# Patient Record
Sex: MALE | Race: ASIAN | NOT HISPANIC OR LATINO | URBAN - METROPOLITAN AREA
[De-identification: names, ages, dates, MRNs, and addresses within clinical notes are randomized per-mention and may not be internally consistent; named-entity substitution may affect disease eponyms.]

---

## 2019-04-01 ENCOUNTER — OUTPATIENT (OUTPATIENT)
Dept: OUTPATIENT SERVICES | Facility: HOSPITAL | Age: 79
LOS: 1 days | End: 2019-04-01
Payer: MEDICAID

## 2019-04-03 ENCOUNTER — INPATIENT (INPATIENT)
Facility: HOSPITAL | Age: 79
LOS: 21 days | Discharge: HOME | DRG: 286 | End: 2019-04-25
Attending: HOSPITALIST | Admitting: HOSPITALIST
Payer: MEDICAID

## 2019-04-03 VITALS
HEIGHT: 66 IN | RESPIRATION RATE: 18 BRPM | OXYGEN SATURATION: 98 % | SYSTOLIC BLOOD PRESSURE: 164 MMHG | DIASTOLIC BLOOD PRESSURE: 110 MMHG | WEIGHT: 167.99 LBS | HEART RATE: 102 BPM | TEMPERATURE: 98 F

## 2019-04-03 DIAGNOSIS — I48.91 UNSPECIFIED ATRIAL FIBRILLATION: ICD-10-CM

## 2019-04-03 LAB
ALBUMIN SERPL ELPH-MCNC: 3.7 G/DL — SIGNIFICANT CHANGE UP (ref 3.3–5)
ALP SERPL-CCNC: 111 U/L — SIGNIFICANT CHANGE UP (ref 40–120)
ALT FLD-CCNC: 467 U/L — HIGH (ref 10–45)
ANION GAP SERPL CALC-SCNC: 15 MMOL/L — SIGNIFICANT CHANGE UP (ref 5–17)
APPEARANCE UR: CLEAR — SIGNIFICANT CHANGE UP
APTT BLD: 27.2 SEC — LOW (ref 27.5–36.3)
AST SERPL-CCNC: 389 U/L — HIGH (ref 10–40)
BACTERIA # UR AUTO: ABNORMAL
BASE EXCESS BLDV CALC-SCNC: -1.2 MMOL/L — SIGNIFICANT CHANGE UP (ref -2–2)
BASOPHILS # BLD AUTO: 0 K/UL — SIGNIFICANT CHANGE UP (ref 0–0.2)
BASOPHILS NFR BLD AUTO: 0.1 % — SIGNIFICANT CHANGE UP (ref 0–2)
BILIRUB SERPL-MCNC: 0.5 MG/DL — SIGNIFICANT CHANGE UP (ref 0.2–1.2)
BILIRUB UR-MCNC: NEGATIVE — SIGNIFICANT CHANGE UP
BUN SERPL-MCNC: 34 MG/DL — HIGH (ref 7–23)
CA-I SERPL-SCNC: 1.1 MMOL/L — LOW (ref 1.12–1.3)
CALCIUM SERPL-MCNC: 8.9 MG/DL — SIGNIFICANT CHANGE UP (ref 8.4–10.5)
CHLORIDE BLDV-SCNC: 96 MMOL/L — SIGNIFICANT CHANGE UP (ref 96–108)
CHLORIDE SERPL-SCNC: 90 MMOL/L — LOW (ref 96–108)
CO2 BLDV-SCNC: 24 MMOL/L — SIGNIFICANT CHANGE UP (ref 22–30)
CO2 SERPL-SCNC: 19 MMOL/L — LOW (ref 22–31)
COLOR SPEC: YELLOW — SIGNIFICANT CHANGE UP
CREAT SERPL-MCNC: 1.59 MG/DL — HIGH (ref 0.5–1.3)
DIFF PNL FLD: ABNORMAL
EOSINOPHIL # BLD AUTO: 0.1 K/UL — SIGNIFICANT CHANGE UP (ref 0–0.5)
EOSINOPHIL NFR BLD AUTO: 0.3 % — SIGNIFICANT CHANGE UP (ref 0–6)
EPI CELLS # UR: 3 — SIGNIFICANT CHANGE UP
GAS PNL BLDV: 121 MMOL/L — LOW (ref 136–145)
GAS PNL BLDV: SIGNIFICANT CHANGE UP
GAS PNL BLDV: SIGNIFICANT CHANGE UP
GLUCOSE BLDV-MCNC: 181 MG/DL — HIGH (ref 70–99)
GLUCOSE SERPL-MCNC: 190 MG/DL — HIGH (ref 70–99)
GLUCOSE UR QL: NEGATIVE — SIGNIFICANT CHANGE UP
HCO3 BLDV-SCNC: 23 MMOL/L — SIGNIFICANT CHANGE UP (ref 21–29)
HCT VFR BLD CALC: 39.2 % — SIGNIFICANT CHANGE UP (ref 39–50)
HCT VFR BLDA CALC: 42 % — SIGNIFICANT CHANGE UP (ref 39–50)
HGB BLD CALC-MCNC: 13.6 G/DL — SIGNIFICANT CHANGE UP (ref 13–17)
HGB BLD-MCNC: 13.6 G/DL — SIGNIFICANT CHANGE UP (ref 13–17)
HYALINE CASTS # UR AUTO: 35 /LPF — HIGH (ref 0–7)
INR BLD: 1.52 RATIO — HIGH (ref 0.88–1.16)
KETONES UR-MCNC: NEGATIVE — SIGNIFICANT CHANGE UP
LACTATE BLDV-MCNC: 3.3 MMOL/L — HIGH (ref 0.7–2)
LEUKOCYTE ESTERASE UR-ACNC: NEGATIVE — SIGNIFICANT CHANGE UP
LIDOCAIN IGE QN: 72 U/L — HIGH (ref 7–60)
LYMPHOCYTES # BLD AUTO: 1.9 K/UL — SIGNIFICANT CHANGE UP (ref 1–3.3)
LYMPHOCYTES # BLD AUTO: 12.1 % — LOW (ref 13–44)
MCHC RBC-ENTMCNC: 29.6 PG — SIGNIFICANT CHANGE UP (ref 27–34)
MCHC RBC-ENTMCNC: 34.8 GM/DL — SIGNIFICANT CHANGE UP (ref 32–36)
MCV RBC AUTO: 85 FL — SIGNIFICANT CHANGE UP (ref 80–100)
MONOCYTES # BLD AUTO: 1.5 K/UL — HIGH (ref 0–0.9)
MONOCYTES NFR BLD AUTO: 9.4 % — SIGNIFICANT CHANGE UP (ref 2–14)
NEUTROPHILS # BLD AUTO: 12.1 K/UL — HIGH (ref 1.8–7.4)
NEUTROPHILS NFR BLD AUTO: 78.1 % — HIGH (ref 43–77)
NITRITE UR-MCNC: NEGATIVE — SIGNIFICANT CHANGE UP
NT-PROBNP SERPL-SCNC: HIGH PG/ML (ref 0–300)
OSMOLALITY UR: 506 MOS/KG — SIGNIFICANT CHANGE UP (ref 300–900)
PCO2 BLDV: 40 MMHG — SIGNIFICANT CHANGE UP (ref 35–50)
PH BLDV: 7.38 — SIGNIFICANT CHANGE UP (ref 7.35–7.45)
PH UR: 6 — SIGNIFICANT CHANGE UP (ref 5–8)
PLATELET # BLD AUTO: 255 K/UL — SIGNIFICANT CHANGE UP (ref 150–400)
PO2 BLDV: 20 MMHG — LOW (ref 25–45)
POTASSIUM BLDV-SCNC: 4.6 MMOL/L — SIGNIFICANT CHANGE UP (ref 3.5–5.3)
POTASSIUM SERPL-MCNC: 4.9 MMOL/L — SIGNIFICANT CHANGE UP (ref 3.5–5.3)
POTASSIUM SERPL-SCNC: 4.9 MMOL/L — SIGNIFICANT CHANGE UP (ref 3.5–5.3)
PROT SERPL-MCNC: 7.2 G/DL — SIGNIFICANT CHANGE UP (ref 6–8.3)
PROT UR-MCNC: ABNORMAL
PROTHROM AB SERPL-ACNC: 17.5 SEC — HIGH (ref 10–12.9)
RBC # BLD: 4.61 M/UL — SIGNIFICANT CHANGE UP (ref 4.2–5.8)
RBC # FLD: 12.6 % — SIGNIFICANT CHANGE UP (ref 10.3–14.5)
RBC CASTS # UR COMP ASSIST: >50 /HPF — HIGH (ref 0–4)
SAO2 % BLDV: 24 % — LOW (ref 67–88)
SODIUM SERPL-SCNC: 124 MMOL/L — LOW (ref 135–145)
SP GR SPEC: 1.03 — HIGH (ref 1.01–1.02)
TROPONIN T, HIGH SENSITIVITY RESULT: 54 NG/L — HIGH (ref 0–51)
UROBILINOGEN FLD QL: NEGATIVE — SIGNIFICANT CHANGE UP
WBC # BLD: 15.5 K/UL — HIGH (ref 3.8–10.5)
WBC # FLD AUTO: 15.5 K/UL — HIGH (ref 3.8–10.5)
WBC UR QL: 3 /HPF — SIGNIFICANT CHANGE UP (ref 0–5)

## 2019-04-03 PROCEDURE — 93308 TTE F-UP OR LMTD: CPT | Mod: 26

## 2019-04-03 PROCEDURE — 99291 CRITICAL CARE FIRST HOUR: CPT

## 2019-04-03 PROCEDURE — 93010 ELECTROCARDIOGRAM REPORT: CPT

## 2019-04-03 PROCEDURE — 71045 X-RAY EXAM CHEST 1 VIEW: CPT | Mod: 26

## 2019-04-03 PROCEDURE — 99223 1ST HOSP IP/OBS HIGH 75: CPT | Mod: GC

## 2019-04-03 RX ORDER — FUROSEMIDE 40 MG
40 TABLET ORAL ONCE
Qty: 0 | Refills: 0 | Status: COMPLETED | OUTPATIENT
Start: 2019-04-03 | End: 2019-04-03

## 2019-04-03 RX ORDER — ONDANSETRON 8 MG/1
4 TABLET, FILM COATED ORAL ONCE
Qty: 0 | Refills: 0 | Status: COMPLETED | OUTPATIENT
Start: 2019-04-03 | End: 2019-04-03

## 2019-04-03 RX ORDER — METOPROLOL TARTRATE 50 MG
5 TABLET ORAL ONCE
Qty: 0 | Refills: 0 | Status: COMPLETED | OUTPATIENT
Start: 2019-04-03 | End: 2019-04-03

## 2019-04-03 RX ORDER — METOPROLOL TARTRATE 50 MG
25 TABLET ORAL ONCE
Qty: 0 | Refills: 0 | Status: COMPLETED | OUTPATIENT
Start: 2019-04-03 | End: 2019-04-03

## 2019-04-03 RX ORDER — ENOXAPARIN SODIUM 100 MG/ML
80 INJECTION SUBCUTANEOUS ONCE
Qty: 0 | Refills: 0 | Status: COMPLETED | OUTPATIENT
Start: 2019-04-03 | End: 2019-04-03

## 2019-04-03 RX ORDER — SODIUM CHLORIDE 9 MG/ML
1000 INJECTION INTRAMUSCULAR; INTRAVENOUS; SUBCUTANEOUS ONCE
Qty: 0 | Refills: 0 | Status: COMPLETED | OUTPATIENT
Start: 2019-04-03 | End: 2019-04-03

## 2019-04-03 RX ORDER — METOCLOPRAMIDE HCL 10 MG
10 TABLET ORAL ONCE
Qty: 0 | Refills: 0 | Status: COMPLETED | OUTPATIENT
Start: 2019-04-03 | End: 2019-04-03

## 2019-04-03 RX ADMIN — ENOXAPARIN SODIUM 80 MILLIGRAM(S): 100 INJECTION SUBCUTANEOUS at 20:36

## 2019-04-03 RX ADMIN — Medication 5 MILLIGRAM(S): at 18:23

## 2019-04-03 RX ADMIN — SODIUM CHLORIDE 1000 MILLILITER(S): 9 INJECTION INTRAMUSCULAR; INTRAVENOUS; SUBCUTANEOUS at 17:09

## 2019-04-03 RX ADMIN — Medication 10 MILLIGRAM(S): at 17:09

## 2019-04-03 RX ADMIN — ONDANSETRON 4 MILLIGRAM(S): 8 TABLET, FILM COATED ORAL at 14:33

## 2019-04-03 RX ADMIN — Medication 40 MILLIGRAM(S): at 18:23

## 2019-04-03 RX ADMIN — Medication 25 MILLIGRAM(S): at 18:53

## 2019-04-03 NOTE — ED PROVIDER NOTE - CROS ED CONS ALL NEG
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 During hospitalization, patient presented with elevated blood pressures and sudden drop to systolic 130s, which could cause ischemic ATN.  2. Over diuresis for CHF  3. Hb trending down, causing hypoperfusion of kidneys  4. Hypoperfusion with cardiorenal etiology, given her decompensated CHF  5. Progression of her CKD  6. Progression of medication effect    Plan / Recommendations:  1. Continue holding diuretics.  2. Medications to renal parameters  3. Follow-up with Nephrology outpatient within 2 weeks with Renal Panel Labs.  4. Strict I/O and chart.  5. Avoid nephrotoxic medications.  6. Maintain MAP > 65.   7. Hb > 7gm/dL.         negative...

## 2019-04-03 NOTE — ED PROVIDER NOTE - CLINICAL SUMMARY MEDICAL DECISION MAKING FREE TEXT BOX
ATTG: : abd distention with decreased po, concern for obstruction, inflammation, will check labs, check ct a/p, ivf, pain meds, re eval for dispo.

## 2019-04-03 NOTE — ED PROVIDER NOTE - CARE PLAN
Principal Discharge DX:	Rapid atrial fibrillation  Secondary Diagnosis:	Acute congestive heart failure, unspecified heart failure type  Secondary Diagnosis:	Hyponatremia

## 2019-04-03 NOTE — ED PROVIDER NOTE - PROGRESS NOTE DETAILS
Attending MD Boyce: patient in rapid afib afib to 150s, increased WOB, POCUS with global LV dysfunction and b/l B lines c/f new onset Chf, lopressor 5mg IV given, will give IV lasix. Attending MD Boyce: patient still unable to lie flat, abdomen not a surgical abdomen so I think CT imaging can be deferred for the moment while we address the patient's acute pulmonary edema. Will admit to telemetry

## 2019-04-03 NOTE — ED PROVIDER NOTE - OBJECTIVE STATEMENT
78yo M denies pmhx, denies past surg hx presents to ER c/o of generalized abdominal pain(fullness) and distension associated with dec po intake, n/v x1week.  Patient reports ~3episodes of nonbloody emesis total, constant nausea.  Tried taking Protonix with no relief.  Has never had colonoscopy. reports having bowel movements everyday however lower in caliber over past 4 days.  Denies fever, chills, change in urinating, hematuria, prostate issues, bloody stool, ETOH use.    Of note: Mild swelling bilaterally 80yo M denies pmhx, denies past surg hx presents to ER c/o of generalized abdominal pain(fullness) and distension associated with dec po intake, n/v x1week.  Patient reports ~3episodes of nonbloody emesis total, constant nausea. Also noted swelling bilaterally. Tried taking Protonix with no relief.  Has never had colonoscopy. reports having bowel movements everyday however lower in caliber over past 4 days.  Denies fever, chills, change in urinating, hematuria, prostate issues, bloody stool, ETOH use.

## 2019-04-03 NOTE — ED PROVIDER NOTE - ATTENDING CONTRIBUTION TO CARE
78 y/o M with pmhx denies pmhx, denies past surg hx presents for generalized abdominal pain described as fullness and distention. e, n/v x1week.    Gen.    HEENT:    Lungs:    CVS: S1S2   Abd;    Ext:   Neuro:  MSK: 78 y/o M with pmhx denies pmhx, denies past surg hx presents for generalized abdominal pain described as fullness and distention. Patient states he feels as though his abd is larger. also with decreased PO intake.had 3 episodes of nonbloody emesis total, constant nausea.    Denies fever, chills, change in urinating, hematuria, prostate issues, bloody stool, ETOH use. son is a physician, at the bedside.   Gen.  no acute distress  HEENT:  perrl eomi  Lungs:  b/l bs  CVS: S1S2   Abd;  soft, non tender. no guarding.  min distention.  Ext: b/l lower ext edema  Neuro: aaox3 no focal deficits  MSK: strength 5/5 x 4 ext

## 2019-04-03 NOTE — ED PROVIDER NOTE - CRITICAL CARE PROVIDED
interpretation of diagnostic studies/documentation/direct patient care (not related to procedure)/consult w/ pt's family directly relating to pts condition/additional history taking

## 2019-04-03 NOTE — ED ADULT NURSE NOTE - OBJECTIVE STATEMENT
79 year old male A&OX4 with no medical hx presents with one week of nausea, vomiting, abdominal distention, and decreased PO intake. Patient's son states that patient has had significant abdominal swelling over the last several days. Patient also notes increased weakness as well as dyspnea on exertion. Patient's lung sounds are clear and equal bilaterally. Abdomen is distended and non tender to palpation. Patient denies fevers, chills, chest pain, palpitations, constipation, diarrhea. 79 year old male A&OX4 with no medical hx presents with one week of nausea, vomiting, abdominal distention, and decreased PO intake. Patient's son states that patient has had significant abdominal swelling over the last several days. Patient also notes increased weakness as well as dyspnea on exertion. Patient's lung sounds are clear and equal bilaterally. Abdomen is distended and non tender to palpation. Bilateral lower extremity swelling noted. Patient denies fevers, chills, chest pain, palpitations, constipation, diarrhea, no jaundice noted or icterus of the sclera.

## 2019-04-03 NOTE — ED STATDOCS - OBJECTIVE STATEMENT
79 y.o. male no PMHx coming in with his son that is a physician complaining of diffuse abd pain, distension, burping, nausea, vomiting.  Symptoms started on Friday with swelling of the abdomen and some nausea.  Since has had intermittent pain but every day.  Difficult swallowing solids, only able to tolerate small amounts of PO liquids.  Last BM was yesterday and normal.  No dysuria, no frequency.  NO chest pain, palp, or SoB.  Has not taken anything for the pain, nothing makes it better, eating makes it worse.

## 2019-04-04 DIAGNOSIS — N17.9 ACUTE KIDNEY FAILURE, UNSPECIFIED: ICD-10-CM

## 2019-04-04 DIAGNOSIS — E87.79 OTHER FLUID OVERLOAD: ICD-10-CM

## 2019-04-04 DIAGNOSIS — I50.9 HEART FAILURE, UNSPECIFIED: ICD-10-CM

## 2019-04-04 DIAGNOSIS — D72.829 ELEVATED WHITE BLOOD CELL COUNT, UNSPECIFIED: ICD-10-CM

## 2019-04-04 DIAGNOSIS — R10.13 EPIGASTRIC PAIN: ICD-10-CM

## 2019-04-04 DIAGNOSIS — R18.8 OTHER ASCITES: ICD-10-CM

## 2019-04-04 DIAGNOSIS — I48.91 UNSPECIFIED ATRIAL FIBRILLATION: ICD-10-CM

## 2019-04-04 DIAGNOSIS — R74.0 NONSPECIFIC ELEVATION OF LEVELS OF TRANSAMINASE AND LACTIC ACID DEHYDROGENASE [LDH]: ICD-10-CM

## 2019-04-04 DIAGNOSIS — D68.9 COAGULATION DEFECT, UNSPECIFIED: ICD-10-CM

## 2019-04-04 DIAGNOSIS — E87.1 HYPO-OSMOLALITY AND HYPONATREMIA: ICD-10-CM

## 2019-04-04 DIAGNOSIS — Z29.9 ENCOUNTER FOR PROPHYLACTIC MEASURES, UNSPECIFIED: ICD-10-CM

## 2019-04-04 LAB
ALBUMIN SERPL ELPH-MCNC: 3.6 G/DL — SIGNIFICANT CHANGE UP (ref 3.3–5)
ALP SERPL-CCNC: 138 U/L — HIGH (ref 40–120)
ALT FLD-CCNC: 1350 U/L — HIGH (ref 10–45)
ANION GAP SERPL CALC-SCNC: 14 MMOL/L — SIGNIFICANT CHANGE UP (ref 5–17)
AST SERPL-CCNC: 1061 U/L — HIGH (ref 10–40)
BASE EXCESS BLDV CALC-SCNC: -2.5 MMOL/L — LOW (ref -2–2)
BASOPHILS # BLD AUTO: 0.02 K/UL — SIGNIFICANT CHANGE UP (ref 0–0.2)
BASOPHILS NFR BLD AUTO: 0.1 % — SIGNIFICANT CHANGE UP (ref 0–2)
BILIRUB DIRECT SERPL-MCNC: 0.4 MG/DL — HIGH (ref 0–0.2)
BILIRUB INDIRECT FLD-MCNC: 0.5 MG/DL — SIGNIFICANT CHANGE UP (ref 0.2–1)
BILIRUB SERPL-MCNC: 0.9 MG/DL — SIGNIFICANT CHANGE UP (ref 0.2–1.2)
BUN SERPL-MCNC: 45 MG/DL — HIGH (ref 7–23)
CA-I SERPL-SCNC: 1.01 MMOL/L — LOW (ref 1.12–1.3)
CALCIUM SERPL-MCNC: 8.7 MG/DL — SIGNIFICANT CHANGE UP (ref 8.4–10.5)
CHLORIDE BLDV-SCNC: 102 MMOL/L — SIGNIFICANT CHANGE UP (ref 96–108)
CHLORIDE SERPL-SCNC: 93 MMOL/L — LOW (ref 96–108)
CHLORIDE UR-SCNC: 56 MMOL/L — SIGNIFICANT CHANGE UP
CK MB CFR SERPL CALC: 5.6 NG/ML — SIGNIFICANT CHANGE UP (ref 0–6.7)
CK SERPL-CCNC: 98 U/L — SIGNIFICANT CHANGE UP (ref 30–200)
CO2 BLDV-SCNC: 22 MMOL/L — SIGNIFICANT CHANGE UP (ref 22–30)
CO2 SERPL-SCNC: 19 MMOL/L — LOW (ref 22–31)
CREAT ?TM UR-MCNC: 35 MG/DL — SIGNIFICANT CHANGE UP
CREAT ?TM UR-MCNC: 36 MG/DL — SIGNIFICANT CHANGE UP
CREAT SERPL-MCNC: 1.77 MG/DL — HIGH (ref 0.5–1.3)
EOSINOPHIL # BLD AUTO: 0 K/UL — SIGNIFICANT CHANGE UP (ref 0–0.5)
EOSINOPHIL NFR BLD AUTO: 0 % — SIGNIFICANT CHANGE UP (ref 0–6)
FERRITIN SERPL-MCNC: 2570 NG/ML — HIGH (ref 30–400)
GAS PNL BLDV: 118 MMOL/L — CRITICAL LOW (ref 136–145)
GAS PNL BLDV: SIGNIFICANT CHANGE UP
GAS PNL BLDV: SIGNIFICANT CHANGE UP
GLUCOSE BLDV-MCNC: 123 MG/DL — HIGH (ref 70–99)
GLUCOSE SERPL-MCNC: 121 MG/DL — HIGH (ref 70–99)
HAV IGM SER-ACNC: SIGNIFICANT CHANGE UP
HBA1C BLD-MCNC: 6.6 % — HIGH (ref 4–5.6)
HBA1C BLD-MCNC: 6.7 % — HIGH (ref 4–5.6)
HBV CORE IGM SER-ACNC: SIGNIFICANT CHANGE UP
HBV SURFACE AG SER-ACNC: SIGNIFICANT CHANGE UP
HCO3 BLDV-SCNC: 21 MMOL/L — SIGNIFICANT CHANGE UP (ref 21–29)
HCT VFR BLD CALC: 38.9 % — LOW (ref 39–50)
HCT VFR BLDA CALC: 40 % — SIGNIFICANT CHANGE UP (ref 39–50)
HCV AB S/CO SERPL IA: 0.07 S/CO — SIGNIFICANT CHANGE UP (ref 0–0.99)
HCV AB S/CO SERPL IA: 0.07 S/CO — SIGNIFICANT CHANGE UP (ref 0–0.99)
HCV AB SERPL-IMP: SIGNIFICANT CHANGE UP
HCV AB SERPL-IMP: SIGNIFICANT CHANGE UP
HGB BLD CALC-MCNC: 13 G/DL — SIGNIFICANT CHANGE UP (ref 13–17)
HGB BLD-MCNC: 12.8 G/DL — LOW (ref 13–17)
IMM GRANULOCYTES NFR BLD AUTO: 1.1 % — SIGNIFICANT CHANGE UP (ref 0–1.5)
LACTATE BLDV-MCNC: 2.7 MMOL/L — HIGH (ref 0.7–2)
LYMPHOCYTES # BLD AUTO: 1.96 K/UL — SIGNIFICANT CHANGE UP (ref 1–3.3)
LYMPHOCYTES # BLD AUTO: 12.5 % — LOW (ref 13–44)
MAGNESIUM SERPL-MCNC: 2.4 MG/DL — SIGNIFICANT CHANGE UP (ref 1.6–2.6)
MCHC RBC-ENTMCNC: 28.1 PG — SIGNIFICANT CHANGE UP (ref 27–34)
MCHC RBC-ENTMCNC: 32.9 GM/DL — SIGNIFICANT CHANGE UP (ref 32–36)
MCV RBC AUTO: 85.3 FL — SIGNIFICANT CHANGE UP (ref 80–100)
MONOCYTES # BLD AUTO: 1.38 K/UL — HIGH (ref 0–0.9)
MONOCYTES NFR BLD AUTO: 8.8 % — SIGNIFICANT CHANGE UP (ref 2–14)
NEUTROPHILS # BLD AUTO: 12.19 K/UL — HIGH (ref 1.8–7.4)
NEUTROPHILS NFR BLD AUTO: 77.5 % — HIGH (ref 43–77)
OSMOLALITY SERPL: 286 MOS/KG — SIGNIFICANT CHANGE UP (ref 275–300)
OSMOLALITY UR: 323 MOS/KG — SIGNIFICANT CHANGE UP (ref 300–900)
PCO2 BLDV: 35 MMHG — SIGNIFICANT CHANGE UP (ref 35–50)
PH BLDV: 7.4 — SIGNIFICANT CHANGE UP (ref 7.35–7.45)
PHOSPHATE SERPL-MCNC: 4.6 MG/DL — HIGH (ref 2.5–4.5)
PLATELET # BLD AUTO: 220 K/UL — SIGNIFICANT CHANGE UP (ref 150–400)
PO2 BLDV: 46 MMHG — HIGH (ref 25–45)
POTASSIUM BLDV-SCNC: 4.3 MMOL/L — SIGNIFICANT CHANGE UP (ref 3.5–5.3)
POTASSIUM SERPL-MCNC: 4.7 MMOL/L — SIGNIFICANT CHANGE UP (ref 3.5–5.3)
POTASSIUM SERPL-SCNC: 4.7 MMOL/L — SIGNIFICANT CHANGE UP (ref 3.5–5.3)
POTASSIUM UR-SCNC: 44 MMOL/L — SIGNIFICANT CHANGE UP
PROT ?TM UR-MCNC: 15 MG/DL — HIGH (ref 0–12)
PROT SERPL-MCNC: 6.7 G/DL — SIGNIFICANT CHANGE UP (ref 6–8.3)
PROT/CREAT UR-RTO: 0.4 RATIO — HIGH (ref 0–0.2)
RAPID RVP RESULT: SIGNIFICANT CHANGE UP
RBC # BLD: 4.56 M/UL — SIGNIFICANT CHANGE UP (ref 4.2–5.8)
RBC # FLD: 13.1 % — SIGNIFICANT CHANGE UP (ref 10.3–14.5)
SAO2 % BLDV: 74 % — SIGNIFICANT CHANGE UP (ref 67–88)
SODIUM SERPL-SCNC: 126 MMOL/L — LOW (ref 135–145)
SODIUM UR-SCNC: 27 MMOL/L — SIGNIFICANT CHANGE UP
TROPONIN T, HIGH SENSITIVITY RESULT: 64 NG/L — HIGH (ref 0–51)
TROPONIN T, HIGH SENSITIVITY RESULT: 67 NG/L — HIGH (ref 0–51)
TSH SERPL-MCNC: 1.05 UIU/ML — SIGNIFICANT CHANGE UP (ref 0.27–4.2)
URATE SERPL-MCNC: 9.7 MG/DL — HIGH (ref 3.4–8.8)
UUN UR-MCNC: 426 MG/DL — SIGNIFICANT CHANGE UP
WBC # BLD: 15.73 K/UL — HIGH (ref 3.8–10.5)
WBC # FLD AUTO: 15.73 K/UL — HIGH (ref 3.8–10.5)

## 2019-04-04 PROCEDURE — 99255 IP/OBS CONSLTJ NEW/EST HI 80: CPT

## 2019-04-04 PROCEDURE — 99233 SBSQ HOSP IP/OBS HIGH 50: CPT | Mod: GC

## 2019-04-04 PROCEDURE — 74176 CT ABD & PELVIS W/O CONTRAST: CPT | Mod: 26

## 2019-04-04 PROCEDURE — 93306 TTE W/DOPPLER COMPLETE: CPT | Mod: 26

## 2019-04-04 PROCEDURE — 71250 CT THORAX DX C-: CPT | Mod: 26

## 2019-04-04 RX ORDER — ENOXAPARIN SODIUM 100 MG/ML
80 INJECTION SUBCUTANEOUS EVERY 12 HOURS
Qty: 0 | Refills: 0 | Status: DISCONTINUED | OUTPATIENT
Start: 2019-04-04 | End: 2019-04-04

## 2019-04-04 RX ORDER — FAMOTIDINE 10 MG/ML
20 INJECTION INTRAVENOUS DAILY
Qty: 0 | Refills: 0 | Status: DISCONTINUED | OUTPATIENT
Start: 2019-04-04 | End: 2019-04-25

## 2019-04-04 RX ORDER — FUROSEMIDE 40 MG
40 TABLET ORAL EVERY 12 HOURS
Qty: 0 | Refills: 0 | Status: DISCONTINUED | OUTPATIENT
Start: 2019-04-04 | End: 2019-04-05

## 2019-04-04 RX ORDER — METOPROLOL TARTRATE 50 MG
12.5 TABLET ORAL EVERY 12 HOURS
Qty: 0 | Refills: 0 | Status: DISCONTINUED | OUTPATIENT
Start: 2019-04-04 | End: 2019-04-04

## 2019-04-04 RX ORDER — METOPROLOL TARTRATE 50 MG
25 TABLET ORAL EVERY 12 HOURS
Qty: 0 | Refills: 0 | Status: DISCONTINUED | OUTPATIENT
Start: 2019-04-04 | End: 2019-04-05

## 2019-04-04 RX ORDER — HEPARIN SODIUM 5000 [USP'U]/ML
5000 INJECTION INTRAVENOUS; SUBCUTANEOUS EVERY 12 HOURS
Qty: 0 | Refills: 0 | Status: DISCONTINUED | OUTPATIENT
Start: 2019-04-04 | End: 2019-04-04

## 2019-04-04 RX ORDER — ERGOCALCIFEROL 1.25 MG/1
50000 CAPSULE ORAL
Qty: 0 | Refills: 0 | Status: DISCONTINUED | OUTPATIENT
Start: 2019-04-04 | End: 2019-04-25

## 2019-04-04 RX ORDER — ENOXAPARIN SODIUM 100 MG/ML
80 INJECTION SUBCUTANEOUS
Qty: 0 | Refills: 0 | Status: DISCONTINUED | OUTPATIENT
Start: 2019-04-04 | End: 2019-04-05

## 2019-04-04 RX ADMIN — Medication 40 MILLIGRAM(S): at 17:49

## 2019-04-04 RX ADMIN — Medication 40 MILLIGRAM(S): at 05:34

## 2019-04-04 RX ADMIN — ENOXAPARIN SODIUM 80 MILLIGRAM(S): 100 INJECTION SUBCUTANEOUS at 21:29

## 2019-04-04 RX ADMIN — Medication 12.5 MILLIGRAM(S): at 05:34

## 2019-04-04 RX ADMIN — Medication 25 MILLIGRAM(S): at 17:49

## 2019-04-04 RX ADMIN — ENOXAPARIN SODIUM 80 MILLIGRAM(S): 100 INJECTION SUBCUTANEOUS at 11:19

## 2019-04-04 RX ADMIN — Medication 1 TABLET(S): at 11:20

## 2019-04-04 RX ADMIN — FAMOTIDINE 20 MILLIGRAM(S): 10 INJECTION INTRAVENOUS at 11:20

## 2019-04-04 NOTE — H&P ADULT - PROBLEM SELECTOR PLAN 2
Patient previously with reported prior EKG at [facility], now found to have new atrial fibrillation with RVR to 130. No prior cardiac instrumentation. [id number]  - s/p metoprolol 5 mg IV and metorpolol 25 mg PO x 1  - currently in afib with controlled rate in 90s  - c/w metoprolol 12.5 mg q12, cautious given concomitant ADHF  - monitor on tele  - replete to K > 4 and Mg > 2  - check TSH  - TTE  - c/w lovenox for AC  - f.u cardiology recs Patient previously with reported prior EKG at [facility], now found to have new atrial fibrillation with RVR to 130. No prior cardiac instrumentation. [id number] 2  - s/p metoprolol 5 mg IV and metorpolol 25 mg PO x 1  - currently in afib with controlled rate in 90s  - c/w metoprolol 12.5 mg q12, cautious given concomitant ADHF  - monitor on tele  - replete to K > 4 and Mg > 2  - check TSH  - TTE  - hold lovenox for now  - f.u cardiology recs Patient previously with reported prior EKG at [facility], now found to have new atrial fibrillation with RVR to 130. No prior cardiac instrumentation. [id number] 2  - s/p metoprolol 5 mg IV and metoprolol 25 mg PO x 1  - currently in afib with controlled rate in 90s  - c/w metoprolol 12.5 mg q12, cautious given concomitant ADHF  - monitor on tele  - replete to K > 4 and Mg > 2  - check TSH  - TTE  - hold lovenox for now  - f.u cardiology recs Patient previously with reported prior EKG at [facility], now found to have new atrial fibrillation with RVR to 130. No prior cardiac instrumentation. [id number] 2 for now  - s/p metoprolol 5 mg IV and metoprolol 25 mg PO x 1  - currently in afib with controlled rate in 90s  - c/w metoprolol 12.5 mg q12, cautious given concomitant ADHF  - monitor on tele  - replete to K > 4 and Mg > 2  - check TSH  - TTE  - will cont lovenox for now  - f.u cardiology recs  - may benefit from cardioversion?

## 2019-04-04 NOTE — PROGRESS NOTE ADULT - PROBLEM SELECTOR PLAN 6
WBC 15 with only reported sx of non productive cough and vague abdominal sx  - f/u blood cultures  - possibly reactive, afebrile, VS stable  - f/u official CT chest read

## 2019-04-04 NOTE — CONSULT NOTE ADULT - SUBJECTIVE AND OBJECTIVE BOX
Patient is a 79y old  Male who presents with a chief complaint of dyspnea      HPI:  79 male patient p/w new onset sob found to have new onset CHF (low ef noted on tte), complicated by afib with vrv, ethan, hypoNa, elevated trop, leukocytosis. hepatology called for worsening LFts. Patient recalls having an isolated episode of jaundice in 2001 for which he received ? med for 3 weeks and that resolved afterwards. He denies recent infection, OTC meds, herbs, alcohol, ivdu, tattoos.     no egd  no colonoscopy  FH + gastric ca              PAST MEDICAL & SURGICAL HISTORY:  No pertinent past medical history  No significant past surgical history      Home Medications:  ergocalciferol 50,000 intl units (1.25 mg) oral capsule: 1 cap(s) orally once a week on Sunday (04 Apr 2019 02:53)  Multiple Vitamins oral tablet: 1 tab(s) orally once a day (04 Apr 2019 02:53)  Protonix: milligram(s) orally once a day, As Needed (04 Apr 2019 02:53)  tumeric: 500 milligram(s) orally once a day (04 Apr 2019 02:53)      MEDICATIONS  (STANDING):  enoxaparin Injectable 80 milliGRAM(s) SubCutaneous two times a day  ergocalciferol 62992 Unit(s) Oral every week  famotidine    Tablet 20 milliGRAM(s) Oral daily  furosemide   Injectable 40 milliGRAM(s) IV Push every 12 hours  metoprolol tartrate 25 milliGRAM(s) Oral every 12 hours  multivitamin 1 Tablet(s) Oral daily    MEDICATIONS  (PRN):      Allergies    No Known Allergies    Intolerances        FAMILY HISTORY:  FH: liver disease: unknown etiology, mother      SOCIAL    REVIEW OF SYSTEMS  General: mild fatigue   Skin: no new changes   Ophtalmologic: no new visual changes   Respiratory: no new shortness of breath   Cardiovascular: no new chest pain   Gastrointestinal: as per H&P   Genitourinary: no new dysuria   Neurological: no new weakness   otherwise as described above     Vital Signs Last 24 Hrs  T(C): 36.6 (04 Apr 2019 20:58), Max: 36.6 (04 Apr 2019 05:29)  T(F): 97.8 (04 Apr 2019 20:58), Max: 97.8 (04 Apr 2019 05:29)  HR: 51 (04 Apr 2019 20:58) (51 - 104)  BP: 118/90 (04 Apr 2019 20:58) (109/83 - 127/89)  BP(mean): --  RR: 18 (04 Apr 2019 20:58) (16 - 18)  SpO2: 98% (04 Apr 2019 20:58) (94% - 100%)    GENERAL:  no distress  SKIN: no new changes   HEENT:  NC/AT,  anicteric  CHEST:   no new increased effort, breath sounds clear  HEART:  Regular rhythm  ABDOMEN:  Soft, non-tender, non-distended  EXTREMITIES:  no new cyanosis, LLE   PSYCHIATRIC: normal affect       CBC Full  -  ( 04 Apr 2019 09:41 )  WBC Count : 15.73 K/uL  RBC Count : 4.56 M/uL  Hemoglobin : 12.8 g/dL  Hematocrit : 38.9 %  Platelet Count - Automated : 220 K/uL  Mean Cell Volume : 85.3 fl  Mean Cell Hemoglobin : 28.1 pg  Mean Cell Hemoglobin Concentration : 32.9 gm/dL  Auto Neutrophil # : 12.19 K/uL  Auto Lymphocyte # : 1.96 K/uL  Auto Monocyte # : 1.38 K/uL  Auto Eosinophil # : 0.00 K/uL  Auto Basophil # : 0.02 K/uL  Auto Neutrophil % : 77.5 %  Auto Lymphocyte % : 12.5 %  Auto Monocyte % : 8.8 %  Auto Eosinophil % : 0.0 %  Auto Basophil % : 0.1 %      Alkaline Phosphatase, Serum: 138 U/L (04-04-19 @ 14:30)  Aspartate Aminotransferase (AST/SGOT): 1061 U/L (04-04-19 @ 14:30)  Alanine Aminotransferase (ALT/SGPT): 1350 U/L (04-04-19 @ 14:30)  Bilirubin Direct, Serum: 0.4 mg/dL (04-04-19 @ 14:30)  Bilirubin Total, Serum: 0.9 mg/dL (04-04-19 @ 14:30)  Hemoglobin: 12.8 g/dL (04-04-19 @ 09:41)  Alkaline Phosphatase, Serum: 130 U/L (04-04-19 @ 05:44)  Aspartate Aminotransferase (AST/SGOT): 1304 U/L (04-04-19 @ 05:44)  Alanine Aminotransferase (ALT/SGPT): 1398 U/L (04-04-19 @ 05:44)  Bilirubin Direct, Serum: 0.5 mg/dL (04-04-19 @ 05:44)  Bilirubin Total, Serum: 1.2 mg/dL (04-04-19 @ 05:44)  INR: 1.52 ratio (04-03-19 @ 20:37)  Hemoglobin: 13.6 g/dL (04-03-19 @ 14:42)  Alkaline Phosphatase, Serum: 111 U/L (04-03-19 @ 14:42)  Bilirubin Total, Serum: 0.5 mg/dL (04-03-19 @ 14:42)  Aspartate Aminotransferase (AST/SGOT): 389 U/L (04-03-19 @ 14:42)  Alanine Aminotransferase (ALT/SGPT): 467 U/L (04-03-19 @ 14:42)      PT/INR - ( 03 Apr 2019 20:37 )   PT: 17.5 sec;   INR: 1.52 ratio         PTT - ( 03 Apr 2019 20:37 )  PTT:27.2 sec    04-04    126<L>  |  90<L>  |  46<H>  ----------------------------<  111<H>  4.7   |  17<L>  |  1.80<H>    Ca    8.7      04 Apr 2019 05:44  Phos  4.7     04-04  Mg     2.4     04-04    TPro  6.7  /  Alb  3.6  /  TBili  0.9  /  DBili  0.4<H>  /  AST  1061<H>  /  ALT  1350<H>  /  AlkPhos  138<H>  04-04        AMYLASE                  04-03 @ 14:42   --  LIPASE                   04-03 @ 14:42  72  HCG  --                    04-03 @ 14:42          RADIOLOGY Patient is a 79y old  Male who presents with a chief complaint of dyspnea      HPI:  79 male patient p/w new onset sob found to have new onset CHF (low ef noted on tte), complicated by afib with vrv, ethan, hypoNa, elevated trop, leukocytosis. hepatology called for worsening LFts. Patient recalls having an isolated episode of jaundice in 2001 for which he received ? med for 3 weeks and that resolved afterwards. He denies recent infection, OTC meds, herbs, alcohol, ivdu, tattoos.     no egd  no colonoscopy  FH + gastric ca              PAST MEDICAL & SURGICAL HISTORY:  No pertinent past medical history  No significant past surgical history      Home Medications:  ergocalciferol 50,000 intl units (1.25 mg) oral capsule: 1 cap(s) orally once a week on Sunday (04 Apr 2019 02:53)  Multiple Vitamins oral tablet: 1 tab(s) orally once a day (04 Apr 2019 02:53)  Protonix: milligram(s) orally once a day, As Needed (04 Apr 2019 02:53)  tumeric: 500 milligram(s) orally once a day (04 Apr 2019 02:53)      MEDICATIONS  (STANDING):  enoxaparin Injectable 80 milliGRAM(s) SubCutaneous two times a day  ergocalciferol 95297 Unit(s) Oral every week  famotidine    Tablet 20 milliGRAM(s) Oral daily  furosemide   Injectable 40 milliGRAM(s) IV Push every 12 hours  metoprolol tartrate 25 milliGRAM(s) Oral every 12 hours  multivitamin 1 Tablet(s) Oral daily    MEDICATIONS  (PRN):      Allergies    No Known Allergies    Intolerances        FAMILY HISTORY:  FH: liver disease: unknown etiology, mother      SOCIAL    REVIEW OF SYSTEMS  General: mild fatigue   Skin: no new changes   Ophtalmologic: no new visual changes   Respiratory: no new shortness of breath   Cardiovascular: no new chest pain   Gastrointestinal: as per H&P   Genitourinary: no new dysuria   Neurological: no new weakness   otherwise as described above     Vital Signs Last 24 Hrs  T(C): 36.6 (04 Apr 2019 20:58), Max: 36.6 (04 Apr 2019 05:29)  T(F): 97.8 (04 Apr 2019 20:58), Max: 97.8 (04 Apr 2019 05:29)  HR: 51 (04 Apr 2019 20:58) (51 - 104)  BP: 118/90 (04 Apr 2019 20:58) (109/83 - 127/89)  BP(mean): --  RR: 18 (04 Apr 2019 20:58) (16 - 18)  SpO2: 98% (04 Apr 2019 20:58) (94% - 100%)    GENERAL:  no distress  SKIN: no new changes   HEENT:  NC/AT,  anicteric  CHEST:   no new increased effort, breath sounds clear  HEART:  Regular rhythm  ABDOMEN:  Soft, non-tender, non-distended  EXTREMITIES:  no new cyanosis, LLE   PSYCHIATRIC: normal affect       CBC Full  -  ( 04 Apr 2019 09:41 )  WBC Count : 15.73 K/uL  RBC Count : 4.56 M/uL  Hemoglobin : 12.8 g/dL  Hematocrit : 38.9 %  Platelet Count - Automated : 220 K/uL  Mean Cell Volume : 85.3 fl  Mean Cell Hemoglobin : 28.1 pg  Mean Cell Hemoglobin Concentration : 32.9 gm/dL  Auto Neutrophil # : 12.19 K/uL  Auto Lymphocyte # : 1.96 K/uL  Auto Monocyte # : 1.38 K/uL  Auto Eosinophil # : 0.00 K/uL  Auto Basophil # : 0.02 K/uL  Auto Neutrophil % : 77.5 %  Auto Lymphocyte % : 12.5 %  Auto Monocyte % : 8.8 %  Auto Eosinophil % : 0.0 %  Auto Basophil % : 0.1 %      Alkaline Phosphatase, Serum: 138 U/L (04-04-19 @ 14:30)  Aspartate Aminotransferase (AST/SGOT): 1061 U/L (04-04-19 @ 14:30)  Alanine Aminotransferase (ALT/SGPT): 1350 U/L (04-04-19 @ 14:30)  Bilirubin Direct, Serum: 0.4 mg/dL (04-04-19 @ 14:30)  Bilirubin Total, Serum: 0.9 mg/dL (04-04-19 @ 14:30)  Hemoglobin: 12.8 g/dL (04-04-19 @ 09:41)  Alkaline Phosphatase, Serum: 130 U/L (04-04-19 @ 05:44)  Aspartate Aminotransferase (AST/SGOT): 1304 U/L (04-04-19 @ 05:44)  Alanine Aminotransferase (ALT/SGPT): 1398 U/L (04-04-19 @ 05:44)  Bilirubin Direct, Serum: 0.5 mg/dL (04-04-19 @ 05:44)  Bilirubin Total, Serum: 1.2 mg/dL (04-04-19 @ 05:44)  INR: 1.52 ratio (04-03-19 @ 20:37)  Hemoglobin: 13.6 g/dL (04-03-19 @ 14:42)  Alkaline Phosphatase, Serum: 111 U/L (04-03-19 @ 14:42)  Bilirubin Total, Serum: 0.5 mg/dL (04-03-19 @ 14:42)  Aspartate Aminotransferase (AST/SGOT): 389 U/L (04-03-19 @ 14:42)  Alanine Aminotransferase (ALT/SGPT): 467 U/L (04-03-19 @ 14:42)      PT/INR - ( 03 Apr 2019 20:37 )   PT: 17.5 sec;   INR: 1.52 ratio         PTT - ( 03 Apr 2019 20:37 )  PTT:27.2 sec    04-04    126<L>  |  90<L>  |  46<H>  ----------------------------<  111<H>  4.7   |  17<L>  |  1.80<H>    Ca    8.7      04 Apr 2019 05:44  Phos  4.7     04-04  Mg     2.4     04-04    TPro  6.7  /  Alb  3.6  /  TBili  0.9  /  DBili  0.4<H>  /  AST  1061<H>  /  ALT  1350<H>  /  AlkPhos  138<H>  04-04        AMYLASE                  04-03 @ 14:42   --  LIPASE                   04-03 @ 14:42  72  HCG  --                    04-03 @ 14:42          RADIOLOGY

## 2019-04-04 NOTE — H&P ADULT - PROBLEM SELECTOR PLAN 9
DVT: heparin SQ  DIet: Veg DASH TLC  Ambulate with assistance for now Patient with complaint of frequent belching and epigastric discomfort. No weight loss, melena, hematemesis. Recent IFOBT test neg in 12/2018  - H-pylori test  - famotidine daily  - low threshold for CT chest, abd, and pelvis

## 2019-04-04 NOTE — PROGRESS NOTE ADULT - PROBLEM SELECTOR PLAN 5
-2/2 to HF vs. liver dysfunction vs. nephrotic range proteinuria causing intravascular volume depletion (BP stable)   -abdominal CT for assess for ascites/liver disease as above  -can consider therapeutic tap if very symptomatic  -continue diuresis

## 2019-04-04 NOTE — PROGRESS NOTE ADULT - SUBJECTIVE AND OBJECTIVE BOX
Delia Garcia, PGY1   Contact/Pager - [phone] / [phone]    SUBJECTIVE / OVERNIGHT EVENTS:  -no acute events overnight  -denies any complaints including CP, SOB, abdominal pain, N/V, diarrhea, constipation, headache, confusion, fever/chills      MEDICATIONS  (STANDING):  enoxaparin Injectable 80 milliGRAM(s) SubCutaneous two times a day  ergocalciferol 65063 Unit(s) Oral every week  famotidine    Tablet 20 milliGRAM(s) Oral daily  furosemide   Injectable 40 milliGRAM(s) IV Push every 12 hours  metoprolol tartrate 12.5 milliGRAM(s) Oral every 12 hours  multivitamin 1 Tablet(s) Oral daily    MEDICATIONS  (PRN):      Allergies    No Known Allergies    Intolerances          Vital Signs Last 24 Hrs  T(C): 36.3 (2019 11:36), Max: 36.6 (2019 05:29)  T(F): 97.3 (2019 11:36), Max: 97.8 (2019 05:29)  HR: 76 (:36) (76 - 140)  BP: 115/73 (2019 11:36) (109/83 - 164/110)  BP(mean): --  RR: 17 (2019 11:36) (17 - 18)  SpO2: 100% (2019 11:36) (97% - 100%)  CAPILLARY BLOOD GLUCOSE        I&O's Summary    2019 07:  -  2019 07:00  --------------------------------------------------------  IN: 0 mL / OUT: 300 mL / NET: -300 mL    2019 07:  -  2019 13:14  --------------------------------------------------------  IN: 0 mL / OUT: 600 mL / NET: -600 mL          PHYSICAL EXAM:  GENERAL: NAD, well-developed  HEAD:  AT, NC  EYES: EOMI, PERRLA, conjunctiva and sclera clear  ENMT: Airway patent. MMM. Good dentition, no lesions.  NECK: Supple, No JVD  CHEST/LUNG: CTABL; No wheezing, rhonci, or rales  HEART: RRR; Normal S1, S2. No murmurs, rubs, or gallops  ABDOMEN: Soft, NT, ND; Bowel sounds present. No organomegaly  EXTREMITIES:  2+ Peripheral Pulses, No clubbing, cyanosis, or edema  PSYCH: AAOx3  NEUROLOGY: non-focal  SKIN: Warm, dry, intact; No rashes or lesions      LABS:                        12.8   15.73 )-----------( 220      ( 2019 09:41 )             38.9     04-04    126<L>  |  90<L>  |  46<H>  ----------------------------<  111<H>  4.7   |  17<L>  |  1.80<H>    Ca    8.7      2019 05:44  Phos  4.7     04-  Mg     2.4     04-    TPro  6.8  /  Alb  3.6  /  TBili  1.2  /  DBili  0.5<H>  /  AST  1304<H>  /  ALT  1398<H>  /  AlkPhos  130<H>  -    LIVER FUNCTIONS - ( 2019 05:44 )  Alb: 3.6 g/dL / Pro: 6.8 g/dL / ALK PHOS: 130 U/L / ALT: 1398 U/L / AST: 1304 U/L / GGT: x           PT/INR - ( 2019 20:37 )   PT: 17.5 sec;   INR: 1.52 ratio         PTT - ( 2019 20:37 )  PTT:27.2 sec  CARDIAC MARKERS ( 2019 05:44 )  x     / x     / 98 U/L / x     / 5.6 ng/mL      Urinalysis Basic - ( 2019 14:42 )    Color: Yellow / Appearance: Clear / S.026 / pH: x  Gluc: x / Ketone: Negative  / Bili: Negative / Urobili: Negative   Blood: x / Protein: 300 mg/dL / Nitrite: Negative   Leuk Esterase: Negative / RBC: >50 /hpf / WBC 3 /HPF   Sq Epi: x / Non Sq Epi: 3 / Bacteria: Few              RADIOLOGY & ADDITIONAL TESTS:    Imaging Personally Reviewed: YES    Consultant(s) Notes Reviewed: YES    Care Discussed with Consultants/Other Providers: YES

## 2019-04-04 NOTE — H&P ADULT - ASSESSMENT
79 M no known PMH who presents with abdominal discomfort, nausea/vomiting, and dyspnea on exertion, found to have new heart failure with reduced ejection facture as well as atrial fibrillation with RVR c/b ELVA and hypervolemia hyponatremia 80 y/o M, some english but mostly gay/English speaking, c hx [place] and no other significant PMH who pw hypervolemic hyponatremia, leukocytosis, new onset afib c rvr, transaminitis, ELVA, of unclear etiologies.

## 2019-04-04 NOTE — H&P ADULT - PROBLEM SELECTOR PLAN 7
DVT: on full dose AC  DIet: Veg DASH TLC  Ambulate with assistance for now Cr 1.5 reportedly  normal in 12/2018. No electrolytes disturbances or significant uremia. UA noting microscopic hematuria and proteinuria  - monitor UOP  - replete K > 4 and Mg > 2  - f/u abdominal US  - f/u urine lytes, pro/Cr  - if worsening Cr, consider renal consult Patient with INR of 1.5. Unclear etiology but noted to have liver dysfxn. Low suspicion for malnutrition  - f/u mixing studies  - trend  - no overt bleeding

## 2019-04-04 NOTE — H&P ADULT - PROBLEM SELECTOR PLAN 6
Patient with complaint of frequent belching and epigatsric discomfort. No weight loss, melena, hematemesis. Recent IFOBT test neg in 12/2018  - hypylori test Patient with complaint of frequent belching and epigastric discomfort. No weight loss, melena, hematemesis. Recent IFOBT test neg in 12/2018  - hypylori test  - famotidine daily WBC 15 with only reported sx of non productive cough and vague abdominal sx  - f/u blood cultures  - low threshold for pan CT scan if fever, worsening leukocytosis WBC 15 with only reported sx of non productive cough and vague abdominal sx  - f/u blood cultures  - pan CT scan

## 2019-04-04 NOTE — H&P ADULT - PROBLEM SELECTOR PLAN 8
Patient with complaint of frequent belching and epigastric discomfort. No weight loss, melena, hematemesis. Recent IFOBT test neg in 12/2018  - hypylori test  - famotidine daily  - low threshold for CT chest, abd,and pelvis Cr 1.5 reportedly  normal in 12/2018. No electrolytes disturbances or significant uremia. UA noting microscopic hematuria and proteinuria  - monitor UOP  - replete K > 4 and Mg > 2  - f/u abdominal US  - f/u urine lytes, pro/Cr  - if worsening Cr, consider renal consult Cr 1.5 reportedly  normal in 12/2018. No electrolytes disturbances or significant uremia.   - UA noting significant microscopic hematuria and poss nephrotic range proteinuria  - monitor UOP  - replete K > 4 and Mg > 2  - f/u abdominal US  - f/u urine lytes, pro/Cr  - if worsening Cr, consider renal consult

## 2019-04-04 NOTE — PROGRESS NOTE ADULT - PROBLEM SELECTOR PLAN 2
-as per son, hx of Afib with recommended ECHO in December. ECHO never done, and patient reverted to sinus on his own  -in ED, originally HR 130s  -now rate controlled 70-80s s/p metoprolol 5 mg IV and metoprolol 25 mg PO x 1, still Afib  -cards recs appreciated- Lopressor inc to 25 BID, continue therapeutic Lovenox, can start Eliquis based on ECHO results   - TSH wnl

## 2019-04-04 NOTE — PROGRESS NOTE ADULT - PROBLEM SELECTOR PLAN 4
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

## 2019-04-04 NOTE — PROGRESS NOTE ADULT - PROBLEM SELECTOR PLAN 1
-likely 2/2 to new acute HF exacerbation based off of imaging, labs, and PE findings   -check official TTE  -cards recs appreciated- continue diuresis  -lipid panel wnl, check A1c in AM  -BNP downtrended to 2700 from 50,000  -repeat troponins stable in 60's upon repeats  -monitor on tele, monitor UOP

## 2019-04-04 NOTE — PROGRESS NOTE ADULT - PROBLEM SELECTOR PLAN 8
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

## 2019-04-04 NOTE — H&P ADULT - PROBLEM SELECTOR PLAN 5
Cr 1.5 reportedly  normal in 12/2018. No electrolytes distubrances or significant uremia  - monitor UOP  - replete K > 4 and Mg > 2  - f/u abdominal US  - f/u urine lytes, pro/Cr Cr 1.5 reportedly  normal in 12/2018. No electrolytes disturbances or significant uremia  - monitor UOP  - replete K > 4 and Mg > 2  - f/u abdominal US  - f/u urine lytes, pro/Cr On exam with fluid wave suggestive of moderate vol ascites, which appears to be new. Likely in setting of volume overload, however unclear if alternative etiologies ie malignant vs On exam with fluid wave suggestive of moderate vol ascites, which appears to be new. Likely in setting of volume overload, however unclear if alternative etiologies ie malignant vs due to liver dysfunction  - f/u abdominal US vs CTAP to assess for malignancy  - consider tap, especially if no overt cardiac or renal dysfxn to explain On exam with fluid wave suggestive of moderate vol ascites, which appears to be new. Likely in setting of volume overload, however unclear if alternative etiologies ie malignant vs due to liver dysfunction  - f/u abdominal US vs CTAP to assess for malignancy  - consider tap, especially if no overt cardiac or renal dysfxn to explain  - if fever, treat for SBP

## 2019-04-04 NOTE — PROGRESS NOTE ADULT - ASSESSMENT
78 y/o male with no known PMH who presented with abdominal distension and severe HEAD likely 2/2 to new acute on chronic HF, found to be in Afib with RVR, along with transaminitis and ELVA of unclear etiologies.

## 2019-04-04 NOTE — CONSULT NOTE ADULT - ASSESSMENT
79 male patient p/w new onset sob found to have new onset CHF (low ef noted on tte), complicated by afib with vrv, ethan, hypoNa, elevated trop, leukocytosis. hepatology called for worsening LFts. Patient recalls having an isolated episode of jaundice in 2001 for which he received ? med for 3 weeks and that resolved afterwards. He denies recent infection, OTC meds, herbs, alcohol, ivdu, tattoos.       1- Elevated LFTs   ast 1061, alt 1350  hav, hbv, hcv, rsv negative     likely ischemic hepatopathy 2/2 hypoperfusion inaddition to component of congestion   check tamiko, ama, asma, iron studies, A1AT, ceruloplasmin, Ig panel  check US abd   trace ascites on ct abd, nl liver   check pancx in view of leukocytosis with elevated pmn   daily LFts and inr     2- Intermittent dyspepsia  HCM   + family hx of gastric ca     consider o/p egd and colonoscopy when stable 79 male patient p/w new onset sob found to have new onset CHF (low ef noted on tte), complicated by afib with vrv, ethan, hypoNa, elevated trop, leukocytosis. hepatology called for worsening LFts. Patient recalls having an isolated episode of jaundice in 2001 for which he received ? med for 3 weeks and that resolved afterwards. He denies recent infection, OTC meds, herbs, alcohol, ivdu, tattoos.     1) Elevated LFTS, likely 2/2 congestive/ischemic hepatopathy in setting of severe heart disease  2) CHFrEF, new diagnosis    Recs:  - followup serologic liver workup for other causes of liver disease, though low suspicion  - optimize cardiac status   - obtain US abdomen to eval liver parenchyma and vasculature  - trend CBC, CMP, INR    Please call hepatology back with questions.

## 2019-04-04 NOTE — H&P ADULT - PROBLEM SELECTOR PLAN 3
Patient with hyponatremia to mis 120s, reportedly normal chemistries in Dec 2018. Sx of nausea/vomiting may be related to hyponatremia. Currently with hypervolemia. UOsm concentrated, indicating inappropriate elevated ADH state. DDx include volume overloaded state 2/2 cardiac and renal dysfxn  - check serum Osm, UOsm  - f/u urine lytes  - address ADHF as above Patient with hyponatremia to mis 120s, reportedly normal chemistries in Dec 2018. Sx of nausea/vomiting may be related to hyponatremia. Currently with hypervolemia. DDx include volume overloaded state 2/2 cardiac and renal dysfxn  - check serum Osm, UOsm  - f/u urine lytes  - address ADHF as above Patient with hyponatremia to mis 120s, reportedly normal chemistries in Dec 2018. Sx of nausea/vomiting may be related to hyponatremia. Currently with hypervolemia. DDx include volume overloaded state 2/2 cardiac and renal dysfxn  - check serum Osm, UOsm and ur lytes  - address ADHF as above  - cont diuresis

## 2019-04-04 NOTE — H&P ADULT - PROBLEM SELECTOR PLAN 4
and  with normal alk phos and Tb, likely hepatic congestion from overloaded state. Degree of hepatic synthetic dysfunction given mildly elevated INR, albumin wnl. Unclear family hx of liver disease in mother  - trend  - f/u acute hepatitis panel  - obtain abdominal US to assess ascites vol and liver anatomy  and  with normal alk phos and Tb, likely hepatic congestion from overloaded state. On exam with fluid wave suggestive of moderate vol ascites. Degree of hepatic synthetic dysfunction given mildly elevated INR, albumin wnl. Unclear family hx of liver disease in mother  - trend LFTs  - f/u acute hepatitis panel  - obtain abdominal US to assess ascites vol and liver anatomy  and  with normal alk phos and Tb, likely hepatic congestion from overloaded state. Degree of hepatic synthetic dysfunction given mildly elevated INR, albumin wnl. Unclear family hx of liver disease in mother  - trend LFTs  - f/u acute hepatitis panel  - obtain abdominal US to assess ascites vol and liver anatomy  and  with normal alk phos and Tb, likely hepatic congestion from overloaded state. Degree of hepatic synthetic dysfunction given mildly elevated INR, albumin wnl. Unclear family hx of liver disease in mother  - trend LFTs  - f/u acute hepatitis panel  - obtain abdominal US to assess ascites vol and liver anatomy  - will also get CT a/p

## 2019-04-04 NOTE — H&P ADULT - PROBLEM SELECTOR PLAN 1
Patient with worsening dyspnea on exertion, ascites on exam, Patient with acute progressive dyspnea on exertion, LE edema, and ascites found  to have pulmonary vascular congestion as well as depression EF on POCUS echo. Unclear etiology, possible silent ischemia, valvular, myocarditis  - obtain TTE  - RVP to assess for possible viral etiology of myocarditis  - check lipid profile, hba1c - reportedly normal in 12/2018  - monitor on tele  - given lasix 40  x 1,   - c/w lasix 20 q8 dosing given soft BP  - monitor UOP  - cardiology consult in AM Patient with acute progressive dyspnea on exertion, LE edema, and ascites found  to have pulmonary vascular congestion as well as depression EF on POCUS echo. Unclear etiology, possible silent ischemia, valvular, myocarditis. EKG without overt ischemic changes  - initial troponin 54, f/u repeat  - obtain TTE  - RVP to assess for possible viral etiology of myocarditis  - check lipid profile, hba1c - reportedly normal in 12/2018  - monitor on tele  - given lasix 40  x 1,   - c/w lasix 20 q8 dosing given soft BP  - monitor UOP Patient with acute progressive dyspnea on exertion, LE edema, and ascites found  to have pulmonary vascular congestion as well as depression EF on POCUS echo. Unclear etiology, possible silent ischemia, valvular, myocarditis. EKG without overt ischemic changes  - initial troponin 54, f/u repeat  - obtain TTE  - RVP to assess for possible viral etiology of myocarditis  - check lipid profile, hba1c - reportedly normal in 12/2018  - monitor on tele  - given lasix 40  x 1,   - c/w lasix 40 q12   - monitor UOP Patient with acute progressive dyspnea on exertion, LE edema, pulmonary vascular congestion. ED US showing possible ascites and reduced EF. EKG without overt ischemic changes  - poss newly diagnosed CHF exacerbation?  - initial troponin 54, f/u repeat  - obtain official TTE  - RVP to assess for possible viral etiology of myocarditis  - check lipid profile, hba1c - reportedly normal in 12/2018  - monitor on tele  - given lasix 40  x 1,   - c/w lasix 40 IV q12   - monitor UOP

## 2019-04-04 NOTE — H&P ADULT - NSHPSOCIALHISTORY_GEN_ALL_CORE
Never smoker  No alcohol use  No illict drugs  Lives with wife at home Never smoker  No alcohol use  No illict drugs  Lives with wife at home  AMbulates without assistance, independent for all ADL and IADL Never smoker  No alcohol use  No illict drugs  Lives with wife at home  AMbulates without assistance, independent for all ADL and IADL  has 1 son and 1 daughter  retired

## 2019-04-04 NOTE — H&P ADULT - NSHPREVIEWOFSYSTEMS_GEN_ALL_CORE
REVIEW OF SYSTEMS:    CONSTITUTIONAL: No weakness, fevers or chills  EYES/ENT: No visual changes;  No vertigo or throat pain   NECK: No pain or stiffness  RESPIRATORY: + cough, shortness of breath  CARDIOVASCULAR: No chest pain or palpitations  GASTROINTESTINAL: + abdominal pain; nausea, vomiting, belching;  diarrhea or constipation. No hematemesis, melena or hematochezia.  GENITOURINARY: No dysuria, frequency or hematuria  NEUROLOGICAL: No numbness or weakness  SKIN: No itching, burning, rashes, or lesions   All other review of systems is negative unless indicated above. REVIEW OF SYSTEMS:    CONSTITUTIONAL: +weakness, No fevers or chills  EYES/ENT: No visual changes;  No vertigo or throat pain   NECK: No pain or stiffness  RESPIRATORY: + cough, +shortness of breath  CARDIOVASCULAR: No chest pain or palpitations  GASTROINTESTINAL: + abdominal pain; nausea, vomiting, belching;  diarrhea or constipation. No hematemesis, melena or hematochezia.  GENITOURINARY: No dysuria, frequency or hematuria  NEUROLOGICAL: No numbness or weakness  SKIN: No itching, burning, rashes, or lesions   Psych: No anxiety or depression  All other review of systems is negative unless indicated above.

## 2019-04-04 NOTE — PROGRESS NOTE ADULT - PROBLEM SELECTOR PLAN 9
Patient with complaint of frequent belching and epigastric discomfort. No weight loss, melena, hematemesis. Recent IFOBT test neg in 12/2018  - famotidine daily  - consider H. pylori after acute exacerbation treated

## 2019-04-04 NOTE — H&P ADULT - NSHPPHYSICALEXAM_GEN_ALL_CORE
PHYSICAL EXAM:    Vital Signs Last 24 Hrs  T(C): 36.4 (03 Apr 2019 22:31), Max: 36.4 (03 Apr 2019 13:42)  T(F): 97.5 (03 Apr 2019 22:31), Max: 97.5 (03 Apr 2019 13:42)  HR: 90 (03 Apr 2019 22:31) (90 - 140)  BP: 111/84 (03 Apr 2019 22:31) (111/84 - 164/110)  BP(mean): --  RR: 18 (03 Apr 2019 22:31) (18 - 18)  SpO2: 100% (03 Apr 2019 22:31) (98% - 100%) on 2L NC    General: No acute distress, sitting upright on nasal cannula breathing comfortably  HEENT: NCAT.  PERRL.  EOMI.  No scleral icterus or injection.    Neck: Supple.  Full ROM.  + JVD. No lymphadenopathy.   Heart: RRR.  Normal S1 and S2.  No murmurs, rubs, or gallops.   Lungs: diminished bilateral breath sounds  Abdomen: Distended, soft, BS+, soft, global discomfort,  no organomegaly.  Skin: Warm and dry.  No rashes.  Extremities: 1+ edema bilateral.  2+ peripheral pulses b/l.  Musculoskeletal: No deformities.  No spinal or paraspinal tenderness.  Neuro: A&Ox3. No focal neurological deficits PHYSICAL EXAM:    Vital Signs Last 24 Hrs  T(C): 36.4 (03 Apr 2019 22:31), Max: 36.4 (03 Apr 2019 13:42)  T(F): 97.5 (03 Apr 2019 22:31), Max: 97.5 (03 Apr 2019 13:42)  HR: 90 (03 Apr 2019 22:31) (90 - 140)  BP: 111/84 (03 Apr 2019 22:31) (111/84 - 164/110)  BP(mean): --  RR: 18 (03 Apr 2019 22:31) (18 - 18)  SpO2: 100% (03 Apr 2019 22:31) (98% - 100%) on 2L NC    General: No acute distress, sitting upright on nasal cannula breathing comfortably  HEENT: NCAT.  PERRL.  EOMI.  No scleral icterus or injection.    Neck: Supple.  Full ROM.  + JVD. No lymphadenopathy.   Heart: Irregular irrgegular.  Normal S1 and S2.  No murmurs, rubs, or gallops.   Lungs: diminished bilateral breath sounds  Abdomen: Distended, soft, BS+, soft, global discomfort,  no organomegaly.  Skin: Warm and dry.  No rashes.  Extremities: 1+ edema bilateral.  2+ peripheral pulses b/l.  Musculoskeletal: No deformities.  No spinal or paraspinal tenderness.  Neuro: A&Ox3. No focal neurological deficits

## 2019-04-04 NOTE — CONSULT NOTE ADULT - ATTENDING COMMENTS
Hepatology Staff: Deneen Benítez MD  I saw and examined the patient on 4/5/2019 along with Dr. Ramirez.    Patient is severe CHF ( EF 20-25%), now with acute elevation of LFTs in the setting of relative hypotension. Now improving. Suspect ischemic hepatitis. Recommend optimization of cardiac function. LFTs likely will improve in the ensuing days to weeks.  No additional recs from hepatology.  We will sign off.

## 2019-04-04 NOTE — H&P ADULT - HISTORY OF PRESENT ILLNESS
80 y/o M w/ no significant PMH who presents with a 10 day h/x of progressive abdominal distension and pain with progressive shortness of breath. The pt had been in his usual state of health until 10 days ago, when he began belching and first started to notice abdominal pain. Pt also had decreased PO intake and has decreased caliber of stools. He describes feeling constantly nauseous and has had ~3 episodes of non-bloody non-bilious emesis over the past week . He notes a burning abdominal pain that he attributes to heartburn which has not been relieved by Protonix. However about 5 days ago, he noticed progressive abdominal and lower extremity swelling within the last 2-3 days. He also noted progressive dyspnea on exertion and orthopnea over the past 10 days which has progressed to the point where he is unable to take even a few steps without sitting down. He also notes a non productive cough that has developed over the same period of time. He denies fevers, chills, sore throat, and chest pain. Pt was seen in recent months by his PCP and no abnormalities were noted. He states his labs were within normal ranges aside from a slightly elevated cholesterol and that IFOBT testing for colon cancer were negative, although the pt has never had a colonoscopy. Outpt records are currently unavailable.    Initial ED vitals: T 97.5F, , /110, RR 18 on RA. He was initially given 1L NS bolus, Zofran, and Reglan. Pt became tachycardic to the 140s and was noted to be in AFib with RVR on EKG. Pt was administered Lopressor 5mg IV and 25mg PO, which improved rate control to 90s. ED echo showed decreased EF and has was administered Lasix 40mg IV. Pt's most recent vitals: T 36.4C, HR 90, /84, RR 18, satO2 100% on 2.5L O2 nasal canula.    Seen at beside, the pt denies any headache, fever, chills, chest pain, diarrhea, constipation, hematuria, or dysuria. He states that he felt palpatations earlier and that he had felt his heart beating fast intermittently over the past couple of weeks. He states that his abdominal discomfort feels like a "fullness" and rates it at a 5/10 in severity. He denies any shortness of breath at rest. 78 y/o M w/ no significant PMH who presents with a 10 day h/x of progressive abdominal distension and pain with progressive shortness of breath. Son (internal medicine physician) at bedside. The pt had been in his usual state of health until 10 days ago, when he began belching and first started to notice abdominal pain. Pt also had decreased PO intake and has decreased caliber of stools. He describes feeling constantly nauseous and has had ~3 episodes of non-bloody non-bilious emesis over the past week . He notes a burning abdominal pain that he attributes to heartburn which has not been relieved by Protonix. However about 5 days ago, he noticed progressive abdominal and lower extremity swelling within the last 2-3 days. He also noted progressive dyspnea on exertion and orthopnea over the past 10 days which has progressed to the point where he is unable to take even a few steps without sitting down. He also notes a non productive cough that has developed over the same period of time. He denies fevers, chills, sore throat, and chest pain. Pt was seen in recent months by his PCP and no abnormalities were noted. He states his labs were within normal ranges aside from a slightly elevated cholesterol and that IFOBT testing for colon cancer were negative, although the pt has never had a colonoscopy. Outpt records are currently unavailable.    Initial ED vitals: T 97.5F, , /110, RR 18 on RA. He was initially given 1L NS bolus, Zofran, and Reglan. Pt became tachycardic to the 140s and was noted to be in AFib with RVR on EKG. Pt was administered Lopressor 5mg IV and 25mg PO, which improved rate control to 90s. ED echo showed decreased EF and has was administered Lasix 40mg IV. Pt's most recent vitals: T 36.4C, HR 90, /84, RR 18, satO2 100% on 2.5L O2 nasal canula.    Seen at beside, the pt denies any headache, fever, chills, chest pain, diarrhea, constipation, hematuria, or dysuria. He states that he felt palpatations earlier and that he had felt his heart beating fast intermittently over the past couple of weeks. He states that his abdominal discomfort feels like a "fullness" and rates it at a 5/10 in severity. He denies any shortness of breath at rest. 80 y/o M, some english but mostly gay/Hebrew speaking, c hx [place] and no other significant PMH who presents with a 10 day h/x of progressive abdominal distension and pain with progressive shortness of breath.     Pt's son (Dr. Zhang is a hospitalist @ [facility] in [facility]) at bedside provided collateral. The pt had been in his usual state of health until 10 days ago, when he began belching and first started to notice abdominal pain. Pt also had decreased PO intake and has decreased caliber of stools. He describes feeling constantly nauseous and has had ~3 episodes of non-bloody non-bilious emesis over the past week . He notes a burning abdominal pain that he attributes to heartburn which has not been relieved by Protonix. However about 5 days ago, he noticed progressive abdominal and lower extremity swelling within the last 2-3 days. He also noted progressive dyspnea on exertion and orthopnea over the past 10 days which has progressed to the point where he is unable to take even a few steps without sitting down. He also notes a non productive cough that has developed over the same period of time. He denies fevers, chills, sore throat, and chest pain. Pt was seen in recent months by his PCP and no abnormalities were noted. He states his labs were within normal ranges aside from a slightly elevated cholesterol and that IFOBT testing for colon cancer were negative, although the pt has never had a colonoscopy. Outpt records are currently unavailable.    Initial ED vitals: T 97.5F, , /110, RR 18 on RA. He was initially given 1L NS bolus, Zofran, and Reglan. Pt became tachycardic to the 140s and was noted to be in AFib with RVR on EKG. Pt was administered Lopressor 5mg IV and 25mg PO, which improved rate control to 90s. ED echo showed decreased EF and has was administered Lasix 40mg IV. Pt's most recent vitals: T 36.4C, HR 90, /84, RR 18, satO2 100% on 2.5L O2 nasal canula.    Seen at beside, the pt denies any headache, fever, chills, chest pain, diarrhea, constipation, hematuria, or dysuria. He states that he felt palpatations earlier and that he had felt his heart beating fast intermittently over the past couple of weeks. He states that his abdominal discomfort feels like a "fullness" and rates it at a 5/10 in severity. He denies any shortness of breath at rest. Pt evaluated prior to midnight on 4/3/19.    80 y/o M, some english but mostly gay/Uzbek speaking, c hx [place] and no other significant PMH who presents with a 10 day h/x of progressive abdominal distension and pain with progressive shortness of breath.     Pt's son (Dr. Zhang is a hospitalist @ [facility] in [facility]) at bedside provided collateral. The pt had been in his usual state of health until 10 days ago, when he began belching and first started to notice abdominal pain. Pt also had decreased PO intake and has decreased caliber of stools. He describes feeling constantly nauseous and has had ~3 episodes of non-bloody non-bilious emesis over the past week . He notes a burning abdominal pain that he attributes to heartburn which has not been relieved by Protonix. However about 5 days ago, he noticed progressive abdominal and lower extremity swelling within the last 2-3 days. He also noted progressive dyspnea on exertion and orthopnea over the past 10 days which has progressed to the point where he is unable to take even a few steps without sitting down. He also notes a non productive cough that has developed over the same period of time. He denies fevers, chills, sore throat, and chest pain. Pt was seen in recent months by his PCP and no abnormalities were noted. He states his labs were within normal ranges aside from a slightly elevated cholesterol and that IFOBT testing for colon cancer were negative, although the pt has never had a colonoscopy. Outpt records are currently unavailable.    Initial ED vitals: T 97.5F, , /110, RR 18 on RA. He was initially given 1L NS bolus, Zofran, and Reglan. Pt became tachycardic to the 140s and was noted to be in AFib with RVR on EKG. Pt was administered Lopressor 5mg IV and 25mg PO, which improved rate control to 90s. ED echo showed decreased EF and has was administered Lasix 40mg IV. Pt's most recent vitals: T 36.4C, HR 90, /84, RR 18, satO2 100% on 2.5L O2 nasal canula.    Seen at beside, the pt denies any headache, fever, chills, chest pain, diarrhea, constipation, hematuria, or dysuria. He states that he felt palpatations earlier and that he had felt his heart beating fast intermittently over the past couple of weeks. He states that his abdominal discomfort feels like a "fullness" and rates it at a 5/10 in severity. He denies any shortness of breath at rest.

## 2019-04-04 NOTE — PROGRESS NOTE ADULT - PROBLEM SELECTOR PLAN 3
-LFT's now in the 1000s from 400's  -possible 2/2 to hepatic congestion from overloaded state. Degree of hepatic synthetic dysfunction given mildly elevated INR, albumin wnl  - f/u acute hepatitis panel  - f/u CT A/P

## 2019-04-04 NOTE — H&P ADULT - NSHPLABSRESULTS_GEN_ALL_CORE
CBC Full  -  ( 2019 14:42 )  WBC Count : 15.5 K/uL  Hemoglobin : 13.6 g/dL  Hematocrit : 39.2 %  Platelet Count - Automated : 255 K/uL  Mean Cell Volume : 85.0 fl  Mean Cell Hemoglobin : 29.6 pg  Mean Cell Hemoglobin Concentration : 34.8 gm/dL  Auto Neutrophil # : 12.1 K/uL  Auto Lymphocyte # : 1.9 K/uL  Auto Monocyte # : 1.5 K/uL  Auto Eosinophil # : 0.1 K/uL  Auto Basophil # : 0.0 K/uL  Auto Neutrophil % : 78.1 %  Auto Lymphocyte % : 12.1 %  Auto Monocyte % : 9.4 %  Auto Eosinophil % : 0.3 %  Auto Basophil % : 0.1 %    0403    125<L>  |  93<L>  |  35<H>  ----------------------------<  174<H>  4.3   |  15<L>  |  1.52<H>    Ca    8.3<L>      2019 18:17    TPro  7.2  /  Alb  3.7  /  TBili  0.5  /  DBili  x   /  AST  389<H>  /  ALT  467<H>  /  AlkPhos  111  04-03    LIVER FUNCTIONS - ( 2019 14:42 )  Alb: 3.7 g/dL / Pro: 7.2 g/dL / ALK PHOS: 111 U/L / ALT: 467 U/L / AST: 389 U/L / GGT: x           Urinalysis Basic - ( 2019 14:42 )    Color: Yellow / Appearance: Clear / S.026 / pH: x  Gluc: x / Ketone: Negative  / Bili: Negative / Urobili: Negative   Blood: x / Protein: 300 mg/dL / Nitrite: Negative   Leuk Esterase: Negative / RBC: >50 /hpf / WBC 3 /HPF   Sq Epi: x / Non Sq Epi: 3 / Bacteria: Few      PT/INR - ( 2019 20:37 )   PT: 17.5 sec;   INR: 1.52 ratio         PTT - ( 2019 20:37 )  PTT:27.2 sec    Troponin T, High Sensitivity (19 @ 18:17)    Troponin T, High Sensitivity Result: 54    Serum Pro-Brain Natriuretic Peptide (19 @ 18:17)    Serum Pro-Brain Natriuretic Peptide: 89873 pg/mL    14:42 - VBG - pH: 7.38  | pCO2: 40    | pO2: 20    | Lactate: 3.3        Osmolality, Random Urine (19 @ 21:19)    Osmolality, Random Urine: 506 mos/kg    TTE 4/3/2019  IMPRESSION:   No Pericardial Effusion.  Depressed LV systolic function.  Bilateral pleural effusions and diffuse B lines concerning for pulmonary   edema.      EKG:   afib with RVR to 120, STT depression in V4-6    Imaging: CBC Full  -  ( 2019 14:42 )  WBC Count : 15.5 K/uL  Hemoglobin : 13.6 g/dL  Hematocrit : 39.2 %  Platelet Count - Automated : 255 K/uL  Mean Cell Volume : 85.0 fl  Mean Cell Hemoglobin : 29.6 pg  Mean Cell Hemoglobin Concentration : 34.8 gm/dL  Auto Neutrophil # : 12.1 K/uL  Auto Lymphocyte # : 1.9 K/uL  Auto Monocyte # : 1.5 K/uL  Auto Eosinophil # : 0.1 K/uL  Auto Basophil # : 0.0 K/uL  Auto Neutrophil % : 78.1 %  Auto Lymphocyte % : 12.1 %  Auto Monocyte % : 9.4 %  Auto Eosinophil % : 0.3 %  Auto Basophil % : 0.1 %    0403    125<L>  |  93<L>  |  35<H>  ----------------------------<  174<H>  4.3   |  15<L>  |  1.52<H>    Ca    8.3<L>      2019 18:17    TPro  7.2  /  Alb  3.7  /  TBili  0.5  /  DBili  x   /  AST  389<H>  /  ALT  467<H>  /  AlkPhos  111  04-03    LIVER FUNCTIONS - ( 2019 14:42 )  Alb: 3.7 g/dL / Pro: 7.2 g/dL / ALK PHOS: 111 U/L / ALT: 467 U/L / AST: 389 U/L / GGT: x           Urinalysis Basic - ( 2019 14:42 )    Color: Yellow / Appearance: Clear / S.026 / pH: x  Gluc: x / Ketone: Negative  / Bili: Negative / Urobili: Negative   Blood: x / Protein: 300 mg/dL / Nitrite: Negative   Leuk Esterase: Negative / RBC: >50 /hpf / WBC 3 /HPF   Sq Epi: x / Non Sq Epi: 3 / Bacteria: Few      PT/INR - ( 2019 20:37 )   PT: 17.5 sec;   INR: 1.52 ratio         PTT - ( 2019 20:37 )  PTT:27.2 sec    Troponin T, High Sensitivity (19 @ 18:17)    Troponin T, High Sensitivity Result: 54    Serum Pro-Brain Natriuretic Peptide (19 @ 18:17)    Serum Pro-Brain Natriuretic Peptide: 40410 pg/mL    14:42 - VBG - pH: 7.38  | pCO2: 40    | pO2: 20    | Lactate: 3.3        Osmolality, Random Urine (19 @ 21:19)    Osmolality, Random Urine: 506 mos/kg    TTE 4/3/2019  IMPRESSION:   No Pericardial Effusion.  Depressed LV systolic function.  Bilateral pleural effusions and diffuse B lines concerning for pulmonary   edema.      EKG:   afib with RVR to 120, STT depression in V4-6    Imaging:    < from: Xray Chest 1 View AP/PA (19 @ 15:27) >      IMPRESSION:  Small areas of bibasilar atelectasis. Pulmonary vascular congestion. No   pleural effusion or pneumothorax. The the heart is enlarged. Mild   degenerative changes of the spine and right shoulder.    < end of copied text > Personally reviewed imaging, labs, ekg.    CBC Full  -  ( 2019 14:42 )  WBC Count : 15.5 K/uL  Hemoglobin : 13.6 g/dL  Hematocrit : 39.2 %  Platelet Count - Automated : 255 K/uL  Mean Cell Volume : 85.0 fl  Mean Cell Hemoglobin : 29.6 pg  Mean Cell Hemoglobin Concentration : 34.8 gm/dL  Auto Neutrophil # : 12.1 K/uL  Auto Lymphocyte # : 1.9 K/uL  Auto Monocyte # : 1.5 K/uL  Auto Eosinophil # : 0.1 K/uL  Auto Basophil # : 0.0 K/uL  Auto Neutrophil % : 78.1 %  Auto Lymphocyte % : 12.1 %  Auto Monocyte % : 9.4 %  Auto Eosinophil % : 0.3 %  Auto Basophil % : 0.1 %        125<L>  |  93<L>  |  35<H>  ----------------------------<  174<H>  4.3   |  15<L>  |  1.52<H>    Ca    8.3<L>      2019 18:17    TPro  7.2  /  Alb  3.7  /  TBili  0.5  /  DBili  x   /  AST  389<H>  /  ALT  467<H>  /  AlkPhos  111  04-03    LIVER FUNCTIONS - ( 2019 14:42 )  Alb: 3.7 g/dL / Pro: 7.2 g/dL / ALK PHOS: 111 U/L / ALT: 467 U/L / AST: 389 U/L / GGT: x           Urinalysis Basic - ( 2019 14:42 )    Color: Yellow / Appearance: Clear / S.026 / pH: x  Gluc: x / Ketone: Negative  / Bili: Negative / Urobili: Negative   Blood: x / Protein: 300 mg/dL / Nitrite: Negative   Leuk Esterase: Negative / RBC: >50 /hpf / WBC 3 /HPF   Sq Epi: x / Non Sq Epi: 3 / Bacteria: Few      PT/INR - ( 2019 20:37 )   PT: 17.5 sec;   INR: 1.52 ratio         PTT - ( 2019 20:37 )  PTT:27.2 sec    Troponin T, High Sensitivity (.19 @ 18:17)    Troponin T, High Sensitivity Result: 54    Serum Pro-Brain Natriuretic Peptide (04.03.19 @ 18:17)    Serum Pro-Brain Natriuretic Peptide: 43985 pg/mL    14:42 - VBG - pH: 7.38  | pCO2: 40    | pO2: 20    | Lactate: 3.3        Osmolality, Random Urine (19 @ 21:19)    Osmolality, Random Urine: 506 mos/kg    TTE 4/3/2019  IMPRESSION:   No Pericardial Effusion.  Depressed LV systolic function.  Bilateral pleural effusions and diffuse B lines concerning for pulmonary   edema.      EKG:   afib with RVR to 120, STT depression in V4-6    Imaging:    < from: Xray Chest 1 View AP/PA (19 @ 15:27) >      IMPRESSION:  Small areas of bibasilar atelectasis. Pulmonary vascular congestion. No   pleural effusion or pneumothorax. The the heart is enlarged. Mild   degenerative changes of the spine and right shoulder.    < end of copied text >

## 2019-04-04 NOTE — PROGRESS NOTE ADULT - PROBLEM SELECTOR PLAN 7
-Patient with INR of 1.5. Unclear etiology but noted to have liver dysfxn. Low suspicion for malnutrition  - f/u mixing studies  - trend  - no overt bleeding

## 2019-04-04 NOTE — CONSULT NOTE ADULT - SUBJECTIVE AND OBJECTIVE BOX
MRN-[id number]    CHIEF COMPLAINT:  Dyspnea, abdominal swelling    HISTORY OF PRESENT ILLNESS:  ERICKSON GREENBERG is a 79y Male patient with past medical history of ***    Allergies  No Known Allergies    PAST MEDICAL & SURGICAL HISTORY:  No pertinent past medical history  No significant past surgical history    FAMILY HISTORY:  FH: liver disease: unknown etiology, mother    SOCIAL HISTORY:    Non-smoker    REVIEW OF SYSTEMS:  CONSTITUTIONAL: No fever, weight loss, or fatigue  EYES: No eye pain, visual disturbances, or discharge  ENMT:  No difficulty hearing, tinnitus, vertigo; No sinus or throat pain  NECK: No pain or stiffness  RESPIRATORY: No cough, wheezing, chills or hemoptysis; No Shortness of Breath  CARDIOVASCULAR: No chest pain, palpitations, passing out, dizziness, or leg swelling  GASTROINTESTINAL: No abdominal or epigastric pain. No nausea, vomiting, or hematemesis; No diarrhea or constipation. No melena or hematochezia.  GENITOURINARY: No dysuria, frequency, hematuria, or incontinence  NEUROLOGICAL: No headaches, memory loss, loss of strength, numbness, or tremors  SKIN: No itching, burning, rashes, or lesions   LYMPH Nodes: No enlarged glands  ENDOCRINE: No heat or cold intolerance; No hair loss  MUSCULOSKELETAL: No joint pain or swelling; No muscle, back, or extremity pain  PSYCHIATRIC: No depression, anxiety, mood swings, or difficulty sleeping  HEME/LYMPH: No easy bruising, or bleeding gums  ALLERY AND IMMUNOLOGIC: No hives or eczema	    I&O's Summary    03 Apr 2019 07:01  -  04 Apr 2019 06:47  --------------------------------------------------------  IN: 0 mL / OUT: 300 mL / NET: -300 mL    PHYSICAL EXAM:  Vital Signs Last 24 Hrs  T(C): 36.6 (04 Apr 2019 05:29), Max: 36.6 (04 Apr 2019 05:29)  T(F): 97.8 (04 Apr 2019 05:29), Max: 97.8 (04 Apr 2019 05:29)  HR: 91 (04 Apr 2019 05:29) (90 - 140)  BP: 115/82 (04 Apr 2019 05:29) (111/84 - 164/110)  RR: 18 (04 Apr 2019 05:29) (18 - 18)  SpO2: 99% (04 Apr 2019 05:29) (98% - 100%)    Appearance: Normal	  HEENT:   Normal oral mucosa, PERRL, EOMI	  Lymphatic: No lymphadenopathy  Cardiovascular: Normal S1 S2, No JVD, No murmurs, No edema  Respiratory: Lungs clear to auscultation	  Psychiatry: A & O x 3, Mood & affect appropriate  Gastrointestinal:  Soft, Non-tender, + BS	  Skin: No rashes, No ecchymoses, No cyanosis	  Neurologic: Non-focal  Extremities: Normal range of motion, No clubbing, cyanosis or edema  Vascular: Peripheral pulses palpable 2+ bilaterally    MEDICATIONS:  MEDICATIONS  (STANDING):  enoxaparin Injectable 80 milliGRAM(s) SubCutaneous two times a day  ergocalciferol 47446 Unit(s) Oral every week  famotidine    Tablet 20 milliGRAM(s) Oral daily  furosemide   Injectable 40 milliGRAM(s) IV Push every 12 hours  metoprolol tartrate 12.5 milliGRAM(s) Oral every 12 hours  multivitamin 1 Tablet(s) Oral daily    LABS:	 	  CBC Full  -  ( 03 Apr 2019 14:42 )  WBC Count : 15.5 K/uL  Hemoglobin : 13.6 g/dL  Hematocrit : 39.2 %  Platelet Count - Automated : 255 K/uL  Mean Cell Volume : 85.0 fl  Mean Cell Hemoglobin : 29.6 pg  Mean Cell Hemoglobin Concentration : 34.8 gm/dL  Auto Neutrophil # : 12.1 K/uL  Auto Lymphocyte # : 1.9 K/uL  Auto Monocyte # : 1.5 K/uL  Auto Eosinophil # : 0.1 K/uL  Auto Basophil # : 0.0 K/uL  Auto Neutrophil % : 78.1 %  Auto Lymphocyte % : 12.1 %  Auto Monocyte % : 9.4 %  Auto Eosinophil % : 0.3 %  Auto Basophil % : 0.1 %    04-04    126<L>  |  90<L>  |  46<H>  ----------------------------<  111<H>  4.7   |  17<L>  |  1.80<H>  04-04    126<L>  |  93<L>  |  45<H>  ----------------------------<  121<H>  4.7   |  19<L>  |  1.77<H>    Ca    8.7      04 Apr 2019 05:44  Ca    8.7      04 Apr 2019 02:10  Phos  4.7     04-04  Phos  4.6     04-04  Mg     2.4     04-04  Mg     2.4     04-04    TPro  7.2  /  Alb  3.7  /  TBili  0.5  /  DBili  x   /  AST  389<H>  /  ALT  467<H>  /  AlkPhos  111  04-03    PT/INR - ( 03 Apr 2019 20:37 )   PT: 17.5 sec;   INR: 1.52 ratio       PTT - ( 03 Apr 2019 20:37 )  PTT:27.2 sec  CARDIAC MARKERS ( 04 Apr 2019 05:44 )  x     / x     / 98 U/L / x     / 5.6 ng/mL    proBNP:   Lipid Profile:   HgA1c:   TSH:     TELEMETRY: 	    ECG:  	  RADIOLOGY:  OTHER: 	    CARDIAC TESTING/STUDIES:    [ ] Echocardiogram:  [ ]  Catheterization:  [ ] Stress Test:  	  	  ASSESSMENT/PLAN: 	      Zehra Shahid MD, MPH, ARVIN  Cardiovascular Specialist Attending  [facility]  C: [phone]  E: [email or web address]  (Cardiology Nocturnist cell number available 7 pm - 7 am every night; available daytime week days for follow-up only; daytime weekends covered by general cardiology consult service) MRN-[id number]    CHIEF COMPLAINT:  Dyspnea, abdominal swelling    HISTORY OF PRESENT ILLNESS:  ERICKSON GREENBERG is a 79y Male patient that does not regularly follow up with healthcare providers and endorses no significant past medical history of presenting with 1-2 weeks of progressive shortness of breath, abdominal distention, mild lower extremity edema, palpitations, reduced exercise capacity. Denies chest pain, diaphoresis, syncope, presyncope. Upon evaluation in the emergency department, found to be in atrial fibrillation RVR controlled with IV and PO metoprolol. BNP >50,000, CXR with interstitial edema, mild lower extremity edema and abdominal distention. hs-troponin 54, 67, 64. He was given IV furosmide with good response and improvement in his symptoms. ECG with course atrial fibrillation and some suggestion of atrial flutter with variable conduction, normal axis, and good r wave progression. Liver transaminitis and rising INR all likely secondary to venous congestion as well as poor perfusion in the setting of decompensation. Cardiology consulted for further management.     Allergies  No Known Allergies    PAST MEDICAL & SURGICAL HISTORY:  No pertinent past medical history  No significant past surgical history    FAMILY HISTORY:  FH: liver disease: unknown etiology, mother    SOCIAL HISTORY:    Non-smoker    REVIEW OF SYSTEMS:  CONSTITUTIONAL: No fever, weight loss, Positive fatigue  EYES: No eye pain, visual disturbances  ENMT:  No difficulty hearing, tinnitus  NECK: No pain or stiffness  RESPIRATORY: No cough, wheezing, chills or hemoptysis; Positive Shortness of Breath  CARDIOVASCULAR: No chest pain, passing out, dizziness, Positive leg swelling and palpitations.  GASTROINTESTINAL: No abdominal or epigastric pain. Positive nausea, vomiting, abdominal distention/tightness  NEUROLOGICAL: No headaches, memory loss  SKIN: No itching, burning, rashes, or lesions   LYMPH Nodes: No enlarged glands  MUSCULOSKELETAL: No joint pain or swelling;  PSYCHIATRIC: No depression, anxiety  HEME/LYMPH: No easy bruising, or bleeding gums    I&O's Summary    03 Apr 2019 07:01  -  04 Apr 2019 06:47  --------------------------------------------------------  IN: 0 mL / OUT: 300 mL / NET: -300 mL    PHYSICAL EXAM:  Vital Signs Last 24 Hrs  T(C): 36.6 (04 Apr 2019 05:29), Max: 36.6 (04 Apr 2019 05:29)  T(F): 97.8 (04 Apr 2019 05:29), Max: 97.8 (04 Apr 2019 05:29)  HR: 91 (04 Apr 2019 05:29) (90 - 140)  BP: 115/82 (04 Apr 2019 05:29) (111/84 - 164/110)  RR: 18 (04 Apr 2019 05:29) (18 - 18)  SpO2: 99% (04 Apr 2019 05:29) (98% - 100%)    Appearance: resting in bed, somewhat tachypnic.   HEENT:  Normal oral mucosa  Lymphatic: No lymphadenopathy  Cardiovascular: Normal S1 S2, No JVD, No murmurs, Pedema  Respiratory: Lungs clear to auscultation	  Psychiatry: A & O x 3, Mood & affect appropriate  Gastrointestinal:  Soft, Non-tender, + BS	  Skin: No rashes, No ecchymoses, No cyanosis	  Neurologic: Non-focal  Extremities: Normal range of motion, No clubbing, cyanosis or edema  Vascular: Peripheral pulses palpable 2+ bilaterally    MEDICATIONS:  MEDICATIONS  (STANDING):  enoxaparin Injectable 80 milliGRAM(s) SubCutaneous two times a day  ergocalciferol 70910 Unit(s) Oral every week  famotidine    Tablet 20 milliGRAM(s) Oral daily  furosemide   Injectable 40 milliGRAM(s) IV Push every 12 hours  metoprolol tartrate 12.5 milliGRAM(s) Oral every 12 hours  multivitamin 1 Tablet(s) Oral daily    LABS:	 	  CBC Full  -  ( 03 Apr 2019 14:42 )  WBC Count : 15.5 K/uL  Hemoglobin : 13.6 g/dL  Hematocrit : 39.2 %  Platelet Count - Automated : 255 K/uL  Mean Cell Volume : 85.0 fl  Mean Cell Hemoglobin : 29.6 pg  Mean Cell Hemoglobin Concentration : 34.8 gm/dL  Auto Neutrophil # : 12.1 K/uL  Auto Lymphocyte # : 1.9 K/uL  Auto Monocyte # : 1.5 K/uL  Auto Eosinophil # : 0.1 K/uL  Auto Basophil # : 0.0 K/uL  Auto Neutrophil % : 78.1 %  Auto Lymphocyte % : 12.1 %  Auto Monocyte % : 9.4 %  Auto Eosinophil % : 0.3 %  Auto Basophil % : 0.1 %    04-04    126<L>  |  90<L>  |  46<H>  ----------------------------<  111<H>  4.7   |  17<L>  |  1.80<H>  04-04    126<L>  |  93<L>  |  45<H>  ----------------------------<  121<H>  4.7   |  19<L>  |  1.77<H>    Ca    8.7      04 Apr 2019 05:44  Ca    8.7      04 Apr 2019 02:10  Phos  4.7     04-04  Phos  4.6     04-04  Mg     2.4     04-04  Mg     2.4     04-04    TPro  7.2  /  Alb  3.7  /  TBili  0.5  /  DBili  x   /  AST  389<H>  /  ALT  467<H>  /  AlkPhos  111  04-03    PT/INR - ( 03 Apr 2019 20:37 )   PT: 17.5 sec;   INR: 1.52 ratio       PTT - ( 03 Apr 2019 20:37 )  PTT:27.2 sec  CARDIAC MARKERS ( 04 Apr 2019 05:44 )  x     / x     / 98 U/L / x     / 5.6 ng/mL    proBNP:   Lipid Profile:   HgA1c:   TSH:     TELEMETRY: 	    ECG:  	  RADIOLOGY:  OTHER: 	    CARDIAC TESTING/STUDIES:    [ ] Echocardiogram:  [ ]  Catheterization:  [ ] Stress Test:  	  	  ASSESSMENT/PLAN: 	      Zehra Shahid MD, MPH, ARVIN  Cardiovascular Specialist Attending  [facility]  C: [phone]  E: [email or web address]  (Cardiology Nocturnist cell number available 7 pm - 7 am every night; available daytime week days for follow-up only; daytime weekends covered by general cardiology consult service) MRN-[id number]    CHIEF COMPLAINT:  Dyspnea, abdominal swelling    HISTORY OF PRESENT ILLNESS:  ERICKSON GREENBERG is a 79y Male patient that does not regularly follow up with healthcare providers and endorses no significant past medical history of presenting with 1-2 weeks of progressive shortness of breath, abdominal distention, mild lower extremity edema, palpitations, reduced exercise capacity. Denies chest pain, diaphoresis, syncope, presyncope. Upon evaluation in the emergency department, found to be in atrial fibrillation RVR controlled with IV and PO metoprolol. BNP >50,000, CXR with interstitial edema, mild lower extremity edema and abdominal distention. hs-troponin 54, 67, 64. He was given IV furosmide with good response and improvement in his symptoms. ECG with coarse atrial fibrillation and some suggestion of atrial flutter with variable conduction, normal axis, and good r wave progression. Liver transaminitis and rising INR all likely secondary to venous congestion as well as poor perfusion in the setting of decompensation. Cardiology consulted for further management.     Allergies  No Known Allergies    PAST MEDICAL & SURGICAL HISTORY:  No pertinent past medical history  No significant past surgical history    FAMILY HISTORY:  FH: liver disease: unknown etiology, mother    SOCIAL HISTORY:    Non-smoker    REVIEW OF SYSTEMS:  CONSTITUTIONAL: No fever, weight loss, Positive fatigue  EYES: No eye pain, visual disturbances  ENMT:  No difficulty hearing, tinnitus  NECK: No pain or stiffness  RESPIRATORY: No cough, wheezing, chills or hemoptysis; Positive Shortness of Breath  CARDIOVASCULAR: No chest pain, passing out, dizziness, Positive leg swelling and palpitations.  GASTROINTESTINAL: No abdominal or epigastric pain. Positive nausea, vomiting, abdominal distention/tightness  NEUROLOGICAL: No headaches, memory loss  SKIN: No itching, burning, rashes, or lesions   LYMPH Nodes: No enlarged glands  MUSCULOSKELETAL: No joint pain or swelling;  PSYCHIATRIC: No depression, anxiety  HEME/LYMPH: No easy bruising, or bleeding gums    I&O's Summary    2019 07:01  -  2019 06:47  --------------------------------------------------------  IN: 0 mL / OUT: 300 mL / NET: -300 mL    PHYSICAL EXAM:  Vital Signs Last 24 Hrs  T(C): 36.6 (2019 05:29), Max: 36.6 (2019 05:29)  T(F): 97.8 (2019 05:29), Max: 97.8 (2019 05:29)  HR: 91 (2019 05:29) (90 - 140)  BP: 115/82 (2019 05:29) (111/84 - 164/110)  RR: 18 (2019 05:29) (18 - 18)  SpO2: 99% (2019 05:29) (98% - 100%)    Appearance: resting in bed, somewhat tachypnic.   HEENT:  Normal oral mucosa  Lymphatic: No lymphadenopathy  Cardiovascular: Normal S1 S2, No JVD, No murmurs, Positive trace-1+ pitting edema bilateral lower extremities.   Respiratory: reduced breat hsounds, I did not appreciate significant crackles.   Psychiatry: A & O x 3  Gastrointestinal:  Soft, Non-tender, Positive distended.   Skin: No rashes, No ecchymoses  Neurologic: Non-focal  Extremities: No clubbing, cyanosis  Vascular: Peripheral pulses palpable 2+ bilaterally    MEDICATIONS:  MEDICATIONS  (STANDING):  enoxaparin Injectable 80 milliGRAM(s) SubCutaneous two times a day  ergocalciferol 09538 Unit(s) Oral every week  famotidine    Tablet 20 milliGRAM(s) Oral daily  furosemide   Injectable 40 milliGRAM(s) IV Push every 12 hours  metoprolol tartrate 12.5 milliGRAM(s) Oral every 12 hours  multivitamin 1 Tablet(s) Oral daily    LABS:	 	  CBC Full  -  ( 2019 14:42 )  WBC Count : 15.5 K/uL  Hemoglobin : 13.6 g/dL  Hematocrit : 39.2 %  Platelet Count - Automated : 255 K/uL  Mean Cell Volume : 85.0 fl  Mean Cell Hemoglobin : 29.6 pg  Mean Cell Hemoglobin Concentration : 34.8 gm/dL  Auto Neutrophil # : 12.1 K/uL  Auto Lymphocyte # : 1.9 K/uL  Auto Monocyte # : 1.5 K/uL  Auto Eosinophil # : 0.1 K/uL  Auto Basophil # : 0.0 K/uL  Auto Neutrophil % : 78.1 %  Auto Lymphocyte % : 12.1 %  Auto Monocyte % : 9.4 %  Auto Eosinophil % : 0.3 %  Auto Basophil % : 0.1 %    04-04    126<L>  |  90<L>  |  46<H>  ----------------------------<  111<H>  4.7   |  17<L>  |  1.80<H>  04-    126<L>  |  93<L>  |  45<H>  ----------------------------<  121<H>  4.7   |  19<L>  |  1.77<H>    Ca    8.7      2019 05:44  Ca    8.7      2019 02:10  Phos  4.7     04-  Phos  4.6     04-  Mg     2.4     04-  Mg     2.4     04-04    TPro  7.2  /  Alb  3.7  /  TBili  0.5  /  DBili  x   /  AST  389<H>  /  ALT  467<H>  /  AlkPhos  111  04-03    PT/INR - ( 2019 20:37 )   PT: 17.5 sec;   INR: 1.52 ratio       PTT - ( 2019 20:37 )  PTT:27.2 sec  CARDIAC MARKERS ( 2019 05:44 )  x     / x     / 98 U/L / x     / 5.6 ng/mL    proBNP: >50,000    TELEMETRY: 4/3/2019  AF    EC/3/2019  Coarse atrial fibrillation and some suggestion of atrial flutter with variable conduction, normal axis, and good r wave progression.     CXR 2019  IMPRESSION:  Small areas of bibasilar atelectasis. Pulmonary vascular congestion. No   pleural effusion or pneumothorax. The the heart is enlarged. Mild   degenerative changes of the spine and right shoulder.    CARDIAC TESTING/STUDIES:    N/A    ASSESSMENT/PLAN:   79y Male patient that does not regularly follow up with healthcare providers and endorses no significant past medical history of presenting with decompensated congestive heart failure and atrial fibrillation with RVR; both cardiac diseases likely exacerbating each other.     1) CHF, diastolic +/- systolic   Physical exam findings consistent with volume overload  BNP >50,000  CXR pulm congestion   End organ hypoperfusion/congestion - ELVA< transaminitis, rising INR, troponin elevated.     ·	Continue IV furosemide 40 mg BID; monitor I/O, electrolytes and modify as appropriate   ·	Pending ECHO to assess LV function, RV function, diastology, chamber quantification, WMA, etc.     2) AF  CHADS2-Vasc: 3 (age, CHF)  Rate controlled     ·	Increase metoprolol tartrate 25 mg BID.  ·	TSH blood test   ·	Suggest therapeutic dose enoxaparin versus heparin ggt for A/C given elevated risk and we can transition to a DOAC once we have an ECHO to rule out sver valvular pathology and ensure there are is not severe LV dysfunction that might warrant cardiac cath.  ·	No current plans for attempts at NSR in the setting of decompensated CHF, pending ECHO results that will assist in predicting the success in conversion and maintenance of NSR.     3) Non-thrombotic troponin elevation   Setting of RVR, decompensated heart failure, ELVA   CK-MB normal   hs-troponin  54, 67, 64    ·	No further cardiac enzymes needed.   ·	Pending ECHO to assess LV function, RV function, diastology, chamber quantification, WMA, etc.   ·	Lipid panel and HgA1c for improved risk stratification.   ·	Although I do not believe this troponin elevation is ACS, once he is compensated, he should get an ischemic evaluation since he has never had one, such as an exercise/pharmacologic nuclear stress scan which can be done outpatient. Will await ECHO and other results as we tailor his medical care.     Zehra Shahid MD, MPH, ARVIN  Cardiovascular Specialist Attending  [facility]  C: [phone]  E: [email or web address]  (Cardiology Nocturnist cell number available 7 pm - 7 am every night; available daytime week days for follow-up only; daytime weekends covered by general cardiology consult service)

## 2019-04-05 DIAGNOSIS — I50.21 ACUTE SYSTOLIC (CONGESTIVE) HEART FAILURE: ICD-10-CM

## 2019-04-05 DIAGNOSIS — R06.09 OTHER FORMS OF DYSPNEA: ICD-10-CM

## 2019-04-05 LAB
ALBUMIN SERPL ELPH-MCNC: 3.5 G/DL — SIGNIFICANT CHANGE UP (ref 3.3–5)
ALP SERPL-CCNC: 148 U/L — HIGH (ref 40–120)
ALT FLD-CCNC: 1124 U/L — HIGH (ref 10–45)
ANION GAP SERPL CALC-SCNC: 16 MMOL/L — SIGNIFICANT CHANGE UP (ref 5–17)
APTT BLD: 34.9 SEC — SIGNIFICANT CHANGE UP (ref 27.5–36.3)
AST SERPL-CCNC: 558 U/L — HIGH (ref 10–40)
BASOPHILS # BLD AUTO: 0 K/UL — SIGNIFICANT CHANGE UP (ref 0–0.2)
BASOPHILS NFR BLD AUTO: 0 % — SIGNIFICANT CHANGE UP (ref 0–2)
BILIRUB SERPL-MCNC: 0.7 MG/DL — SIGNIFICANT CHANGE UP (ref 0.2–1.2)
BUN SERPL-MCNC: 56 MG/DL — HIGH (ref 7–23)
CALCIUM SERPL-MCNC: 8.1 MG/DL — LOW (ref 8.4–10.5)
CHLORIDE SERPL-SCNC: 92 MMOL/L — LOW (ref 96–108)
CO2 SERPL-SCNC: 21 MMOL/L — LOW (ref 22–31)
CREAT SERPL-MCNC: 2 MG/DL — HIGH (ref 0.5–1.3)
EOSINOPHIL # BLD AUTO: 0 K/UL — SIGNIFICANT CHANGE UP (ref 0–0.5)
EOSINOPHIL NFR BLD AUTO: 0 % — SIGNIFICANT CHANGE UP (ref 0–6)
GLUCOSE SERPL-MCNC: 111 MG/DL — HIGH (ref 70–99)
HCT VFR BLD CALC: 40.3 % — SIGNIFICANT CHANGE UP (ref 39–50)
HGB BLD-MCNC: 13.4 G/DL — SIGNIFICANT CHANGE UP (ref 13–17)
INR BLD: 1.6 RATIO — HIGH (ref 0.88–1.16)
IRON SATN MFR SERPL: 15 % — LOW (ref 16–55)
IRON SATN MFR SERPL: 55 UG/DL — SIGNIFICANT CHANGE UP (ref 45–165)
LYMPHOCYTES # BLD AUTO: 14 % — SIGNIFICANT CHANGE UP (ref 13–44)
LYMPHOCYTES # BLD AUTO: 2.22 K/UL — SIGNIFICANT CHANGE UP (ref 1–3.3)
MAGNESIUM SERPL-MCNC: 2.4 MG/DL — SIGNIFICANT CHANGE UP (ref 1.6–2.6)
MCHC RBC-ENTMCNC: 28.4 PG — SIGNIFICANT CHANGE UP (ref 27–34)
MCHC RBC-ENTMCNC: 33.3 GM/DL — SIGNIFICANT CHANGE UP (ref 32–36)
MCV RBC AUTO: 85.4 FL — SIGNIFICANT CHANGE UP (ref 80–100)
MONOCYTES # BLD AUTO: 1.43 K/UL — HIGH (ref 0–0.9)
MONOCYTES NFR BLD AUTO: 9 % — SIGNIFICANT CHANGE UP (ref 2–14)
NEUTROPHILS # BLD AUTO: 12.21 K/UL — HIGH (ref 1.8–7.4)
NEUTROPHILS NFR BLD AUTO: 77 % — SIGNIFICANT CHANGE UP (ref 43–77)
PHOSPHATE SERPL-MCNC: 3.9 MG/DL — SIGNIFICANT CHANGE UP (ref 2.5–4.5)
PLATELET # BLD AUTO: 236 K/UL — SIGNIFICANT CHANGE UP (ref 150–400)
POTASSIUM SERPL-MCNC: 4 MMOL/L — SIGNIFICANT CHANGE UP (ref 3.5–5.3)
POTASSIUM SERPL-SCNC: 4 MMOL/L — SIGNIFICANT CHANGE UP (ref 3.5–5.3)
PROT SERPL-MCNC: 6.6 G/DL — SIGNIFICANT CHANGE UP (ref 6–8.3)
PROTHROM AB SERPL-ACNC: 18.3 SEC — HIGH (ref 10–13.1)
RBC # BLD: 4.72 M/UL — SIGNIFICANT CHANGE UP (ref 4.2–5.8)
RBC # FLD: 13.4 % — SIGNIFICANT CHANGE UP (ref 10.3–14.5)
SODIUM SERPL-SCNC: 129 MMOL/L — LOW (ref 135–145)
TIBC SERPL-MCNC: 376 UG/DL — SIGNIFICANT CHANGE UP (ref 220–430)
UIBC SERPL-MCNC: 321 UG/DL — SIGNIFICANT CHANGE UP (ref 110–370)
WBC # BLD: 15.86 K/UL — HIGH (ref 3.8–10.5)
WBC # FLD AUTO: 15.86 K/UL — HIGH (ref 3.8–10.5)

## 2019-04-05 PROCEDURE — 99233 SBSQ HOSP IP/OBS HIGH 50: CPT

## 2019-04-05 PROCEDURE — 99233 SBSQ HOSP IP/OBS HIGH 50: CPT | Mod: GC

## 2019-04-05 PROCEDURE — 99232 SBSQ HOSP IP/OBS MODERATE 35: CPT

## 2019-04-05 RX ORDER — HEPARIN SODIUM 5000 [USP'U]/ML
INJECTION INTRAVENOUS; SUBCUTANEOUS
Qty: 25000 | Refills: 0 | Status: DISCONTINUED | OUTPATIENT
Start: 2019-04-05 | End: 2019-04-08

## 2019-04-05 RX ORDER — HEPARIN SODIUM 5000 [USP'U]/ML
6500 INJECTION INTRAVENOUS; SUBCUTANEOUS EVERY 6 HOURS
Qty: 0 | Refills: 0 | Status: DISCONTINUED | OUTPATIENT
Start: 2019-04-05 | End: 2019-04-15

## 2019-04-05 RX ORDER — HEPARIN SODIUM 5000 [USP'U]/ML
3000 INJECTION INTRAVENOUS; SUBCUTANEOUS EVERY 6 HOURS
Qty: 0 | Refills: 0 | Status: DISCONTINUED | OUTPATIENT
Start: 2019-04-05 | End: 2019-04-08

## 2019-04-05 RX ORDER — HEPARIN SODIUM 5000 [USP'U]/ML
6500 INJECTION INTRAVENOUS; SUBCUTANEOUS ONCE
Qty: 0 | Refills: 0 | Status: DISCONTINUED | OUTPATIENT
Start: 2019-04-05 | End: 2019-04-08

## 2019-04-05 RX ORDER — FUROSEMIDE 40 MG
40 TABLET ORAL EVERY 6 HOURS
Qty: 0 | Refills: 0 | Status: DISCONTINUED | OUTPATIENT
Start: 2019-04-05 | End: 2019-04-08

## 2019-04-05 RX ADMIN — Medication 40 MILLIGRAM(S): at 05:19

## 2019-04-05 RX ADMIN — Medication 25 MILLIGRAM(S): at 05:20

## 2019-04-05 RX ADMIN — Medication 40 MILLIGRAM(S): at 11:59

## 2019-04-05 RX ADMIN — HEPARIN SODIUM 1400 UNIT(S)/HR: 5000 INJECTION INTRAVENOUS; SUBCUTANEOUS at 22:06

## 2019-04-05 RX ADMIN — FAMOTIDINE 20 MILLIGRAM(S): 10 INJECTION INTRAVENOUS at 11:59

## 2019-04-05 RX ADMIN — ENOXAPARIN SODIUM 80 MILLIGRAM(S): 100 INJECTION SUBCUTANEOUS at 09:46

## 2019-04-05 RX ADMIN — Medication 40 MILLIGRAM(S): at 17:05

## 2019-04-05 RX ADMIN — Medication 1 TABLET(S): at 11:59

## 2019-04-05 NOTE — PROGRESS NOTE ADULT - PROBLEM SELECTOR PLAN 6
WBC 15 with only reported sx of non productive cough and vague abdominal sx  - possibly reactive, afebrile, VS stable  - CT chest/A/P without source of infection  - f/u blood cultures

## 2019-04-05 NOTE — PROGRESS NOTE ADULT - PROBLEM SELECTOR PLAN 1
-likely 2/2 to fluid overload from new acute HF exacerbation based off of imaging, labs, and PE findings   -ACS shows stable troponins and EKG   -f/u TTE  -cards recs appreciated- continue diuresis  -BNP downtrended to 2700 from 50,000  -monitor on tele, monitor UOP -ECHO shows EF 23% with right and left ventricular dysfunction, elevated pulm pressures  -continue diuresis   -ACS shows stable troponins and EKG   -cardiology following   -monitor on tele, monitor UOP; currently net neg 1L -ECHO shows EF 23% with right and left ventricular dysfunction, elevated pulm pressures  -ACS shows stable troponins and EKG   -cardiology following - concern for component of cardiogenic shock given cool extremities; inc diuresis to Lasix 40 IV q 6, d/c BB; possible dobutamine if no improvement in peripheral circulation  -plan for cath Monday given adequate diuresis   -monitor on tele, monitor UOP, strict I+O and daily weight

## 2019-04-05 NOTE — PROGRESS NOTE ADULT - PROBLEM SELECTOR PLAN 4
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

## 2019-04-05 NOTE — PROGRESS NOTE ADULT - PROBLEM SELECTOR PLAN 3
-LFT's now in the 1000s from 400's, but trending down this AM  -hepatitis panel negative; CT A/P with no acute abdominal pathology   -hepatology recs appreciated- possibly 2/2 to hepatic congestion vs. ischemic hepatopathy  -check autoimmune labs  -abdominal U/S ordered -LFT's now in the 1000s from 400's, but trending down this AM  -hepatitis panel negative; CT A/P with no acute abdominal pathology   -hepatology recs appreciated- possibly 2/2 to hepatic congestion vs. ischemic hepatopathy  -check autoimmune labs  -improving with diuresis

## 2019-04-05 NOTE — PROGRESS NOTE ADULT - SUBJECTIVE AND OBJECTIVE BOX
Delia Garcia, PGY1   Contact/Pager - [phone] / [phone]    SUBJECTIVE / OVERNIGHT EVENTS:  -no acute events overnight  -denies any complaints including CP, SOB, abdominal pain, N/V, diarrhea, constipation, headache, confusion, fever/chills      MEDICATIONS  (STANDING):  enoxaparin Injectable 80 milliGRAM(s) SubCutaneous two times a day  ergocalciferol 49599 Unit(s) Oral every week  famotidine    Tablet 20 milliGRAM(s) Oral daily  furosemide   Injectable 40 milliGRAM(s) IV Push every 12 hours  metoprolol tartrate 25 milliGRAM(s) Oral every 12 hours  multivitamin 1 Tablet(s) Oral daily    MEDICATIONS  (PRN):      Allergies    No Known Allergies    Intolerances          Vital Signs Last 24 Hrs  T(C): 36.3 (2019 05:17), Max: 36.6 (2019 17:39)  T(F): 97.4 (2019 05:17), Max: 97.8 (2019 17:39)  HR: 71 (:17) (51 - 104)  BP: 135/90 (2019 05:17) (115/73 - 139/91)  BP(mean): --  RR: 18 (2019 05:17) (16 - 18)  SpO2: 98% (2019 05:17) (94% - 100%)  CAPILLARY BLOOD GLUCOSE        I&O's Summary    2019 07:01  -  2019 07:00  --------------------------------------------------------  IN: 340 mL / OUT: 1325 mL / NET: -985 mL          PHYSICAL EXAM:  GENERAL: NAD, well-developed  HEAD:  AT, NC  EYES: EOMI, PERRLA, conjunctiva and sclera clear  ENMT: Airway patent. MMM. Good dentition, no lesions.  NECK: Supple, No JVD  CHEST/LUNG: CTABL; No wheezing, rhonci, or rales  HEART: RRR; Normal S1, S2. No murmurs, rubs, or gallops  ABDOMEN: Soft, NT, ND; Bowel sounds present. No organomegaly  EXTREMITIES:  2+ Peripheral Pulses, No clubbing, cyanosis, or edema  PSYCH: AAOx3  NEUROLOGY: non-focal  SKIN: Warm, dry, intact; No rashes or lesions      LABS:                        12.8   15.73 )-----------( 220      ( 2019 09:41 )             38.9     04-05    129<L>  |  92<L>  |  56<H>  ----------------------------<  111<H>  4.0   |  21<L>  |  2.00<H>    Ca    8.1<L>      2019 05:54  Phos  3.9     -  Mg     2.4     -    TPro  6.6  /  Alb  3.5  /  TBili  0.7  /  DBili  x   /  AST  558<H>  /  ALT  1124<H>  /  AlkPhos  148<H>      LIVER FUNCTIONS - ( 2019 05:54 )  Alb: 3.5 g/dL / Pro: 6.6 g/dL / ALK PHOS: 148 U/L / ALT: 1124 U/L / AST: 558 U/L / GGT: x           PT/INR - ( 2019 20:37 )   PT: 17.5 sec;   INR: 1.52 ratio         PTT - ( 2019 20:37 )  PTT:27.2 sec  CARDIAC MARKERS ( 2019 05:44 )  x     / x     / 98 U/L / x     / 5.6 ng/mL      Urinalysis Basic - ( 2019 14:42 )    Color: Yellow / Appearance: Clear / S.026 / pH: x  Gluc: x / Ketone: Negative  / Bili: Negative / Urobili: Negative   Blood: x / Protein: 300 mg/dL / Nitrite: Negative   Leuk Esterase: Negative / RBC: >50 /hpf / WBC 3 /HPF   Sq Epi: x / Non Sq Epi: 3 / Bacteria: Few              RADIOLOGY & ADDITIONAL TESTS:    Imaging Personally Reviewed: YES    Consultant(s) Notes Reviewed: YES    Care Discussed with Consultants/Other Providers: YES Delia Garcia, PGY1   Contact/Pager - [phone] / [phone]    SUBJECTIVE / OVERNIGHT EVENTS:  -no acute events overnight  -patient feeling well this AM      MEDICATIONS  (STANDING):  enoxaparin Injectable 80 milliGRAM(s) SubCutaneous two times a day  ergocalciferol 18114 Unit(s) Oral every week  famotidine    Tablet 20 milliGRAM(s) Oral daily  furosemide   Injectable 40 milliGRAM(s) IV Push every 12 hours  metoprolol tartrate 25 milliGRAM(s) Oral every 12 hours  multivitamin 1 Tablet(s) Oral daily    MEDICATIONS  (PRN):      Allergies    No Known Allergies    Intolerances          Vital Signs Last 24 Hrs  T(C): 36.3 (2019 05:17), Max: 36.6 (2019 17:39)  T(F): 97.4 (2019 05:17), Max: 97.8 (2019 17:39)  HR: 71 (2019 05:17) (51 - 104)  BP: 135/90 (2019 05:17) (115/73 - 139/91)  BP(mean): --  RR: 18 (2019 05:17) (16 - 18)  SpO2: 98% (2019 05:17) (94% - 100%)  CAPILLARY BLOOD GLUCOSE        I&O's Summary    2019 07:01  -  2019 07:00  --------------------------------------------------------  IN: 340 mL / OUT: 1325 mL / NET: -985 mL          PHYSICAL EXAM:  GENERAL: NAD, well-developed  HEAD:  AT, NC  EYES: EOMI, PERRLA, conjunctiva and sclera clear  ENMT: Airway patent. MMM. Good dentition, no lesions.  NECK: Supple, No JVD  CHEST/LUNG: decreased breath sounds at bases; no crackles  HEART: RRR; Normal S1, S2. No murmurs, rubs, or gallops  ABDOMEN: Soft, NT, distended with fluid wave; Bowel sounds present. No organomegaly  EXTREMITIES:  2+ Peripheral Pulses, No clubbing, cyanosis, or edema  PSYCH: AAOx3  NEUROLOGY: non-focal  SKIN: Warm, dry, intact; No rashes or lesions      LABS:                        12.8   15.73 )-----------( 220      ( 2019 09:41 )             38.9     04-05    129<L>  |  92<L>  |  56<H>  ----------------------------<  111<H>  4.0   |  21<L>  |  2.00<H>    Ca    8.1<L>      2019 05:54  Phos  3.9       Mg     2.4         TPro  6.6  /  Alb  3.5  /  TBili  0.7  /  DBili  x   /  AST  558<H>  /  ALT  1124<H>  /  AlkPhos  148<H>      LIVER FUNCTIONS - ( 2019 05:54 )  Alb: 3.5 g/dL / Pro: 6.6 g/dL / ALK PHOS: 148 U/L / ALT: 1124 U/L / AST: 558 U/L / GGT: x           PT/INR - ( 2019 20:37 )   PT: 17.5 sec;   INR: 1.52 ratio         PTT - ( 2019 20:37 )  PTT:27.2 sec  CARDIAC MARKERS ( 2019 05:44 )  x     / x     / 98 U/L / x     / 5.6 ng/mL      Urinalysis Basic - ( 2019 14:42 )    Color: Yellow / Appearance: Clear / S.026 / pH: x  Gluc: x / Ketone: Negative  / Bili: Negative / Urobili: Negative   Blood: x / Protein: 300 mg/dL / Nitrite: Negative   Leuk Esterase: Negative / RBC: >50 /hpf / WBC 3 /HPF   Sq Epi: x / Non Sq Epi: 3 / Bacteria: Few              RADIOLOGY & ADDITIONAL TESTS:    Imaging Personally Reviewed: YES    < from: Transthoracic Echocardiogram (19 @ 19:23) >  ------------------------------------------------------------------------  Conclusions:  1. Tethered mitral valve leaflets with normal opening.  Malcoaptation of leaflet tips.  Moderate mitral  regurgitation. Peak mitral valve gradient equals 4 mm Hg,  mean transmitral valve gradient equals 1 mm Hg.  2. Severely dilated left atrium.  LA volume index = 55  cc/m2.  3. Normal left ventricular internal dimensions and wall  thicknesses.  4. Severe global left ventricular systolic dysfunction.  5. Increased E/e'  is consistent with elevated left  ventricular filling pressure.  6. Right ventricular enlargement with decreased right  ventricular systolic function.  7. Normal tricuspid valve. Moderate-severe tricuspid  regurgitation.  8. Large left pleural effusion.  *** No previous Echo exam.    EF is 23%    < end of copied text >      Consultant(s) Notes Reviewed: YES    Care Discussed with Consultants/Other Providers: YES

## 2019-04-05 NOTE — PROGRESS NOTE ADULT - SUBJECTIVE AND OBJECTIVE BOX
SUBJ:continued SOB, though patient denies it    MEDICATIONS  (STANDING):  enoxaparin Injectable 80 milliGRAM(s) SubCutaneous two times a day  ergocalciferol 10005 Unit(s) Oral every week  famotidine    Tablet 20 milliGRAM(s) Oral daily  furosemide   Injectable 40 milliGRAM(s) IV Push every 12 hours  metoprolol tartrate 25 milliGRAM(s) Oral every 12 hours  multivitamin 1 Tablet(s) Oral daily    MEDICATIONS  (PRN):      Vital Signs Last 24 Hrs  T(C): 36.3 (05 Apr 2019 05:17), Max: 36.6 (04 Apr 2019 17:39)  T(F): 97.4 (05 Apr 2019 05:17), Max: 97.8 (04 Apr 2019 17:39)  HR: 71 (05 Apr 2019 05:17) (51 - 104)  BP: 135/90 (05 Apr 2019 05:17) (115/73 - 139/91)  BP(mean): --  RR: 18 (05 Apr 2019 05:17) (16 - 18)  SpO2: 98% (05 Apr 2019 05:17) (94% - 100%)    REVIEW OF SYSTEMS:  CONSTITUTIONAL: No fever, weight loss, or fatigue  EYES: No eye pain, visual disturbances, or discharge  ENMT:  No difficulty hearing, tinnitus, vertigo; No sinus or throat pain  NECK: No pain or stiffness  RESPIRATORY: No cough, wheezing, chills or hemoptysis; +shortness of breath  CARDIOVASCULAR: No chest pain, palpitations, dizziness, +leg swelling, + abd swelling, + HEAD  GASTROINTESTINAL: No abdominal or epigastric pain. No nausea, vomiting, or hematemesis; No diarrhea or constipation. No melena or hematochezia.  GENITOURINARY: No dysuria, frequency, hematuria, or incontinence  NEUROLOGICAL: No headaches, memory loss, loss of strength, numbness, or tremors  SKIN: No itching, burning, rashes, or lesions   LYMPH NODES: No enlarged glands  ENDOCRINE: No heat or cold intolerance; No hair loss  MUSCULOSKELETAL: No joint pain or swelling; No muscle, back, or extremity pain  PSYCHIATRIC: No depression, anxiety, mood swings, or difficulty sleeping  HEME/LYMPH: No easy bruising, or bleeding gums  ALLERY AND IMMUNOLOGIC: No hives or eczema          PHYSICAL EXAM:  · CONSTITUTIONAL: eld m sitting 45deg in bed      · EYES: EOMI; PERRL; no drainage or redness  · NECK: No bruits; no thyromegaly or nodules  ·RESPIRATORY:   crackles and reduced br sounds to mid lung fields  · CARDIOVASCULAR: irreg, + murmur, + JVD, cool extremities, +1LE edema  . GASTROINTESTINAL:  +distention; no masses palpable; bowel sounds normal  · EXTREMITIES: No cyanosis, clubbing or edema  · VASCULAR:  Equal and normal pulses (radial, femoral)  ·NEUROLOGICAL:  Alert and oriented x 3;   · SKIN: No lesions; no rash  . LYMPH NODES: No lymphadedenopathy  · MUSCULOSKELETAL:  No calf tenderness  no joint swelling	    TELEMETRY:afib 70-90      TTE: images reviewed EF 20-25%, mod severe MR, biatrial enlargement  pleural effusions    LABS:   CBC Full  -  ( 04 Apr 2019 09:41 )  WBC Count : 15.73 K/uL  RBC Count : 4.56 M/uL  Hemoglobin : 12.8 g/dL  Hematocrit : 38.9 %  Platelet Count - Automated : 220 K/uL  Mean Cell Volume : 85.3 fl  Mean Cell Hemoglobin : 28.1 pg  Mean Cell Hemoglobin Concentration : 32.9 gm/dL  Auto Neutrophil # : 12.19 K/uL  Auto Lymphocyte # : 1.96 K/uL  Auto Monocyte # : 1.38 K/uL  Auto Eosinophil # : 0.00 K/uL  Auto Basophil # : 0.02 K/uL  Auto Neutrophil % : 77.5 %  Auto Lymphocyte % : 12.5 %  Auto Monocyte % : 8.8 %  Auto Eosinophil % : 0.0 %  Auto Basophil % : 0.1 %      04-05    129<L>  |  92<L>  |  56<H>  ----------------------------<  111<H>  4.0   |  21<L>  |  2.00<H>    Ca    8.1<L>      05 Apr 2019 05:54  Phos  3.9     04-05  Mg     2.4     04-05    TPro  6.6  /  Alb  3.5  /  TBili  0.7  /  DBili  x   /  AST  558<H>  /  ALT  1124<H>  /  AlkPhos  148<H>  04-05    CARDIAC MARKERS ( 04 Apr 2019 05:44 )  x     / x     / 98 U/L / x     / 5.6 ng/mL        RADIOLOGY & ADDITIONAL STUDIES:    IMPRESSION AND PLAN:      ERICKSON GREENBERG is a79y Male patient that does not regularly follow up with healthcare providers and endorses no significant past medical history of presenting with decompensated congestive heart failure and atrial fibrillation with RVR; both cardiac diseases likely exacerbating each other. His labs are signifianct for mild troponin elevation, Elevated BNP , elevated LFTs.  Exam is significant for cool extremities, abd distention, + JVd, and irreg HB.  He is currently responding to the diuresis but in the presence of cool extremities will hold the beta blocker and increase the diuresis.        1) CHF, EF 20-25%, acute decompensated  Physical exam findings consistent with volume overload  BNP >50,000  CXR pulm congestion   End organ hypoperfusion/congestion - ELVA< transaminitis, rising INR, troponin elevated.     ·	increase IV furosemide 40 mg q6hrs,for net negative 2L daily ;   ·	monitor I/O, electrolytes and modify as appropriate , BID chem labs  ·	-monitor ins/outs  ·	-check daily standing weights  ·	  ·	-hold beta blocker  ·	-hold ACE/ ARB at this time  reassess for urine output and extremity perfusion in 24hrs as he has so far responded well to IV lasix and if continued cool extremities consider addition of inotrope   ·	he will need a cath this admission but will need to be able to lie flat first. will hope to cath Monday or Tuesday following a weekend of AGGRESSIVE diuresis.  ·	    2) AF  CHADS2-Vasc: 3 (age, CHF)  Rate controlled but will hold betablocker in setting of cardiogenic shock    ·	dc metoprolol tartrate   ·	TSH blood test   ·	may begin AC with heparin drip, hold DOAC until after cardiac cath   No current plans for attempts at NSR in the setting of decompensated CHF,     3) Non-thrombotic troponin elevation   Setting of RVR, decompensated heart failure, ELVA   CK-MB normal   hs-troponin  54, 67, 64    ·	No further cardiac enzymes needed. trop elevation in setting of cardiac decompensation  ·	Lipid panel and HgA1c for improved risk stratification.         Billy Lopez MD, PhD  Cardiology Attending  [facility]  [phone]    (Cardiology Nocturnist cell number available 7 pm - 7 am every night; available daytime week days for follow-up only; daytime weekends covered by general cardiology consult service)

## 2019-04-05 NOTE — PROGRESS NOTE ADULT - PROBLEM SELECTOR PLAN 2
-as per son, hx of Afib with recommended ECHO in December. ECHO never done, and patient reverted to sinus on his own  -in ED, originally HR 130s, now rate controlled 70-80s  -cards recs appreciated- Lopressor 25 BID, continue therapeutic Lovenox, can start Eliquis based on ECHO results   - TSH wnl -LA severely dilated in ECHO   -in ED, originally HR 130s, now rate controlled 70-80s  -cards recs appreciated- Lopressor 25 BID, continue therapeutic Lovenox, potential switch to Eliquis with ECHO results   - TSH wnl -LA severely dilated in ECHO   -in ED, originally HR 130s, now rate controlled 70-80s  -cards recs appreciated- d/c BB for now; switch to heparin gtt for AC given renal function  - TSH wnl

## 2019-04-05 NOTE — PROGRESS NOTE ADULT - PROBLEM SELECTOR PLAN 8
-Cr typically wnl as per patient and family   -UA noting significant microscopic hematuria and poss nephrotic range  proteinuria, consistent with Intrinsic disease as seen with FeNa  -Cr uptrending, on lasix- will consult neprhology  - monitor UOP -Cr typically wnl as per patient and family   -UA noting significant microscopic hematuria and poss nephrotic range  proteinuria, consistent with Intrinsic disease as seen with FeNa  -Cr uptrending, on lasix- will consult nephrology  - monitor UOP

## 2019-04-05 NOTE — PROGRESS NOTE ADULT - PROBLEM SELECTOR PLAN 5
-likely 2/2 to HF exacerbation (of note, patient with proteinuria that may be contributing to intravascular depletion)  -abdominal CT with ascites  -continue diuresis -likely 2/2 to HF exacerbation (of note, patient with proteinuria that may be contributing to intravascular depletion); possibly 2/2 to primary liver dysfunction given elevated INR and transaminitis   -abdominal CT with ascites, f/u abdominal U/S  -continue diuresis

## 2019-04-06 LAB
ALBUMIN SERPL ELPH-MCNC: 3.7 G/DL — SIGNIFICANT CHANGE UP (ref 3.3–5)
ALP SERPL-CCNC: 138 U/L — HIGH (ref 40–120)
ALT FLD-CCNC: 869 U/L — HIGH (ref 10–45)
ANION GAP SERPL CALC-SCNC: 17 MMOL/L — SIGNIFICANT CHANGE UP (ref 5–17)
ANION GAP SERPL CALC-SCNC: 17 MMOL/L — SIGNIFICANT CHANGE UP (ref 5–17)
ANION GAP SERPL CALC-SCNC: 18 MMOL/L — HIGH (ref 5–17)
APTT BLD: 122.3 SEC — CRITICAL HIGH (ref 27.5–36.3)
APTT BLD: 151.6 SEC — CRITICAL HIGH (ref 27.5–36.3)
APTT BLD: 73.5 SEC — HIGH (ref 27.5–36.3)
AST SERPL-CCNC: 261 U/L — HIGH (ref 10–40)
BILIRUB DIRECT SERPL-MCNC: 0.2 MG/DL — SIGNIFICANT CHANGE UP (ref 0–0.2)
BILIRUB INDIRECT FLD-MCNC: 0.4 MG/DL — SIGNIFICANT CHANGE UP (ref 0.2–1)
BILIRUB SERPL-MCNC: 0.6 MG/DL — SIGNIFICANT CHANGE UP (ref 0.2–1.2)
BUN SERPL-MCNC: 44 MG/DL — HIGH (ref 7–23)
BUN SERPL-MCNC: 48 MG/DL — HIGH (ref 7–23)
BUN SERPL-MCNC: 55 MG/DL — HIGH (ref 7–23)
CALCIUM SERPL-MCNC: 7.9 MG/DL — LOW (ref 8.4–10.5)
CALCIUM SERPL-MCNC: 8 MG/DL — LOW (ref 8.4–10.5)
CALCIUM SERPL-MCNC: 8 MG/DL — LOW (ref 8.4–10.5)
CHLORIDE SERPL-SCNC: 86 MMOL/L — LOW (ref 96–108)
CHLORIDE SERPL-SCNC: 88 MMOL/L — LOW (ref 96–108)
CHLORIDE SERPL-SCNC: 90 MMOL/L — LOW (ref 96–108)
CO2 SERPL-SCNC: 25 MMOL/L — SIGNIFICANT CHANGE UP (ref 22–31)
CO2 SERPL-SCNC: 26 MMOL/L — SIGNIFICANT CHANGE UP (ref 22–31)
CO2 SERPL-SCNC: 26 MMOL/L — SIGNIFICANT CHANGE UP (ref 22–31)
CREAT SERPL-MCNC: 1.7 MG/DL — HIGH (ref 0.5–1.3)
CREAT SERPL-MCNC: 1.76 MG/DL — HIGH (ref 0.5–1.3)
CREAT SERPL-MCNC: 1.85 MG/DL — HIGH (ref 0.5–1.3)
GLUCOSE SERPL-MCNC: 167 MG/DL — HIGH (ref 70–99)
GLUCOSE SERPL-MCNC: 170 MG/DL — HIGH (ref 70–99)
GLUCOSE SERPL-MCNC: 184 MG/DL — HIGH (ref 70–99)
HCT VFR BLD CALC: 40.2 % — SIGNIFICANT CHANGE UP (ref 39–50)
HGB BLD-MCNC: 13.6 G/DL — SIGNIFICANT CHANGE UP (ref 13–17)
INR BLD: 1.46 RATIO — HIGH (ref 0.88–1.16)
MAGNESIUM SERPL-MCNC: 2.1 MG/DL — SIGNIFICANT CHANGE UP (ref 1.6–2.6)
MCHC RBC-ENTMCNC: 28.8 PG — SIGNIFICANT CHANGE UP (ref 27–34)
MCHC RBC-ENTMCNC: 33.7 GM/DL — SIGNIFICANT CHANGE UP (ref 32–36)
MCV RBC AUTO: 85.4 FL — SIGNIFICANT CHANGE UP (ref 80–100)
PHOSPHATE SERPL-MCNC: 3.1 MG/DL — SIGNIFICANT CHANGE UP (ref 2.5–4.5)
PLATELET # BLD AUTO: 240 K/UL — SIGNIFICANT CHANGE UP (ref 150–400)
POTASSIUM SERPL-MCNC: 2.6 MMOL/L — CRITICAL LOW (ref 3.5–5.3)
POTASSIUM SERPL-MCNC: 3.8 MMOL/L — SIGNIFICANT CHANGE UP (ref 3.5–5.3)
POTASSIUM SERPL-MCNC: 4.5 MMOL/L — SIGNIFICANT CHANGE UP (ref 3.5–5.3)
POTASSIUM SERPL-SCNC: 2.6 MMOL/L — CRITICAL LOW (ref 3.5–5.3)
POTASSIUM SERPL-SCNC: 3.8 MMOL/L — SIGNIFICANT CHANGE UP (ref 3.5–5.3)
POTASSIUM SERPL-SCNC: 4.5 MMOL/L — SIGNIFICANT CHANGE UP (ref 3.5–5.3)
PROT SERPL-MCNC: 6.7 G/DL — SIGNIFICANT CHANGE UP (ref 6–8.3)
PROTHROM AB SERPL-ACNC: 16.8 SEC — HIGH (ref 10–12.9)
RBC # BLD: 4.71 M/UL — SIGNIFICANT CHANGE UP (ref 4.2–5.8)
RBC # FLD: 13.2 % — SIGNIFICANT CHANGE UP (ref 10.3–14.5)
SODIUM SERPL-SCNC: 130 MMOL/L — LOW (ref 135–145)
SODIUM SERPL-SCNC: 131 MMOL/L — LOW (ref 135–145)
SODIUM SERPL-SCNC: 132 MMOL/L — LOW (ref 135–145)
WBC # BLD: 14.3 K/UL — HIGH (ref 3.8–10.5)
WBC # FLD AUTO: 14.3 K/UL — HIGH (ref 3.8–10.5)

## 2019-04-06 PROCEDURE — 99233 SBSQ HOSP IP/OBS HIGH 50: CPT

## 2019-04-06 PROCEDURE — 99233 SBSQ HOSP IP/OBS HIGH 50: CPT | Mod: GC

## 2019-04-06 RX ORDER — POTASSIUM CHLORIDE 20 MEQ
40 PACKET (EA) ORAL EVERY 4 HOURS
Qty: 0 | Refills: 0 | Status: COMPLETED | OUTPATIENT
Start: 2019-04-06 | End: 2019-04-06

## 2019-04-06 RX ORDER — SIMETHICONE 80 MG/1
80 TABLET, CHEWABLE ORAL ONCE
Qty: 0 | Refills: 0 | Status: COMPLETED | OUTPATIENT
Start: 2019-04-06 | End: 2019-04-06

## 2019-04-06 RX ORDER — POTASSIUM CHLORIDE 20 MEQ
10 PACKET (EA) ORAL
Qty: 0 | Refills: 0 | Status: COMPLETED | OUTPATIENT
Start: 2019-04-06 | End: 2019-04-06

## 2019-04-06 RX ORDER — POTASSIUM CHLORIDE 20 MEQ
40 PACKET (EA) ORAL ONCE
Qty: 0 | Refills: 0 | Status: COMPLETED | OUTPATIENT
Start: 2019-04-06 | End: 2019-04-06

## 2019-04-06 RX ORDER — ONDANSETRON 8 MG/1
4 TABLET, FILM COATED ORAL ONCE
Qty: 0 | Refills: 0 | Status: COMPLETED | OUTPATIENT
Start: 2019-04-06 | End: 2019-04-06

## 2019-04-06 RX ORDER — POTASSIUM CHLORIDE 20 MEQ
10 PACKET (EA) ORAL
Qty: 0 | Refills: 0 | Status: DISCONTINUED | OUTPATIENT
Start: 2019-04-06 | End: 2019-04-06

## 2019-04-06 RX ADMIN — Medication 40 MILLIGRAM(S): at 18:19

## 2019-04-06 RX ADMIN — Medication 100 MILLIEQUIVALENT(S): at 09:26

## 2019-04-06 RX ADMIN — ONDANSETRON 4 MILLIGRAM(S): 8 TABLET, FILM COATED ORAL at 18:19

## 2019-04-06 RX ADMIN — HEPARIN SODIUM 900 UNIT(S)/HR: 5000 INJECTION INTRAVENOUS; SUBCUTANEOUS at 21:45

## 2019-04-06 RX ADMIN — SIMETHICONE 80 MILLIGRAM(S): 80 TABLET, CHEWABLE ORAL at 18:19

## 2019-04-06 RX ADMIN — HEPARIN SODIUM 0 UNIT(S)/HR: 5000 INJECTION INTRAVENOUS; SUBCUTANEOUS at 13:02

## 2019-04-06 RX ADMIN — HEPARIN SODIUM 1400 UNIT(S)/HR: 5000 INJECTION INTRAVENOUS; SUBCUTANEOUS at 04:49

## 2019-04-06 RX ADMIN — Medication 40 MILLIGRAM(S): at 13:01

## 2019-04-06 RX ADMIN — FAMOTIDINE 20 MILLIGRAM(S): 10 INJECTION INTRAVENOUS at 13:01

## 2019-04-06 RX ADMIN — Medication 100 MILLIEQUIVALENT(S): at 10:32

## 2019-04-06 RX ADMIN — Medication 40 MILLIGRAM(S): at 00:41

## 2019-04-06 RX ADMIN — Medication 100 MILLIEQUIVALENT(S): at 08:05

## 2019-04-06 RX ADMIN — Medication 1 TABLET(S): at 13:01

## 2019-04-06 RX ADMIN — Medication 40 MILLIEQUIVALENT(S): at 05:23

## 2019-04-06 RX ADMIN — HEPARIN SODIUM 1100 UNIT(S)/HR: 5000 INJECTION INTRAVENOUS; SUBCUTANEOUS at 14:02

## 2019-04-06 RX ADMIN — Medication 40 MILLIEQUIVALENT(S): at 10:29

## 2019-04-06 RX ADMIN — Medication 40 MILLIEQUIVALENT(S): at 13:11

## 2019-04-06 RX ADMIN — Medication 40 MILLIGRAM(S): at 05:00

## 2019-04-06 NOTE — PROGRESS NOTE ADULT - ASSESSMENT
79y Male patient that does not regularly follow up with healthcare providers and endorses no significant past medical history of presenting with decompensated congestive heart failure and atrial fibrillation with RVR; both cardiac diseases likely exacerbating each other.   ·	Patient starting to feel better. He appears better perfused. Feet are cold to touch but patient with palpable DP pulses B/l. Likely improving CO and perfusion. Crt improving as well. Lasix increased to Q6 hours.   ·	BP is elevated would consider adding vasodilators. If crt stable add ACE if crt increasing will add Hydralazine/isordil.   ·	AF rates are currently acceptable. Continue heparin gtt  ·	Plan for LHC and likely RHC on Monday if patient able to lay flat.    =======================================================================  Rufus Espinosa MD FAC    Please page [phone] with questions  On nights and weekend please call the office [phone].

## 2019-04-06 NOTE — PROGRESS NOTE ADULT - PROBLEM SELECTOR PLAN 4
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

## 2019-04-06 NOTE — PROGRESS NOTE ADULT - ASSESSMENT
80 y/o male with no known PMH who presented with abdominal distension and severe HEAD likely 2/2 to new acute on chronic HF, found to be in Afib with RVR, along with transaminitis and ELVA of unclear etiologies.

## 2019-04-06 NOTE — PROGRESS NOTE ADULT - PROBLEM SELECTOR PLAN 8
-Cr typically wnl as per patient and family   -UA noting significant microscopic hematuria and poss nephrotic range  proteinuria, consistent with Intrinsic disease as seen with FeNa  -Cr uptrending, on lasix- will consult nephrology  - monitor UOP -Cr typically wnl as per patient and family   -UA noting significant microscopic hematuria and poss nephrotic range  proteinuria, consistent with Intrinsic disease as seen with FeNa  -Cr uptrending, on lasix- will consult nephrology  -Cr improved from 2.00 to 1.85 this morning  -monitor UOP -Cr typically wnl as per patient and family   -UA noting significant microscopic hematuria and poss nephrotic range  proteinuria, consistent with Intrinsic disease as seen with FeNa  -Cr improved from 2.00 to 1.85 this morning  -monitor UOP

## 2019-04-06 NOTE — PROGRESS NOTE ADULT - PROBLEM SELECTOR PLAN 2
-LA severely dilated in ECHO   -in ED, originally HR 130s, now rate controlled 70-80s  -cards recs appreciated- d/c BB for now; switch to heparin gtt for AC given renal function  - TSH wnl -LA severely dilated on TTE  -rate controlled 70-80s  -cards recs appreciated- d/c BB for now; switch to heparin gtt for AC given renal function  - TSH wnl

## 2019-04-06 NOTE — PHYSICAL THERAPY INITIAL EVALUATION ADULT - ADDITIONAL COMMENTS
Pt A&Ox4. Pt is primarily Farsi speaking but is able to communicate in English. Pt stated that he lives in a 1 level private house with wife and daughter. Pt is independent with all functional activities and ambulation. Pt does not require an assistive device.

## 2019-04-06 NOTE — PROGRESS NOTE ADULT - PROBLEM SELECTOR PLAN 5
-likely 2/2 to HF exacerbation (of note, patient with proteinuria that may be contributing to intravascular depletion); possibly 2/2 to primary liver dysfunction given elevated INR and transaminitis   -abdominal CT with ascites, f/u abdominal U/S  -continue diuresis -likely 2/2 to CHF exacerbation (of note, patient with proteinuria that may be contributing to intravascular depletion); possibly 2/2 to primary liver dysfunction given elevated INR and transaminitis   -abdominal CT with ascites  -continue diuresis

## 2019-04-06 NOTE — PROGRESS NOTE ADULT - SUBJECTIVE AND OBJECTIVE BOX
Cardiology Follow Up  ==========================================  CC: 4 Elements or 3 chronics    HPI:    Review Of Systems:  General: No fevers  GI: No nausea, no abdominal pain  Other wise negative except as documented above      Medications:    famotidine    Tablet   20 milliGRAM(s) Oral (04-06-19 @ 13:01)    furosemide   Injectable   40 milliGRAM(s) IV Push (04-06-19 @ 13:01)   40 milliGRAM(s) IV Push (04-06-19 @ 05:00)   40 milliGRAM(s) IV Push (04-06-19 @ 00:41)   40 milliGRAM(s) IV Push (04-05-19 @ 17:05)    heparin  Infusion.   1100 Unit(s)/Hr IV Continuous (04-06-19 @ 13:02)   0 Unit(s)/Hr IV Continuous (04-06-19 @ 04:49)   1400 Unit(s)/Hr IV Continuous (04-05-19 @ 22:06)    multivitamin   1 Tablet(s) Oral (04-06-19 @ 13:01)    potassium chloride    Tablet ER   40 milliEquivalent(s) Oral (04-06-19 @ 13:11)    potassium chloride    Tablet ER   40 milliEquivalent(s) Oral (04-06-19 @ 10:29)   40 milliEquivalent(s) Oral (04-06-19 @ 05:23)    potassium chloride  10 mEq/100 mL IVPB   100 mL/Hr IV Intermittent (04-06-19 @ 10:32)   100 mL/Hr IV Intermittent (04-06-19 @ 09:26)   100 mL/Hr IV Intermittent (04-06-19 @ 08:05)        Telemetry:     Vital Signs Last 24 Hrs  T(C): 36.6 (06 Apr 2019 12:39), Max: 36.7 (06 Apr 2019 00:40)  T(F): 97.9 (06 Apr 2019 12:39), Max: 98 (06 Apr 2019 00:40)  HR: 87 (06 Apr 2019 12:39) (68 - 101)  BP: 157/95 (06 Apr 2019 12:39) (133/88 - 162/93)  BP(mean): --  RR: 18 (06 Apr 2019 12:39) (18 - 18)  SpO2: 95% (06 Apr 2019 12:39) (95% - 100%)  I&O's Summary    05 Apr 2019 07:01  -  06 Apr 2019 07:00  --------------------------------------------------------  IN: 1191 mL / OUT: 4000 mL / NET: -2809 mL    06 Apr 2019 07:01  -  06 Apr 2019 14:22  --------------------------------------------------------  IN: 720 mL / OUT: 950 mL / NET: -230 mL        Physical Exam:  General: [x ] NAD  Eyes: [x ] EOMI [ x] PERRL  HENT: [x] MMM  Neck: [x] No JVD  CV: [x ] S1,S2 [x ] RRR [x ] No M/R/G [x] Nl PMI         [x] No edema  Lungs: [x ] CTA B/L [ x] Normal effort  Abd: [x ] Soft [x ] Non-tender [x ] +BS  Ext: [x ] No cyanosis  Skin:[x] No rashes   Psych: [x] Mood and affect appropriate    Labs:                        13.6   14.3  )-----------( 240      ( 06 Apr 2019 04:23 )             40.2     04-06    131<L>  |  88<L>  |  48<H>  ----------------------------<  184<H>  3.8   |  26  |  1.76<H>    Ca    8.0<L>      06 Apr 2019 12:18  Phos  3.1     04-06  Mg     2.1     04-06    TPro  6.7  /  Alb  3.7  /  TBili  0.6  /  DBili  0.2  /  AST  261<H>  /  ALT  869<H>  /  AlkPhos  138<H>  04-06 Cardiology Follow Up  ==========================================  CC: AF, HFrEF, HTN    HPI: AF without symptoms rates 90's. Patient is on hep gtt. HFrEF with slowly improving volume status. Denies SOB. He has HTN with elevated BP.     Review Of Systems:  General: No fevers  GI: No nausea, no abdominal pain  Other wise negative except as documented above      Medications:    famotidine    Tablet   20 milliGRAM(s) Oral (04-06-19 @ 13:01)    furosemide   Injectable   40 milliGRAM(s) IV Push (04-06-19 @ 13:01)   40 milliGRAM(s) IV Push (04-06-19 @ 05:00)   40 milliGRAM(s) IV Push (04-06-19 @ 00:41)   40 milliGRAM(s) IV Push (04-05-19 @ 17:05)    heparin  Infusion.   1100 Unit(s)/Hr IV Continuous (04-06-19 @ 13:02)   0 Unit(s)/Hr IV Continuous (04-06-19 @ 04:49)   1400 Unit(s)/Hr IV Continuous (04-05-19 @ 22:06)    multivitamin   1 Tablet(s) Oral (04-06-19 @ 13:01)    potassium chloride    Tablet ER   40 milliEquivalent(s) Oral (04-06-19 @ 13:11)    potassium chloride    Tablet ER   40 milliEquivalent(s) Oral (04-06-19 @ 10:29)   40 milliEquivalent(s) Oral (04-06-19 @ 05:23)    potassium chloride  10 mEq/100 mL IVPB   100 mL/Hr IV Intermittent (04-06-19 @ 10:32)   100 mL/Hr IV Intermittent (04-06-19 @ 09:26)   100 mL/Hr IV Intermittent (04-06-19 @ 08:05)        Telemetry:  AF 90's    Vital Signs Last 24 Hrs  T(C): 36.6 (06 Apr 2019 12:39), Max: 36.7 (06 Apr 2019 00:40)  T(F): 97.9 (06 Apr 2019 12:39), Max: 98 (06 Apr 2019 00:40)  HR: 87 (06 Apr 2019 12:39) (68 - 101)  BP: 157/95 (06 Apr 2019 12:39) (133/88 - 162/93)  BP(mean): --  RR: 18 (06 Apr 2019 12:39) (18 - 18)  SpO2: 95% (06 Apr 2019 12:39) (95% - 100%)  I&O's Summary    05 Apr 2019 07:01  -  06 Apr 2019 07:00  --------------------------------------------------------  IN: 1191 mL / OUT: 4000 mL / NET: -2809 mL    06 Apr 2019 07:01  -  06 Apr 2019 14:22  --------------------------------------------------------  IN: 720 mL / OUT: 950 mL / NET: -230 mL        Physical Exam:  General: [x ] NAD  Eyes: [x ] EOMI [ x] PERRL  HENT: [x] MMM  Neck: [x] minimal JVD elevation  CV: [x ] S1,S2 [x ] IRIR [x ] No M/R/G [x] Nl PMI         [x] B/L edema  Lungs: [x ] BS decreased L base. [ x] Normal effort  Abd: [x ] Soft [x ] Non-tender [x ] +BS  Ext: [x ] No cyanosis  Skin:[x] No rashes   Psych: [x] Mood and affect appropriate    Labs:                        13.6   14.3  )-----------( 240      ( 06 Apr 2019 04:23 )             40.2     04-06    131<L>  |  88<L>  |  48<H>  ----------------------------<  184<H>  3.8   |  26  |  1.76<H>    Ca    8.0<L>      06 Apr 2019 12:18  Phos  3.1     04-06  Mg     2.1     04-06    TPro  6.7  /  Alb  3.7  /  TBili  0.6  /  DBili  0.2  /  AST  261<H>  /  ALT  869<H>  /  AlkPhos  138<H>  04-06

## 2019-04-06 NOTE — PROGRESS NOTE ADULT - PROBLEM SELECTOR PLAN 9
Patient with complaint of frequent belching and epigastric discomfort. No weight loss, melena, hematemesis. Recent IFOBT test neg in 12/2018  - famotidine daily  - consider H. pylori after acute exacerbation treated  - endoscopy/colonoscopy outpatient Patient with complaint of frequent belching and epigastric discomfort. No weight loss, melena, hematemesis. Recent IFOBT test neg in 12/2018  - famotidine daily  - consider H. pylori after acute exacerbation treated  - endoscopy/colonoscopy as outpatient

## 2019-04-06 NOTE — PROGRESS NOTE ADULT - PROBLEM SELECTOR PLAN 10
DVT: therapeutic Lovenox for Afib   DIet: Veg DASH TLC  Ambulate with assistance for now DVT: Heparin drip  DIet: Veg DASH TLC  Ambulate with assistance for now

## 2019-04-06 NOTE — PROGRESS NOTE ADULT - SUBJECTIVE AND OBJECTIVE BOX
Patient is a 79y old  Male who presents with a chief complaint of dyspnea, abdominal swelling (05 Apr 2019 10:09)      INTERVAL HPI/OVERNIGHT EVENTS:    MEDICATIONS (STANDING):  ergocalciferol 52628 Unit(s) Oral every week  famotidine    Tablet 20 milliGRAM(s) Oral daily  furosemide   Injectable 40 milliGRAM(s) IV Push every 6 hours  heparin  Infusion.  Unit(s)/Hr IV Continuous <Continuous>  heparin  Injectable 6500 Unit(s) IV Push once  multivitamin 1 Tablet(s) Oral daily  potassium chloride    Tablet ER 40 milliEquivalent(s) Oral every 4 hours    MEDICATIONS  (PRN):  heparin  Injectable 6500 Unit(s) IV Push every 6 hours PRN  heparin  Injectable 3000 Unit(s) IV Push every 6 hours PRN      REVIEW OF SYSTEMS:  CONSTITUTIONAL: No fever, weight loss, or fatigue  EYES: No eye pain, visual disturbances, or discharge  ENMT:  No difficulty hearing, tinnitus, vertigo; No sinus or throat pain  NECK: No pain or stiffness  RESPIRATORY: No cough, wheezing, chills or hemoptysis; No shortness of breath  CARDIOVASCULAR: No chest pain, palpitations, dizziness, or leg swelling  GASTROINTESTINAL: No abdominal or epigastric pain. No nausea, vomiting, or hematemesis; No diarrhea or constipation. No melena or hematochezia.  GENITOURINARY: No dysuria, frequency, hematuria, or incontinence  NEUROLOGICAL: No headaches, memory loss, loss of strength, numbness, or tremors  SKIN: No itching, burning, rashes, or lesions   MUSCULOSKELETAL: No joint pain or swelling; No muscle, back, or extremity pain    T(F): 97.6 (04-06-19 @ 04:07), Max: 98 (04-06-19 @ 00:40)  HR: 94 (04-06-19 @ 04:07) (66 - 95)  BP: 162/93 (04-06-19 @ 04:07) (127/92 - 162/93)  RR: 18 (04-06-19 @ 04:07) (18 - 18)  SpO2: 96% (04-06-19 @ 04:07) (95% - 100%)  Wt(kg): --  CAPILLARY BLOOD GLUCOSE        I&O's Summary    05 Apr 2019 07:01  -  06 Apr 2019 07:00  --------------------------------------------------------  IN: 1163 mL / OUT: 4000 mL / NET: -2837 mL        PHYSICAL EXAM:  GENERAL: NAD, well-groomed, well-developed  HEAD:  Atraumatic, Normocephalic  EYES: EOMI, PERRLA, conjunctiva and sclera clear  ENMT: No tonsillar erythema, exudates, or enlargement; Moist mucous membranes  NECK: Supple, No JVD, Normal thyroid  NERVOUS SYSTEM:  Alert & Oriented X3, Good concentration; Motor Strength 5/5 B/L upper and lower extremities  CHEST/LUNG: Clear to auscultation bilaterally; No rales, rhonchi, wheezing, or rubs  HEART: Regular rate and rhythm; No murmurs, rubs, or gallops  ABDOMEN: Soft, Nontender, Nondistended; Bowel sounds present  EXTREMITIES:  2+ Peripheral Pulses, No clubbing, cyanosis, or edema  LYMPH: No lymphadenopathy noted  SKIN: No rashes or lesions    LABS:                        13.6   14.3  )-----------( 240      ( 06 Apr 2019 04:23 )             40.2     04-06    132<L>  |  90<L>  |  55<H>  ----------------------------<  170<H>  2.6<LL>   |  25  |  1.85<H>    Ca    7.9<L>      06 Apr 2019 04:23  Phos  3.1     04-06  Mg     2.1     04-06    TPro  6.7  /  Alb  3.7  /  TBili  0.6  /  DBili  0.2  /  AST  261<H>  /  ALT  869<H>  /  AlkPhos  138<H>  04-06    PT/INR - ( 06 Apr 2019 04:23 )   PT: 16.8 sec;   INR: 1.46 ratio         PTT - ( 06 Apr 2019 04:23 )  PTT:73.5 sec        RADIOLOGY & ADDITIONAL TESTS:    Yohana Jacobsen  Internal Medicine PGY-3  Pager #[phone] ([place]) / [phone] ([facility]) Patient is a 79y old  Male who presents with a chief complaint of dyspnea, abdominal swelling (05 Apr 2019 10:09)      INTERVAL HPI/OVERNIGHT EVENTS: Patient seen and examined at bedside. No overnight events. Patient has no complaints.    MEDICATIONS (STANDING):  ergocalciferol 17714 Unit(s) Oral every week  famotidine    Tablet 20 milliGRAM(s) Oral daily  furosemide   Injectable 40 milliGRAM(s) IV Push every 6 hours  heparin  Infusion.  Unit(s)/Hr IV Continuous <Continuous>  heparin  Injectable 6500 Unit(s) IV Push once  multivitamin 1 Tablet(s) Oral daily  potassium chloride    Tablet ER 40 milliEquivalent(s) Oral every 4 hours    MEDICATIONS  (PRN):  heparin  Injectable 6500 Unit(s) IV Push every 6 hours PRN  heparin  Injectable 3000 Unit(s) IV Push every 6 hours PRN      REVIEW OF SYSTEMS:  CONSTITUTIONAL: No fever, weight loss, or fatigue  EYES: No eye pain, visual disturbances, or discharge  ENMT:  No difficulty hearing, tinnitus, vertigo; No sinus or throat pain  NECK: No pain or stiffness  RESPIRATORY: No cough, wheezing, chills or hemoptysis; No shortness of breath  CARDIOVASCULAR: No chest pain, palpitations, dizziness, or leg swelling  GASTROINTESTINAL: No abdominal or epigastric pain. No nausea, vomiting, or hematemesis; No diarrhea or constipation. No melena or hematochezia.  GENITOURINARY: No dysuria, frequency, hematuria, or incontinence  NEUROLOGICAL: No headaches, memory loss, loss of strength, numbness, or tremors  SKIN: No itching, burning, rashes, or lesions   MUSCULOSKELETAL: No joint pain or swelling; No muscle, back, or extremity pain    T(F): 97.6 (04-06-19 @ 04:07), Max: 98 (04-06-19 @ 00:40)  HR: 94 (04-06-19 @ 04:07) (66 - 95)  BP: 162/93 (04-06-19 @ 04:07) (127/92 - 162/93)  RR: 18 (04-06-19 @ 04:07) (18 - 18)  SpO2: 96% (04-06-19 @ 04:07) (95% - 100%)  Wt(kg): --  CAPILLARY BLOOD GLUCOSE        I&O's Summary    05 Apr 2019 07:01  -  06 Apr 2019 07:00  --------------------------------------------------------  IN: 1163 mL / OUT: 4000 mL / NET: -2837 mL        PHYSICAL EXAM:  GENERAL: NAD, well-groomed, well-developed  HEAD:  Atraumatic, Normocephalic  EYES: EOMI, PERRLA, conjunctiva and sclera clear  ENMT: No tonsillar erythema, exudates, or enlargement; Moist mucous membranes  NECK: Supple, No JVD  NERVOUS SYSTEM:  Alert & Oriented X3, Good concentration; Motor Strength 5/5 B/L upper and lower extremities  CHEST/LUNG: Decreased breath sounds at b/l lower lobes  HEART: Irregular rate and rhythm; No murmurs, rubs, or gallops  ABDOMEN: Soft, Nontender, Nondistended; Bowel sounds present  EXTREMITIES:  2+ Peripheral Pulses, trace b/l ankle edema  LYMPH: No lymphadenopathy noted  SKIN: No rashes or lesions    LABS:                        13.6   14.3  )-----------( 240      ( 06 Apr 2019 04:23 )             40.2     04-06    132<L>  |  90<L>  |  55<H>  ----------------------------<  170<H>  2.6<LL>   |  25  |  1.85<H>    Ca    7.9<L>      06 Apr 2019 04:23  Phos  3.1     04-06  Mg     2.1     04-06    TPro  6.7  /  Alb  3.7  /  TBili  0.6  /  DBili  0.2  /  AST  261<H>  /  ALT  869<H>  /  AlkPhos  138<H>  04-06    PT/INR - ( 06 Apr 2019 04:23 )   PT: 16.8 sec;   INR: 1.46 ratio         PTT - ( 06 Apr 2019 04:23 )  PTT:73.5 sec        RADIOLOGY & ADDITIONAL TESTS: No new imaging    Yohana Delmar  Internal Medicine PGY-3  Pager #[phone] ([place]) / [phone] ([facility])

## 2019-04-06 NOTE — PROGRESS NOTE ADULT - PROBLEM SELECTOR PLAN 3
-LFT's now in the 1000s from 400's, but trending down this AM  -hepatitis panel negative; CT A/P with no acute abdominal pathology   -hepatology recs appreciated- possibly 2/2 to hepatic congestion vs. ischemic hepatopathy  -check autoimmune labs  -improving with diuresis -LFT's now in the 1000s from 400's, but trending down  -hepatitis panel negative; CT A/P with no acute abdominal pathology   -hepatology recs appreciated- possibly 2/2 to hepatic congestion vs. ischemic hepatopathy  -check autoimmune labs  -improving with diuresis

## 2019-04-06 NOTE — PROGRESS NOTE ADULT - PROBLEM SELECTOR PLAN 6
WBC 15 with only reported sx of non productive cough and vague abdominal sx  - possibly reactive, afebrile, VS stable  - CT chest/A/P without source of infection  - f/u blood cultures WBC 15 with only reported sx of non productive cough and vague abdominal sx  - possibly reactive, afebrile, VS stable  - CT chest/A/P without source of infection  - blood cultures with NGTD from 4/4

## 2019-04-06 NOTE — PHYSICAL THERAPY INITIAL EVALUATION ADULT - PERTINENT HX OF CURRENT PROBLEM, REHAB EVAL
Pt is a 79 M with PMH of hypervolemic hyponatremia, leukocytosis, afib with RVR, transaminitis, ELVA, and [place] that presented with abdominal swelling and dyspnea. CXR: small areas of bibasilar atelectasis. CT Chest: b/l pleural effusion.

## 2019-04-06 NOTE — PROGRESS NOTE ADULT - PROBLEM SELECTOR PLAN 7
-Patient with INR of 1.5. Unclear etiology but noted to have liver dysfxn. Low suspicion for malnutrition  - f/u mixing studies  - trend  - no overt bleeding -Patient with INR of 1.5. Unclear etiology but noted to have liver dysfxn. Low suspicion for malnutrition  - no overt bleeding

## 2019-04-06 NOTE — PROGRESS NOTE ADULT - PROBLEM SELECTOR PLAN 1
-ECHO shows EF 23% with right and left ventricular dysfunction, elevated pulm pressures  -ACS shows stable troponins and EKG   -cardiology following - concern for component of cardiogenic shock given cool extremities; inc diuresis to Lasix 40 IV q 6, d/c BB; possible dobutamine if no improvement in peripheral circulation  -plan for cath Monday given adequate diuresis   -monitor on tele, monitor UOP, strict I+O and daily weight -ECHO shows EF 23% with right and left ventricular dysfunction, elevated pulm pressures  -ACS shows stable troponins and EKG   -cardiology following - concern for component of cardiogenic shock given cool extremities; increased diuresis to Lasix 40 IV q6h, d/c BB; possible dobutamine if no improvement in peripheral circulation  -plan for cath Monday given adequate diuresis   -monitor on tele, monitor UOP, strict I+O and daily weight  -net negative 2.8L on 4/5

## 2019-04-07 LAB
ALBUMIN SERPL ELPH-MCNC: 3.8 G/DL — SIGNIFICANT CHANGE UP (ref 3.3–5)
ALP SERPL-CCNC: 141 U/L — HIGH (ref 40–120)
ALT FLD-CCNC: 799 U/L — HIGH (ref 10–45)
ANION GAP SERPL CALC-SCNC: 20 MMOL/L — HIGH (ref 5–17)
APTT BLD: 71 SEC — HIGH (ref 27.5–36.3)
APTT BLD: 76 SEC — HIGH (ref 27.5–36.3)
AST SERPL-CCNC: 229 U/L — HIGH (ref 10–40)
BASOPHILS # BLD AUTO: 0.02 K/UL — SIGNIFICANT CHANGE UP (ref 0–0.2)
BASOPHILS NFR BLD AUTO: 0.1 % — SIGNIFICANT CHANGE UP (ref 0–2)
BILIRUB SERPL-MCNC: 0.9 MG/DL — SIGNIFICANT CHANGE UP (ref 0.2–1.2)
BUN SERPL-MCNC: 42 MG/DL — HIGH (ref 7–23)
CALCIUM SERPL-MCNC: 8.4 MG/DL — SIGNIFICANT CHANGE UP (ref 8.4–10.5)
CERULOPLASMIN SERPL-MCNC: 40 MG/DL — HIGH (ref 15–30)
CHLORIDE SERPL-SCNC: 85 MMOL/L — LOW (ref 96–108)
CO2 SERPL-SCNC: 26 MMOL/L — SIGNIFICANT CHANGE UP (ref 22–31)
CREAT SERPL-MCNC: 1.77 MG/DL — HIGH (ref 0.5–1.3)
EOSINOPHIL # BLD AUTO: 0 K/UL — SIGNIFICANT CHANGE UP (ref 0–0.5)
EOSINOPHIL NFR BLD AUTO: 0 % — SIGNIFICANT CHANGE UP (ref 0–6)
FERRITIN SERPL-MCNC: 959 NG/ML — HIGH (ref 30–400)
GLUCOSE SERPL-MCNC: 178 MG/DL — HIGH (ref 70–99)
HCT VFR BLD CALC: 43.7 % — SIGNIFICANT CHANGE UP (ref 39–50)
HGB BLD-MCNC: 14.3 G/DL — SIGNIFICANT CHANGE UP (ref 13–17)
IMM GRANULOCYTES NFR BLD AUTO: 0.8 % — SIGNIFICANT CHANGE UP (ref 0–1.5)
INR BLD: 1.33 RATIO — HIGH (ref 0.88–1.16)
LYMPHOCYTES # BLD AUTO: 1.68 K/UL — SIGNIFICANT CHANGE UP (ref 1–3.3)
LYMPHOCYTES # BLD AUTO: 11.1 % — LOW (ref 13–44)
MCHC RBC-ENTMCNC: 27.4 PG — SIGNIFICANT CHANGE UP (ref 27–34)
MCHC RBC-ENTMCNC: 32.7 GM/DL — SIGNIFICANT CHANGE UP (ref 32–36)
MCV RBC AUTO: 83.7 FL — SIGNIFICANT CHANGE UP (ref 80–100)
MITOCHONDRIA AB SER-ACNC: SIGNIFICANT CHANGE UP
MONOCYTES # BLD AUTO: 1.17 K/UL — HIGH (ref 0–0.9)
MONOCYTES NFR BLD AUTO: 7.7 % — SIGNIFICANT CHANGE UP (ref 2–14)
NEUTROPHILS # BLD AUTO: 12.14 K/UL — HIGH (ref 1.8–7.4)
NEUTROPHILS NFR BLD AUTO: 80.3 % — HIGH (ref 43–77)
PLATELET # BLD AUTO: 294 K/UL — SIGNIFICANT CHANGE UP (ref 150–400)
POTASSIUM SERPL-MCNC: 4.2 MMOL/L — SIGNIFICANT CHANGE UP (ref 3.5–5.3)
POTASSIUM SERPL-SCNC: 4.2 MMOL/L — SIGNIFICANT CHANGE UP (ref 3.5–5.3)
PROT SERPL-MCNC: 7.4 G/DL — SIGNIFICANT CHANGE UP (ref 6–8.3)
PROTHROM AB SERPL-ACNC: 15.4 SEC — HIGH (ref 10–13.1)
RBC # BLD: 5.22 M/UL — SIGNIFICANT CHANGE UP (ref 4.2–5.8)
RBC # FLD: 14.3 % — SIGNIFICANT CHANGE UP (ref 10.3–14.5)
SMOOTH MUSCLE AB SER-ACNC: SIGNIFICANT CHANGE UP
SODIUM SERPL-SCNC: 131 MMOL/L — LOW (ref 135–145)
WBC # BLD: 15.13 K/UL — HIGH (ref 3.8–10.5)
WBC # FLD AUTO: 15.13 K/UL — HIGH (ref 3.8–10.5)

## 2019-04-07 PROCEDURE — 99232 SBSQ HOSP IP/OBS MODERATE 35: CPT

## 2019-04-07 RX ORDER — SIMETHICONE 80 MG/1
80 TABLET, CHEWABLE ORAL ONCE
Qty: 0 | Refills: 0 | Status: COMPLETED | OUTPATIENT
Start: 2019-04-07 | End: 2019-04-07

## 2019-04-07 RX ORDER — LANOLIN ALCOHOL/MO/W.PET/CERES
1 CREAM (GRAM) TOPICAL ONCE
Qty: 0 | Refills: 0 | Status: COMPLETED | OUTPATIENT
Start: 2019-04-07 | End: 2019-04-07

## 2019-04-07 RX ORDER — ONDANSETRON 8 MG/1
4 TABLET, FILM COATED ORAL ONCE
Qty: 0 | Refills: 0 | Status: COMPLETED | OUTPATIENT
Start: 2019-04-07 | End: 2019-04-07

## 2019-04-07 RX ADMIN — Medication 40 MILLIGRAM(S): at 06:21

## 2019-04-07 RX ADMIN — SIMETHICONE 80 MILLIGRAM(S): 80 TABLET, CHEWABLE ORAL at 22:53

## 2019-04-07 RX ADMIN — Medication 1 MILLIGRAM(S): at 22:54

## 2019-04-07 RX ADMIN — Medication 40 MILLIGRAM(S): at 17:15

## 2019-04-07 RX ADMIN — Medication 1 TABLET(S): at 11:16

## 2019-04-07 RX ADMIN — Medication 40 MILLIGRAM(S): at 11:16

## 2019-04-07 RX ADMIN — Medication 1 MILLIGRAM(S): at 01:19

## 2019-04-07 RX ADMIN — FAMOTIDINE 20 MILLIGRAM(S): 10 INJECTION INTRAVENOUS at 11:16

## 2019-04-07 RX ADMIN — ONDANSETRON 4 MILLIGRAM(S): 8 TABLET, FILM COATED ORAL at 13:23

## 2019-04-07 RX ADMIN — Medication 40 MILLIGRAM(S): at 00:07

## 2019-04-07 RX ADMIN — SIMETHICONE 80 MILLIGRAM(S): 80 TABLET, CHEWABLE ORAL at 13:23

## 2019-04-07 RX ADMIN — HEPARIN SODIUM 900 UNIT(S)/HR: 5000 INJECTION INTRAVENOUS; SUBCUTANEOUS at 09:29

## 2019-04-07 RX ADMIN — Medication 40 MILLIGRAM(S): at 23:00

## 2019-04-07 RX ADMIN — HEPARIN SODIUM 900 UNIT(S)/HR: 5000 INJECTION INTRAVENOUS; SUBCUTANEOUS at 16:19

## 2019-04-07 NOTE — PROGRESS NOTE ADULT - PROBLEM SELECTOR PLAN 1
-ECHO shows EF 23% with right and left ventricular dysfunction, elevated pulm pressures  -ACS shows stable troponins and EKG   -cardiology following   - Minimal urine output with Lasix 40mg Q6 hours ATC.  - After discussion with Cardiology, Metolazone added.  - Plan for cath Monday given adequate diuresis   - Monitor on tele, monitor UOP, strict I+O and daily weight  - Net negative 2.8L on 4/5

## 2019-04-07 NOTE — PROGRESS NOTE ADULT - PROBLEM SELECTOR PLAN 2
- LA severely dilated on TTE  - Rate controlled 70-80s  - Cards recs appreciated- d/c BB for now; switch to heparin gtt for AC given renal   function  - TSH wnl

## 2019-04-07 NOTE — PROGRESS NOTE ADULT - PROBLEM SELECTOR PLAN 4
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

## 2019-04-07 NOTE — PROGRESS NOTE ADULT - PROBLEM SELECTOR PLAN 5
- likely 2/2 to CHF exacerbation (of note, patient with proteinuria that may be   contributing to intravascular depletion); possibly 2/2 to primary liver dysfunction   given elevated INR and transaminitis   - abdominal CT with ascites  - continue diuresis

## 2019-04-07 NOTE — PROGRESS NOTE ADULT - SUBJECTIVE AND OBJECTIVE BOX
Patient is a 79y old  Male who presents with a chief complaint of dyspnea, abdominal swelling (06 Apr 2019 14:22)      SUBJECTIVE / OVERNIGHT EVENTS:  Patient sitting in the chair.  Poor urine output, even with ATC Lasix Q6 hours.  Still with abdominal and flank distension.    MEDICATIONS  (STANDING):  ergocalciferol 36912 Unit(s) Oral every week  famotidine    Tablet 20 milliGRAM(s) Oral daily  furosemide   Injectable 40 milliGRAM(s) IV Push every 6 hours  heparin  Infusion.  Unit(s)/Hr (14 mL/Hr) IV Continuous <Continuous>  heparin  Injectable 6500 Unit(s) IV Push once  metolazone 2.5 milliGRAM(s) Oral once  multivitamin 1 Tablet(s) Oral daily    MEDICATIONS  (PRN):  heparin  Injectable 6500 Unit(s) IV Push every 6 hours PRN For aPTT less than 40  heparin  Injectable 3000 Unit(s) IV Push every 6 hours PRN For aPTT between 40 - 57      CAPILLARY BLOOD GLUCOSE        I&O's Summary    06 Apr 2019 07:01  -  07 Apr 2019 07:00  --------------------------------------------------------  IN: 1264 mL / OUT: 3000 mL / NET: -1736 mL    07 Apr 2019 07:01  -  07 Apr 2019 14:13  --------------------------------------------------------  IN: 200 mL / OUT: 0 mL / NET: 200 mL        PHYSICAL EXAM:  Vital Signs Last 24 Hrs  T(C): 36.6 (07 Apr 2019 12:36), Max: 36.7 (07 Apr 2019 04:58)  T(F): 97.8 (07 Apr 2019 12:36), Max: 98 (07 Apr 2019 04:58)  HR: 106 (07 Apr 2019 12:36) (90 - 106)  BP: 148/106 (07 Apr 2019 12:36) (138/98 - 148/106)  BP(mean): --  RR: 18 (07 Apr 2019 12:36) (18 - 18)  SpO2: 99% (07 Apr 2019 12:36) (96% - 99%)  GENERAL: NAD, well-developed  HEAD:  Atraumatic, Normocephalic  EYES: EOMI, PERRLA, conjunctiva and sclera clear  NECK: Supple, No JVD  CHEST/LUNG: Clear to auscultation bilaterally; No wheeze  HEART: Regular rate and rhythm; No murmurs, rubs, or gallops  ABDOMEN: Soft, Nontender, Nondistended; Bowel sounds present, flank and abdominal edema  EXTREMITIES:  2+ Peripheral Pulses, No clubbing, cyanosis, or edema  PSYCH: AAOx3  NEUROLOGY: non-focal  SKIN: No rashes or lesions    LABS:                        14.3   15.13 )-----------( 294      ( 07 Apr 2019 08:29 )             43.7     04-07    131<L>  |  85<L>  |  42<H>  ----------------------------<  178<H>  4.2   |  26  |  1.77<H>    Ca    8.4      07 Apr 2019 05:15  Phos  3.1     04-06  Mg     2.1     04-06    TPro  7.4  /  Alb  3.8  /  TBili  0.9  /  DBili  x   /  AST  229<H>  /  ALT  799<H>  /  AlkPhos  141<H>  04-07    PT/INR - ( 07 Apr 2019 07:37 )   PT: 15.4 sec;   INR: 1.33 ratio         PTT - ( 07 Apr 2019 07:37 )  PTT:71.0 sec          RADIOLOGY & ADDITIONAL TESTS:    Imaging Personally Reviewed:    Consultant(s) Notes Reviewed:      Care Discussed with Consultants/Other Providers:

## 2019-04-07 NOTE — PROGRESS NOTE ADULT - PROBLEM SELECTOR PLAN 3
-LFT's now in the 1000s from 400's, but trending down  -hepatitis panel negative; CT A/P with no acute abdominal pathology   - hepatology recs appreciated- possibly 2/2 to hepatic congestion vs. ischemic     hepatopathy  - improving with diuresis

## 2019-04-07 NOTE — PROGRESS NOTE ADULT - PROBLEM SELECTOR PLAN 6
WBC 15 with only reported sx of non productive cough and vague abdominal sx  - possibly reactive, afebrile, VS stable  - CT chest/A/P without source of infection  - blood cultures with NGTD from 4/4

## 2019-04-07 NOTE — PROGRESS NOTE ADULT - PROBLEM SELECTOR PLAN 7
-Patient with INR of 1.5. Unclear etiology but noted to have liver dysfxn. Low suspicion for malnutrition  - no overt bleeding

## 2019-04-07 NOTE — PROGRESS NOTE ADULT - PROBLEM SELECTOR PLAN 8
-Cr typically wnl as per patient and family   -UA noting significant microscopic hematuria and poss nephrotic range  proteinuria, consistent with Intrinsic disease as seen with FeNa  -Cr improved from 2.00 to 1.85 this morning  -monitor UOP

## 2019-04-07 NOTE — PROGRESS NOTE ADULT - PROBLEM SELECTOR PLAN 9
Patient with complaint of frequent belching and epigastric discomfort. No weight loss, melena, hematemesis. Recent IFOBT test neg in 12/2018  - famotidine daily  - consider H. pylori after acute exacerbation treated  - endoscopy/colonoscopy as outpatient

## 2019-04-08 LAB
ANION GAP SERPL CALC-SCNC: 20 MMOL/L — HIGH (ref 5–17)
APTT BLD: 43.4 SEC — HIGH (ref 27.5–36.3)
BUN SERPL-MCNC: 41 MG/DL — HIGH (ref 7–23)
CALCIUM SERPL-MCNC: 9 MG/DL — SIGNIFICANT CHANGE UP (ref 8.4–10.5)
CHLORIDE SERPL-SCNC: 76 MMOL/L — LOW (ref 96–108)
CO2 SERPL-SCNC: 30 MMOL/L — SIGNIFICANT CHANGE UP (ref 22–31)
CREAT SERPL-MCNC: 1.87 MG/DL — HIGH (ref 0.5–1.3)
GLUCOSE SERPL-MCNC: 177 MG/DL — HIGH (ref 70–99)
HCT VFR BLD CALC: 42.7 % — SIGNIFICANT CHANGE UP (ref 39–50)
HGB BLD-MCNC: 14.3 G/DL — SIGNIFICANT CHANGE UP (ref 13–17)
MCHC RBC-ENTMCNC: 27.8 PG — SIGNIFICANT CHANGE UP (ref 27–34)
MCHC RBC-ENTMCNC: 33.5 GM/DL — SIGNIFICANT CHANGE UP (ref 32–36)
MCV RBC AUTO: 82.9 FL — SIGNIFICANT CHANGE UP (ref 80–100)
PLATELET # BLD AUTO: 301 K/UL — SIGNIFICANT CHANGE UP (ref 150–400)
POTASSIUM SERPL-MCNC: 3.5 MMOL/L — SIGNIFICANT CHANGE UP (ref 3.5–5.3)
POTASSIUM SERPL-SCNC: 3.5 MMOL/L — SIGNIFICANT CHANGE UP (ref 3.5–5.3)
RBC # BLD: 5.15 M/UL — SIGNIFICANT CHANGE UP (ref 4.2–5.8)
RBC # FLD: 14 % — SIGNIFICANT CHANGE UP (ref 10.3–14.5)
SODIUM SERPL-SCNC: 126 MMOL/L — LOW (ref 135–145)
WBC # BLD: 16.85 K/UL — HIGH (ref 3.8–10.5)
WBC # FLD AUTO: 16.85 K/UL — HIGH (ref 3.8–10.5)

## 2019-04-08 PROCEDURE — 99233 SBSQ HOSP IP/OBS HIGH 50: CPT

## 2019-04-08 PROCEDURE — 93456 R HRT CORONARY ARTERY ANGIO: CPT | Mod: 26,GC

## 2019-04-08 PROCEDURE — 99152 MOD SED SAME PHYS/QHP 5/>YRS: CPT | Mod: GC

## 2019-04-08 RX ORDER — FUROSEMIDE 40 MG
60 TABLET ORAL EVERY 12 HOURS
Qty: 0 | Refills: 0 | Status: DISCONTINUED | OUTPATIENT
Start: 2019-04-08 | End: 2019-04-08

## 2019-04-08 RX ORDER — FUROSEMIDE 40 MG
60 TABLET ORAL
Qty: 0 | Refills: 0 | Status: DISCONTINUED | OUTPATIENT
Start: 2019-04-08 | End: 2019-04-10

## 2019-04-08 RX ORDER — ONDANSETRON 8 MG/1
4 TABLET, FILM COATED ORAL ONCE
Qty: 0 | Refills: 0 | Status: COMPLETED | OUTPATIENT
Start: 2019-04-08 | End: 2019-04-08

## 2019-04-08 RX ORDER — HEPARIN SODIUM 5000 [USP'U]/ML
6500 INJECTION INTRAVENOUS; SUBCUTANEOUS EVERY 6 HOURS
Qty: 0 | Refills: 0 | Status: DISCONTINUED | OUTPATIENT
Start: 2019-04-09 | End: 2019-04-19

## 2019-04-08 RX ORDER — FUROSEMIDE 40 MG
40 TABLET ORAL
Qty: 0 | Refills: 0 | Status: DISCONTINUED | OUTPATIENT
Start: 2019-04-08 | End: 2019-04-08

## 2019-04-08 RX ORDER — HEPARIN SODIUM 5000 [USP'U]/ML
INJECTION INTRAVENOUS; SUBCUTANEOUS
Qty: 25000 | Refills: 0 | Status: DISCONTINUED | OUTPATIENT
Start: 2019-04-09 | End: 2019-04-19

## 2019-04-08 RX ORDER — HYDRALAZINE HCL 50 MG
10 TABLET ORAL THREE TIMES A DAY
Qty: 0 | Refills: 0 | Status: DISCONTINUED | OUTPATIENT
Start: 2019-04-08 | End: 2019-04-11

## 2019-04-08 RX ORDER — ISOSORBIDE DINITRATE 5 MG/1
20 TABLET ORAL
Qty: 0 | Refills: 0 | Status: DISCONTINUED | OUTPATIENT
Start: 2019-04-08 | End: 2019-04-08

## 2019-04-08 RX ORDER — HEPARIN SODIUM 5000 [USP'U]/ML
3000 INJECTION INTRAVENOUS; SUBCUTANEOUS EVERY 6 HOURS
Qty: 0 | Refills: 0 | Status: DISCONTINUED | OUTPATIENT
Start: 2019-04-09 | End: 2019-04-19

## 2019-04-08 RX ORDER — ASPIRIN/CALCIUM CARB/MAGNESIUM 324 MG
81 TABLET ORAL DAILY
Qty: 0 | Refills: 0 | Status: DISCONTINUED | OUTPATIENT
Start: 2019-04-08 | End: 2019-04-08

## 2019-04-08 RX ADMIN — HEPARIN SODIUM 3000 UNIT(S): 5000 INJECTION INTRAVENOUS; SUBCUTANEOUS at 08:40

## 2019-04-08 RX ADMIN — FAMOTIDINE 20 MILLIGRAM(S): 10 INJECTION INTRAVENOUS at 11:30

## 2019-04-08 RX ADMIN — Medication 40 MILLIGRAM(S): at 11:30

## 2019-04-08 RX ADMIN — Medication 1 TABLET(S): at 11:30

## 2019-04-08 RX ADMIN — Medication 40 MILLIGRAM(S): at 05:32

## 2019-04-08 RX ADMIN — ONDANSETRON 4 MILLIGRAM(S): 8 TABLET, FILM COATED ORAL at 21:39

## 2019-04-08 RX ADMIN — HEPARIN SODIUM 1100 UNIT(S)/HR: 5000 INJECTION INTRAVENOUS; SUBCUTANEOUS at 08:38

## 2019-04-08 NOTE — PROGRESS NOTE ADULT - PROBLEM SELECTOR PLAN 6
WBC 17 with only reported sx of non productive cough and vague abdominal sx  - possibly reactive, afebrile, VS stable  - CT chest/A/P without source of infection  - blood cultures with NGTD from 4/4 WBC 16 with only reported sx of non productive cough and vague abdominal sx  - possibly reactive, afebrile, VS stable  - CT chest/A/P without source of infection  - blood cultures with NGTD from 4/4

## 2019-04-08 NOTE — PROGRESS NOTE ADULT - SUBJECTIVE AND OBJECTIVE BOX
Cardiology Attending Progress Note    CHIEF COMPLAINT/REASON FOR CONSULT: SOB, AFIB    HISTORY OF PRESENT ILLNESS:    79y.o. Male with no PMH now admitted with new HFrEF (TTE 04/04/2019 with LVEF 25%, biventricular dysfunction, moderate MR, moderate TR, mild WA) and AFIB with RVR, abdominal discomfort/nausea concerning for hepatic congestion in the setting of acute decompensated CHF (BNP>50K), now pending LHC/RHC.    INTERVAL EVENTS:   Patient seen and examined. This AM he complains of persistent nausea and abdominal distension. No CP. No SOB.. No HA/Dizziness. No Palpitations. No N/V/D/F/C.    Allergies    No Known Allergies    Intolerances    	    MEDICATIONS:  furosemide   Injectable 40 milliGRAM(s) IV Push every 6 hours  heparin  Infusion.  Unit(s)/Hr IV Continuous <Continuous>  heparin  Injectable 6500 Unit(s) IV Push once  heparin  Injectable 6500 Unit(s) IV Push every 6 hours PRN  heparin  Injectable 3000 Unit(s) IV Push every 6 hours PRN  famotidine    Tablet 20 milliGRAM(s) Oral daily      ergocalciferol 53100 Unit(s) Oral every week  multivitamin 1 Tablet(s) Oral daily      PAST MEDICAL & SURGICAL HISTORY:  No pertinent past medical history  No significant past surgical history      FAMILY HISTORY:  FH: liver disease: unknown etiology, mother      SOCIAL HISTORY:    Never smoked, no ETOH, no IVDU    REVIEW OF SYSTEMS:    CONSTITUTIONAL: No weakness, fevers or chills  EYES/ENT: No visual changes;  No vertigo or throat pain   NECK: No pain or stiffness  RESPIRATORY: No cough, wheezing, hemoptysis; +shortness of breath  CARDIOVASCULAR: No chest pain or palpitations  GASTROINTESTINAL: No abdominal or epigastric pain. +nausea/vomiting. No hematemesis; No diarrhea or constipation. No melena or hematochezia.  GENITOURINARY: No dysuria, frequency or hematuria  NEUROLOGICAL: No numbness or weakness  SKIN: No itching, burning, rashes, or lesions   All other review of systems is negative unless indicated above.    PHYSICAL EXAM:  T(C): 36.4 (04-08-19 @ 08:01), Max: 36.7 (04-07-19 @ 20:37)  HR: 118 (04-08-19 @ 08:01) (93 - 118)  BP: 162/109 (04-08-19 @ 08:01) (134/102 - 162/109)  RR: 18 (04-08-19 @ 08:01) (16 - 18)  SpO2: 95% (04-08-19 @ 08:01) (95% - 99%)  Wt(kg): --  I&O's Summary    07 Apr 2019 07:01  -  08 Apr 2019 07:00  --------------------------------------------------------  IN: 1018 mL / OUT: 1800 mL / NET: -782 mL    08 Apr 2019 07:01  -  08 Apr 2019 10:59  --------------------------------------------------------  IN: 200 mL / OUT: 200 mL / NET: 0 mL        Appearance: Normal	  HEENT:   Normal oral mucosa, PERRL, EOMI	  Lymphatic: No lymphadenopathy  Cardiovascular: Normal S1 S2, +JVD, 1/6 MAZIN  Respiratory: Lungs clear to auscultation	  Psychiatry: A & O x 3, Mood & affect appropriate  Gastrointestinal:  Soft, Non-tender, + BS	  Skin: No rashes, No ecchymoses, No cyanosis	  Neurologic: Non-focal  Extremities: Normal range of motion, No clubbing, cyanosis. Trace edema BL LE   Vascular: Peripheral pulses palpable 2+ bilaterally    LABS:	 	    CBC Full  -  ( 08 Apr 2019 09:05 )  WBC Count : 16.85 K/uL  Hemoglobin : 14.3 g/dL  Hematocrit : 42.7 %  Platelet Count - Automated : 301 K/uL  Mean Cell Volume : 82.9 fl  Mean Cell Hemoglobin : 27.8 pg  Mean Cell Hemoglobin Concentration : 33.5 gm/dL  Auto Neutrophil # : x  Auto Lymphocyte # : x  Auto Monocyte # : x  Auto Eosinophil # : x  Auto Basophil # : x  Auto Neutrophil % : x  Auto Lymphocyte % : x  Auto Monocyte % : x  Auto Eosinophil % : x  Auto Basophil % : x    04-08    126<L>  |  76<L>  |  41<H>  ----------------------------<  177<H>  3.5   |  30  |  1.87<H>  04-07    131<L>  |  85<L>  |  42<H>  ----------------------------<  178<H>  4.2   |  26  |  1.77<H>    Ca    9.0      08 Apr 2019 05:56  Ca    8.4      07 Apr 2019 05:15    TPro  7.4  /  Alb  3.8  /  TBili  0.9  /  DBili  x   /  AST  229<H>  /  ALT  799<H>  /  AlkPhos  141<H>  04-07    hsTrop: 54 -> 67 -> 64  proBNP:  50,170  WBC 16.85  cr 1.87  ,     ECG: Coarse atrial fibrillation    TELEMETRY: AFIB 90 - 120  	    	  CT-ChesT: 04/04/2019:  CHEST:     LUNGS AND LARGE AIRWAYS: Patent central airways. Bilateral lower lung   passive atelectasis. No pulmonary nodules.  PLEURA: Small to moderate bilateral pleural effusions.  VESSELS: Aortic and coronary artery calcification.  HEART: Cardiomegaly. No pericardial effusion.  MEDIASTINUM AND OSWALDO: No lymphadenopathy.  CHEST WALL AND LOWER NECK: Bilateral gynecomastia with slight asymmetry.    ABDOMEN AND PELVIS:    LIVER: Within normal limits.  BILE DUCTS: Normal caliber.  GALLBLADDER: Within normal limits.  SPLEEN: Within normal limits.  PANCREAS: Within normal limits.  ADRENALS: Within normal limits.  KIDNEYS/URETERS: Within normal limits.     BLADDER: Within normal limits.  REPRODUCTIVE ORGANS: Prostate within normal limits.    BOWEL: No bowel obstruction. Appendix  is normal.  PERITONEUM: Trace pelvic free fluid.  VESSELS:  Atherosclerotic changes.  RETROPERITONEUM: No lymphadenopathy.    ABDOMINAL WALL: Questionable right inguinal hernia repair.  BONES: Degenerative changes.    IMPRESSION:     Small-to-moderate bilateral pleural effusions.    No acute pathology in the abdomen or pelvis.      TTE:  04/04/2019:  EF (Visual Estimate): 20-25 %  EF (Teicholtz): 23 %  Doppler Peak Velocity (m/sec): MV=1.0  ------------------------------------------------------------------------  Observations:  Mitral Valve: Tethered mitral valve leaflets with normal  opening. Malcoaptation of leaflet tips.  Moderate mitral  regurgitation. Peak mitral valve gradient equals 4 mm Hg,  mean transmitral valve gradient equals 1 mm Hg.  Aortic Valve/Aorta: Normal trileaflet aortic valve. Mild  aortic regurgitation.  Aortic Root: 3.3 cm.  LVOT diameter: 2 cm.  Left Atrium: Severely dilated left atrium.  LA volume index  = 55 cc/m2.  Left Ventricle: Severe global left ventricular systolic  dysfunction. Normal left ventricular internal dimensions  and wall thicknesses. Increased E/e'  is consistent with  elevated left ventricular filling pressure.  Right Heart: Severe right atrial enlargement. Right  ventricular enlargement with decreased right ventricular  systolic function. Normal tricuspid valve. Moderate  tricuspid regurgitation. Pulmonic valve not well  visualized. Mild pulmonic regurgitation.  Pericardium/Pleura: Normal pericardium with no pericardial  effusion.  Large left pleural effusion.  Hemodynamic: Estimated right atrial pressure is 8 mm Hg.  Estimated right ventricular systolic pressure equals 37 mm  Hg, assuming right atrial pressure equals 8 mm Hg,  consistent with borderline pulmonary hypertension.  ------------------------------------------------------------------------  Conclusions:  1. Tethered mitral valve leaflets with normal opening.  Malcoaptation of leaflet tips.  Moderate mitral  regurgitation. Peak mitral valve gradient equals 4 mm Hg,  mean transmitral valve gradient equals 1 mm Hg.  2. Severely dilated left atrium.  LA volume index = 55  cc/m2.  3. Normal left ventricular internal dimensions and wall  thicknesses.  4. Severe global left ventricular systolic dysfunction.  5. Increased E/e'  is consistent with elevated left  ventricular filling pressure.  6. Right ventricular enlargement with decreased right  ventricular systolic function.  7. Normal tricuspid valve. Moderate-severe tricuspid  regurgitation.  8. Large left pleural effusion.  *** No previous Echo exam.      A/P: 79y.o. Male with no PMH now admitted with new HFrEF (TTE 04/04/2019 with LVEF 25%, biventricular dysfunction, moderate MR, moderate TR, mild WA) and AFIB with RVR, abdominal discomfort/nausea concerning for hepatic congestion in the setting of acute decompensated CHF (BNP>50K), now pending LHC/RHC.    1. HFrEF, RV dysfunction, moderate MR - Suspect SOB is multifactorial 2/2 acute on chronic decompensated biventricular dysfunction and valvular dysfunction (moderate MR) in the setting of atrial fibrillation.  -Overall picture is concerning for hepatic congestion and cardiorenal syndrome in the setting of acute on chronic HFrEF.  -Net negative 782 cc overnight  -Switch from lasix 40 IV Q6 to Lasix 60 mg IV Q12  -Cont off beta blockade at this time pending volume optimization  -Strict I/O's  -Daily weights  -Plan for LHC/RHC today    2. AFIB - Patient with dilated LA on TTE, suspect longstanding paroxysmal AFIB  -If he remains in AF despite volume optimization will need to consider RICH/DCCV  -Cont Heparin GTT for now  -AF rates reasonable off beta blockade at this time    3. HTN - BP elevated at 162/109  -Start hydralazine 10 mg po TID/Isosorbide dinitrate 20 mg po TID  -Consider adding ACE once cr stable    Thank you for this interesting consult. My team will continue to follow along with you.      Elliot Meyer MD  Cardiology Attending  [facility]  Cell: [phone]  (Cardiology Nocturnist cell number available 7 PM -7 AM every night; available day time weekdays for follow-up only; day time weekends covered by general cardiology consult service) Cardiology Attending Progress Note    CHIEF COMPLAINT/REASON FOR CONSULT: SOB, AFIB    HISTORY OF PRESENT ILLNESS:    79y.o. Male with no PMH now admitted with new HFrEF (TTE 04/04/2019 with LVEF 25%, biventricular dysfunction, moderate MR, moderate TR, mild DE) and AFIB with RVR, abdominal discomfort/nausea concerning for hepatic congestion in the setting of acute decompensated CHF (BNP>50K), now pending LHC/RHC.    INTERVAL EVENTS:   Patient seen and examined. This AM he complains of persistent nausea and abdominal distension. No CP. No SOB.. No HA/Dizziness. No Palpitations. No N/V/D/F/C.    Allergies    No Known Allergies    Intolerances    	    MEDICATIONS:  furosemide   Injectable 40 milliGRAM(s) IV Push every 6 hours  heparin  Infusion.  Unit(s)/Hr IV Continuous <Continuous>  heparin  Injectable 6500 Unit(s) IV Push once  heparin  Injectable 6500 Unit(s) IV Push every 6 hours PRN  heparin  Injectable 3000 Unit(s) IV Push every 6 hours PRN  famotidine    Tablet 20 milliGRAM(s) Oral daily      ergocalciferol 25073 Unit(s) Oral every week  multivitamin 1 Tablet(s) Oral daily      PAST MEDICAL & SURGICAL HISTORY:  No pertinent past medical history  No significant past surgical history      FAMILY HISTORY:  FH: liver disease: unknown etiology, mother      SOCIAL HISTORY:    Never smoked, no ETOH, no IVDU    REVIEW OF SYSTEMS:    CONSTITUTIONAL: No weakness, fevers or chills  EYES/ENT: No visual changes;  No vertigo or throat pain   NECK: No pain or stiffness  RESPIRATORY: No cough, wheezing, hemoptysis; +shortness of breath  CARDIOVASCULAR: No chest pain or palpitations  GASTROINTESTINAL: No abdominal or epigastric pain. +nausea/vomiting. No hematemesis; No diarrhea or constipation. No melena or hematochezia.  GENITOURINARY: No dysuria, frequency or hematuria  NEUROLOGICAL: No numbness or weakness  SKIN: No itching, burning, rashes, or lesions   All other review of systems is negative unless indicated above.    PHYSICAL EXAM:  T(C): 36.4 (04-08-19 @ 08:01), Max: 36.7 (04-07-19 @ 20:37)  HR: 118 (04-08-19 @ 08:01) (93 - 118)  BP: 162/109 (04-08-19 @ 08:01) (134/102 - 162/109)  RR: 18 (04-08-19 @ 08:01) (16 - 18)  SpO2: 95% (04-08-19 @ 08:01) (95% - 99%)  Wt(kg): --  I&O's Summary    07 Apr 2019 07:01  -  08 Apr 2019 07:00  --------------------------------------------------------  IN: 1018 mL / OUT: 1800 mL / NET: -782 mL    08 Apr 2019 07:01  -  08 Apr 2019 10:59  --------------------------------------------------------  IN: 200 mL / OUT: 200 mL / NET: 0 mL        Appearance: Normal	  HEENT:   Normal oral mucosa, PERRL, EOMI	  Lymphatic: No lymphadenopathy  Cardiovascular: Normal S1 S2, +JVD, 1/6 MAZIN  Respiratory: Lungs clear to auscultation	  Psychiatry: A & O x 3, Mood & affect appropriate  Gastrointestinal:  Soft, Non-tender, + BS	  Skin: No rashes, No ecchymoses, No cyanosis	  Neurologic: Non-focal  Extremities: Normal range of motion, No clubbing, cyanosis. Trace edema BL LE   Vascular: Peripheral pulses palpable 2+ bilaterally    LABS:	 	    CBC Full  -  ( 08 Apr 2019 09:05 )  WBC Count : 16.85 K/uL  Hemoglobin : 14.3 g/dL  Hematocrit : 42.7 %  Platelet Count - Automated : 301 K/uL  Mean Cell Volume : 82.9 fl  Mean Cell Hemoglobin : 27.8 pg  Mean Cell Hemoglobin Concentration : 33.5 gm/dL  Auto Neutrophil # : x  Auto Lymphocyte # : x  Auto Monocyte # : x  Auto Eosinophil # : x  Auto Basophil # : x  Auto Neutrophil % : x  Auto Lymphocyte % : x  Auto Monocyte % : x  Auto Eosinophil % : x  Auto Basophil % : x    04-08    126<L>  |  76<L>  |  41<H>  ----------------------------<  177<H>  3.5   |  30  |  1.87<H>  04-07    131<L>  |  85<L>  |  42<H>  ----------------------------<  178<H>  4.2   |  26  |  1.77<H>    Ca    9.0      08 Apr 2019 05:56  Ca    8.4      07 Apr 2019 05:15    TPro  7.4  /  Alb  3.8  /  TBili  0.9  /  DBili  x   /  AST  229<H>  /  ALT  799<H>  /  AlkPhos  141<H>  04-07    hsTrop: 54 -> 67 -> 64  proBNP:  50,170  WBC 16.85  cr 1.87  ,     ECG: Coarse atrial fibrillation    TELEMETRY: AFIB 90 - 120  	    	  CT-ChesT: 04/04/2019:  CHEST:     LUNGS AND LARGE AIRWAYS: Patent central airways. Bilateral lower lung   passive atelectasis. No pulmonary nodules.  PLEURA: Small to moderate bilateral pleural effusions.  VESSELS: Aortic and coronary artery calcification.  HEART: Cardiomegaly. No pericardial effusion.  MEDIASTINUM AND OSWALDO: No lymphadenopathy.  CHEST WALL AND LOWER NECK: Bilateral gynecomastia with slight asymmetry.    ABDOMEN AND PELVIS:    LIVER: Within normal limits.  BILE DUCTS: Normal caliber.  GALLBLADDER: Within normal limits.  SPLEEN: Within normal limits.  PANCREAS: Within normal limits.  ADRENALS: Within normal limits.  KIDNEYS/URETERS: Within normal limits.     BLADDER: Within normal limits.  REPRODUCTIVE ORGANS: Prostate within normal limits.    BOWEL: No bowel obstruction. Appendix  is normal.  PERITONEUM: Trace pelvic free fluid.  VESSELS:  Atherosclerotic changes.  RETROPERITONEUM: No lymphadenopathy.    ABDOMINAL WALL: Questionable right inguinal hernia repair.  BONES: Degenerative changes.    IMPRESSION:     Small-to-moderate bilateral pleural effusions.    No acute pathology in the abdomen or pelvis.      TTE:  04/04/2019:  EF (Visual Estimate): 20-25 %  EF (Teicholtz): 23 %  Doppler Peak Velocity (m/sec): MV=1.0  ------------------------------------------------------------------------  Observations:  Mitral Valve: Tethered mitral valve leaflets with normal  opening. Malcoaptation of leaflet tips.  Moderate mitral  regurgitation. Peak mitral valve gradient equals 4 mm Hg,  mean transmitral valve gradient equals 1 mm Hg.  Aortic Valve/Aorta: Normal trileaflet aortic valve. Mild  aortic regurgitation.  Aortic Root: 3.3 cm.  LVOT diameter: 2 cm.  Left Atrium: Severely dilated left atrium.  LA volume index  = 55 cc/m2.  Left Ventricle: Severe global left ventricular systolic  dysfunction. Normal left ventricular internal dimensions  and wall thicknesses. Increased E/e'  is consistent with  elevated left ventricular filling pressure.  Right Heart: Severe right atrial enlargement. Right  ventricular enlargement with decreased right ventricular  systolic function. Normal tricuspid valve. Moderate  tricuspid regurgitation. Pulmonic valve not well  visualized. Mild pulmonic regurgitation.  Pericardium/Pleura: Normal pericardium with no pericardial  effusion.  Large left pleural effusion.  Hemodynamic: Estimated right atrial pressure is 8 mm Hg.  Estimated right ventricular systolic pressure equals 37 mm  Hg, assuming right atrial pressure equals 8 mm Hg,  consistent with borderline pulmonary hypertension.  ------------------------------------------------------------------------  Conclusions:  1. Tethered mitral valve leaflets with normal opening.  Malcoaptation of leaflet tips.  Moderate mitral  regurgitation. Peak mitral valve gradient equals 4 mm Hg,  mean transmitral valve gradient equals 1 mm Hg.  2. Severely dilated left atrium.  LA volume index = 55  cc/m2.  3. Normal left ventricular internal dimensions and wall  thicknesses.  4. Severe global left ventricular systolic dysfunction.  5. Increased E/e'  is consistent with elevated left  ventricular filling pressure.  6. Right ventricular enlargement with decreased right  ventricular systolic function.  7. Normal tricuspid valve. Moderate-severe tricuspid  regurgitation.  8. Large left pleural effusion.  *** No previous Echo exam.      A/P: 79y.o. Male with no PMH now admitted with new HFrEF (TTE 04/04/2019 with LVEF 25%, biventricular dysfunction, moderate MR, moderate TR, mild DE) and AFIB with RVR, abdominal discomfort/nausea concerning for hepatic congestion in the setting of acute decompensated CHF (BNP>50K), now pending LHC/RHC.    1. HFrEF, RV dysfunction, moderate MR - Suspect SOB is multifactorial 2/2 acute on chronic decompensated biventricular dysfunction and valvular dysfunction (moderate MR) in the setting of atrial fibrillation.  -Overall picture is concerning for hepatic congestion and cardiorenal syndrome in the setting of acute on chronic HFrEF.  -Net negative 782 cc overnight  -Switch from lasix 40 IV Q6 to Lasix 60 mg IV Q12  -Start ASA 81 mg po QD  -Cont off beta blockade at this time pending volume optimization  -Strict I/O's  -Daily weights  -Plan for LHC/RHC today    2. AFIB - Patient with dilated LA on TTE, suspect longstanding paroxysmal AFIB  -If he remains in AF despite volume optimization will need to consider RICH/DCCV  -Cont Heparin GTT for now  -AF rates reasonable off beta blockade at this time    3. HTN - BP elevated at 162/109  -Start hydralazine 10 mg po TID/Isosorbide dinitrate 20 mg po TID  -Consider adding ACE once cr stable    Thank you for this interesting consult. My team will continue to follow along with you.      Elliot Meyer MD  Cardiology Attending  [facility]  Cell: [phone]  (Cardiology Nocturnist cell number available 7 PM -7 AM every night; available day time weekdays for follow-up only; day time weekends covered by general cardiology consult service)

## 2019-04-08 NOTE — PROGRESS NOTE ADULT - PROBLEM SELECTOR PLAN 1
-ECHO shows EF 23% with right and left ventricular dysfunction, elevated pulm pressures  -ACS shows stable troponins and EKG   -cardiology following   - Crads following, Lasix 60 mg iv q12.   - Continue with Metolazone.   - Plan for cath today.   - Monitor on tele, monitor UOP, strict I+O and daily weight  - Net negative 2.8L on 4/5

## 2019-04-08 NOTE — PROGRESS NOTE ADULT - PROBLEM SELECTOR PLAN 3
-LFT's trending down  -hepatitis panel negative; CT A/P with no acute abdominal pathology   - hepatology recs appreciated- possibly 2/2 to hepatic congestion vs. ischemic     hepatopathy  - improving with diuresis

## 2019-04-08 NOTE — PROGRESS NOTE ADULT - PROBLEM SELECTOR PLAN 2
- LA severely dilated on TTE  - Rate controlled 70-80s  - Heparin gtt for AC given renal   function  - TSH wnl

## 2019-04-08 NOTE — PROVIDER CONTACT NOTE (OTHER) - ASSESSMENT
pt AO3, VSS, complaining of abdominal discomfort, non-tender to touch, distended, + bowel sounds, no chest pain, SOB, dizziness  Zofran given during day for same issue   pt states last BM yesterday

## 2019-04-08 NOTE — PROGRESS NOTE ADULT - SUBJECTIVE AND OBJECTIVE BOX
Patient is a 79y old  Male who presents with a chief complaint of dyspnea, abdominal swelling (06 Apr 2019 14:22)      SUBJECTIVE / OVERNIGHT EVENTS:  Patient feels ok, no new complaints.   Still with abdominal and flank distension.    T(C): 36.6 (04-08-19 @ 12:12), Max: 36.6 (04-08-19 @ 12:12)  HR: 113 (04-08-19 @ 12:12) (113 - 118)  BP: 147/110 (04-08-19 @ 12:12) (147/110 - 162/109)  RR: 18 (04-08-19 @ 12:12) (18 - 18)  SpO2: 96% (04-08-19 @ 12:12) (95% - 96%)    MEDICATIONS  (STANDING):  aspirin enteric coated 81 milliGRAM(s) Oral daily  ergocalciferol 71205 Unit(s) Oral every week  famotidine    Tablet 20 milliGRAM(s) Oral daily  furosemide   Injectable 40 milliGRAM(s) IV Push two times a day  heparin  Infusion.  Unit(s)/Hr (14 mL/Hr) IV Continuous <Continuous>  heparin  Injectable 6500 Unit(s) IV Push once  multivitamin 1 Tablet(s) Oral daily    MEDICATIONS  (PRN):  heparin  Injectable 6500 Unit(s) IV Push every 6 hours PRN For aPTT less than 40  heparin  Injectable 3000 Unit(s) IV Push every 6 hours PRN For aPTT between 40 - 57      PHYSICAL EXAM:        GENERAL: NAD, well-developed  HEAD:  Atraumatic, Normocephalic  EYES: EOMI, conjunctiva and sclera clear  NECK: Supple, No JVD  CHEST/LUNG: Clear to auscultation bilaterally; No wheeze  HEART: Regular rate and rhythm; No murmurs, rubs, or gallops  ABDOMEN: Soft, Nontender, distended, Bowel sounds present, flank and abdominal edema  EXTREMITIES:  2+ Peripheral Pulses, No clubbing, cyanosis, or edema  PSYCH: AAOx3  NEUROLOGY: non-focal  SKIN: No rashes or lesions                          14.3   16.85 )-----------( 301      ( 08 Apr 2019 09:05 )             42.7           LIVER FUNCTIONS - ( 07 Apr 2019 05:15 )  Alb: 3.8 g/dL / Pro: 7.4 g/dL / ALK PHOS: 141 U/L / ALT: 799 U/L / AST: 229 U/L / GGT: x           PT/INR - ( 07 Apr 2019 07:37 )   PT: 15.4 sec;   INR: 1.33 ratio         PTT - ( 08 Apr 2019 08:06 )  PTT:43.4 sec  126|76|41<177  3.5|30|1.87  9.0,--,--  04-08 @ 05:56        RADIOLOGY & ADDITIONAL TESTS:    Imaging Personally Reviewed:    Consultant(s) Notes Reviewed:      Care Discussed with Consultants/Other Providers:

## 2019-04-09 DIAGNOSIS — I50.9 HEART FAILURE, UNSPECIFIED: ICD-10-CM

## 2019-04-09 LAB
ALBUMIN SERPL ELPH-MCNC: 3.9 G/DL — SIGNIFICANT CHANGE UP (ref 3.3–5)
ALP SERPL-CCNC: 118 U/L — SIGNIFICANT CHANGE UP (ref 40–120)
ALT FLD-CCNC: 436 U/L — HIGH (ref 10–45)
ANA TITR SER: NEGATIVE — SIGNIFICANT CHANGE UP
ANION GAP SERPL CALC-SCNC: 18 MMOL/L — HIGH (ref 5–17)
ANION GAP SERPL CALC-SCNC: 21 MMOL/L — HIGH (ref 5–17)
APTT BLD: 103.7 SEC — HIGH (ref 27.5–36.3)
APTT BLD: 136.7 SEC — CRITICAL HIGH (ref 27.5–36.3)
APTT BLD: 67.9 SEC — HIGH (ref 27.5–36.3)
AST SERPL-CCNC: 76 U/L — HIGH (ref 10–40)
BASOPHILS # BLD AUTO: 0 K/UL — SIGNIFICANT CHANGE UP (ref 0–0.2)
BASOPHILS NFR BLD AUTO: 0.1 % — SIGNIFICANT CHANGE UP (ref 0–2)
BILIRUB DIRECT SERPL-MCNC: 0.3 MG/DL — HIGH (ref 0–0.2)
BILIRUB INDIRECT FLD-MCNC: 1 MG/DL — SIGNIFICANT CHANGE UP (ref 0.2–1)
BILIRUB SERPL-MCNC: 1.3 MG/DL — HIGH (ref 0.2–1.2)
BUN SERPL-MCNC: 42 MG/DL — HIGH (ref 7–23)
BUN SERPL-MCNC: 48 MG/DL — HIGH (ref 7–23)
CALCIUM SERPL-MCNC: 9.1 MG/DL — SIGNIFICANT CHANGE UP (ref 8.4–10.5)
CALCIUM SERPL-MCNC: 9.1 MG/DL — SIGNIFICANT CHANGE UP (ref 8.4–10.5)
CHLORIDE SERPL-SCNC: 71 MMOL/L — LOW (ref 96–108)
CHLORIDE SERPL-SCNC: 73 MMOL/L — LOW (ref 96–108)
CO2 SERPL-SCNC: 33 MMOL/L — HIGH (ref 22–31)
CO2 SERPL-SCNC: 35 MMOL/L — HIGH (ref 22–31)
CREAT SERPL-MCNC: 1.93 MG/DL — HIGH (ref 0.5–1.3)
CREAT SERPL-MCNC: 2.13 MG/DL — HIGH (ref 0.5–1.3)
CULTURE RESULTS: SIGNIFICANT CHANGE UP
CULTURE RESULTS: SIGNIFICANT CHANGE UP
EOSINOPHIL # BLD AUTO: 0 K/UL — SIGNIFICANT CHANGE UP (ref 0–0.5)
EOSINOPHIL NFR BLD AUTO: 0.1 % — SIGNIFICANT CHANGE UP (ref 0–6)
GLUCOSE SERPL-MCNC: 201 MG/DL — HIGH (ref 70–99)
GLUCOSE SERPL-MCNC: 205 MG/DL — HIGH (ref 70–99)
HCT VFR BLD CALC: 43.7 % — SIGNIFICANT CHANGE UP (ref 39–50)
HCT VFR BLD CALC: 45.4 % — SIGNIFICANT CHANGE UP (ref 39–50)
HGB BLD-MCNC: 14.2 G/DL — SIGNIFICANT CHANGE UP (ref 13–17)
HGB BLD-MCNC: 14.7 G/DL — SIGNIFICANT CHANGE UP (ref 13–17)
IGG SERPL-MCNC: 1102 MG/DL — SIGNIFICANT CHANGE UP (ref 700–1600)
IGG1 SER-MCNC: 400 MG/DL — SIGNIFICANT CHANGE UP (ref 248–810)
IGG2 SER-MCNC: 483 MG/DL — SIGNIFICANT CHANGE UP (ref 130–555)
IGG3 SER-MCNC: 19 MG/DL — SIGNIFICANT CHANGE UP (ref 15–102)
IGG4 SER-MCNC: 46 MG/DL — SIGNIFICANT CHANGE UP (ref 2–96)
LYMPHOCYTES # BLD AUTO: 1.5 K/UL — SIGNIFICANT CHANGE UP (ref 1–3.3)
LYMPHOCYTES # BLD AUTO: 8.9 % — LOW (ref 13–44)
MCHC RBC-ENTMCNC: 26.7 PG — LOW (ref 27–34)
MCHC RBC-ENTMCNC: 28.4 PG — SIGNIFICANT CHANGE UP (ref 27–34)
MCHC RBC-ENTMCNC: 31.3 GM/DL — LOW (ref 32–36)
MCHC RBC-ENTMCNC: 33.5 GM/DL — SIGNIFICANT CHANGE UP (ref 32–36)
MCV RBC AUTO: 84.9 FL — SIGNIFICANT CHANGE UP (ref 80–100)
MCV RBC AUTO: 85.2 FL — SIGNIFICANT CHANGE UP (ref 80–100)
MONOCYTES # BLD AUTO: 1.3 K/UL — HIGH (ref 0–0.9)
MONOCYTES NFR BLD AUTO: 7.6 % — SIGNIFICANT CHANGE UP (ref 2–14)
NEUTROPHILS # BLD AUTO: 14.5 K/UL — HIGH (ref 1.8–7.4)
NEUTROPHILS NFR BLD AUTO: 83.4 % — HIGH (ref 43–77)
PLAT MORPH BLD: NORMAL — SIGNIFICANT CHANGE UP
PLATELET # BLD AUTO: 278 K/UL — SIGNIFICANT CHANGE UP (ref 150–400)
PLATELET # BLD AUTO: 287 K/UL — SIGNIFICANT CHANGE UP (ref 150–400)
POTASSIUM SERPL-MCNC: 2.5 MMOL/L — CRITICAL LOW (ref 3.5–5.3)
POTASSIUM SERPL-MCNC: 4.2 MMOL/L — SIGNIFICANT CHANGE UP (ref 3.5–5.3)
POTASSIUM SERPL-SCNC: 2.5 MMOL/L — CRITICAL LOW (ref 3.5–5.3)
POTASSIUM SERPL-SCNC: 4.2 MMOL/L — SIGNIFICANT CHANGE UP (ref 3.5–5.3)
PROT SERPL-MCNC: 7.2 G/DL — SIGNIFICANT CHANGE UP (ref 6–8.3)
RBC # BLD: 5.15 M/UL — SIGNIFICANT CHANGE UP (ref 4.2–5.8)
RBC # BLD: 5.33 M/UL — SIGNIFICANT CHANGE UP (ref 4.2–5.8)
RBC # FLD: 13 % — SIGNIFICANT CHANGE UP (ref 10.3–14.5)
RBC # FLD: 13.1 % — SIGNIFICANT CHANGE UP (ref 10.3–14.5)
RBC BLD AUTO: SIGNIFICANT CHANGE UP
SODIUM SERPL-SCNC: 124 MMOL/L — LOW (ref 135–145)
SODIUM SERPL-SCNC: 127 MMOL/L — LOW (ref 135–145)
SPECIMEN SOURCE: SIGNIFICANT CHANGE UP
SPECIMEN SOURCE: SIGNIFICANT CHANGE UP
WBC # BLD: 16.1 K/UL — HIGH (ref 3.8–10.5)
WBC # BLD: 17.4 K/UL — HIGH (ref 3.8–10.5)
WBC # FLD AUTO: 16.1 K/UL — HIGH (ref 3.8–10.5)
WBC # FLD AUTO: 17.4 K/UL — HIGH (ref 3.8–10.5)

## 2019-04-09 PROCEDURE — 99233 SBSQ HOSP IP/OBS HIGH 50: CPT

## 2019-04-09 PROCEDURE — 99223 1ST HOSP IP/OBS HIGH 75: CPT | Mod: AI

## 2019-04-09 PROCEDURE — 76705 ECHO EXAM OF ABDOMEN: CPT | Mod: 26

## 2019-04-09 RX ORDER — SIMETHICONE 80 MG/1
80 TABLET, CHEWABLE ORAL THREE TIMES A DAY
Qty: 0 | Refills: 0 | Status: DISCONTINUED | OUTPATIENT
Start: 2019-04-09 | End: 2019-04-25

## 2019-04-09 RX ORDER — POTASSIUM CHLORIDE 20 MEQ
PACKET (EA) ORAL
Qty: 0 | Refills: 0 | Status: DISCONTINUED | OUTPATIENT
Start: 2019-04-09 | End: 2019-04-09

## 2019-04-09 RX ORDER — CALCIUM CARBONATE 500(1250)
2 TABLET ORAL THREE TIMES A DAY
Qty: 0 | Refills: 0 | Status: DISCONTINUED | OUTPATIENT
Start: 2019-04-09 | End: 2019-04-25

## 2019-04-09 RX ORDER — POTASSIUM CHLORIDE 20 MEQ
40 PACKET (EA) ORAL EVERY 4 HOURS
Qty: 0 | Refills: 0 | Status: COMPLETED | OUTPATIENT
Start: 2019-04-09 | End: 2019-04-09

## 2019-04-09 RX ORDER — POTASSIUM CHLORIDE 20 MEQ
40 PACKET (EA) ORAL ONCE
Qty: 0 | Refills: 0 | Status: COMPLETED | OUTPATIENT
Start: 2019-04-09 | End: 2019-04-09

## 2019-04-09 RX ORDER — METOPROLOL TARTRATE 50 MG
12.5 TABLET ORAL
Qty: 0 | Refills: 0 | Status: DISCONTINUED | OUTPATIENT
Start: 2019-04-09 | End: 2019-04-10

## 2019-04-09 RX ORDER — ONDANSETRON 8 MG/1
4 TABLET, FILM COATED ORAL ONCE
Qty: 0 | Refills: 0 | Status: COMPLETED | OUTPATIENT
Start: 2019-04-09 | End: 2019-04-09

## 2019-04-09 RX ORDER — POTASSIUM CHLORIDE 20 MEQ
40 PACKET (EA) ORAL EVERY 4 HOURS
Qty: 0 | Refills: 0 | Status: DISCONTINUED | OUTPATIENT
Start: 2019-04-09 | End: 2019-04-09

## 2019-04-09 RX ADMIN — Medication 60 MILLIGRAM(S): at 18:46

## 2019-04-09 RX ADMIN — HEPARIN SODIUM 900 UNIT(S)/HR: 5000 INJECTION INTRAVENOUS; SUBCUTANEOUS at 23:17

## 2019-04-09 RX ADMIN — HEPARIN SODIUM 1200 UNIT(S)/HR: 5000 INJECTION INTRAVENOUS; SUBCUTANEOUS at 07:57

## 2019-04-09 RX ADMIN — HEPARIN SODIUM 900 UNIT(S)/HR: 5000 INJECTION INTRAVENOUS; SUBCUTANEOUS at 16:24

## 2019-04-09 RX ADMIN — Medication 40 MILLIEQUIVALENT(S): at 09:46

## 2019-04-09 RX ADMIN — Medication 10 MILLIGRAM(S): at 05:14

## 2019-04-09 RX ADMIN — Medication 40 MILLIEQUIVALENT(S): at 12:27

## 2019-04-09 RX ADMIN — ONDANSETRON 4 MILLIGRAM(S): 8 TABLET, FILM COATED ORAL at 09:46

## 2019-04-09 RX ADMIN — Medication 12.5 MILLIGRAM(S): at 18:46

## 2019-04-09 RX ADMIN — HEPARIN SODIUM 1400 UNIT(S)/HR: 5000 INJECTION INTRAVENOUS; SUBCUTANEOUS at 00:31

## 2019-04-09 RX ADMIN — HEPARIN SODIUM 0 UNIT(S)/HR: 5000 INJECTION INTRAVENOUS; SUBCUTANEOUS at 15:20

## 2019-04-09 RX ADMIN — Medication 10 MILLIGRAM(S): at 16:04

## 2019-04-09 RX ADMIN — Medication 1 TABLET(S): at 12:24

## 2019-04-09 RX ADMIN — Medication 10 MILLIGRAM(S): at 21:14

## 2019-04-09 RX ADMIN — FAMOTIDINE 20 MILLIGRAM(S): 10 INJECTION INTRAVENOUS at 12:24

## 2019-04-09 RX ADMIN — Medication 60 MILLIGRAM(S): at 05:14

## 2019-04-09 RX ADMIN — Medication 40 MILLIEQUIVALENT(S): at 18:47

## 2019-04-09 NOTE — PROGRESS NOTE ADULT - SUBJECTIVE AND OBJECTIVE BOX
Cardiology Attending Progress Note    CHIEF COMPLAINT/REASON FOR CONSULT: SOB, AFIB    HISTORY OF PRESENT ILLNESS:    79y.o. Male with no PMH now admitted with new HFrEF (TTE 04/04/2019 with LVEF 25%, biventricular dysfunction, moderate MR, moderate TR, mild WA) and AFIB with RVR, abdominal discomfort/nausea concerning for hepatic congestion in the setting of acute decompensated CHF (BNP>50K), now s/p LHC/RHC (non-obstructive CAD).    INTERVAL EVENTS:   Patient seen and examined. This AM he complains of persistent nausea and abdominal distension. No CP. No SOB.. No HA/Dizziness. No Palpitations. No N/V/D/F/C.    Allergies    erythromycin (Unknown)    Intolerances    	    MEDICATIONS:  furosemide   Injectable 60 milliGRAM(s) IV Push two times a day  heparin  Infusion.  Unit(s)/Hr IV Continuous <Continuous>  heparin  Injectable 6500 Unit(s) IV Push every 6 hours PRN  heparin  Injectable 6500 Unit(s) IV Push every 6 hours PRN  heparin  Injectable 3000 Unit(s) IV Push every 6 hours PRN  hydrALAZINE 10 milliGRAM(s) Oral three times a day          famotidine    Tablet 20 milliGRAM(s) Oral daily      ergocalciferol 68537 Unit(s) Oral every week  multivitamin 1 Tablet(s) Oral daily  potassium chloride   Solution 40 milliEquivalent(s) Oral every 4 hours      PAST MEDICAL & SURGICAL HISTORY:  No pertinent past medical history  No significant past surgical history      FAMILY HISTORY:  FH: liver disease: unknown etiology, mother      SOCIAL HISTORY:    Never smoked, no ETOH, no IVDU    REVIEW OF SYSTEMS:    REVIEW OF SYSTEMS:    CONSTITUTIONAL: No weakness, fevers or chills  EYES/ENT: No visual changes;  No vertigo or throat pain   NECK: No pain or stiffness  RESPIRATORY: No cough, wheezing, hemoptysis; +shortness of breath  CARDIOVASCULAR: No chest pain or palpitations  GASTROINTESTINAL: No abdominal or epigastric pain. +nausea/vomiting. No hematemesis; No diarrhea or constipation. No melena or hematochezia.  GENITOURINARY: No dysuria, frequency or hematuria  NEUROLOGICAL: No numbness or weakness  SKIN: No itching, burning, rashes, or lesions   All other review of systems is negative unless indicated above.    PHYSICAL EXAM:  T(C): 36.5 (04-09-19 @ 08:53), Max: 36.6 (04-08-19 @ 12:12)  HR: 110 (04-09-19 @ 08:53) (100 - 127)  BP: 136/87 (04-09-19 @ 08:53) (136/87 - 163/100)  RR: 18 (04-09-19 @ 08:53) (18 - 18)  SpO2: 97% (04-09-19 @ 08:53) (94% - 99%)  Wt(kg): --  I&O's Summary    08 Apr 2019 07:01  -  09 Apr 2019 07:00  --------------------------------------------------------  IN: 1160 mL / OUT: 2650 mL / NET: -1490 mL    09 Apr 2019 07:01  -  09 Apr 2019 12:00  --------------------------------------------------------  IN: 240 mL / OUT: 0 mL / NET: 240 mL        Appearance: Normal	  HEENT:   Normal oral mucosa, PERRL, EOMI	  Lymphatic: No lymphadenopathy  Cardiovascular: Normal S1 S2, +JVD, 1/6 MAZIN  Respiratory: Lungs clear to auscultation	  Psychiatry: A & O x 3, Mood & affect appropriate  Gastrointestinal:  Soft, Non-tender, + BS	  Skin: No rashes, No ecchymoses, No cyanosis	  Neurologic: Non-focal  Extremities: Normal range of motion, No clubbing, cyanosis. Trace edema BL LE   Vascular: Peripheral pulses palpable 2+ bilaterally    LABS:	 	    CBC Full  -  ( 09 Apr 2019 07:01 )  WBC Count : 16.1 K/uL  Hemoglobin : 14.2 g/dL  Hematocrit : 45.4 %  Platelet Count - Automated : 287 K/uL  Mean Cell Volume : 85.2 fl  Mean Cell Hemoglobin : 26.7 pg  Mean Cell Hemoglobin Concentration : 31.3 gm/dL  Auto Neutrophil # : x  Auto Lymphocyte # : x  Auto Monocyte # : x  Auto Eosinophil # : x  Auto Basophil # : x  Auto Neutrophil % : x  Auto Lymphocyte % : x  Auto Monocyte % : x  Auto Eosinophil % : x  Auto Basophil % : x    04-09    127<L>  |  71<L>  |  42<H>  ----------------------------<  201<H>  2.5<LL>   |  35<H>  |  1.93<H>  04-08    126<L>  |  76<L>  |  41<H>  ----------------------------<  177<H>  3.5   |  30  |  1.87<H>    Ca    9.1      09 Apr 2019 06:58  Ca    9.0      08 Apr 2019 05:56      hsTrop: 54 -> 67 -> 64  proBNP:  50,170  WBC 16.85  cr 1.93  ,     ECG: Coarse atrial fibrillation    TELEMETRY: AFIB 90 - 120  	    	  CT-ChesT: 04/04/2019:  CHEST:     LUNGS AND LARGE AIRWAYS: Patent central airways. Bilateral lower lung   passive atelectasis. No pulmonary nodules.  PLEURA: Small to moderate bilateral pleural effusions.  VESSELS: Aortic and coronary artery calcification.  HEART: Cardiomegaly. No pericardial effusion.  MEDIASTINUM AND OSWALDO: No lymphadenopathy.  CHEST WALL AND LOWER NECK: Bilateral gynecomastia with slight asymmetry.    ABDOMEN AND PELVIS:    LIVER: Within normal limits.  BILE DUCTS: Normal caliber.  GALLBLADDER: Within normal limits.  SPLEEN: Within normal limits.  PANCREAS: Within normal limits.  ADRENALS: Within normal limits.  KIDNEYS/URETERS: Within normal limits.     BLADDER: Within normal limits.  REPRODUCTIVE ORGANS: Prostate within normal limits.    BOWEL: No bowel obstruction. Appendix  is normal.  PERITONEUM: Trace pelvic free fluid.  VESSELS:  Atherosclerotic changes.  RETROPERITONEUM: No lymphadenopathy.    ABDOMINAL WALL: Questionable right inguinal hernia repair.  BONES: Degenerative changes.    IMPRESSION:     Small-to-moderate bilateral pleural effusions.    No acute pathology in the abdomen or pelvis.      TTE:  04/04/2019:  EF (Visual Estimate): 20-25 %  EF (Teicholtz): 23 %  Doppler Peak Velocity (m/sec): MV=1.0  ------------------------------------------------------------------------  Observations:  Mitral Valve: Tethered mitral valve leaflets with normal  opening. Malcoaptation of leaflet tips.  Moderate mitral  regurgitation. Peak mitral valve gradient equals 4 mm Hg,  mean transmitral valve gradient equals 1 mm Hg.  Aortic Valve/Aorta: Normal trileaflet aortic valve. Mild  aortic regurgitation.  Aortic Root: 3.3 cm.  LVOT diameter: 2 cm.  Left Atrium: Severely dilated left atrium.  LA volume index  = 55 cc/m2.  Left Ventricle: Severe global left ventricular systolic  dysfunction. Normal left ventricular internal dimensions  and wall thicknesses. Increased E/e'  is consistent with  elevated left ventricular filling pressure.  Right Heart: Severe right atrial enlargement. Right  ventricular enlargement with decreased right ventricular  systolic function. Normal tricuspid valve. Moderate  tricuspid regurgitation. Pulmonic valve not well  visualized. Mild pulmonic regurgitation.  Pericardium/Pleura: Normal pericardium with no pericardial  effusion.  Large left pleural effusion.  Hemodynamic: Estimated right atrial pressure is 8 mm Hg.  Estimated right ventricular systolic pressure equals 37 mm  Hg, assuming right atrial pressure equals 8 mm Hg,  consistent with borderline pulmonary hypertension.  ------------------------------------------------------------------------  Conclusions:  1. Tethered mitral valve leaflets with normal opening.  Malcoaptation of leaflet tips.  Moderate mitral  regurgitation. Peak mitral valve gradient equals 4 mm Hg,  mean transmitral valve gradient equals 1 mm Hg.  2. Severely dilated left atrium.  LA volume index = 55  cc/m2.  3. Normal left ventricular internal dimensions and wall  thicknesses.  4. Severe global left ventricular systolic dysfunction.  5. Increased E/e'  is consistent with elevated left  ventricular filling pressure.  6. Right ventricular enlargement with decreased right  ventricular systolic function.  7. Normal tricuspid valve. Moderate-severe tricuspid  regurgitation.  8. Large left pleural effusion.  *** No previous Echo exam.    [facility] 04/08/2019:  CORONARY VESSELS: The coronary circulation is right dominant.  LM:   --  Ostial LM: There was a 20 % stenosis.  --  Distal left main: There was a 30 % stenosis.  LAD:   --  LAD: Normal.  CX:   --  Circumflex: Normal.  RCA:   --  RCA: Normal.  COMPLICATIONS: There were no complications.  DIAGNOSTIC RECOMMENDATIONS: The patient should continue with the present  medications.  Prepared and signed by  Owen Christina M.D.  Signed 04/09/2019 09:10:39  HEMODYNAMIC TABLES  Pressures:  Baseline  Pressures:  - HR: 109  Pressures:  - Rhythm:  Pressures:  -- Aortic Pressure (S/D/M): 164/103/131  Pressures:  -- Pulmonary Artery (S/D/M): 59/34/43  Pressures:  -- Pulmonary Capillary Wedge: 36/34/33  Pressures:  -- Right Atrium (a/v/M): 15/19/15  Pressures:  -- Right Ventricle (s/edp): 58/14/--      A/P: 79y.o. Male with no PMH now admitted with new HFrEF (TTE 04/04/2019 with LVEF 25%, biventricular dysfunction, moderate MR, moderate TR, mild WA) and AFIB with RVR, abdominal discomfort/nausea concerning for hepatic congestion in the setting of acute decompensated CHF (BNP>50K), now s/p LHC/RHC (non-obstructive CAD).      1. HFrEF, RV dysfunction, moderate MR - Suspect SOB is multifactorial 2/2 acute on chronic decompensated biventricular dysfunction and valvular dysfunction (moderate MR) in the setting of atrial fibrillation.  -s/p LHC with non-obstructive CAD  -Overall picture is concerning for hepatic congestion and cardiorenal syndrome in the setting of acute on chronic HFrEF and RV dysfunction. Given non-obstructive CAD on LHC, NICM may be 2/2 longstanding valvular dysfunction (MR) vs tachycardia induced CM, or a combination of both.  -Net negative 1.5 L overnight  -Cont Lasix 60 mg IV Q12  -Daily lytes. Replete K, repeat BMP tonight.  -Start ASA 81 mg po QD  -As volume status improving, would start low dose beta blockade with Metoprolol 12.5 mg po BID  -Strict I/O's  -Daily weights      2. AFIB - Patient with dilated LA on TTE, suspect longstanding paroxysmal AFIB  -Cont Heparin GTT for now given poor renal function  -Start Metoprolol 12.5 mg po BID  -Will consult EPS for RICH/DCCV.     3. HTN - /87  -Cont hydralazine 10 mg po TID/Isosorbide dinitrate 20 mg po TID  -Consider adding ACE once cr stable    Thank you for this interesting consult. My team will continue to follow along with you.      Elliot Meyer MD  Cardiology Attending  [facility]  Cell: [phone]  (Cardiology Nocturnist cell number available 7 PM -7 AM every night; available day time weekdays for follow-up only; day time weekends covered by general cardiology consult service)

## 2019-04-09 NOTE — CONSULT NOTE ADULT - ATTENDING COMMENTS
seen and examined with NP. I agree with H & P, A & P.  Possible TIC.  When medically optimized would proceed with RICH guided DCCV and likely amio post.

## 2019-04-09 NOTE — DIETITIAN INITIAL EVALUATION ADULT. - PHYSICAL APPEARANCE
other (specify)/BMI 27.1, pt appears thin however Nutrition focused physical exam deferred at this time secondary to family present at bedside and pt preference

## 2019-04-09 NOTE — DIETITIAN INITIAL EVALUATION ADULT. - PROBLEM SELECTOR PLAN 1
Patient with acute progressive dyspnea on exertion, LE edema, pulmonary vascular congestion. ED US showing possible ascites and reduced EF. EKG without overt ischemic changes  - poss newly diagnosed CHF exacerbation?  - initial troponin 54, f/u repeat  - obtain official TTE  - RVP to assess for possible viral etiology of myocarditis  - check lipid profile, hba1c - reportedly normal in 12/2018  - monitor on tele  - given lasix 40  x 1,   - c/w lasix 40 IV q12   - monitor UOP

## 2019-04-09 NOTE — PROGRESS NOTE ADULT - PROBLEM SELECTOR PLAN 1
-ECHO shows EF 23% with right and left ventricular dysfunction, elevated pulm pressures  -ACS shows stable troponins and EKG   -cardiology following   - Cards following, Lasix 60 mg iv q12.   - Continue with Metolazone.   -s/p cath today, non obstructive coronaries.   - Monitor on tele, monitor UOP, strict I+O and daily weight  - Net negative 2.8L on 4/5

## 2019-04-09 NOTE — DIETITIAN INITIAL EVALUATION ADULT. - PROBLEM SELECTOR PLAN 5
On exam with fluid wave suggestive of moderate vol ascites, which appears to be new. Likely in setting of volume overload, however unclear if alternative etiologies ie malignant vs due to liver dysfunction  - f/u abdominal US vs CTAP to assess for malignancy  - consider tap, especially if no overt cardiac or renal dysfxn to explain  - if fever, treat for SBP

## 2019-04-09 NOTE — DIETITIAN INITIAL EVALUATION ADULT. - PROBLEM SELECTOR PLAN 4
and  with normal alk phos and Tb, likely hepatic congestion from overloaded state. Degree of hepatic synthetic dysfunction given mildly elevated INR, albumin wnl. Unclear family hx of liver disease in mother  - trend LFTs  - f/u acute hepatitis panel  - obtain abdominal US to assess ascites vol and liver anatomy  - will also get CT a/p

## 2019-04-09 NOTE — DIETITIAN INITIAL EVALUATION ADULT. - ORAL INTAKE PTA
pt with decreased po intake a few days prior to admission secondary to fluid retention and early satiety. NKFA reported. No vitamin/mineral/herbal supplementation PTA reported.

## 2019-04-09 NOTE — DIETITIAN INITIAL EVALUATION ADULT. - NUTRITION INTERVENTION
Medical Food Supplements/Vitamin/Nutrition Education/Collaboration and Referral of Nutrition Care/Meals and Snack

## 2019-04-09 NOTE — DIETITIAN INITIAL EVALUATION ADULT. - ENERGY NEEDS
Height: 66 inches, Weight: 172.4 pounds (dosing)  BMI: 27.1 kg/m2 IBW: 142 pounds (+/-10%), %IBW: 121%  Pertinent Info: No peripheral edema noted, pt noted with distended abdomen, no pressure injuries noted in nursing flow sheet.  Pt is 79yoM with no PMH per chart, presenting with dyspnea, LE and abdomen swelling for several days PTA as well as nausea/vomiting and decreased po intake. On admission, pt with hypervolemic hyponatremia, leukocytosis, new Afib with RVR, transaminitis, ELVA, ascites, coagulopathy, and dyspepsia. Per hepatology, low suspicion for liver disease, LFTs downtrending, "likely 2/2 congestive/ischemic hepatopathy in setting of severe heart disease." Pt s/p cath indicative of non-obstructive CAD, "NICM may be 2/2 longstanding valvular dysfunction (MR) vs tachycardia induced CM, or a combination of both". Pt with new acute systolic HF (EF 35%). Pt noted with elevated HgbA1c, borderline DM? Pt continues with low sodium and low potassium per chart.

## 2019-04-09 NOTE — DIETITIAN INITIAL EVALUATION ADULT. - PROBLEM SELECTOR PLAN 6
WBC 15 with only reported sx of non productive cough and vague abdominal sx  - f/u blood cultures  - pan CT scan

## 2019-04-09 NOTE — DIETITIAN INITIAL EVALUATION ADULT. - OTHER INFO
Pt seen for length of stay on 6TOW. Pt reports poor po intake x 7 days in-patient, family reports pt consuming 25-50% of meals. c/o lack of appetite, feeling of fullness, excessive dyspepsia, nausea. No c/o diarrhea or constipation. Pt denies chewing/swallowing difficulties. Family unaware of any recent wt changes, unable to provide pt UBW. Pt dosing wt noted as 172.4 lbs, however ascites may be masking more accurate wt? Pt normally with good appetite at baseline, this is new secondary to current medical issues it seems. See recommendations below.

## 2019-04-09 NOTE — DIETITIAN INITIAL EVALUATION ADULT. - NS AS NUTRI INTERV MEDICAL AND FOOD SUPPLEMENTS
Pt declines multiple oral nutrition supplements offered stating he doesn't want anything sweet, however when offered supplements that are not sweet pt declines those as well. Some food preferences obtained and will be honored as feasible- to provide plain yogurt at every meal. RD remains available to update preferences as feasible. Pt/family provided with vegetarian/vegan items available list as well as alternative cold items list. Family bringing in food from home for pt as well.

## 2019-04-09 NOTE — DIETITIAN INITIAL EVALUATION ADULT. - PROBLEM SELECTOR PLAN 8
Cr 1.5 reportedly  normal in 12/2018. No electrolytes disturbances or significant uremia.   - UA noting significant microscopic hematuria and poss nephrotic range proteinuria  - monitor UOP  - replete K > 4 and Mg > 2  - f/u abdominal US  - f/u urine lytes, pro/Cr  - if worsening Cr, consider renal consult

## 2019-04-09 NOTE — PROGRESS NOTE ADULT - SUBJECTIVE AND OBJECTIVE BOX
Patient is a 79y old  Male who presents with a chief complaint of dyspnea, abdominal swelling (06 Apr 2019 14:22)      SUBJECTIVE / OVERNIGHT EVENTS:  Patient feels ok, no new complaints.   Still with abdominal and flank distension.    T(C): 36.5 (04-09-19 @ 08:53), Max: 36.6 (04-09-19 @ 05:02)  HR: 110 (04-09-19 @ 08:53) (104 - 110)  BP: 136/87 (04-09-19 @ 08:53) (136/87 - 163/100)  RR: 18 (04-09-19 @ 08:53) (18 - 18)  SpO2: 97% (04-09-19 @ 08:53) (95% - 99%)      MEDICATIONS  (STANDING):  ergocalciferol 00123 Unit(s) Oral every week  famotidine    Tablet 20 milliGRAM(s) Oral daily  furosemide   Injectable 60 milliGRAM(s) IV Push two times a day  heparin  Infusion.  Unit(s)/Hr (14 mL/Hr) IV Continuous <Continuous>  hydrALAZINE 10 milliGRAM(s) Oral three times a day  multivitamin 1 Tablet(s) Oral daily  potassium chloride   Solution 40 milliEquivalent(s) Oral every 4 hours    MEDICATIONS  (PRN):  calcium carbonate    500 mG (Tums) Chewable 2 Tablet(s) Chew three times a day PRN Heartburn  heparin  Injectable 6500 Unit(s) IV Push every 6 hours PRN For aPTT less than 40  heparin  Injectable 6500 Unit(s) IV Push every 6 hours PRN For aPTT less than 40  heparin  Injectable 3000 Unit(s) IV Push every 6 hours PRN For aPTT between 40 - 57    PHYSICAL EXAM:      GENERAL: NAD, well-developed  HEAD:  Atraumatic, Normocephalic  EYES: EOMI, conjunctiva and sclera clear  NECK: Supple, No JVD  CHEST/LUNG: Clear to auscultation bilaterally; No wheeze  HEART: Irregular rate and rhythm; No murmurs, rubs, or gallops  ABDOMEN: Soft, Nontender, distended, Bowel sounds present, flank and abdominal edema  EXTREMITIES:  2+ Peripheral Pulses, No clubbing, cyanosis, or edema  PSYCH: AAOx3  NEUROLOGY: non-focal  SKIN: No rashes or lesions                                            14.7   17.4  )-----------( 278      ( 09 Apr 2019 12:14 )             43.7           LIVER FUNCTIONS - ( 09 Apr 2019 12:14 )  Alb: 3.9 g/dL / Pro: 7.2 g/dL / ALK PHOS: 118 U/L / ALT: 436 U/L / AST: 76 U/L / GGT: x           PTT - ( 09 Apr 2019 07:01 )  PTT:103.7 sec  127|71|42<201  2.5|35|1.93  9.1,--,--  04-09 @ 06:58      RADIOLOGY & ADDITIONAL TESTS:    Imaging Personally Reviewed:    Consultant(s) Notes Reviewed:  cards     Care Discussed with Consultants/Other Providers:

## 2019-04-09 NOTE — DIETITIAN INITIAL EVALUATION ADULT. - PROBLEM SELECTOR PLAN 9
Patient with complaint of frequent belching and epigastric discomfort. No weight loss, melena, hematemesis. Recent IFOBT test neg in 12/2018  - H-pylori test  - famotidine daily  - low threshold for CT chest, abd, and pelvis

## 2019-04-09 NOTE — DIETITIAN INITIAL EVALUATION ADULT. - PROBLEM SELECTOR PLAN 2
Patient previously with reported prior EKG at [facility], now found to have new atrial fibrillation with RVR to 130. No prior cardiac instrumentation. [id number] 2 for now  - s/p metoprolol 5 mg IV and metoprolol 25 mg PO x 1  - currently in afib with controlled rate in 90s  - c/w metoprolol 12.5 mg q12, cautious given concomitant ADHF  - monitor on tele  - replete to K > 4 and Mg > 2  - check TSH  - TTE  - will cont lovenox for now  - f.u cardiology recs  - may benefit from cardioversion?

## 2019-04-09 NOTE — PROGRESS NOTE ADULT - PROBLEM SELECTOR PLAN 6
WBC 16 with only reported sx of non productive cough and vague abdominal sx  - possibly reactive, afebrile, VS stable  - CT chest/A/P without source of infection  - blood cultures with NGTD from 4/4

## 2019-04-09 NOTE — CONSULT NOTE ADULT - PROBLEM SELECTOR RECOMMENDATION 9
- Tele: AF - 130's  - ChadVasc Score= 3 (Age 2, HF 1)  - Continue with Heparin drip with eventual transition to oral AC  - Agree with starting BB for rate control - Tele: AF - 130's  -S/P cardiac Cath, which revealed NCA, elevated pulmonary pressures  - ChadVasc Score= 3 (Age 2, HF 1)  - Continue with Heparin drip with eventual transition to oral AC  - Agree with starting BB for rate control

## 2019-04-09 NOTE — DIETITIAN INITIAL EVALUATION ADULT. - NS AS NUTRI INTERV ED CONTENT
Other (specify)/Discussed importance of adequate po intake as feasible, continuing to encourage po intake. Extensive HF education deferred at this time as pt c/o dyspepsia at time of visit and with poor po persisting. RD remains available to review nutrition education as feasible./Purpose of the nutrition education

## 2019-04-09 NOTE — DIETITIAN INITIAL EVALUATION ADULT. - ADHERENCE
no specific therapeutic diet restrictions followed PTA. Pt follows a lacto-vegetarian diet (accepts dairy, does not accept fish, eggs, meat/poultry).

## 2019-04-09 NOTE — CONSULT NOTE ADULT - ASSESSMENT
Patient is a 79 year old male denying any past medical or surgical history and reports normal state of health prior  who now present  to [facility] with complaint of progressively worsening SOB over the last 2-3 weeks and noted to have increasing abdominal girth, pain, increased belching and n/v.  Seen in Ed was found to be  in AFIB w/ RVR 's was given IVF and lopresor IV and lopressor 25mg PO x 1, later became rate controlled to HR 90's as per documentation. Pt denies any past history of AFIB, however patient's son who is a physician recalls a past EKG done in Dr. Johnston office may have revealed AFIB. EP consulted for further management of AFIB. Patient is a 79 year old male denying any past medical or surgical history and reports normal state of health prior  who now present  to [facility] with complaint of progressively worsening SOB over the last 2-3 weeks and noted to have increasing abdominal girth, pain, increased belching and n/v.  Seen in Ed was found to be  in AFIB w/ RVR 's was given IVF and lopresor IV and lopressor 25mg PO x 1, later became rate controlled to HR 90's as per documentation. Pt denies any past history of AFIB, however patient's son who is a physician recalls a past EKG done in Dr. Johnston office may have revealed AFIB. EP consulted for further management of AFIB.     #AFIB  # HF  - Tele: AF - 130's  - ECHO: LA size 5.7cm, severe global LV systolic dysfunction, right ventricular enlargment with decreased RV systolic fxn. Patient is a 79 year old male denying any past medical or surgical history and reports normal state of health prior  who now present  to [facility] with complaint of progressively worsening SOB over the last 2-3 weeks and noted to have increasing abdominal girth, pain, increased belching and n/v.  Seen in Ed was found to be  in AFIB w/ RVR 's was given IVF and lopresor IV and lopressor 25mg PO x 1, later became rate controlled to HR 90's as per documentation. Pt denies any past history of AFIB, however patient's son who is a physician recalls a past EKG done in Dr. Johnston office may have revealed AFIB. EP consulted for further management of AFIB. Patient is a 79 year old male denying any past medical or surgical history and reports normal state of health prior  who now present  to [facility] with complaint of progressively worsening SOB over the last 2-3 weeks and noted to have increasing abdominal girth, pain, increased belching and n/v.  Seen in Ed was found to be  in AFIB w/ RVR 's was given IVF and lopresor IV and lopressor 25mg PO x 1, later became rate controlled to HR 90's as per documentation. Pt denies any past history of AFIB, however patient's son who is a physician recalls a past EKG done in Dr. Jhonston office may have revealed AFIB. S/P cardiac Cath, which revealed NCA, EP consulted for further management of AFIB.  S/P cardiac Cath, which revealed NCA,

## 2019-04-09 NOTE — DIETITIAN INITIAL EVALUATION ADULT. - NS AS NUTRI INTERV MEALS SNACK
General/healthful diet/Recommend change diet to Low Sodium only, Lacto-Vegetarian per pt preference.

## 2019-04-09 NOTE — DIETITIAN INITIAL EVALUATION ADULT. - PROBLEM SELECTOR PLAN 7
Patient with INR of 1.5. Unclear etiology but noted to have liver dysfxn. Low suspicion for malnutrition  - f/u mixing studies  - trend  - no overt bleeding

## 2019-04-09 NOTE — DIETITIAN INITIAL EVALUATION ADULT. - PT NOT SOURCE
Pt primarily Barry/Bulgarian speaking per chart, family at bedside provided assistance in translation for RD interview

## 2019-04-09 NOTE — CONSULT NOTE ADULT - SUBJECTIVE AND OBJECTIVE BOX
HPI:  Patient is a 79 year old male denying any past medical or surgical history and reports normal state of health prior  who now present  to [facility] with complaint of progressively worsening SOB over the last 2-3 weeks and noted to have increasing abdominal girth, pain, increased belching and n/v.  Seen in Ed was found to be  in AFIB w/ RVR 's was given IVF and lopresor IV and lopressor 25mg PO x 1, later became rate controlled to HR 90's as per documentation. Pt denies any past history of AFIB, however patient's son who is a physician recalls a past EKG done in Dr. Johnston office may have revealed AFIB. At present reports + SOB however denies any lightheadedness, dizziness, chest pains, or palpitations. EP consulted for further management of AFIB.   Pt is English and Barry speaking son at the bedside for further translation assistance.       PCP Dr. Johnston    PAST MEDICAL & SURGICAL HISTORY:  No pertinent past medical history  No significant past surgical history      ROS:  General: Pt denies recent weight loss/fever/chills  Neurological: denies numbness or  sensation loss  Cardiovascular: denies chest pain/palpitations/leg edema  Respiratory and Thorax: denies SOB/cough/wheezing  Gastrointestinal: + abdominal bloating  Musculoskeletal: denies joint pain or swelling, denies restricted motion  Hematologic: denies abnormal bleeding  	    MEDICATIONS  (STANDING):  ergocalciferol 07241 Unit(s) Oral every week  famotidine    Tablet 20 milliGRAM(s) Oral daily  furosemide   Injectable 60 milliGRAM(s) IV Push two times a day  heparin  Infusion.  Unit(s)/Hr (14 mL/Hr) IV Continuous <Continuous>  hydrALAZINE 10 milliGRAM(s) Oral three times a day  multivitamin 1 Tablet(s) Oral daily  potassium chloride   Solution 40 milliEquivalent(s) Oral every 4 hours    MEDICATIONS  (PRN):  calcium carbonate    500 mG (Tums) Chewable 2 Tablet(s) Chew three times a day PRN Heartburn  heparin  Injectable 6500 Unit(s) IV Push every 6 hours PRN For aPTT less than 40  heparin  Injectable 6500 Unit(s) IV Push every 6 hours PRN For aPTT less than 40  heparin  Injectable 3000 Unit(s) IV Push every 6 hours PRN For aPTT between 40 - 57      Allergies    erythromycin (Unknown)    Intolerances        SOCIAL HISTORY:    FAMILY HISTORY:  FH: liver disease: unknown etiology, mother      Vital Signs Last 24 Hrs  T(C): 36.6 (09 Apr 2019 13:39), Max: 36.6 (09 Apr 2019 05:02)  T(F): 97.8 (09 Apr 2019 13:39), Max: 97.9 (09 Apr 2019 05:02)  HR: 70 (09 Apr 2019 13:39) (70 - 127)  BP: 115/88 (09 Apr 2019 13:39) (115/88 - 163/100)  BP(mean): --  RR: 18 (09 Apr 2019 13:39) (18 - 18)  SpO2: 98% (09 Apr 2019 13:39) (94% - 99%)    Physical Exam:  Neuro: Alert and oriented x 3  Cardiac: S1, S2 IRR  Resp: Bilateral lung sounds clear  Abdomen Soft, slight distention  Extremities; No edema noted      LABS:                        14.7   17.4  )-----------( 278      ( 09 Apr 2019 12:14 )             43.7     04-09    127<L>  |  71<L>  |  42<H>  ----------------------------<  201<H>  2.5<LL>   |  35<H>  |  1.93<H>    Ca    9.1      09 Apr 2019 06:58    TPro  7.2  /  Alb  3.9  /  TBili  1.3<H>  /  DBili  0.3<H>  /  AST  76<H>  /  ALT  436<H>  /  AlkPhos  118  04-09    PTT - ( 09 Apr 2019 14:36 )  PTT:136.7 sec      RADIOLOGY & ADDITIONAL STUDIES:    < from: Transthoracic Echocardiogram (04.04.19 @ 19:23) >  Dimensions:    Normal Values:  LA:     5.7    2.0 - 4.0 cm  Ao:     3.3    2.0 - 3.8 cm  SEPTUM: 0.9    0.6 - 1.2 cm  PWT:    0.9    0.6 - 1.1 cm  LVIDd:  4.8    3.0 - 5.6 cm  LVIDs:  4.3    1.8 - 4.0 cm  Derived variables:  LVMI: 79 g/m2  RWT: 0.37  Fractional short: 10 %  EF (Visual Estimate): 20-25 %  EF (Teicholtz): 23 %  Doppler Peak Velocity (m/sec): MV=1.0  ------------------------------------------------------------------------  Observations:  Mitral Valve: Tethered mitral valve leaflets with normal  opening. Malcoaptation of leaflet tips.  Moderate mitral  regurgitation. Peak mitral valve gradient equals 4 mm Hg,  mean transmitral valve gradient equals 1 mm Hg.  Aortic Valve/Aorta: Normal trileaflet aortic valve. Mild  aortic regurgitation.  Aortic Root: 3.3 cm.  LVOT diameter: 2 cm.  Left Atrium: Severely dilated left atrium.  LA volume index  =55 cc/m2.  Left Ventricle: Severe global left ventricular systolic  dysfunction. Normal left ventricular internal dimensions  and wall thicknesses. Increased E/e'  is consistent with  elevated left ventricular filling pressure.  Right Heart: Severe right atrial enlargement. Right  ventricular enlargement with decreased right ventricular  systolic function. Normal tricuspid valve. Moderate  tricuspid regurgitation. Pulmonic valve not well  visualized. Mild pulmonic regurgitation.  Pericardium/Pleura: Normal pericardium with no pericardial  effusion.  Large left pleural effusion.  Hemodynamic: Estimated right atrial pressure is 8 mm Hg.  Estimated right ventricular systolic pressure equals 37 mm  Hg, assuming right atrial pressure equals 8 mm Hg,  consistent with borderline pulmonary hypertension.  ------------------------------------------------------------------------  Conclusions:  1. Tethered mitral valve leaflets with normal opening.  Malcoaptation of leaflet tips.  Moderate mitral  regurgitation. Peak mitral valve gradient equals 4 mm Hg,  mean transmitral valve gradient equals 1 mm Hg.  2. Severely dilated left atrium.  LA volume index = 55  cc/m2.  3. Normal left ventricular internal dimensions and wall  thicknesses.  4. Severe global left ventricular systolic dysfunction.  5. Increased E/e'  is consistent with elevated left  ventricular filling pressure.  6. Right ventricular enlargement with decreased right  ventricular systolic function.  7. Normal tricuspid valve. Moderate-severe tricuspid  regurgitation.  8. Large left pleural effusion.  *** No previous Echo exam.  ------------------------------------------------------------------------    < end of copied text >

## 2019-04-09 NOTE — DIETITIAN INITIAL EVALUATION ADULT. - PROBLEM SELECTOR PLAN 3
Patient with hyponatremia to mis 120s, reportedly normal chemistries in Dec 2018. Sx of nausea/vomiting may be related to hyponatremia. Currently with hypervolemia. DDx include volume overloaded state 2/2 cardiac and renal dysfxn  - check serum Osm, UOsm and ur lytes  - address ADHF as above  - cont diuresis

## 2019-04-10 LAB
ALBUMIN SERPL ELPH-MCNC: 3.9 G/DL — SIGNIFICANT CHANGE UP (ref 3.3–5)
ALP SERPL-CCNC: 116 U/L — SIGNIFICANT CHANGE UP (ref 40–120)
ALT FLD-CCNC: 343 U/L — HIGH (ref 10–45)
ANION GAP SERPL CALC-SCNC: 19 MMOL/L — HIGH (ref 5–17)
ANION GAP SERPL CALC-SCNC: 19 MMOL/L — HIGH (ref 5–17)
APTT BLD: 70.8 SEC — HIGH (ref 27.5–36.3)
AST SERPL-CCNC: 50 U/L — HIGH (ref 10–40)
BILIRUB DIRECT SERPL-MCNC: 0.4 MG/DL — HIGH (ref 0–0.2)
BILIRUB INDIRECT FLD-MCNC: 0.8 MG/DL — SIGNIFICANT CHANGE UP (ref 0.2–1)
BILIRUB SERPL-MCNC: 1.2 MG/DL — SIGNIFICANT CHANGE UP (ref 0.2–1.2)
BUN SERPL-MCNC: 53 MG/DL — HIGH (ref 7–23)
BUN SERPL-MCNC: 58 MG/DL — HIGH (ref 7–23)
CALCIUM SERPL-MCNC: 8.8 MG/DL — SIGNIFICANT CHANGE UP (ref 8.4–10.5)
CALCIUM SERPL-MCNC: 9.1 MG/DL — SIGNIFICANT CHANGE UP (ref 8.4–10.5)
CHLORIDE SERPL-SCNC: 71 MMOL/L — LOW (ref 96–108)
CHLORIDE SERPL-SCNC: 73 MMOL/L — LOW (ref 96–108)
CO2 SERPL-SCNC: 30 MMOL/L — SIGNIFICANT CHANGE UP (ref 22–31)
CO2 SERPL-SCNC: 33 MMOL/L — HIGH (ref 22–31)
CREAT SERPL-MCNC: 2.41 MG/DL — HIGH (ref 0.5–1.3)
CREAT SERPL-MCNC: 2.41 MG/DL — HIGH (ref 0.5–1.3)
GLUCOSE SERPL-MCNC: 185 MG/DL — HIGH (ref 70–99)
GLUCOSE SERPL-MCNC: 187 MG/DL — HIGH (ref 70–99)
HCT VFR BLD CALC: 42.9 % — SIGNIFICANT CHANGE UP (ref 39–50)
HGB BLD-MCNC: 14.4 G/DL — SIGNIFICANT CHANGE UP (ref 13–17)
INR BLD: 1.28 RATIO — HIGH (ref 0.88–1.16)
MAGNESIUM SERPL-MCNC: 2.6 MG/DL — SIGNIFICANT CHANGE UP (ref 1.6–2.6)
MCHC RBC-ENTMCNC: 28.2 PG — SIGNIFICANT CHANGE UP (ref 27–34)
MCHC RBC-ENTMCNC: 33.6 GM/DL — SIGNIFICANT CHANGE UP (ref 32–36)
MCV RBC AUTO: 84 FL — SIGNIFICANT CHANGE UP (ref 80–100)
PLATELET # BLD AUTO: 254 K/UL — SIGNIFICANT CHANGE UP (ref 150–400)
POTASSIUM SERPL-MCNC: 4.1 MMOL/L — SIGNIFICANT CHANGE UP (ref 3.5–5.3)
POTASSIUM SERPL-MCNC: 4.4 MMOL/L — SIGNIFICANT CHANGE UP (ref 3.5–5.3)
POTASSIUM SERPL-SCNC: 4.1 MMOL/L — SIGNIFICANT CHANGE UP (ref 3.5–5.3)
POTASSIUM SERPL-SCNC: 4.4 MMOL/L — SIGNIFICANT CHANGE UP (ref 3.5–5.3)
PROT SERPL-MCNC: 7.4 G/DL — SIGNIFICANT CHANGE UP (ref 6–8.3)
PROTHROM AB SERPL-ACNC: 14.7 SEC — HIGH (ref 10–12.9)
RBC # BLD: 5.11 M/UL — SIGNIFICANT CHANGE UP (ref 4.2–5.8)
RBC # FLD: 13.6 % — SIGNIFICANT CHANGE UP (ref 10.3–14.5)
SODIUM SERPL-SCNC: 120 MMOL/L — CRITICAL LOW (ref 135–145)
SODIUM SERPL-SCNC: 125 MMOL/L — LOW (ref 135–145)
WBC # BLD: 19.16 K/UL — HIGH (ref 3.8–10.5)
WBC # FLD AUTO: 19.16 K/UL — HIGH (ref 3.8–10.5)

## 2019-04-10 PROCEDURE — 99232 SBSQ HOSP IP/OBS MODERATE 35: CPT

## 2019-04-10 PROCEDURE — 99233 SBSQ HOSP IP/OBS HIGH 50: CPT

## 2019-04-10 RX ORDER — FUROSEMIDE 40 MG
40 TABLET ORAL DAILY
Qty: 0 | Refills: 0 | Status: DISCONTINUED | OUTPATIENT
Start: 2019-04-10 | End: 2019-04-10

## 2019-04-10 RX ORDER — MILRINONE LACTATE 1 MG/ML
0.25 INJECTION, SOLUTION INTRAVENOUS
Qty: 20 | Refills: 0 | Status: DISCONTINUED | OUTPATIENT
Start: 2019-04-10 | End: 2019-04-14

## 2019-04-10 RX ORDER — DIGOXIN 250 MCG
0.5 TABLET ORAL ONCE
Qty: 0 | Refills: 0 | Status: COMPLETED | OUTPATIENT
Start: 2019-04-10 | End: 2019-04-10

## 2019-04-10 RX ORDER — FUROSEMIDE 40 MG
10 TABLET ORAL
Qty: 500 | Refills: 0 | Status: DISCONTINUED | OUTPATIENT
Start: 2019-04-10 | End: 2019-04-13

## 2019-04-10 RX ADMIN — FAMOTIDINE 20 MILLIGRAM(S): 10 INJECTION INTRAVENOUS at 12:19

## 2019-04-10 RX ADMIN — Medication 5 MG/HR: at 20:22

## 2019-04-10 RX ADMIN — Medication 10 MILLIGRAM(S): at 05:21

## 2019-04-10 RX ADMIN — Medication 12.5 MILLIGRAM(S): at 17:40

## 2019-04-10 RX ADMIN — SIMETHICONE 80 MILLIGRAM(S): 80 TABLET, CHEWABLE ORAL at 20:31

## 2019-04-10 RX ADMIN — Medication 10 MILLIGRAM(S): at 13:47

## 2019-04-10 RX ADMIN — Medication 0.5 MILLIGRAM(S): at 20:50

## 2019-04-10 RX ADMIN — HEPARIN SODIUM 900 UNIT(S)/HR: 5000 INJECTION INTRAVENOUS; SUBCUTANEOUS at 08:30

## 2019-04-10 RX ADMIN — MILRINONE LACTATE 5.87 MICROGRAM(S)/KG/MIN: 1 INJECTION, SOLUTION INTRAVENOUS at 20:50

## 2019-04-10 RX ADMIN — Medication 12.5 MILLIGRAM(S): at 05:19

## 2019-04-10 RX ADMIN — SIMETHICONE 80 MILLIGRAM(S): 80 TABLET, CHEWABLE ORAL at 13:47

## 2019-04-10 RX ADMIN — Medication 1 TABLET(S): at 12:19

## 2019-04-10 RX ADMIN — Medication 10 MILLIGRAM(S): at 20:28

## 2019-04-10 RX ADMIN — Medication 60 MILLIGRAM(S): at 05:21

## 2019-04-10 NOTE — PROGRESS NOTE ADULT - SUBJECTIVE AND OBJECTIVE BOX
24H hour events: No over night events, resting in chair with family at his side. Admits to SOB, but improving lower extremity edema. Denies CP or palpitations.     MEDICATIONS:  furosemide   Injectable 40 milliGRAM(s) IV Push daily  heparin  Infusion.  Unit(s)/Hr IV Continuous <Continuous>  heparin  Injectable 6500 Unit(s) IV Push every 6 hours PRN  heparin  Injectable 6500 Unit(s) IV Push every 6 hours PRN  heparin  Injectable 3000 Unit(s) IV Push every 6 hours PRN  hydrALAZINE 10 milliGRAM(s) Oral three times a day  metoprolol succinate ER 12.5 milliGRAM(s) Oral two times a day    calcium carbonate    500 mG (Tums) Chewable 2 Tablet(s) Chew three times a day PRN  famotidine    Tablet 20 milliGRAM(s) Oral daily  simethicone 80 milliGRAM(s) Chew three times a day PRN      ergocalciferol 95816 Unit(s) Oral every week  multivitamin 1 Tablet(s) Oral daily      REVIEW OF SYSTEMS:  Complete 10point ROS negative.    PHYSICAL EXAM:  T(C): 36.3 (04-10-19 @ 04:51), Max: 36.6 (04-09-19 @ 13:39)  HR: 100 (04-10-19 @ 08:13) (70 - 117)  BP: 129/90 (04-10-19 @ 08:13) (102/88 - 132/90)  RR: 18 (04-10-19 @ 08:13) (18 - 18)  SpO2: 93% (04-10-19 @ 08:13) (93% - 99%)    09 Apr 2019 07:01  -  10 Apr 2019 07:00  --------------------------------------------------------  IN: 583.5 mL / OUT: 700 mL / NET: -116.5 mL    10 Apr 2019 07:01  -  10 Apr 2019 11:38  --------------------------------------------------------  IN: 160 mL / OUT: 0 mL / NET: 160 mL    Appearance: Normal	  Cardiovascular: Normal S1 S2, irregular.    Respiratory: Lungs with crackles at bases.   Psychiatry: A & O x 3, Mood & affect appropriate  Gastrointestinal:  Soft, Non-tender, + BS	l  Extremities: Normal range of motion, No clubbing, cyanosis. +1 pitting BL/LE edema   Vascular: Peripheral pulses palpable 2+ bilaterally      LABS:	 	    CBC Full  -  ( 10 Apr 2019 09:23 )  WBC Count : 19.16 K/uL  Hemoglobin : 14.4 g/dL  Hematocrit : 42.9 %  Platelet Count - Automated : 254 K/uL  Mean Cell Volume : 84.0 fl  Mean Cell Hemoglobin : 28.2 pg  Mean Cell Hemoglobin Concentration : 33.6 gm/dL  Auto Neutrophil # : x  Auto Lymphocyte # : x  Auto Monocyte # : x  Auto Eosinophil # : x  Auto Basophil # : x  Auto Neutrophil % : x  Auto Lymphocyte % : x  Auto Monocyte % : x  Auto Eosinophil % : x  Auto Basophil % : x    04-10    125<L>  |  73<L>  |  53<H>  ----------------------------<  185<H>  4.1   |  33<H>  |  2.41<H>  04-09    124<L>  |  73<L>  |  48<H>  ----------------------------<  205<H>  4.2   |  33<H>  |  2.13<H>    Ca    9.1      10 Apr 2019 07:44  Ca    9.1      09 Apr 2019 17:53    TPro  7.4  /  Alb  3.9  /  TBili  1.2  /  DBili  0.4<H>  /  AST  50<H>  /  ALT  343<H>  /  AlkPhos  116  04-10  TPro  7.2  /  Alb  3.9  /  TBili  1.3<H>  /  DBili  0.3<H>  /  AST  76<H>  /  ALT  436<H>  /  AlkPhos  118  04-09      TELEMETRY: 	 AFib 90's, briefly up to 110

## 2019-04-10 NOTE — CHART NOTE - NSCHARTNOTEFT_GEN_A_CORE
Pt was seen and examined.  Briefly this is a male c hx of HTN DM and new onset Afib and found to have moderate to severe Mitral regurg and CHF  ARF-Worse  Hepatitis -Improving  Hypervolemic hyponatremia    Suggest  -I suspect still in heart failure.  Cool extremities.  Would Milrinone gtt help?    -?IV Bumex 1mg IVP bid but only in light of inotropic support  -Monitor serum sodium .  May need samsca but not today    Call placed to cards who will call me p evaluation       Full dictated note to follow in am       Sayed Aivva  [facility]  [phone]

## 2019-04-10 NOTE — PROGRESS NOTE ADULT - PROBLEM SELECTOR PLAN 8
-Patient with INR of 1.5. Unclear etiology but noted to have liver dysfxn. Low suspicion for malnutrition  -Start Nepro fro now.   - no overt bleeding

## 2019-04-10 NOTE — PROGRESS NOTE ADULT - PROBLEM SELECTOR PLAN 6
- abdominal CT with ascites  - continue diuresis  -Abdominal sono show no evidence of Ascites s/p diuretics.

## 2019-04-10 NOTE — PROGRESS NOTE ADULT - PROBLEM SELECTOR PLAN 4
-LFT's trending down  -hepatitis panel negative; CT A/P with no acute abdominal pathology   - hepatology recs appreciated- possibly 2/2 to hepatic congestion vs. ischemic     hepatopathy  - improving with diuresis  - Reviewed renal sono no ascites seen.

## 2019-04-10 NOTE — PROGRESS NOTE ADULT - PROBLEM SELECTOR PLAN 2
ELVA from diuretics, spoke to renal, patient needs Ionotrope support given worsening ELVA from diuretics and patient in severe CHF with moderate MR.  Monitor renal function.   Bladder sono.

## 2019-04-10 NOTE — CONSULT NOTE ADULT - SUBJECTIVE AND OBJECTIVE BOX
Patient seen and evaluated at bedside    Chief Complaint:    HPI:  Pt evaluated prior to midnight on 4/3/19.    80 y/o M, some english but mostly gay/Welsh speaking, c hx [place] and no other significant PMH who presents with a 10 day h/x of progressive abdominal distension and pain with progressive shortness of breath.     Pt's son (Dr. Zhang is a hospitalist @ [facility] in [facility]) at bedside provided collateral. The pt had been in his usual state of health until 10 days ago, when he began belching and first started to notice abdominal pain. Pt also had decreased PO intake and has decreased caliber of stools. He describes feeling constantly nauseous and has had ~3 episodes of non-bloody non-bilious emesis over the past week . He notes a burning abdominal pain that he attributes to heartburn which has not been relieved by Protonix. However about 5 days ago, he noticed progressive abdominal and lower extremity swelling within the last 2-3 days. He also noted progressive dyspnea on exertion and orthopnea over the past 10 days which has progressed to the point where he is unable to take even a few steps without sitting down. He also notes a non productive cough that has developed over the same period of time. He denies fevers, chills, sore throat, and chest pain. Pt was seen in recent months by his PCP and no abnormalities were noted. He states his labs were within normal ranges aside from a slightly elevated cholesterol and that IFOBT testing for colon cancer were negative, although the pt has never had a colonoscopy. Outpt records are currently unavailable.    Initial ED vitals: T 97.5F, , /110, RR 18 on RA. He was initially given 1L NS bolus, Zofran, and Reglan. Pt became tachycardic to the 140s and was noted to be in AFib with RVR on EKG. Pt was administered Lopressor 5mg IV and 25mg PO, which improved rate control to 90s. ED echo showed decreased EF and has was administered Lasix 40mg IV. Pt's most recent vitals: T 36.4C, HR 90, /84, RR 18, satO2 100% on 2.5L O2 nasal canula.    Seen at beside, the pt denies any headache, fever, chills, chest pain, diarrhea, constipation, hematuria, or dysuria. He states that he felt palpatations earlier and that he had felt his heart beating fast intermittently over the past couple of weeks. He states that his abdominal discomfort feels like a "fullness" and rates it at a 5/10 in severity. He denies any shortness of breath at rest. (2019 00:12)      PMHx:   No pertinent past medical history      PSHx:   No significant past surgical history      Allergies:  erythromycin (Unknown)      Home Meds:    Current Medications:   calcium carbonate    500 mG (Tums) Chewable 2 Tablet(s) Chew three times a day PRN  ergocalciferol 34648 Unit(s) Oral every week  famotidine    Tablet 20 milliGRAM(s) Oral daily  furosemide   Injectable 40 milliGRAM(s) IV Push daily  heparin  Infusion.  Unit(s)/Hr IV Continuous <Continuous>  heparin  Injectable 6500 Unit(s) IV Push every 6 hours PRN  heparin  Injectable 6500 Unit(s) IV Push every 6 hours PRN  heparin  Injectable 3000 Unit(s) IV Push every 6 hours PRN  hydrALAZINE 10 milliGRAM(s) Oral three times a day  metoprolol succinate ER 12.5 milliGRAM(s) Oral two times a day  multivitamin 1 Tablet(s) Oral daily  simethicone 80 milliGRAM(s) Chew three times a day PRN      FAMILY HISTORY:  FH: liver disease: unknown etiology, mother      Social History:  Smoking History:  Alcohol Use:  Drug Use:    Review of Systems:  REVIEW OF SYSTEMS:  CONSTITUTIONAL: No weakness, fevers or chills  EYES/ENT: No visual changes;  No dysphagia  NECK: No pain or stiffness  RESPIRATORY: No cough, wheezing, hemoptysis; No shortness of breath  CARDIOVASCULAR: No chest pain or palpitations; No lower extremity edema  GASTROINTESTINAL: No abdominal or epigastric pain. No nausea, vomiting, or hematemesis; No diarrhea or constipation. No melena or hematochezia.  BACK: No back pain  GENITOURINARY: No dysuria, frequency or hematuria  NEUROLOGICAL: No numbness or weakness  SKIN: No itching, burning, rashes, or lesions   All other review of systems is negative unless indicated above.    Physical Exam:  T(F): 97.3 (-10), Max: 97.8 (-)  HR: 67 (-10) (67 - 117)  BP: 107/77 (04-10) (97/62 - 132/90)  RR: 18 (04-10)  SpO2: 99% (-10)  GENERAL: No acute distress, well-developed  HEAD:  Atraumatic, Normocephalic  ENT: EOMI, PERRLA, conjunctiva and sclera clear, Neck supple, No JVD, moist mucosa  CHEST/LUNG: Clear to auscultation bilaterally; No wheeze, equal breath sounds bilaterally   BACK: No spinal tenderness  HEART: Regular rate and rhythm; No murmurs, rubs, or gallops  ABDOMEN: Soft, Nontender, Nondistended; Bowel sounds present  EXTREMITIES:  No clubbing, cyanosis, or edema  PSYCH: Nl behavior, nl affect  NEUROLOGY: AAOx3, non-focal, cranial nerves intact  SKIN: Normal color, No rashes or lesions  LINES:    Cardiovascular Diagnostic Testing:    ECG: Personally reviewed:    Echo: Personally reviewed:  < from: Transthoracic Echocardiogram (19 @ 19:23) >  Dimensions:    Normal Values:  LA:     5.7    2.0 - 4.0 cm  Ao:     3.3    2.0 - 3.8 cm  SEPTUM: 0.9    0.6 - 1.2 cm  PWT:    0.9    0.6 - 1.1 cm  LVIDd:  4.8    3.0 - 5.6 cm  LVIDs:  4.3    1.8 - 4.0 cm  Derived variables:  LVMI: 79 g/m2  RWT: 0.37  Fractional short: 10 %  EF (Visual Estimate): 20-25 %  EF (Teicholtz): 23 %  Doppler Peak Velocity (m/sec): MV=1.0  ------------------------------------------------------------------------  Observations:  Mitral Valve: Tethered mitral valve leaflets with normal  opening. Malcoaptation of leaflet tips.  Moderate mitral  regurgitation. Peak mitral valve gradient equals 4 mm Hg,  mean transmitral valve gradient equals 1 mm Hg.  Aortic Valve/Aorta: Normal trileaflet aortic valve. Mild  aortic regurgitation.  Aortic Root: 3.3 cm.  LVOT diameter: 2 cm.  Left Atrium: Severely dilated left atrium.  LA volume index  =55 cc/m2.  Left Ventricle: Severe global left ventricular systolic  dysfunction. Normal left ventricular internal dimensions  and wall thicknesses. Increased E/e'  is consistent with  elevated left ventricular filling pressure.  Right Heart: Severe right atrial enlargement. Right  ventricular enlargement with decreased right ventricular  systolic function. Normal tricuspid valve. Moderate  tricuspid regurgitation. Pulmonic valve not well  visualized. Mild pulmonic regurgitation.  Pericardium/Pleura: Normal pericardium with no pericardial  effusion.  Large left pleural effusion.  Hemodynamic: Estimated right atrial pressure is 8 mm Hg.  Estimated right ventricular systolic pressure equals 37 mm  Hg, assuming right atrial pressure equals 8 mm Hg,  consistent with borderline pulmonary hypertension.  ------------------------------------------------------------------------  Conclusions:  1. Tethered mitral valve leaflets with normal opening.  Malcoaptation of leaflet tips.  Moderate mitral  regurgitation. Peak mitral valve gradient equals 4 mm Hg,  mean transmitral valve gradient equals 1 mm Hg.  2. Severely dilated left atrium.  LA volume index = 55  cc/m2.  3. Normal left ventricular internal dimensions and wall  thicknesses.  4. Severe global left ventricular systolic dysfunction.  5. Increased E/e'  is consistent with elevated left  ventricular filling pressure.  6. Right ventricular enlargement with decreased right  ventricular systolic function.  7. Normal tricuspid valve. Moderate-severe tricuspid  regurgitation.  8. Large left pleural effusion.  *** No previous Echo exam.    < end of copied text >      Stress Testing:    Cath:  < from: Cardiac Cath Lab - Adult (19 @ 18:00) >  CORONARY VESSELS: The coronary circulation is right dominant.  LM:   --  Ostial LM: There was a 20 % stenosis.  --  Distal left main: There was a 30 % stenosis.  LAD:   --  LAD: Normal.  CX:   --  Circumflex: Normal.  RCA:   --  RCA: Normal.  COMPLICATIONS: There were no complications.  DIAGNOSTIC RECOMMENDATIONS: The patient should continue with the present  medications.    < end of copied text >  < from: Cardiac Cath Lab - Adult (19 @ 18:00) >  HEMODYNAMIC TABLES  Pressures:  Baseline  Pressures:  - HR: 109  Pressures:  - Rhythm:  Pressures:  -- Aortic Pressure (S/D/M): 164/103/131  Pressures:  -- Pulmonary Artery (S/D/M): 59/34/43  Pressures:  -- Pulmonary Capillary Wedge: 36/34/33  Pressures:  -- Right Atrium (a/v/M): 15/19/15  Pressures:  -- Right Ventricle (s/edp): 58/14/--  O2 Sats:  Baseline  O2 Sats:  - HR: 109  O2 Sats:  - Rhythm:  O2 Sats:  -- AO: 14.4/87.4/17.12  O2 Sats:  -- PA: 14.4/44/8.62  Outputs:  Baseline  Outputs:  -- CALCULATIONS[age]  Outputs:  -- CALCULATIONS: Body Surface Area: 1.87  Outputs:  -- CALCULATIONS: Height in cm: 167.00  Outputs:  -- CALCULATIONS: Sex: Male  Outputs:  -- CALCULATIONS: Weight in k.20  Outputs:  -- OUTPUTS: Blood Oxygen Difference: 8.50  Outputs:  -- OUTPUTS: CO by Corey: 2.94  Outputs:  -- OUTPUTS: Corey cardiac index: 1.57    < end of copied text >      Imaging:    CXR: Personally reviewed    Labs: Personally reviewed                        14.4   19.16 )-----------( 254      ( 10 Apr 2019 09:23 )             42.9     04-10    125<L>  |  73<L>  |  53<H>  ----------------------------<  185<H>  4.1   |  33<H>  |  2.41<H>    Ca    9.1      10 Apr 2019 07:44    TPro  7.4  /  Alb  3.9  /  TBili  1.2  /  DBili  0.4<H>  /  AST  50<H>  /  ALT  343<H>  /  AlkPhos  116  04-10    PT/INR - ( 10 Apr 2019 07:44 )   PT: 14.7 sec;   INR: 1.28 ratio         PTT - ( 10 Apr 2019 07:44 )  PTT:70.8 sec      Serum Pro-Brain Natriuretic Peptide: 87776 pg/mL ( @ 18:17)      Hemoglobin A1C, Whole Blood: 6.6 % ( @ 18:29)  Hemoglobin A1C, Whole Blood: 6.7 % ( @ 17:44)    Thyroid Stimulating Hormone, Serum: 1.05 uIU/mL ( @ 09:24) Patient seen and evaluated at bedside    Chief Complaint:    HPI:  Pt evaluated prior to midnight on 4/3/19.    80 y/o M, some english but mostly gay/Polish speaking, c hx [place] and no other significant PMH who presents with a 10 day h/x of progressive abdominal distension and pain with progressive shortness of breath.     Pt's son (Dr. Zhang is a hospitalist @ [facility] in [facility]) at bedside provided collateral. The pt had been in his usual state of health until 10 days ago, when he began belching and first started to notice abdominal pain. Pt also had decreased PO intake and has decreased caliber of stools. He describes feeling constantly nauseous and has had ~3 episodes of non-bloody non-bilious emesis over the past week . He notes a burning abdominal pain that he attributes to heartburn which has not been relieved by Protonix. However about 5 days ago, he noticed progressive abdominal and lower extremity swelling within the last 2-3 days. He also noted progressive dyspnea on exertion and orthopnea over the past 10 days which has progressed to the point where he is unable to take even a few steps without sitting down. He also notes a non productive cough that has developed over the same period of time. He denies fevers, chills, sore throat, and chest pain. Pt was seen in recent months by his PCP and no abnormalities were noted. He states his labs were within normal ranges aside from a slightly elevated cholesterol and that IFOBT testing for colon cancer were negative, although the pt has never had a colonoscopy. Outpt records are currently unavailable.    Initial ED vitals: T 97.5F, , /110, RR 18 on RA. He was initially given 1L NS bolus, Zofran, and Reglan. Pt became tachycardic to the 140s and was noted to be in AFib with RVR on EKG. Pt was administered Lopressor 5mg IV and 25mg PO, which improved rate control to 90s. ED echo showed decreased EF and has was administered Lasix 40mg IV. Pt's most recent vitals: T 36.4C, HR 90, /84, RR 18, satO2 100% on 2.5L O2 nasal canula.    Seen at beside, the pt denies any headache, fever, chills, chest pain, diarrhea, constipation, hematuria, or dysuria. He states that he felt palpatations earlier and that he had felt his heart beating fast intermittently over the past couple of weeks. He states that his abdominal discomfort feels like a "fullness" and rates it at a 5/10 in severity. He denies any shortness of breath at rest. (2019 00:12)      PMHx:   No pertinent past medical history      PSHx:   No significant past surgical history      Allergies:  erythromycin (Unknown)      Home Meds:    Current Medications:   calcium carbonate    500 mG (Tums) Chewable 2 Tablet(s) Chew three times a day PRN  ergocalciferol 46026 Unit(s) Oral every week  famotidine    Tablet 20 milliGRAM(s) Oral daily  furosemide   Injectable 40 milliGRAM(s) IV Push daily  heparin  Infusion.  Unit(s)/Hr IV Continuous <Continuous>  heparin  Injectable 6500 Unit(s) IV Push every 6 hours PRN  heparin  Injectable 6500 Unit(s) IV Push every 6 hours PRN  heparin  Injectable 3000 Unit(s) IV Push every 6 hours PRN  hydrALAZINE 10 milliGRAM(s) Oral three times a day  metoprolol succinate ER 12.5 milliGRAM(s) Oral two times a day  multivitamin 1 Tablet(s) Oral daily  simethicone 80 milliGRAM(s) Chew three times a day PRN      FAMILY HISTORY:  FH: liver disease: unknown etiology, mother      Social History: Former   Smoking History: None  Alcohol Use: Past   Drug Use: none    Review of Systems:  REVIEW OF SYSTEMS:  CONSTITUTIONAL: + weakness  EYES/ENT: No visual changes;  No dysphagia  NECK: No pain or stiffness  RESPIRATORY: No cough, wheezing, hemoptysis; +SOB  CARDIOVASCULAR: No chest pain or palpitations; LE Edema, Orthopnea  GASTROINTESTINAL: No abdominal or epigastric pain. No nausea, vomiting, or hematemesis; No diarrhea or constipation. No melena or hematochezia.  BACK: No back pain  GENITOURINARY: No dysuria, frequency or hematuria  NEUROLOGICAL: No numbness or weakness  SKIN: No itching, burning, rashes, or lesions   All other review of systems is negative unless indicated above.    Physical Exam:  T(F): 97.3 (-10), Max: 97.8 (-)  HR: 67 (-10) (67 - 117)  BP: 107/77 (-10) (97/62 - 132/90)  RR: 18 (-10)  SpO2: 99% (04-10)  GENERAL: No acute distress, well-developed  HEAD:  Atraumatic, Normocephalic  ENT: EOMI, PERRLA, conjunctiva and sclera clear, Neck supple, No JVD, moist mucosa  CHEST/LUNG: Clear to auscultation bilaterally; No wheeze, decreased b/s  BACK: No spinal tenderness  HEART: Tachy, Irreg Irreg, +JVD, +HJR  ABDOMEN: Soft, Nontender, slightly palpable liver  EXTREMITIES:  No clubbing, cyanosis, +1 edma  PSYCH: Nl behavior, nl affect  NEUROLOGY: AAOx3, non-focal, cranial nerves intact  SKIN: Normal color, No rashes or lesions  LINES:    Cardiovascular Diagnostic Testing:    ECG: Personally reviewed:    Echo: Personally reviewed:  < from: Transthoracic Echocardiogram (19 @ 19:23) >  Dimensions:    Normal Values:  LA:     5.7    2.0 - 4.0 cm  Ao:     3.3    2.0 - 3.8 cm  SEPTUM: 0.9    0.6 - 1.2 cm  PWT:    0.9    0.6 - 1.1 cm  LVIDd:  4.8    3.0 - 5.6 cm  LVIDs:  4.3    1.8 - 4.0 cm  Derived variables:  LVMI: 79 g/m2  RWT: 0.37  Fractional short: 10 %  EF (Visual Estimate): 20-25 %  EF (Teicholtz): 23 %  Doppler Peak Velocity (m/sec): MV=1.0  ------------------------------------------------------------------------  Observations:  Mitral Valve: Tethered mitral valve leaflets with normal  opening. Malcoaptation of leaflet tips.  Moderate mitral  regurgitation. Peak mitral valve gradient equals 4 mm Hg,  mean transmitral valve gradient equals 1 mm Hg.  Aortic Valve/Aorta: Normal trileaflet aortic valve. Mild  aortic regurgitation.  Aortic Root: 3.3 cm.  LVOT diameter: 2 cm.  Left Atrium: Severely dilated left atrium.  LA volume index  =55 cc/m2.  Left Ventricle: Severe global left ventricular systolic  dysfunction. Normal left ventricular internal dimensions  and wall thicknesses. Increased E/e'  is consistent with  elevated left ventricular filling pressure.  Right Heart: Severe right atrial enlargement. Right  ventricular enlargement with decreased right ventricular  systolic function. Normal tricuspid valve. Moderate  tricuspid regurgitation. Pulmonic valve not well  visualized. Mild pulmonic regurgitation.  Pericardium/Pleura: Normal pericardium with no pericardial  effusion.  Large left pleural effusion.  Hemodynamic: Estimated right atrial pressure is 8 mm Hg.  Estimated right ventricular systolic pressure equals 37 mm  Hg, assuming right atrial pressure equals 8 mm Hg,  consistent with borderline pulmonary hypertension.  ------------------------------------------------------------------------  Conclusions:  1. Tethered mitral valve leaflets with normal opening.  Malcoaptation of leaflet tips.  Moderate mitral  regurgitation. Peak mitral valve gradient equals 4 mm Hg,  mean transmitral valve gradient equals 1 mm Hg.  2. Severely dilated left atrium.  LA volume index = 55  cc/m2.  3. Normal left ventricular internal dimensions and wall  thicknesses.  4. Severe global left ventricular systolic dysfunction.  5. Increased E/e'  is consistent with elevated left  ventricular filling pressure.  6. Right ventricular enlargement with decreased right  ventricular systolic function.  7. Normal tricuspid valve. Moderate-severe tricuspid  regurgitation.  8. Large left pleural effusion.  *** No previous Echo exam.    < end of copied text >      Stress Testing:    Cath:  < from: Cardiac Cath Lab - Adult (19 @ 18:00) >  CORONARY VESSELS: The coronary circulation is right dominant.  LM:   --  Ostial LM: There was a 20 % stenosis.  --  Distal left main: There was a 30 % stenosis.  LAD:   --  LAD: Normal.  CX:   --  Circumflex: Normal.  RCA:   --  RCA: Normal.  COMPLICATIONS: There were no complications.  DIAGNOSTIC RECOMMENDATIONS: The patient should continue with the present  medications.    < end of copied text >  < from: Cardiac Cath Lab - Adult (19 @ 18:00) >  HEMODYNAMIC TABLES  Pressures:  Baseline  Pressures:  - HR: 109  Pressures:  - Rhythm:  Pressures:  -- Aortic Pressure (S/D/M): 164/103/131  Pressures:  -- Pulmonary Artery (S/D/M): 59/34/43  Pressures:  -- Pulmonary Capillary Wedge: 36/34/33  Pressures:  -- Right Atrium (a/v/M): 15/19/15  Pressures:  -- Right Ventricle (s/edp): 58/14/--  O2 Sats:  Baseline  O2 Sats:  - HR: 109  O2 Sats:  - Rhythm:  O2 Sats:  -- AO: 14.4/87.4/17.12  O2 Sats:  -- PA: 14.4/44/8.62  Outputs:  Baseline  Outputs:  -- CALCULATIONS[age]  Outputs:  -- CALCULATIONS: Body Surface Area: 1.87  Outputs:  -- CALCULATIONS: Height in cm: 167.00  Outputs:  -- CALCULATIONS: Sex: Male  Outputs:  -- CALCULATIONS: Weight in k.20  Outputs:  -- OUTPUTS: Blood Oxygen Difference: 8.50  Outputs:  -- OUTPUTS: CO by Corey: 2.94  Outputs:  -- OUTPUTS: Corey cardiac index: 1.57    < end of copied text >      Imaging:    CXR: Personally reviewed    Labs: Personally reviewed                        14.4   19.16 )-----------( 254      ( 10 Apr 2019 09:23 )             42.9     04-10    125<L>  |  73<L>  |  53<H>  ----------------------------<  185<H>  4.1   |  33<H>  |  2.41<H>    Ca    9.1      10 Apr 2019 07:44    TPro  7.4  /  Alb  3.9  /  TBili  1.2  /  DBili  0.4<H>  /  AST  50<H>  /  ALT  343<H>  /  AlkPhos  116  04-10    PT/INR - ( 10 Apr 2019 07:44 )   PT: 14.7 sec;   INR: 1.28 ratio         PTT - ( 10 Apr 2019 07:44 )  PTT:70.8 sec      Serum Pro-Brain Natriuretic Peptide: 71707 pg/mL ( @ 18:17)      Hemoglobin A1C, Whole Blood: 6.6 % ( @ 18:29)  Hemoglobin A1C, Whole Blood: 6.7 % ( @ 17:44)    Thyroid Stimulating Hormone, Serum: 1.05 uIU/mL ( @ 09:24) Patient seen and evaluated at bedside    Chief Complaint:    HPI:  Pt evaluated prior to midnight on 4/3/19.    78 y/o M, some english but mostly gay/Korean speaking, c hx [place] and no other significant PMH who presents with a 10 day h/x of progressive abdominal distension and pain with progressive shortness of breath.     Pt's son (Dr. Zhang is a hospitalist @ [facility] in [facility]) at bedside provided collateral. The pt had been in his usual state of health until 10 days ago, when he began belching and first started to notice abdominal pain. Pt also had decreased PO intake and has decreased caliber of stools. He describes feeling constantly nauseous and has had ~3 episodes of non-bloody non-bilious emesis over the past week . He notes a burning abdominal pain that he attributes to heartburn which has not been relieved by Protonix. However about 5 days ago, he noticed progressive abdominal and lower extremity swelling within the last 2-3 days. He also noted progressive dyspnea on exertion and orthopnea over the past 10 days which has progressed to the point where he is unable to take even a few steps without sitting down. He also notes a non productive cough that has developed over the same period of time. He denies fevers, chills, sore throat, and chest pain. Pt was seen in recent months by his PCP and no abnormalities were noted. He states his labs were within normal ranges aside from a slightly elevated cholesterol and that IFOBT testing for colon cancer were negative, although the pt has never had a colonoscopy. Outpt records are currently unavailable.    Initial ED vitals: T 97.5F, , /110, RR 18 on RA. He was initially given 1L NS bolus, Zofran, and Reglan. Pt became tachycardic to the 140s and was noted to be in AFib with RVR on EKG. Pt was administered Lopressor 5mg IV and 25mg PO, which improved rate control to 90s. ED echo showed decreased EF and has was administered Lasix 40mg IV. Pt's most recent vitals: T 36.4C, HR 90, /84, RR 18, satO2 100% on 2.5L O2 nasal canula.    Seen at beside, the pt denies any headache, fever, chills, chest pain, diarrhea, constipation, hematuria, or dysuria. He states that he felt palpatations earlier and that he had felt his heart beating fast intermittently over the past couple of weeks. He states that his abdominal discomfort feels like a "fullness" and rates it at a 5/10 in severity. He denies any shortness of breath at rest. (2019 00:12)      PMHx:   No pertinent past medical history      PSHx:   No significant past surgical history      Allergies:  erythromycin (Unknown)      Home Meds:    Current Medications:   calcium carbonate    500 mG (Tums) Chewable 2 Tablet(s) Chew three times a day PRN  ergocalciferol 54651 Unit(s) Oral every week  famotidine    Tablet 20 milliGRAM(s) Oral daily  furosemide   Injectable 40 milliGRAM(s) IV Push daily  heparin  Infusion.  Unit(s)/Hr IV Continuous <Continuous>  heparin  Injectable 6500 Unit(s) IV Push every 6 hours PRN  heparin  Injectable 6500 Unit(s) IV Push every 6 hours PRN  heparin  Injectable 3000 Unit(s) IV Push every 6 hours PRN  hydrALAZINE 10 milliGRAM(s) Oral three times a day  metoprolol succinate ER 12.5 milliGRAM(s) Oral two times a day  multivitamin 1 Tablet(s) Oral daily  simethicone 80 milliGRAM(s) Chew three times a day PRN      FAMILY HISTORY:  FH: liver disease: unknown etiology, mother      Social History: Former   Smoking History: None  Alcohol Use: Past   Drug Use: none    Review of Systems:  REVIEW OF SYSTEMS:  CONSTITUTIONAL: + weakness  EYES/ENT: No visual changes;  No dysphagia  NECK: No pain or stiffness  RESPIRATORY: No cough, wheezing, hemoptysis; +SOB  CARDIOVASCULAR: No chest pain or palpitations; LE Edema, Orthopnea  GASTROINTESTINAL: No abdominal or epigastric pain. No nausea, vomiting, or hematemesis; No diarrhea or constipation. No melena or hematochezia.  BACK: No back pain  GENITOURINARY: No dysuria, frequency or hematuria  NEUROLOGICAL: No numbness or weakness  SKIN: No itching, burning, rashes, or lesions   All other review of systems is negative unless indicated above.    Physical Exam:  T(F): 97.3 (-10), Max: 97.8 (-)  HR: 67 (-10) (67 - 117)  BP: 107/77 (-10) (97/62 - 132/90)  RR: 18 (-10)  SpO2: 99% (04-10)  GENERAL: No acute distress, well-developed  HEAD:  Atraumatic, Normocephalic  ENT: EOMI, PERRLA, conjunctiva and sclera clear, Neck supple, No JVD, moist mucosa  CHEST/LUNG: Clear to auscultation bilaterally; No wheeze, decreased b/s  BACK: No spinal tenderness  HEART: Tachy, Irreg Irreg, minmal JVD, +HJR  ABDOMEN: Soft, Nontender, slightly palpable liver  EXTREMITIES:  No clubbing, cyanosis, +1 edma  PSYCH: Nl behavior, nl affect  NEUROLOGY: AAOx3, non-focal, cranial nerves intact  SKIN: Normal color, No rashes or lesions  LINES:    Cardiovascular Diagnostic Testing:    ECG: Personally reviewed:    Echo: Personally reviewed:  < from: Transthoracic Echocardiogram (19 @ 19:23) >  Dimensions:    Normal Values:  LA:     5.7    2.0 - 4.0 cm  Ao:     3.3    2.0 - 3.8 cm  SEPTUM: 0.9    0.6 - 1.2 cm  PWT:    0.9    0.6 - 1.1 cm  LVIDd:  4.8    3.0 - 5.6 cm  LVIDs:  4.3    1.8 - 4.0 cm  Derived variables:  LVMI: 79 g/m2  RWT: 0.37  Fractional short: 10 %  EF (Visual Estimate): 20-25 %  EF (Teicholtz): 23 %  Doppler Peak Velocity (m/sec): MV=1.0  ------------------------------------------------------------------------  Observations:  Mitral Valve: Tethered mitral valve leaflets with normal  opening. Malcoaptation of leaflet tips.  Moderate mitral  regurgitation. Peak mitral valve gradient equals 4 mm Hg,  mean transmitral valve gradient equals 1 mm Hg.  Aortic Valve/Aorta: Normal trileaflet aortic valve. Mild  aortic regurgitation.  Aortic Root: 3.3 cm.  LVOT diameter: 2 cm.  Left Atrium: Severely dilated left atrium.  LA volume index  =55 cc/m2.  Left Ventricle: Severe global left ventricular systolic  dysfunction. Normal left ventricular internal dimensions  and wall thicknesses. Increased E/e'  is consistent with  elevated left ventricular filling pressure.  Right Heart: Severe right atrial enlargement. Right  ventricular enlargement with decreased right ventricular  systolic function. Normal tricuspid valve. Moderate  tricuspid regurgitation. Pulmonic valve not well  visualized. Mild pulmonic regurgitation.  Pericardium/Pleura: Normal pericardium with no pericardial  effusion.  Large left pleural effusion.  Hemodynamic: Estimated right atrial pressure is 8 mm Hg.  Estimated right ventricular systolic pressure equals 37 mm  Hg, assuming right atrial pressure equals 8 mm Hg,  consistent with borderline pulmonary hypertension.  ------------------------------------------------------------------------  Conclusions:  1. Tethered mitral valve leaflets with normal opening.  Malcoaptation of leaflet tips.  Moderate mitral  regurgitation. Peak mitral valve gradient equals 4 mm Hg,  mean transmitral valve gradient equals 1 mm Hg.  2. Severely dilated left atrium.  LA volume index = 55  cc/m2.  3. Normal left ventricular internal dimensions and wall  thicknesses.  4. Severe global left ventricular systolic dysfunction.  5. Increased E/e'  is consistent with elevated left  ventricular filling pressure.  6. Right ventricular enlargement with decreased right  ventricular systolic function.  7. Normal tricuspid valve. Moderate-severe tricuspid  regurgitation.  8. Large left pleural effusion.  *** No previous Echo exam.    < end of copied text >      Stress Testing:    Cath:  < from: Cardiac Cath Lab - Adult (19 @ 18:00) >  CORONARY VESSELS: The coronary circulation is right dominant.  LM:   --  Ostial LM: There was a 20 % stenosis.  --  Distal left main: There was a 30 % stenosis.  LAD:   --  LAD: Normal.  CX:   --  Circumflex: Normal.  RCA:   --  RCA: Normal.  COMPLICATIONS: There were no complications.  DIAGNOSTIC RECOMMENDATIONS: The patient should continue with the present  medications.    < end of copied text >  < from: Cardiac Cath Lab - Adult (19 @ 18:00) >  HEMODYNAMIC TABLES  Pressures:  Baseline  Pressures:  - HR: 109  Pressures:  - Rhythm:  Pressures:  -- Aortic Pressure (S/D/M): 164/103/131  Pressures:  -- Pulmonary Artery (S/D/M): 59/34/43  Pressures:  -- Pulmonary Capillary Wedge: 36/34/33  Pressures:  -- Right Atrium (a/v/M): 15/19/15  Pressures:  -- Right Ventricle (s/edp): 58/14/--  O2 Sats:  Baseline  O2 Sats:  - HR: 109  O2 Sats:  - Rhythm:  O2 Sats:  -- AO: 14.4/87.4/17.12  O2 Sats:  -- PA: 14.4/44/8.62  Outputs:  Baseline  Outputs:  -- CALCULATIONS[age]  Outputs:  -- CALCULATIONS: Body Surface Area: 1.87  Outputs:  -- CALCULATIONS: Height in cm: 167.00  Outputs:  -- CALCULATIONS: Sex: Male  Outputs:  -- CALCULATIONS: Weight in k.20  Outputs:  -- OUTPUTS: Blood Oxygen Difference: 8.50  Outputs:  -- OUTPUTS: CO by Corey: 2.94  Outputs:  -- OUTPUTS: Corey cardiac index: 1.57    < end of copied text >      Imaging:    CXR: Personally reviewed    Labs: Personally reviewed                        14.4   19.16 )-----------( 254      ( 10 Apr 2019 09:23 )             42.9     04-10    125<L>  |  73<L>  |  53<H>  ----------------------------<  185<H>  4.1   |  33<H>  |  2.41<H>    Ca    9.1      10 Apr 2019 07:44    TPro  7.4  /  Alb  3.9  /  TBili  1.2  /  DBili  0.4<H>  /  AST  50<H>  /  ALT  343<H>  /  AlkPhos  116  04-10    PT/INR - ( 10 Apr 2019 07:44 )   PT: 14.7 sec;   INR: 1.28 ratio         PTT - ( 10 Apr 2019 07:44 )  PTT:70.8 sec      Serum Pro-Brain Natriuretic Peptide: 95751 pg/mL ( @ 18:17)      Hemoglobin A1C, Whole Blood: 6.6 % ( @ 18:29)  Hemoglobin A1C, Whole Blood: 6.7 % ( @ 17:44)    Thyroid Stimulating Hormone, Serum: 1.05 uIU/mL ( @ 09:24)

## 2019-04-10 NOTE — PROGRESS NOTE ADULT - PROBLEM SELECTOR PLAN 7
WBC 16 with only reported sx of non productive cough and vague abdominal sx  - possibly reactive, afebrile, VS stable  - CT chest/A/P without source of infection  - blood cultures with NGTD from 4/4  -Will get Hem consult needs BM biopsy.   -Repeat blood cultures from today.

## 2019-04-10 NOTE — PROGRESS NOTE ADULT - PROBLEM SELECTOR PLAN 5
- likely hypervolemic hyponatremia given fluid overloaded state  - FeNa demonstrating intrinsic renal disease (1.1%)- possibly ATN from   hypovolemia   - cont diuresis depend on blood pressure.   - Resolved

## 2019-04-10 NOTE — PROGRESS NOTE ADULT - PROBLEM SELECTOR PLAN 2
ECHO: LA size 5.7cm, severe global LV systolic dysfunction, right ventricular enlargement with decreased RV systolic fxn.  BNP on admission +50, 000  - Continue with Diuresis, lasix 40mg IV BID  - Daily weight, I& O.

## 2019-04-10 NOTE — PROGRESS NOTE ADULT - ATTENDING COMMENTS
seen and examined with NP. I agree with H & P, A & P.  c/w therapy for HF.  Will consider for DCCV when optimized

## 2019-04-10 NOTE — PROGRESS NOTE ADULT - SUBJECTIVE AND OBJECTIVE BOX
SUBJ:  Improved volume status with aggressive diuresis over several days but has worsening renal function in the setting suboptimal hemodynamics per RHC. Consulting advanced heart failure for further guidance.     MEDICATIONS  (STANDING):  ergocalciferol 78371 Unit(s) Oral every week  famotidine    Tablet 20 milliGRAM(s) Oral daily  furosemide Infusion 10 mG/Hr (5 mL/Hr) IV Continuous <Continuous>  heparin  Infusion.  Unit(s)/Hr (14 mL/Hr) IV Continuous <Continuous>  hydrALAZINE 10 milliGRAM(s) Oral three times a day  metoprolol succinate ER 12.5 milliGRAM(s) Oral two times a day  milrinone Infusion 0.25 MICROgram(s)/kG/Min (5.865 mL/Hr) IV Continuous <Continuous>  multivitamin 1 Tablet(s) Oral daily    MEDICATIONS  (PRN):  calcium carbonate    500 mG (Tums) Chewable 2 Tablet(s) Chew three times a day PRN Heartburn  heparin  Injectable 6500 Unit(s) IV Push every 6 hours PRN For aPTT less than 40  heparin  Injectable 6500 Unit(s) IV Push every 6 hours PRN For aPTT less than 40  heparin  Injectable 3000 Unit(s) IV Push every 6 hours PRN For aPTT between 40 - 57  simethicone 80 milliGRAM(s) Chew three times a day PRN Dyspepsia    Vital Signs Last 24 Hrs  T(C): 36.3 (10 Apr 2019 20:05), Max: 36.8 (10 Apr 2019 14:44)  T(F): 97.3 (10 Apr 2019 20:05), Max: 98.2 (10 Apr 2019 14:44)  HR: 79 (10 Apr 2019 20:47) (67 - 102)  BP: 117/79 (10 Apr 2019 20:47) (97/62 - 129/90)  RR: 18 (10 Apr 2019 20:47) (18 - 18)  SpO2: 97% (10 Apr 2019 20:47) (93% - 99%)    REVIEW OF SYSTEMS:  As per HPI, otherwise unremarkable.     PHYSICAL EXAM:  Appearance: ill-appearing  HEENT:  Normal oral mucosa  Lymphatic: No lymphadenopathy  Cardiovascular: Normal S1 S2, +JVD, 1/6 MAZIN  Respiratory: reduced breath sounds bilaterally   Psychiatry: A & O x 3  Gastrointestinal:  Soft, Non-tender  Skin: No rashes, No ecchymoses, No cyanosis	  Neurologic: Non-focal  Extremities: Normal range of motion, No clubbing, cyanosis. 1+ pitting edema BL LE   Vascular: Peripheral pulses palpable 2+ bilaterally    TELEMETRY: AF    LABS:   CBC Full  -  ( 10 Apr 2019 09:23 )  WBC Count : 19.16 K/uL  RBC Count : 5.11 M/uL  Hemoglobin : 14.4 g/dL  Hematocrit : 42.9 %  Platelet Count - Automated : 254 K/uL  Mean Cell Volume : 84.0 fl  Mean Cell Hemoglobin : 28.2 pg  Mean Cell Hemoglobin Concentration : 33.6 gm/dL    04-10    120<LL>  |  71<L>  |  58<H>  ----------------------------<  187<H>  4.4   |  30  |  2.41<H>    Ca    8.8      10 Apr 2019 18:04  Mg     2.6     04-10    TPro  7.4  /  Alb  3.9  /  TBili  1.2  /  DBili  0.4<H>  /  AST  50<H>  /  ALT  343<H>  /  AlkPhos  116  04-10    Cardiac Catheterization 2019  PROCEDURE:  --  Right heart catheterization.  --  Left coronary angiography.  --  Right coronary angiography.  --  Sonosite - Diagnostic.    CORONARY VESSELS: The coronary circulation is right dominant.  LM:   --  Ostial LM: There was a 20 % stenosis.  --  Distal left main: There was a 30 % stenosis.  LAD:   --  LAD: Normal.  CX:   --  Circumflex: Normal.  RCA:   --  RCA: Normal.    COMPLICATIONS: There were no complications.    DIAGNOSTIC RECOMMENDATIONS: The patient should continue with the present  medications.    HEMODYNAMIC TABLES  Pressures:  Baseline  Pressures:  - HR: 109  Pressures:  - Rhythm:  Pressures:  -- Aortic Pressure (S/D/M): 164/103/131  Pressures:  -- Pulmonary Artery (S/D/M): 59/34/43  Pressures:  -- Pulmonary Capillary Wedge: 36/34/33  Pressures:  -- Right Atrium (a/v/M): 15/19/15  Pressures:  -- Right Ventricle (s/edp): 58/14/--    Outputs:  Baseline  Outputs:  -- CALCULATIONS[age]  Outputs:  -- CALCULATIONS: Body Surface Area: 1.87  Outputs:  -- CALCULATIONS: Height in cm: 167.00  Outputs:  -- CALCULATIONS: Sex: Male  Outputs:  -- CALCULATIONS: Weight in k.20  Outputs:  -- OUTPUTS: Blood Oxygen Difference: 8.50  Outputs:  -- OUTPUTS: CO by Corey: 2.94  Outputs:  -- OUTPUTS: Corey cardiac index: 1.57  Outputs:  -- OUTPUTS: O2 consumption: 250.00  Outputs:  -- OUTPUTS: Vo2 Indexed: 133.50    Outputs:  -- RESISTANCES: Systemic vascular index (dsc): 5906.62  Outputs:  -- RESISTANCES: Systemic vascular index (Wood Units): 73.85  Outputs:  -- RESISTANCES: Systemic vascular resistance (dsc): 3154.21  Outputs:  -- RESISTANCES: Systemic vascular resistance (Wood Units): 39.44    Outputs:  -- RESISTANCES: Total pulmonary index (dsc): 2189.52  Outputs:  -- RESISTANCES: Total pulmonary index (Wood Units): 27.38  Outputs:  -- RESISTANCES: Total pulmonary resistance (dsc): 1169.23  Outputs:  -- RESISTANCES: Total pulmonary resistance (Wood Units): 14.62    Outputs:  -- RESISTANCES: Total vascular index (Wood Units): 83.40  Outputs:  -- RESISTANCES: Total vascular resistance (dsc): 3562.08  Outputs:  -- RESISTANCES: Total vascular resistance (Wood Units): 44.54  Outputs:  -- RESISTANCES: Total vascular resistance index (dsc): 6670.41    IMPRESSION AND PLAN:  79y.o. Male with no PMH now admitted with new HFrEF (TTE 2019 with LVEF 25%, biventricular dysfunction, moderate MR, moderate TR, mild TN) and AFIB with RVR, abdominal discomfort/nausea concerning for hepatic congestion in the setting of acute decompensated CHF (BNP>50K), now s/p LHC/RHC (non-obstructive CAD) with worsening renal function.     1. HFrEF  LVEF 25%  Decompensated biventricular dysfunction and valvular dysfunction (moderate MR) in the setting of atrial fibrillation.  s/p LHC with non-obstructive CAD  s/p RHC demonstrating RV and LV failure with volume overload; CO/CI 2.94/1.57  End-organ hypoperfusion (improving transaminitis, worsening renal function, unknown lactate)  Longstanding valvular dysfunction (MR) vs tachycardia induced CM, or a combination of both.    ·	Appreciate advanced heart failure consult: Start milrinone 0.25, furosemide IV 10 mg/hr, digoxin load 0.5 mg today with 0.25 mg in the morning.   ·	Low threshold for escalation of care.   ·	Continue hydralazine 10 mg PO TID  ·	Introduce Isosorbide dinitrate 10 mg po TID  ·	Discontinue metoprolol succinate with the initiation of milrinone.     2. AFIB   Dilated LA on TTE    ·	Cont Heparin GTT  ·	Appreciate EP consult; defer RICH/DCCV until further optimized.   ·	Starting digoxin for positive ionotropic affect.     3. HTN   Well controlled.     ·	Cont hydralazine 10 mg po TID/Isosorbide dinitrate 10 mg po TID    Zehra Shahid MD, MPH, ARVIN  Cardiovascular Specialist Attending  [facility]  C: [phone]  E: [email or web address]  (Cardiology nocturnist available every night 7 pm - 7 am; available week days for follow-up; general cardiology consult coverage weekend days)

## 2019-04-10 NOTE — PROGRESS NOTE ADULT - PROBLEM SELECTOR PLAN 1
-ECHO shows EF 23% with right and left ventricular dysfunction, elevated pulm pressures  -ACS shows stable troponins and EKG   -cardiology following   - Cards following, change Lasix 40 mg iv daily given low blood pressure and ELVA, spoke to CHF team regarding starting patient on Ionotropes, monitor Is and Os.   - - Continue with Metolazone.   -s/p cath today, non obstructive coronaries.   - Monitor on tele, monitor UOP, strict I+O and daily weight  - Net negative 2.8L on 4/5  -Discussed plan at length with patient and family.

## 2019-04-10 NOTE — PROGRESS NOTE ADULT - PROBLEM SELECTOR PLAN 1
Tele: AF HR    -S/P cardiac Cath, which revealed NCA, elevated pulmonary pressures  - ChadVasc Score= 3 (Age 2, HF 1)  - Currently on Heparin drip, with eventual transition to oral AC once labs stabilize  - On Toprol XL 12.5mg BID, can uptitrate as needed as BP tolerates

## 2019-04-10 NOTE — PROGRESS NOTE ADULT - SUBJECTIVE AND OBJECTIVE BOX
Patient is a 79y old  Male who presents with a chief complaint of dyspnea, abdominal swelling (06 Apr 2019 14:22)      SUBJECTIVE / OVERNIGHT EVENTS:  Patient states he is still short of breath and has Orthopnea.    Still with abdominal and flank distension.  No events over night.       T(C): 36.3 (04-10-19 @ 04:51), Max: 36.3 (04-10-19 @ 04:51)  HR: 99 (04-10-19 @ 12:00) (99 - 102)  BP: 97/62 (04-10-19 @ 12:00) (97/62 - 129/90)  RR: 18 (04-10-19 @ 12:00) (18 - 18)  SpO2: 99% (04-10-19 @ 12:00) (93% - 99%)      MEDICATIONS  (STANDING):  ergocalciferol 83466 Unit(s) Oral every week  famotidine    Tablet 20 milliGRAM(s) Oral daily  furosemide   Injectable 40 milliGRAM(s) IV Push daily  heparin  Infusion.  Unit(s)/Hr (14 mL/Hr) IV Continuous <Continuous>  hydrALAZINE 10 milliGRAM(s) Oral three times a day  metoprolol succinate ER 12.5 milliGRAM(s) Oral two times a day  multivitamin 1 Tablet(s) Oral daily    MEDICATIONS  (PRN):  calcium carbonate    500 mG (Tums) Chewable 2 Tablet(s) Chew three times a day PRN Heartburn  heparin  Injectable 6500 Unit(s) IV Push every 6 hours PRN For aPTT less than 40  heparin  Injectable 6500 Unit(s) IV Push every 6 hours PRN For aPTT less than 40  heparin  Injectable 3000 Unit(s) IV Push every 6 hours PRN For aPTT between 40 - 57  simethicone 80 milliGRAM(s) Chew three times a day PRN Dyspepsia    PHYSICAL EXAM:      GENERAL: NAD, well-developed  HEAD:  Atraumatic, Normocephalic  EYES: EOMI, conjunctiva and sclera clear  NECK: Supple, No JVD  CHEST/LUNG: crackles at bases, no wheeze  HEART: Irregular rate and rhythm; No murmurs, rubs, or gallops  ABDOMEN: Soft, Nontender, distended, Bowel sounds present, flank and abdominal edema  EXTREMITIES:  2+ Peripheral Pulses, No clubbing, cyanosis, or edema  cool extremities   PSYCH: AAOx3  NEUROLOGY: non-focal  SKIN: No rashes or lesions                                               14.4   19.16 )-----------( 254      ( 10 Apr 2019 09:23 )             42.9           LIVER FUNCTIONS - ( 10 Apr 2019 07:44 )  Alb: 3.9 g/dL / Pro: 7.4 g/dL / ALK PHOS: 116 U/L / ALT: 343 U/L / AST: 50 U/L / GGT: x           PT/INR - ( 10 Apr 2019 07:44 )   PT: 14.7 sec;   INR: 1.28 ratio         PTT - ( 10 Apr 2019 07:44 )  PTT:70.8 sec  125|73|53<185  4.1|33|2.41  9.1,--,--  04-10 @ 07:44  124|73|48<205  4.2|33|2.13  9.1,--,--  04-09 @ 17:53      RADIOLOGY & ADDITIONAL TESTS:    Imaging Personally Reviewed:    Consultant(s) Notes Reviewed:  cards     Care Discussed with Consultants/Other Providers:

## 2019-04-10 NOTE — CONSULT NOTE ADULT - ATTENDING COMMENTS
79yrs, no PMH. Previously active. No prodrome.   Presented April 3 with abdo swelling LE edema SOB. Dyspnea on exertion.   In Rapid AF with rate control. IV lasix diuresis.   TTE 4.4: LA 5.7, LVEDD 4.8, LVEF 20-25, Mod MR. Mild AI, Severe global dysfunciton. severe RA enlargement. decreased RV funtion and RV enlargement. Mod TR. RVSP 37.   Cath 4.8: non obstructive. RA 15, RV 58/14, PA 59/ 34 43, PCWP 33, PA sat 44, Corey 1.6, LVEDP   Current meds: Heparin, Lasix 60 IV bid to  40 IV, Toprol XL 12.5 bid, HDZN 10 tid.   Afib , 90/-120/,   I/O -116, -1.4  04-10  125<L>  |  73<L>  |  53<H>  ----------------------------<  185<H>  4.1   |  33<H>  |  2.41<H> (admission 1.6)  Ca    9.1      10 Apr 2019 07:44  TPro  7.4  /  Alb  3.9  /  TBili  1.2  /  DBili  0.4<H>  /  AST  50<H>  /  ALT  343<H>  /  AlkPhos  116  04-10                        14.4   19.16 )-----------( 254      ( 10 Apr 2019 09:23 )             42.9   NT proBNP 50,000  HsTrop 54-67.   79yrs with new onset HF. Worsening renal functon in the setting of diuresis and IV contrast from angriogram.  Suggest: 79yrs, no PMH. Previously active. No prodrome.   Presented April 3 with abdo swelling LE edema SOB. Dyspnea on exertion.   In Rapid AF with rate control. IV lasix diuresis.   TTE 4.4: LA 5.7, LVEDD 4.8, LVEF 20-25, Mod MR. Mild AI, Severe global dysfunciton. severe RA enlargement. decreased RV funtion and RV enlargement. Mod TR. RVSP 37.   Cath 4.8: non obstructive. RA 15, RV 58/14, PA 59/ 34 43, PCWP 33, PA sat 44, Corey 1.6, LVEDP   Current meds: Heparin, Lasix 60 IV bid to  40 IV, Toprol XL 12.5 bid, HDZN 10 tid.   Afib , 90/-120/,   JVP not well seen. decreased AE. quiet HS. Abdo distention. Min LE edema.   I/O -116, -1.4  04-10  125<L>  |  73<L>  |  53<H>  ----------------------------<  185<H>  4.1   |  33<H>  |  2.41<H> (admission 1.6)  Ca    9.1      10 Apr 2019 07:44  TPro  7.4  /  Alb  3.9  /  TBili  1.2  /  DBili  0.4<H>  /  AST  50<H>  /  ALT  343<H>  /  AlkPhos  116  04-10                        14.4   19.16 )-----------( 254      ( 10 Apr 2019 09:23 )             42.9   NT proBNP 50,000  HsTrop 54-67.   79yrs with new onset HF. Compromised hemodynamics and worsening renal functon in the setting of diuresis and IV contrast from angriogram.  Suggest:  Start milrinone 0.25, start lasix iV 10/hr. Digoxin load without maintenance. (0.5/.25)  close follow up K/Mg  Low threshold for move to PICU unstable.  Lon Trejo

## 2019-04-10 NOTE — PROGRESS NOTE ADULT - ASSESSMENT
Patient is a 79 year old male denying any past medical or surgical history and reports normal state of health prior  who now present  to [facility] with complaint of progressively worsening SOB over the last 2-3 weeks and noted to have increasing abdominal girth, pain, increased belching and n/v.  Seen in Ed was found to be  in AFIB w/ RVR 's was given IVF and lopresor IV and lopressor 25mg PO x 1, later became rate controlled to HR 90's as per documentation. Pt denies any past history of AFIB, however patient's son who is a physician recalls a past EKG done in Dr. Johnston office may have revealed AFIB. S/P cardiac Cath, which revealed NCA, EP consulted for further management of AFIB.  S/P cardiac Cath, which revealed NCA,

## 2019-04-10 NOTE — CONSULT NOTE ADULT - PROBLEM SELECTOR RECOMMENDATION 9
Pt with hemodynamics showing volume overload and pt still symptomatic.   - would start milrinone 0.25 mcg/kg/min  - lasix gtt at 10mg /hr  - keep K>4.0 and Mg>2  - strict I/Os  - Daily standing weights.

## 2019-04-10 NOTE — CONSULT NOTE ADULT - ASSESSMENT
79y.o. Male with no PMH now admitted with new HFrEF (TTE 04/04/2019 with LVEF 25%, biventricular dysfunction, moderate MR, moderate TR, mild VA) and AFIB with RVR, abdominal discomfort/nausea concerning for hepatic congestion in the setting of acute decompensated CHF (BNP>50K), now s/p LHC/RHC (non-obstructive CAD). 79y.o. Male with no PMH now admitted with new HFrEF (TTE 04/04/2019 with LVEF 25%, biventricular dysfunction, moderate MR, moderate TR, mild AR) and AFIB with RVR, abdominal discomfort/nausea concerning for hepatic congestion in the setting of acute decompensated CHF (BNP>50K), now s/p LHC/RHC (non-obstructive CAD).    Please call me at [phone]  for any further questions up till 5 pm. For after hours, please call the covering cardiology on call fellow at [phone] for any further assistance.

## 2019-04-11 DIAGNOSIS — I48.91 UNSPECIFIED ATRIAL FIBRILLATION: ICD-10-CM

## 2019-04-11 LAB
ANION GAP SERPL CALC-SCNC: 17 MMOL/L — SIGNIFICANT CHANGE UP (ref 5–17)
APTT BLD: 132.7 SEC — CRITICAL HIGH (ref 27.5–36.3)
APTT BLD: 48 SEC — HIGH (ref 27.5–36.3)
BUN SERPL-MCNC: 61 MG/DL — HIGH (ref 7–23)
CALCIUM SERPL-MCNC: 9 MG/DL — SIGNIFICANT CHANGE UP (ref 8.4–10.5)
CHLORIDE SERPL-SCNC: 70 MMOL/L — LOW (ref 96–108)
CO2 SERPL-SCNC: 34 MMOL/L — HIGH (ref 22–31)
CREAT SERPL-MCNC: 2.57 MG/DL — HIGH (ref 0.5–1.3)
GLUCOSE SERPL-MCNC: 139 MG/DL — HIGH (ref 70–99)
HCT VFR BLD CALC: 39.8 % — SIGNIFICANT CHANGE UP (ref 39–50)
HGB BLD-MCNC: 13.5 G/DL — SIGNIFICANT CHANGE UP (ref 13–17)
MAGNESIUM SERPL-MCNC: 2.4 MG/DL — SIGNIFICANT CHANGE UP (ref 1.6–2.6)
MCHC RBC-ENTMCNC: 27.8 PG — SIGNIFICANT CHANGE UP (ref 27–34)
MCHC RBC-ENTMCNC: 33.9 GM/DL — SIGNIFICANT CHANGE UP (ref 32–36)
MCV RBC AUTO: 81.9 FL — SIGNIFICANT CHANGE UP (ref 80–100)
PLATELET # BLD AUTO: 259 K/UL — SIGNIFICANT CHANGE UP (ref 150–400)
POTASSIUM SERPL-MCNC: 3.9 MMOL/L — SIGNIFICANT CHANGE UP (ref 3.5–5.3)
POTASSIUM SERPL-SCNC: 3.9 MMOL/L — SIGNIFICANT CHANGE UP (ref 3.5–5.3)
RBC # BLD: 4.86 M/UL — SIGNIFICANT CHANGE UP (ref 4.2–5.8)
RBC # FLD: 13.1 % — SIGNIFICANT CHANGE UP (ref 10.3–14.5)
SODIUM SERPL-SCNC: 121 MMOL/L — LOW (ref 135–145)
WBC # BLD: 15.79 K/UL — HIGH (ref 3.8–10.5)
WBC # FLD AUTO: 15.79 K/UL — HIGH (ref 3.8–10.5)

## 2019-04-11 PROCEDURE — 99233 SBSQ HOSP IP/OBS HIGH 50: CPT

## 2019-04-11 PROCEDURE — 99222 1ST HOSP IP/OBS MODERATE 55: CPT | Mod: GC

## 2019-04-11 RX ORDER — HYDRALAZINE HCL 50 MG
10 TABLET ORAL EVERY 8 HOURS
Qty: 0 | Refills: 0 | Status: DISCONTINUED | OUTPATIENT
Start: 2019-04-11 | End: 2019-04-11

## 2019-04-11 RX ORDER — POTASSIUM CHLORIDE 20 MEQ
40 PACKET (EA) ORAL ONCE
Qty: 0 | Refills: 0 | Status: COMPLETED | OUTPATIENT
Start: 2019-04-11 | End: 2019-04-11

## 2019-04-11 RX ORDER — HYDRALAZINE HCL 50 MG
25 TABLET ORAL EVERY 8 HOURS
Qty: 0 | Refills: 0 | Status: DISCONTINUED | OUTPATIENT
Start: 2019-04-11 | End: 2019-04-12

## 2019-04-11 RX ORDER — HYDRALAZINE HCL 50 MG
25 TABLET ORAL EVERY 8 HOURS
Qty: 0 | Refills: 0 | Status: DISCONTINUED | OUTPATIENT
Start: 2019-04-11 | End: 2019-04-11

## 2019-04-11 RX ORDER — TOLVAPTAN 15 MG/1
15 TABLET ORAL ONCE
Qty: 0 | Refills: 0 | Status: COMPLETED | OUTPATIENT
Start: 2019-04-11 | End: 2019-04-11

## 2019-04-11 RX ADMIN — HEPARIN SODIUM 0 UNIT(S)/HR: 5000 INJECTION INTRAVENOUS; SUBCUTANEOUS at 17:02

## 2019-04-11 RX ADMIN — TOLVAPTAN 15 MILLIGRAM(S): 15 TABLET ORAL at 11:32

## 2019-04-11 RX ADMIN — ERGOCALCIFEROL 50000 UNIT(S): 1.25 CAPSULE ORAL at 11:32

## 2019-04-11 RX ADMIN — Medication 10 MILLIGRAM(S): at 13:25

## 2019-04-11 RX ADMIN — Medication 1 TABLET(S): at 11:33

## 2019-04-11 RX ADMIN — Medication 10 MILLIGRAM(S): at 05:39

## 2019-04-11 RX ADMIN — SIMETHICONE 80 MILLIGRAM(S): 80 TABLET, CHEWABLE ORAL at 11:32

## 2019-04-11 RX ADMIN — Medication 25 MILLIGRAM(S): at 21:33

## 2019-04-11 RX ADMIN — FAMOTIDINE 20 MILLIGRAM(S): 10 INJECTION INTRAVENOUS at 11:32

## 2019-04-11 RX ADMIN — HEPARIN SODIUM 1100 UNIT(S)/HR: 5000 INJECTION INTRAVENOUS; SUBCUTANEOUS at 10:12

## 2019-04-11 RX ADMIN — HEPARIN SODIUM 3000 UNIT(S): 5000 INJECTION INTRAVENOUS; SUBCUTANEOUS at 10:16

## 2019-04-11 RX ADMIN — HEPARIN SODIUM 800 UNIT(S)/HR: 5000 INJECTION INTRAVENOUS; SUBCUTANEOUS at 18:03

## 2019-04-11 RX ADMIN — Medication 40 MILLIEQUIVALENT(S): at 09:04

## 2019-04-11 NOTE — PROGRESS NOTE ADULT - ASSESSMENT
Patient is a 79 year old male denying any past medical or surgical history and reports normal state of health prior  who now present  to [facility] with complaint of progressively worsening SOB over the last 2-3 weeks and noted to have increasing abdominal girth, pain, increased belching and n/v.  Seen in Ed was found to be  in AFIB w/ RVR 's was given IVF and lopresor IV and lopressor 25mg PO x 1, later became rate controlled to HR 90's as per documentation. Pt denies any past history of AFIB, however patient's son who is a physician recalls a past EKG done in Dr. Johnston office may have revealed AFIB. S/P cardiac Cath, which revealed NCA, EP consulted for further management of AFIB.

## 2019-04-11 NOTE — PROGRESS NOTE ADULT - PROBLEM SELECTOR PLAN 5
- likely hypervolemic hyponatremia given fluid overloaded state.   - Samsca x 1 dose as per renal.   - FeNa demonstrating intrinsic renal disease (1.1%)- possibly ATN from   hypovolemia   - Monitor Na levels.

## 2019-04-11 NOTE — PROGRESS NOTE ADULT - PROBLEM SELECTOR PLAN 1
Tele: AF rate controlled HR    -S/P cardiac Cath, which revealed NCA, elevated pulmonary pressures  - ChadVasc Score= 3 (Age 2, HF 1)  - Currently on Heparin drip, with eventual transition to oral AC once labs stabilize  - Eventual RICH/Cardioversion once medically stable

## 2019-04-11 NOTE — PROGRESS NOTE ADULT - SUBJECTIVE AND OBJECTIVE BOX
24H hour events: Patient resting in chair with daughter at bedside. Admits to improvement in appetite and SOB.     MEDICATIONS:  furosemide Infusion 10 mG/Hr IV Continuous <Continuous>  heparin  Infusion.  Unit(s)/Hr IV Continuous <Continuous>  heparin  Injectable 6500 Unit(s) IV Push every 6 hours PRN  heparin  Injectable 6500 Unit(s) IV Push every 6 hours PRN  heparin  Injectable 3000 Unit(s) IV Push every 6 hours PRN  hydrALAZINE 10 milliGRAM(s) Oral three times a day  milrinone Infusion 0.25 MICROgram(s)/kG/Min IV Continuous <Continuous>  tolvaptan 15 milliGRAM(s) Oral once    calcium carbonate    500 mG (Tums) Chewable 2 Tablet(s) Chew three times a day PRN  famotidine    Tablet 20 milliGRAM(s) Oral daily  simethicone 80 milliGRAM(s) Chew three times a day PRN    ergocalciferol 88913 Unit(s) Oral every week  multivitamin 1 Tablet(s) Oral daily    REVIEW OF SYSTEMS:  Complete 10point ROS negative.    PHYSICAL EXAM:  T(C): 36.4 (04-11-19 @ 09:34), Max: 36.8 (04-10-19 @ 14:44)  HR: 89 (04-11-19 @ 09:34) (67 - 101)  BP: 133/75 (04-11-19 @ 09:34) (97/62 - 137/83)  RR: 18 (04-11-19 @ 09:34) (18 - 18)  SpO2: 100% (04-11-19 @ 09:34) (97% - 100%)    10 Apr 2019 07:01  -  11 Apr 2019 07:00  --------------------------------------------------------  IN: 902.8 mL / OUT: 2400 mL / NET: -1497.2 mL    11 Apr 2019 07:01  -  11 Apr 2019 10:54  --------------------------------------------------------  IN: 150 mL / OUT: 0 mL / NET: 150 mL    Appearance: Normal	  Cardiovascular: Normal S1 S2, irregular   Respiratory: Lungs diminished BL bases 	  Psychiatry: A & O x 3, Mood & affect appropriate  Gastrointestinal:  Soft, Non-tender, + BS	  Extremities: Normal range of motion, No clubbing, cyanosis. Trace BL/LE edema.   Vascular: Peripheral pulses palpable 2+ bilaterally      LABS:	 	    CBC Full  -  ( 11 Apr 2019 08:20 )  WBC Count : 15.79 K/uL  Hemoglobin : 13.5 g/dL  Hematocrit : 39.8 %  Platelet Count - Automated : 259 K/uL  Mean Cell Volume : 81.9 fl  Mean Cell Hemoglobin : 27.8 pg  Mean Cell Hemoglobin Concentration : 33.9 gm/dL  Auto Neutrophil # : x  Auto Lymphocyte # : x  Auto Monocyte # : x  Auto Eosinophil # : x  Auto Basophil # : x  Auto Neutrophil % : x  Auto Lymphocyte % : x  Auto Monocyte % : x  Auto Eosinophil % : x  Auto Basophil % : x    04-11    121<L>  |  70<L>  |  61<H>  ----------------------------<  139<H>  3.9   |  34<H>  |  2.57<H>  04-10    120<LL>  |  71<L>  |  58<H>  ----------------------------<  187<H>  4.4   |  30  |  2.41<H>    Ca    9.0      11 Apr 2019 05:30  Ca    8.8      10 Apr 2019 18:04  Mg     2.4     04-11  Mg     2.6     04-10    TPro  7.4  /  Alb  3.9  /  TBili  1.2  /  DBili  0.4<H>  /  AST  50<H>  /  ALT  343<H>  /  AlkPhos  116  04-10  TPro  7.2  /  Alb  3.9  /  TBili  1.3<H>  /  DBili  0.3<H>  /  AST  76<H>  /  ALT  436<H>  /  AlkPhos  118  04-09    TELEMETRY: 	  Afib  with PVC's

## 2019-04-11 NOTE — PROGRESS NOTE ADULT - PROBLEM SELECTOR PLAN 1
- c/w Milrinone 0.25 and Lasix 10mg/hr  - Keep K >4 and Mg >2  - Strict I/Os  - Daily standing weights  -

## 2019-04-11 NOTE — CONSULT NOTE ADULT - SUBJECTIVE AND OBJECTIVE BOX
HPI: 78 yo M with no significant medical history p/w progressive abdominal distension and SOB admitted for newly diagnosed acute systolic heart failure and Afib with RVR, found to have leukocytosis with WBC ranging from 15k to 19k with neutrophilic predominance, no apparent infection, for which hematology is consulted.    No records or blood work available prior to 2019 for comparison. Pt reports this is the first time he was told that he had a higher than normal WBC count. Denies any fevers, chills, night sweats, weight loss. No enlarged lymph nodes, no rashes. No dysuria or increased urinary frequency. No Reported having abdominal pain and decrease po intake and ~3 episodes of non-bloody and non-bilious emesis. He noted progressive abdominal and lower extremity swelling with progressive HEAD and orthopnea. Pt was seen in recent months by his PCP and no abnormalities were noted. He states his labs were within normal ranges aside from a slightly elevated cholesterol and that IFOBT testing for colon cancer were negative, although the pt has never had a colonoscopy.     For work up of leukocytosis, he has had RVP, blood cultures, and CT C/A/P which were all negative for infection. On CBC with Diff, has neurophilic predominance, no report of immature cells or blasts, Hgb and platelets are normal.     PAST MEDICAL & SURGICAL HISTORY:  No pertinent past medical history  No significant past surgical history    Allergies    erythromycin (Unknown)    Intolerances    MEDICATIONS  (STANDING):  ergocalciferol 05121 Unit(s) Oral every week  famotidine    Tablet 20 milliGRAM(s) Oral daily  furosemide Infusion 10 mG/Hr (5 mL/Hr) IV Continuous <Continuous>  heparin  Infusion.  Unit(s)/Hr (14 mL/Hr) IV Continuous <Continuous>  hydrALAZINE 10 milliGRAM(s) Oral three times a day  milrinone Infusion 0.25 MICROgram(s)/kG/Min (5.865 mL/Hr) IV Continuous <Continuous>  multivitamin 1 Tablet(s) Oral daily    MEDICATIONS  (PRN):  calcium carbonate    500 mG (Tums) Chewable 2 Tablet(s) Chew three times a day PRN Heartburn  heparin  Injectable 6500 Unit(s) IV Push every 6 hours PRN For aPTT less than 40  heparin  Injectable 6500 Unit(s) IV Push every 6 hours PRN For aPTT less than 40  heparin  Injectable 3000 Unit(s) IV Push every 6 hours PRN For aPTT between 40 - 57  simethicone 80 milliGRAM(s) Chew three times a day PRN Dyspepsia    FAMILY HISTORY:  FH: liver disease: unknown etiology, mother    SOCIAL HISTORY: No EtOH, no tobacco    REVIEW OF SYSTEMS: see HPI  All other review of systems is negative unless indicated above    VITALS:   T(F): 97.5 (04-11-19 @ 09:34), Max: 98.3 (04-11-19 @ 04:25)  HR: 89 (04-11-19 @ 13:24)  BP: 124/80 (04-11-19 @ 13:24)  RR: 18 (04-11-19 @ 09:34)  SpO2: 99% (04-11-19 @ 12:12)  Wt(kg): --    PHYSICAL EXAM:  GENERAL: resting in bed comfortably  EYES: EOMI, conjunctiva and sclera clearNECK: supple  RESP: CTAB  HEART: irregularly, irregular , S1/S2  ABDOMEN: +BS, soft, distended, NT  EXTREMITIES: trace LE edema  NEUROLOGIC: no focal deficits, AAO x 3  LYMPH: No peripheral lymphadenopathy appreciated    LABS:                         13.5   15.79 )-----------( 259      ( 11 Apr 2019 08:20 )             39.8       04-11    121<L>  |  70<L>  |  61<H>  ----------------------------<  139<H>  3.9   |  34<H>  |  2.57<H>    Ca    9.0      11 Apr 2019 05:30  Mg     2.4     04-11    TPro  7.4  /  Alb  3.9  /  TBili  1.2  /  DBili  0.4<H>  /  AST  50<H>  /  ALT  343<H>  /  AlkPhos  116  04-10    Magnesium, Serum: 2.4 mg/dL (04-11 @ 05:30)  Magnesium, Serum: 2.6 mg/dL (04-10 @ 18:04)    PT/INR - ( 10 Apr 2019 07:44 )   PT: 14.7 sec;   INR: 1.28 ratio   PTT - ( 11 Apr 2019 09:14 )  PTT:48.0 sec    IMAGING:

## 2019-04-11 NOTE — PROGRESS NOTE ADULT - ASSESSMENT
79y.o. Male with no PMH now admitted with new HFrEF (TTE 04/04/2019 with LVEF 25%, biventricular dysfunction, moderate MR, moderate TR, mild MT) and AFIB with RVR, abdominal discomfort/nausea concerning for hepatic congestion in the setting of acute decompensated CHF (BNP>50K), now s/p LHC/RHC (non-obstructive CAD).    Please call me at [phone]  for any further questions up till 5 pm. For after hours, please call the covering cardiology on call fellow at [phone] for any further assistance.

## 2019-04-11 NOTE — CONSULT NOTE ADULT - ATTENDING COMMENTS
Patient seen and agree with Dr. Almendarez's note. Briefly, patient is 80 yo M with no significant medical history p/w progressive abdominal distension and SOB admitted for newly diagnosed acute systolic heart failure and Afib with RVR, and found to have leukocytosis with WBC ranging from 15k to 19k with neutrophilic predominance, no apparent infection, for which Hematology is consulted.    1. Leukocytosis: infectious work up negative, ? reactive vs lymphoproliferative disorder   - he has had RVP, blood cultures, and CT C/A/P which were all negative for infection.   - On CBC with Differential, has neutrophilic predominance, no report of immature cells or blasts, and Hgb and platelets are normal.   - will review peripheral blood smear  - send peripheral blood flow cytometry (in a dark green top tube)  - send BCR/ABL test to R/O CML    d/w primary team

## 2019-04-11 NOTE — PROGRESS NOTE ADULT - SUBJECTIVE AND OBJECTIVE BOX
Patient is a 79y old  Male who presents with a chief complaint of dyspnea, abdominal swelling (06 Apr 2019 14:22)      SUBJECTIVE / OVERNIGHT EVENTS:  Patient states he is still short of breath and has Orthopnea.    Still with abdominal and flank distension.  No events over night.       T(C): 36.4 (04-11-19 @ 09:34), Max: 36.8 (04-11-19 @ 04:25)  HR: 87 (04-11-19 @ 12:12) (86 - 98)  BP: 115/69 (04-11-19 @ 12:12) (115/69 - 136/84)  RR: 18 (04-11-19 @ 09:34) (18 - 18)  SpO2: 99% (04-11-19 @ 12:12) (98% - 100%)      MEDICATIONS  (STANDING):  ergocalciferol 15723 Unit(s) Oral every week  famotidine    Tablet 20 milliGRAM(s) Oral daily  furosemide Infusion 10 mG/Hr (5 mL/Hr) IV Continuous <Continuous>  heparin  Infusion.  Unit(s)/Hr (14 mL/Hr) IV Continuous <Continuous>  hydrALAZINE 10 milliGRAM(s) Oral three times a day  milrinone Infusion 0.25 MICROgram(s)/kG/Min (5.865 mL/Hr) IV Continuous <Continuous>  multivitamin 1 Tablet(s) Oral daily    MEDICATIONS  (PRN):  calcium carbonate    500 mG (Tums) Chewable 2 Tablet(s) Chew three times a day PRN Heartburn  heparin  Injectable 6500 Unit(s) IV Push every 6 hours PRN For aPTT less than 40  heparin  Injectable 6500 Unit(s) IV Push every 6 hours PRN For aPTT less than 40  heparin  Injectable 3000 Unit(s) IV Push every 6 hours PRN For aPTT between 40 - 57  simethicone 80 milliGRAM(s) Chew three times a day PRN Dyspepsia    PHYSICAL EXAM:      GENERAL: NAD, well-developed  HEAD:  Atraumatic, Normocephalic  EYES: EOMI, conjunctiva and sclera clear  NECK: Supple, No JVD  CHEST/LUNG: crackles at bases, no wheeze  HEART: Irregular rate and rhythm; No murmurs, rubs, or gallops  ABDOMEN: Soft, Nontender, distended, Bowel sounds present, flank and abdominal edema  EXTREMITIES:  2+ Peripheral Pulses, No clubbing, cyanosis, or edema  cool extremities   PSYCH: AAOx3  NEUROLOGY: non-focal  SKIN: No rashes or lesions                                      13.5   15.79 )-----------( 259      ( 11 Apr 2019 08:20 )             39.8           LIVER FUNCTIONS - ( 10 Apr 2019 07:44 )  Alb: 3.9 g/dL / Pro: 7.4 g/dL / ALK PHOS: 116 U/L / ALT: 343 U/L / AST: 50 U/L / GGT: x           PT/INR - ( 10 Apr 2019 07:44 )   PT: 14.7 sec;   INR: 1.28 ratio         PTT - ( 11 Apr 2019 09:14 )  PTT:48.0 sec  121|70|61<139  3.9|34|2.57  9.0,2.4,--  04-11 @ 05:30  120|71|58<187  4.4|30|2.41  8.8,2.6,--  04-10 @ 18:04    RADIOLOGY & ADDITIONAL TESTS:    Imaging Personally Reviewed:    Consultant(s) Notes Reviewed:  cards     Care Discussed with Consultants/Other Providers:

## 2019-04-11 NOTE — PROGRESS NOTE ADULT - PROBLEM SELECTOR PLAN 7
WBC 15 possibly reactive, afebrile, VS stable  - CT chest/A/P without source of infection  - blood cultures with NGTD from 4/4  -Will get Hem consult needs BM biopsy.   -Repeat blood cultures from today.

## 2019-04-11 NOTE — CHART NOTE - NSCHARTNOTEFT_GEN_A_CORE
Received call from RN APTT  132.7   currently Heparin drip is at 11 _ml/hr  therefore will hold Heparin drip for 1 hour and restart Heparin Drip at  8 ml/hr   repeat PT/APTT in 6 hours. No signs of active bleeding noted.   Kari Slater DNP- C  [phone]

## 2019-04-11 NOTE — PROGRESS NOTE ADULT - SUBJECTIVE AND OBJECTIVE BOX
NEPHROLOGY-[facility] [phone]        Patient seen and examined         MEDICATIONS  (STANDING):  ergocalciferol 03432 Unit(s) Oral every week  famotidine    Tablet 20 milliGRAM(s) Oral daily  furosemide Infusion 10 mG/Hr (5 mL/Hr) IV Continuous <Continuous>  heparin  Infusion.  Unit(s)/Hr (14 mL/Hr) IV Continuous <Continuous>  hydrALAZINE 10 milliGRAM(s) Oral three times a day  milrinone Infusion 0.25 MICROgram(s)/kG/Min (5.865 mL/Hr) IV Continuous <Continuous>  multivitamin 1 Tablet(s) Oral daily      VITAL:  T(C): , Max: 36.8 (04-10-19 @ 14:44)  T(F): , Max: 98.3 (04-11-19 @ 04:25)  HR: 87 (04-11-19 @ 12:12)  BP: 115/69 (04-11-19 @ 12:12)  BP(mean): --  RR: 18 (04-11-19 @ 09:34)  SpO2: 99% (04-11-19 @ 12:12)  Wt(kg): --    I and O's:    04-10 @ 07:01  -  04-11 @ 07:00  --------------------------------------------------------  IN: 902.8 mL / OUT: 2400 mL / NET: -1497.2 mL    04-11 @ 07:01  -  04-11 @ 12:32  --------------------------------------------------------  IN: 310 mL / OUT: 600 mL / NET: -290 mL          PHYSICAL EXAM:    Constitutional: NAD  Neck:  No JVD  Respiratory: CTAB/L  Cardiovascular: S1 and S2  Gastrointestinal: BS+, soft, NT/ND  Extremities: No peripheral edema  Neurological: A/O x 3, no focal deficits  Psychiatric: Normal mood, normal affect  : No Aguirre  Skin: No rashes  Access: Not applicable    LABS:                        13.5   15.79 )-----------( 259      ( 11 Apr 2019 08:20 )             39.8     04-11    121<L>  |  70<L>  |  61<H>  ----------------------------<  139<H>  3.9   |  34<H>  |  2.57<H>    Ca    9.0      11 Apr 2019 05:30  Mg     2.4     04-11    TPro  7.4  /  Alb  3.9  /  TBili  1.2  /  DBili  0.4<H>  /  AST  50<H>  /  ALT  343<H>  /  AlkPhos  116  04-10          Urine Studies:          RADIOLOGY & ADDITIONAL STUDIES:

## 2019-04-11 NOTE — CONSULT NOTE ADULT - ASSESSMENT
78 yo M with no significant medical history p/w progressive abdominal distension and SOB admitted for newly diagnosed acute systolic heart failure and Afib with RVR, found to have leukocytosis with WBC ranging from 15k to 19k with neutrophilic predominance, no apparent infection, for which hematology is consulted.    1. Leukocytosis: infectious work up negative, ? reactive vs lymphoproliferative disorder   - he has had RVP, blood cultures, and CT C/A/P which were all negative for infection.   - On CBC with Diff, has neurophilic predominance, no report of immature cells or blasts, Hgb and platelets are normal.   - will review peripheral blood smear  - send peripheral blood flow cytometry (in a dark green top tube)  - send BCR/ABL    d/w primary team    James Almendarez, PGY-5  Hematology-Oncology Fellow  Pager: [phone] ([facility]), [phone] ([facility])  (After 5 pm or on weekends please page the on-call fellow)

## 2019-04-11 NOTE — PROGRESS NOTE ADULT - PROBLEM SELECTOR PLAN 3
-continue to trend BUN/ creatinine   BUN 61/ creat 2.57 -continue to trend BUN/ creatinine   BUN 61/ creat 2.57  -On Samsca for hyponatremia Na 121

## 2019-04-11 NOTE — PROGRESS NOTE ADULT - PROBLEM SELECTOR PLAN 2
ELVA from low perfusion and diuretics, continue to monitor on lasix and Milrinone drip for now.   Monitor renal function.

## 2019-04-11 NOTE — CHART NOTE - NSCHARTNOTEFT_GEN_A_CORE
MEDICINE NP     ERICKSON GREENBERG    Patient is a 79y old  Male who presents with a chief complaint of dyspnea, abdominal swelling (10 Apr 2019 18:35)       Event Summary:   Notified by RN patient with 5 beats of WCT  Patient seen at bedside , denies c/o CP, palpitation or SOB,  /75, HR 89 RR 18   continue  Primacor gtt at 0.25 uq/kg/min and Lasix gtt at 10 mg /hr  Potassium = 3.9 supplemented  with potassium 40 meq x 1 to keep K > 4.0       Vital Signs Last 24 Hrs  T(C): 36.4 (11 Apr 2019 09:34), Max: 36.8 (10 Apr 2019 14:44)  T(F): 97.5 (11 Apr 2019 09:34), Max: 98.3 (11 Apr 2019 04:25)  HR: 89 (11 Apr 2019 09:34) (67 - 101)  BP: 133/75 (11 Apr 2019 09:34) (97/62 - 137/83)  BP(mean): --  RR: 18 (11 Apr 2019 09:34) (18 - 18)  SpO2: 100% (11 Apr 2019 09:34) (97% - 100%)    Will. continue to monitor   Kari Slater DNP- C  [id number]

## 2019-04-11 NOTE — PROGRESS NOTE ADULT - SUBJECTIVE AND OBJECTIVE BOX
Interval History:No acute events overnight noted. Pt states he feels better. SOB improved. 5 beats WCT this am. Received tolvaptan this am.     Medications:  calcium carbonate    500 mG (Tums) Chewable 2 Tablet(s) Chew three times a day PRN  ergocalciferol 98909 Unit(s) Oral every week  famotidine    Tablet 20 milliGRAM(s) Oral daily  furosemide Infusion 10 mG/Hr IV Continuous <Continuous>  heparin  Infusion.  Unit(s)/Hr IV Continuous <Continuous>  heparin  Injectable 6500 Unit(s) IV Push every 6 hours PRN  heparin  Injectable 6500 Unit(s) IV Push every 6 hours PRN  heparin  Injectable 3000 Unit(s) IV Push every 6 hours PRN  hydrALAZINE 10 milliGRAM(s) Oral three times a day  milrinone Infusion 0.25 MICROgram(s)/kG/Min IV Continuous <Continuous>  multivitamin 1 Tablet(s) Oral daily  simethicone 80 milliGRAM(s) Chew three times a day PRN    Vitals:  T(C): 36.4 (04-11-19 @ 09:34), Max: 36.8 (04-10-19 @ 14:44)  HR: 89 (04-11-19 @ 09:34) (67 - 101)  BP: 133/75 (04-11-19 @ 09:34) (97/62 - 137/83)  BP(mean): --  ABP: --  ABP(mean): --  RR: 18 (04-11-19 @ 09:34) (18 - 18)  SpO2: 100% (04-11-19 @ 09:34) (97% - 100%)    Daily     Daily         I&O's Summary    10 Apr 2019 07:01  -  11 Apr 2019 07:00  --------------------------------------------------------  IN: 902.8 mL / OUT: 2400 mL / NET: -1497.2 mL    11 Apr 2019 07:01  -  11 Apr 2019 11:55  --------------------------------------------------------  IN: 150 mL / OUT: 0 mL / NET: 150 mL        Physical Exam:  Appearance: No Acute Distress  HEENT: minimal JVD  Cardiovascular: Irreg Irreg, S1S2  Respiratory: Clear to auscultation bilaterally  Gastrointestinal: Soft, distended	  Skin: No cyanosis	  Neurologic: Non-focal  Extremities: Trace edema.   Psychiatry: A & O x 3, Mood & affect appropriate    Labs:                        13.5   15.79 )-----------( 259      ( 11 Apr 2019 08:20 )             39.8     04-11    121<L>  |  70<L>  |  61<H>  ----------------------------<  139<H>  3.9   |  34<H>  |  2.57<H>    Ca    9.0      11 Apr 2019 05:30  Mg     2.4     04-11    TPro  7.4  /  Alb  3.9  /  TBili  1.2  /  DBili  0.4<H>  /  AST  50<H>  /  ALT  343<H>  /  AlkPhos  116  04-10    PT/INR - ( 10 Apr 2019 07:44 )   PT: 14.7 sec;   INR: 1.28 ratio         PTT - ( 11 Apr 2019 09:14 )  PTT:48.0 sec        TELEMETRY:    [ ] Echocardiogram:

## 2019-04-11 NOTE — PROGRESS NOTE ADULT - PROBLEM SELECTOR PLAN 3
- LA severely dilated on TTE  - Rate controlled 70-80s  - Heparin gtt for AC given renal   function  -Digoxin for rate control.   - TSH wnl

## 2019-04-11 NOTE — PROGRESS NOTE ADULT - PROBLEM SELECTOR PLAN 1
-ECHO shows EF 23% with right and left ventricular dysfunction, elevated pulm pressures  -ACS shows stable troponins and EKG   -CHF team following.   - CHF team following, started patient on Lasix and Milrinone drip.   - - Continue with Metolazone.   -s/p cath today, non obstructive coronaries.   - Monitor on tele, monitor UOP, strict I+O and daily weight  -Discussed plan at length with patient and family.

## 2019-04-11 NOTE — PROGRESS NOTE ADULT - ASSESSMENT
The patient is a belinda 79-year-old gentleman, past medical history of diabetes, hypertension presents with decompensated heart failure along with rapid atrial fibrillation.  Renal failure is new and likely has cardiorenal syndrome.   Hypervolemic hyponatremia  Vtach   Hepatitis     1.  Cardiology:   Milrinone gtt and lasix gtt.  Pt feels better.  ? CCU xfer   2.  Renal:  Samsca 15mg po x 1 for the hyponatremia  Avoid excessive liquids without calories as that will worsen his serum sodium.  3.  :  No evidence of post obstruction   4.  GI-Trend LFTs

## 2019-04-11 NOTE — PROGRESS NOTE ADULT - PROBLEM SELECTOR PLAN 2
ECHO: LA size 5.7cm, severe global LV systolic dysfunction, right ventricular enlargement with decreased RV systolic fxn.  BNP on admission +50, 000  - On IV Milrinone/ IV Lasix   - Daily weight, I& O.

## 2019-04-11 NOTE — PROGRESS NOTE ADULT - ATTENDING COMMENTS
no events overnight.   pans scanned for elevated WBC unremarkable  meds; lasix 10/hr, heparin, milrinone .25, HDZN 10 tid, Toprol 12.5 bid  held. received one dose tolvptan today.   I/O: -1.5  120/-130/, Af ,   04-11  121<L>  |  70<L>  |  61<H>  ----------------------------<  139<H>  3.9   |  34<H>  |  2.57<H> (2.4)     Ca    9.0      11 Apr 2019 05:30  Mg     2.4     04-11  TPro  7.4  /  Alb  3.9  /  TBili  1.2  /  DBili  0.4<H>  /  AST  50<H>  /  ALT  343<H>  /  AlkPhos  116  04-10                        13.5   15.79 )-----------( 259      ( 11 Apr 2019 08:20 )             39.8 no events overnight.   pans scanned for elevated WBC unremarkable  meds; lasix 10/hr, heparin, milrinone .25, HDZN 10 tid, Toprol 12.5 bid  held. received one dose tolvaptan today.   I/O: -1.5  120/-130/, Af ,   04-11  121<L>  |  70<L>  |  61<H>  ----------------------------<  139<H>  3.9   |  34<H>  |  2.57<H> (2.4)     Ca    9.0      11 Apr 2019 05:30  Mg     2.4     04-11  TPro  7.4  /  Alb  3.9  /  TBili  1.2  /  DBili  0.4<H>  /  AST  50<H>  /  ALT  343<H>  /  AlkPhos  116  04-10                        13.5   15.79 )-----------( 259      ( 11 Apr 2019 08:20 )             39.8  symptomatically better.  renal function remains impaired. still hyponatremic.  remains with brittle heart failure but normotensive.  Suggest:  initiate and advance vasodilator.   continue this dose milrinone   continue diuresis, tolvaptan again tomorrow.  check digoxin level tomorrow.   Lon Trjeo

## 2019-04-12 LAB
4/8 RATIO: 0.85 RATIO — LOW (ref 0.86–4.14)
ABS CD8: 331 /UL — SIGNIFICANT CHANGE UP (ref 90–775)
ANION GAP SERPL CALC-SCNC: 14 MMOL/L — SIGNIFICANT CHANGE UP (ref 5–17)
ANION GAP SERPL CALC-SCNC: 15 MMOL/L — SIGNIFICANT CHANGE UP (ref 5–17)
ANION GAP SERPL CALC-SCNC: 16 MMOL/L — SIGNIFICANT CHANGE UP (ref 5–17)
ANISOCYTOSIS BLD QL: SLIGHT — SIGNIFICANT CHANGE UP
APTT BLD: 186.5 SEC — CRITICAL HIGH (ref 27.5–36.3)
APTT BLD: 35.3 SEC — SIGNIFICANT CHANGE UP (ref 27.5–36.3)
APTT BLD: 57.6 SEC — HIGH (ref 27.5–36.3)
APTT BLD: 67.5 SEC — HIGH (ref 27.5–36.3)
BASOPHILS # BLD AUTO: 0.03 K/UL — SIGNIFICANT CHANGE UP (ref 0–0.2)
BASOPHILS NFR BLD AUTO: 0.2 % — SIGNIFICANT CHANGE UP (ref 0–2)
BUN SERPL-MCNC: 66 MG/DL — HIGH (ref 7–23)
BUN SERPL-MCNC: 69 MG/DL — HIGH (ref 7–23)
BUN SERPL-MCNC: 85 MG/DL — HIGH (ref 7–23)
CALCIUM SERPL-MCNC: 9.1 MG/DL — SIGNIFICANT CHANGE UP (ref 8.4–10.5)
CALCIUM SERPL-MCNC: 9.1 MG/DL — SIGNIFICANT CHANGE UP (ref 8.4–10.5)
CALCIUM SERPL-MCNC: 9.3 MG/DL — SIGNIFICANT CHANGE UP (ref 8.4–10.5)
CD3 BLASTS SPEC-ACNC: 60 % — SIGNIFICANT CHANGE UP (ref 58–84)
CD3 BLASTS SPEC-ACNC: 609 /UL — SIGNIFICANT CHANGE UP (ref 396–2024)
CD4 %: 28 % — LOW (ref 30–56)
CD8 %: 33 % — SIGNIFICANT CHANGE UP (ref 11–43)
CHLORIDE SERPL-SCNC: 70 MMOL/L — LOW (ref 96–108)
CHLORIDE SERPL-SCNC: 71 MMOL/L — LOW (ref 96–108)
CHLORIDE SERPL-SCNC: 72 MMOL/L — LOW (ref 96–108)
CO2 SERPL-SCNC: 35 MMOL/L — HIGH (ref 22–31)
CO2 SERPL-SCNC: 35 MMOL/L — HIGH (ref 22–31)
CO2 SERPL-SCNC: 37 MMOL/L — HIGH (ref 22–31)
CREAT SERPL-MCNC: 2.36 MG/DL — HIGH (ref 0.5–1.3)
CREAT SERPL-MCNC: 2.46 MG/DL — HIGH (ref 0.5–1.3)
CREAT SERPL-MCNC: 2.52 MG/DL — HIGH (ref 0.5–1.3)
DIGOXIN SERPL-MCNC: 0.7 NG/ML — LOW (ref 0.8–2)
EOSINOPHIL # BLD AUTO: 0.02 K/UL — SIGNIFICANT CHANGE UP (ref 0–0.5)
EOSINOPHIL NFR BLD AUTO: 0.1 % — SIGNIFICANT CHANGE UP (ref 0–6)
GLUCOSE SERPL-MCNC: 146 MG/DL — HIGH (ref 70–99)
GLUCOSE SERPL-MCNC: 183 MG/DL — HIGH (ref 70–99)
GLUCOSE SERPL-MCNC: 203 MG/DL — HIGH (ref 70–99)
HCT VFR BLD CALC: 39.7 % — SIGNIFICANT CHANGE UP (ref 39–50)
HGB BLD-MCNC: 13.5 G/DL — SIGNIFICANT CHANGE UP (ref 13–17)
IMM GRANULOCYTES NFR BLD AUTO: 1 % — SIGNIFICANT CHANGE UP (ref 0–1.5)
INR BLD: 1.17 RATIO — HIGH (ref 0.88–1.16)
LYMPHOCYTES # BLD AUTO: 1.32 K/UL — SIGNIFICANT CHANGE UP (ref 1–3.3)
LYMPHOCYTES # BLD AUTO: 7.6 % — LOW (ref 13–44)
MACROCYTES BLD QL: SLIGHT — SIGNIFICANT CHANGE UP
MAGNESIUM SERPL-MCNC: 2.3 MG/DL — SIGNIFICANT CHANGE UP (ref 1.6–2.6)
MANUAL SMEAR VERIFICATION: SIGNIFICANT CHANGE UP
MCHC RBC-ENTMCNC: 27.8 PG — SIGNIFICANT CHANGE UP (ref 27–34)
MCHC RBC-ENTMCNC: 34 GM/DL — SIGNIFICANT CHANGE UP (ref 32–36)
MCV RBC AUTO: 81.7 FL — SIGNIFICANT CHANGE UP (ref 80–100)
MONOCYTES # BLD AUTO: 1.6 K/UL — HIGH (ref 0–0.9)
MONOCYTES NFR BLD AUTO: 9.3 % — SIGNIFICANT CHANGE UP (ref 2–14)
NEUTROPHILS # BLD AUTO: 14.14 K/UL — HIGH (ref 1.8–7.4)
NEUTROPHILS NFR BLD AUTO: 81.8 % — HIGH (ref 43–77)
PLAT MORPH BLD: NORMAL — SIGNIFICANT CHANGE UP
PLATELET # BLD AUTO: 261 K/UL — SIGNIFICANT CHANGE UP (ref 150–400)
POLYCHROMASIA BLD QL SMEAR: SLIGHT — SIGNIFICANT CHANGE UP
POTASSIUM SERPL-MCNC: 3.3 MMOL/L — LOW (ref 3.5–5.3)
POTASSIUM SERPL-MCNC: 3.3 MMOL/L — LOW (ref 3.5–5.3)
POTASSIUM SERPL-MCNC: 4.1 MMOL/L — SIGNIFICANT CHANGE UP (ref 3.5–5.3)
POTASSIUM SERPL-SCNC: 3.3 MMOL/L — LOW (ref 3.5–5.3)
POTASSIUM SERPL-SCNC: 3.3 MMOL/L — LOW (ref 3.5–5.3)
POTASSIUM SERPL-SCNC: 4.1 MMOL/L — SIGNIFICANT CHANGE UP (ref 3.5–5.3)
PROTHROM AB SERPL-ACNC: 13.4 SEC — HIGH (ref 10–12.9)
RBC # BLD: 4.86 M/UL — SIGNIFICANT CHANGE UP (ref 4.2–5.8)
RBC # FLD: 12.9 % — SIGNIFICANT CHANGE UP (ref 10.3–14.5)
RBC BLD AUTO: ABNORMAL
SODIUM SERPL-SCNC: 121 MMOL/L — LOW (ref 135–145)
SODIUM SERPL-SCNC: 121 MMOL/L — LOW (ref 135–145)
SODIUM SERPL-SCNC: 123 MMOL/L — LOW (ref 135–145)
T-CELL CD4 SUBSET PNL BLD: 282 /UL — LOW (ref 325–1251)
WBC # BLD: 17.29 K/UL — HIGH (ref 3.8–10.5)
WBC # FLD AUTO: 17.29 K/UL — HIGH (ref 3.8–10.5)

## 2019-04-12 PROCEDURE — 99232 SBSQ HOSP IP/OBS MODERATE 35: CPT

## 2019-04-12 PROCEDURE — 99233 SBSQ HOSP IP/OBS HIGH 50: CPT | Mod: GC

## 2019-04-12 PROCEDURE — 99233 SBSQ HOSP IP/OBS HIGH 50: CPT

## 2019-04-12 RX ORDER — POTASSIUM CHLORIDE 20 MEQ
40 PACKET (EA) ORAL ONCE
Qty: 0 | Refills: 0 | Status: COMPLETED | OUTPATIENT
Start: 2019-04-12 | End: 2019-04-12

## 2019-04-12 RX ORDER — TOLVAPTAN 15 MG/1
30 TABLET ORAL ONCE
Qty: 0 | Refills: 0 | Status: COMPLETED | OUTPATIENT
Start: 2019-04-12 | End: 2019-04-12

## 2019-04-12 RX ORDER — HYDRALAZINE HCL 50 MG
37.5 TABLET ORAL EVERY 8 HOURS
Qty: 0 | Refills: 0 | Status: DISCONTINUED | OUTPATIENT
Start: 2019-04-12 | End: 2019-04-12

## 2019-04-12 RX ORDER — POTASSIUM CHLORIDE 20 MEQ
40 PACKET (EA) ORAL ONCE
Qty: 0 | Refills: 0 | Status: DISCONTINUED | OUTPATIENT
Start: 2019-04-12 | End: 2019-04-12

## 2019-04-12 RX ORDER — POLYETHYLENE GLYCOL 3350 17 G/17G
17 POWDER, FOR SOLUTION ORAL DAILY
Qty: 0 | Refills: 0 | Status: DISCONTINUED | OUTPATIENT
Start: 2019-04-12 | End: 2019-04-25

## 2019-04-12 RX ORDER — HYDRALAZINE HCL 50 MG
35 TABLET ORAL EVERY 8 HOURS
Qty: 0 | Refills: 0 | Status: DISCONTINUED | OUTPATIENT
Start: 2019-04-12 | End: 2019-04-13

## 2019-04-12 RX ORDER — DOCUSATE SODIUM 100 MG
100 CAPSULE ORAL THREE TIMES A DAY
Qty: 0 | Refills: 0 | Status: DISCONTINUED | OUTPATIENT
Start: 2019-04-12 | End: 2019-04-14

## 2019-04-12 RX ADMIN — Medication 25 MILLIGRAM(S): at 04:56

## 2019-04-12 RX ADMIN — HEPARIN SODIUM 800 UNIT(S)/HR: 5000 INJECTION INTRAVENOUS; SUBCUTANEOUS at 01:24

## 2019-04-12 RX ADMIN — FAMOTIDINE 20 MILLIGRAM(S): 10 INJECTION INTRAVENOUS at 11:29

## 2019-04-12 RX ADMIN — Medication 1 TABLET(S): at 11:29

## 2019-04-12 RX ADMIN — HEPARIN SODIUM 800 UNIT(S)/HR: 5000 INJECTION INTRAVENOUS; SUBCUTANEOUS at 23:20

## 2019-04-12 RX ADMIN — Medication 25 MILLIGRAM(S): at 13:14

## 2019-04-12 RX ADMIN — HEPARIN SODIUM 0 UNIT(S)/HR: 5000 INJECTION INTRAVENOUS; SUBCUTANEOUS at 15:09

## 2019-04-12 RX ADMIN — HEPARIN SODIUM 1100 UNIT(S)/HR: 5000 INJECTION INTRAVENOUS; SUBCUTANEOUS at 07:55

## 2019-04-12 RX ADMIN — Medication 100 MILLIGRAM(S): at 13:59

## 2019-04-12 RX ADMIN — HEPARIN SODIUM 800 UNIT(S)/HR: 5000 INJECTION INTRAVENOUS; SUBCUTANEOUS at 16:13

## 2019-04-12 RX ADMIN — Medication 40 MILLIEQUIVALENT(S): at 09:07

## 2019-04-12 RX ADMIN — TOLVAPTAN 30 MILLIGRAM(S): 15 TABLET ORAL at 11:29

## 2019-04-12 RX ADMIN — Medication 5 MG/HR: at 18:02

## 2019-04-12 RX ADMIN — HEPARIN SODIUM 6500 UNIT(S): 5000 INJECTION INTRAVENOUS; SUBCUTANEOUS at 07:56

## 2019-04-12 RX ADMIN — Medication 40 MILLIEQUIVALENT(S): at 15:10

## 2019-04-12 RX ADMIN — Medication 100 MILLIGRAM(S): at 21:06

## 2019-04-12 RX ADMIN — POLYETHYLENE GLYCOL 3350 17 GRAM(S): 17 POWDER, FOR SOLUTION ORAL at 13:59

## 2019-04-12 RX ADMIN — Medication 35 MILLIGRAM(S): at 21:06

## 2019-04-12 NOTE — PROGRESS NOTE ADULT - PROBLEM SELECTOR PLAN 5
- likely hypervolemic hyponatremia given fluid overloaded state.   - Samsca and Urea as per renal.   - FeNa demonstrating intrinsic renal disease (1.1%)- possibly ATN from   hypovolemia   - Monitor Na levels.

## 2019-04-12 NOTE — PROGRESS NOTE ADULT - ASSESSMENT
79y.o. Male with no PMH now admitted with new HFrEF (TTE 04/04/2019 with LVEF 25%, biventricular dysfunction, moderate MR, moderate TR, mild SD) and AFIB with RVR, abdominal discomfort/nausea concerning for hepatic congestion in the setting of acute decompensated CHF (BNP>50K), now s/p LHC/RHC (non-obstructive CAD). RHC:  RA 15, RV 58/14, PA 59/34/43, Wedge 33 PA sat 44%, CI 1.57. Pt now received tolvaptan x2 and initiated on milrinone and lasix gtt with improvment in symptoms and uop.       Please call me at [phone]  for any further questions up till 5 pm. For after hours, please call the covering cardiology [place] call fellow at [phone] for any further assistance.

## 2019-04-12 NOTE — PROGRESS NOTE ADULT - ASSESSMENT
The patient is a belinda 79-year-old gentleman, past medical history of diabetes, hypertension presents with decompensated heart failure along with rapid atrial fibrillation.  Renal failure is new and likely has cardiorenal syndrome.   Hypervolemic hyponatremia  Vtach   Hepatitis   Hypokalemia     1.  Cardiology:   Milrinone gtt and lasix gtt.  Pt feels better.     2.  Renal:  Samsca 30mg po x 1 for the hyponatremia and also give Urea-Na+ 30grams x 1; Avoid excessive liquids without calories as that will worsen his serum sodium. KDUR 40meq po x 1   3.  :  No evidence of post obstruction   4.  GI-Trend LFTs and awaiting labs The patient is a belinda 79-year-old gentleman, past medical history of diabetes, hypertension presents with decompensated heart failure along with rapid atrial fibrillation.  Renal failure is new and likely has cardiorenal syndrome.   Hypervolemic hyponatremia  Vtach-  Again today   Hepatitis   Hypokalemia     1.  Cardiology:   Milrinone gtt and lasix gtt.  Pt feels better.   More Vtach.  Move pt to CCU?  2.  Renal:  Samsca 30mg po x 1 for the hyponatremia and also give Urea-Na+ 30grams x 1; Avoid excessive liquids without calories as that will worsen his serum sodium. KDUR 40meq po x 1   3.  :  No evidence of post obstruction   4.  GI-Trend LFTs and awaiting labs

## 2019-04-12 NOTE — PROGRESS NOTE ADULT - PROBLEM SELECTOR PLAN 10
I have reviewed and confirmed nurses' notes... DVT: Heparin drip  DIet: Veg DASH TLC  Ambulate with assistance for now

## 2019-04-12 NOTE — PROGRESS NOTE ADULT - ATTENDING COMMENTS
TTE reviewed with Dr Abdul. Significant biatrial enlargment with severe LV dysfunction  without LV dilation. RV enargement and dysfunction. No increase in wall thickness.   meds: lasix 10/hr, milrinone 0.25, tolvaptan 30, HDZN 25 tid.   I/O: -3L  Afebrile, Afib , 2 episodes of WCT with low K, 100/-120/  04-12  123<L>  |  71<L>  |  69<H>  ----------------------------<  146<H>  3.3<L>   |  37<H>  |  2.46<H> (admit 1.6, peak yesterday 2.5)  Ca    9.1      12 Apr 2019 11:24  Mg     2.3     04-12                        13.5   17.29 )-----------( 261      ( 12 Apr 2019 09:29 )             39.7

## 2019-04-12 NOTE — CHART NOTE - NSCHARTNOTEFT_GEN_A_CORE
Nutrition Follow Up Note  Patient seen for: nutrition follow up on 6TOW    Chart reviewed, events noted. Pt is 79yoM with no PMH per chart, presenting with dyspnea, LE and abdomen swelling for several days PTA as well as nausea/vomiting and decreased po intake. On admission, pt with hypervolemic hyponatremia, leukocytosis, new Afib with RVR, transaminitis, ELVA, ascites, coagulopathy, and dyspepsia.   Per nephrology, renal dysfunction is new, likely cardiorenal syndrome. Per hepatology, low suspicion for liver disease, LFTs downtrending, "likely 2/2 congestive/ischemic hepatopathy in setting of severe heart disease." Pt s/p R/L cath indicative of non-obstructive CAD, cath was indicative of NCA," EP following, plan for RICH/cardioversion when medically stable Pt with new acute systolic HF (EF 35%). Pt noted with elevated HgbA1c 6.6% on admission as well, borderline vs new DM? Pt noted with leukocytosis and now infectious salt, heme/onc consulted. Pt s/p abdominal sonogram, indicates no ascites s/p diuretics. Pt continues with low sodium (improving) and low potassium per chart.    Source: RN, chart (pt sleeping at time of RD visit)    Diet : Low Sodium + Lacto-Vegetarian + 2 Nepro    Patient reports: n/a, per RN no acute GI distress. Last BM noted 4/10.    PO intake : Pt noted with variable po intake per chart, confirmed by RN. At time of initial RD assessment, pt/family at bedside had reported pt with decreased po intake while in-patient.   Despite previously declining oral nutrition supplementation, pt did accept 2 Nepro today. Would recommend continuing with Nepro supplementation for now, despite pt noted with elevated BUN/creat continuing, low potassium noted and given variable po intake it is important to maximize the fluids the pt is drinking. Nepro provides more calories than glucerna and suplena would restrict the potassium content unnecessarily. Would consider glucerna/suplena supplementation as indicated upon follow up, will continue to monitor lytes.  Nutrition education deferred at this time, as feasible would recommend education on pre-DM/DM nutrition recommendations and HF nutrition recommendations.     Source for PO intake: chart, RN     Enteral /Parenteral Nutrition: n/a      Daily Weight in k.5 (-12, standing), Pt noted with ~1.6 kG (2.0%) wt loss since  and fluid shifts noted. Continue to monitor.  Weight in k.4 (, standing), Weight in k.8 (04-10, standing), Weight in k.1 (, standing)    Pertinent Medications: MEDICATIONS  (STANDING):  docusate sodium 100 milliGRAM(s) Oral three times a day  ergocalciferol 08740 Unit(s) Oral every week  famotidine    Tablet 20 milliGRAM(s) Oral daily  furosemide Infusion 10 mG/Hr (5 mL/Hr) IV Continuous <Continuous>  heparin  Infusion.  Unit(s)/Hr (14 mL/Hr) IV Continuous <Continuous>  hydrALAZINE 25 milliGRAM(s) Oral every 8 hours  milrinone Infusion 0.25 MICROgram(s)/kG/Min (5.865 mL/Hr) IV Continuous <Continuous>  multivitamin 1 Tablet(s) Oral daily  polyethylene glycol 3350 17 Gram(s) Oral daily  potassium chloride    Tablet ER 40 milliEquivalent(s) Oral once    MEDICATIONS  (PRN):  calcium carbonate    500 mG (Tums) Chewable 2 Tablet(s) Chew three times a day PRN Heartburn  heparin  Injectable 6500 Unit(s) IV Push every 6 hours PRN For aPTT less than 40  heparin  Injectable 6500 Unit(s) IV Push every 6 hours PRN For aPTT less than 40  heparin  Injectable 3000 Unit(s) IV Push every 6 hours PRN For aPTT between 40 - 57  simethicone 80 milliGRAM(s) Chew three times a day PRN Dyspepsia    Pertinent Labs:  @ 11:24: Na 123<L>, BUN 69<H>, Cr 2.46<H>, <H>, K+ 3.3<L>, Phos --, Mg 2.3, Alk Phos --, ALT/SGPT --, AST/SGOT --, HbA1c --   @ 07:06: Na 121<L>, BUN 66<H>, Cr 2.52<H>, <H>, K+ 3.3<L>, Phos --, Mg --, Alk Phos --, ALT/SGPT --, AST/SGOT --, HbA1c --    Finger Sticks:      Skin per nursing documentation: no pressure injuries noted  Edema: 2+ edema to b/l legs noted    Estimated Needs:   [ ] no change since previous assessment  [x] recalculated: using pt IBW of 142 lbs/64.5kG  5892-8379 kcal (25-30 kcal/kG)  64-77 g pro/day (1.0-1.2 g/kG pro) in setting of ELVA    Previous Nutrition Diagnosis: Inadequate protein-energy intake  Nutrition Diagnosis is: ongoing, po intake continues to vary, being addressed with oral nutrition supplementation and liberalized diet, micronutrient supplementation    New Nutrition Diagnosis: n/a  Related to:    As evidenced by:      Interventions:     Recommend  1) Continue low sodium + lacto-vegetarian diet per pt preference. Continue to obtain/provide food preferences as feasible. Monitor for adequate po intake.  2) Provide Nepro-2 per day (Provides additional 850kcal, 38grams protein per day), continue to monitor for appropriateness of supplement. Monitor lytes, renal indiced.  3) Continue micronutrient supplementation as indicated  4) Provide/review nutrition education as feasible    Monitoring and Evaluation:     Continue to monitor Nutritional intake, Tolerance to diet prescription, weights, labs, skin integrity    RD remains available upon request and will follow up per protocol. Kari Spears RD, CDN Pager: [phone]

## 2019-04-12 NOTE — PROGRESS NOTE ADULT - ASSESSMENT
79 year old male denying any past medical or surgical history and reports normal state of health prior  who now present  to [facility] with complaint of progressively worsening SOB over the last 2-3 weeks and noted to have increasing abdominal girth, pain, increased belching and n/v.  Seen in Ed was found to be  in AFIB w/ RVR 's was given IVF and lopresor IV and lopressor 25mg PO x 1, later became rate controlled to HR 90's as per documentation. Pt denies any past history of AFIB, however patient's son who is a physician recalls a past EKG done in Dr. Johnston office may have revealed AFIB. S/P cardiac Cath, which revealed NCA, EP consulted for further management of AFIB.

## 2019-04-12 NOTE — PROGRESS NOTE ADULT - PROBLEM SELECTOR PLAN 1
Tele: AF rate controlled HR 90's   -S/P cardiac Cath, which revealed NCA, elevated pulmonary pressures  - ChadVasc Score= 3 (Age 2, HF 1)  - Currently on Heparin drip, with eventual transition to oral AC once labs stabilize  - Eventual RICH/Cardioversion once medically stable

## 2019-04-12 NOTE — PROGRESS NOTE ADULT - PROBLEM SELECTOR PLAN 1
-ECHO shows EF 23% with right and left ventricular dysfunction, elevated pulm pressures  - Continue with Lasix and Midodrine drip as per CHF team.   - - Continue with Metolazone.   - Cath show non obstructive coronaries.   - Monitor on tele, monitor UOP, strict I+O and daily weight  -Discussed plan at length with patient and family.

## 2019-04-12 NOTE — PROGRESS NOTE ADULT - SUBJECTIVE AND OBJECTIVE BOX
Interval History:  No acute events overnight. Pt complains of some cramping. He also had 2 episodes of WCT.     Medications:  calcium carbonate    500 mG (Tums) Chewable 2 Tablet(s) Chew three times a day PRN  docusate sodium 100 milliGRAM(s) Oral three times a day  ergocalciferol 07846 Unit(s) Oral every week  famotidine    Tablet 20 milliGRAM(s) Oral daily  furosemide Infusion 10 mG/Hr IV Continuous <Continuous>  heparin  Infusion.  Unit(s)/Hr IV Continuous <Continuous>  heparin  Injectable 6500 Unit(s) IV Push every 6 hours PRN  heparin  Injectable 6500 Unit(s) IV Push every 6 hours PRN  heparin  Injectable 3000 Unit(s) IV Push every 6 hours PRN  hydrALAZINE 25 milliGRAM(s) Oral every 8 hours  milrinone Infusion 0.25 MICROgram(s)/kG/Min IV Continuous <Continuous>  multivitamin 1 Tablet(s) Oral daily  polyethylene glycol 3350 17 Gram(s) Oral daily  simethicone 80 milliGRAM(s) Chew three times a day PRN    Vitals:  T(C): 36.4 (19 @ 11:48), Max: 36.7 (19 @ 20:22)  HR: 94 (19 @ 13:13) (82 - 102)  BP: 135/96 (19 @ 13:13) (106/71 - 135/96)  BP(mean): --  ABP: --  ABP(mean): --  RR: 18 (19 @ 11:48) (16 - 18)  SpO2: 100% (19 @ 11:48) (95% - 100%)      Daily     Daily Weight in k.5 (2019 08:38)        I&O's Summary    2019 07:  -  2019 07:00  --------------------------------------------------------  IN: 439.6 mL / OUT: 3460 mL / NET: -3020.4 mL    2019 07:01  -  2019 17:04  --------------------------------------------------------  IN: 460 mL / OUT: 1550 mL / NET: -1090 mL        Physical Exam:  Appearance: No Acute Distress  HEENT: No JVD  Cardiovascular: Normal S1 S2, No murmurs/rubs/gallops  Respiratory: Clear to auscultation bilaterally  Gastrointestinal: Soft, Non-tender	  Skin: No cyanosis	  Neurologic: Non-focal  Extremities: No LE edema  Psychiatry: A & O x 3, Mood & affect appropriate      Labs:                        13.5   17.29 )-----------( 261      ( 2019 09:29 )             39.7     04-12    123<L>  |  71<L>  |  69<H>  ----------------------------<  146<H>  3.3<L>   |  37<H>  |  2.46<H>    Ca    9.1      2019 11:24  Mg     2.3     04-12      PT/INR - ( 2019 07:12 )   PT: 13.4 sec;   INR: 1.17 ratio         PTT - ( 2019 14:06 )  PTT:186.5 sec                  TELEMETRY:    [ ] Echocardiogram: Interval History:  No acute events overnight. Pt complains of some cramping. He also had 2 episodes of WCT.     Medications:  calcium carbonate    500 mG (Tums) Chewable 2 Tablet(s) Chew three times a day PRN  docusate sodium 100 milliGRAM(s) Oral three times a day  ergocalciferol 92374 Unit(s) Oral every week  famotidine    Tablet 20 milliGRAM(s) Oral daily  furosemide Infusion 10 mG/Hr IV Continuous <Continuous>  heparin  Infusion.  Unit(s)/Hr IV Continuous <Continuous>  heparin  Injectable 6500 Unit(s) IV Push every 6 hours PRN  heparin  Injectable 6500 Unit(s) IV Push every 6 hours PRN  heparin  Injectable 3000 Unit(s) IV Push every 6 hours PRN  hydrALAZINE 25 milliGRAM(s) Oral every 8 hours  milrinone Infusion 0.25 MICROgram(s)/kG/Min IV Continuous <Continuous>  multivitamin 1 Tablet(s) Oral daily  polyethylene glycol 3350 17 Gram(s) Oral daily  simethicone 80 milliGRAM(s) Chew three times a day PRN    Vitals:  T(C): 36.4 (19 @ 11:48), Max: 36.7 (19 @ 20:22)  HR: 94 (19 @ 13:13) (82 - 102)  BP: 135/96 (19 @ 13:13) (106/71 - 135/96)  BP(mean): --  ABP: --  ABP(mean): --  RR: 18 (19 @ 11:48) (16 - 18)  SpO2: 100% (19 @ 11:48) (95% - 100%)      Daily     Daily Weight in k.5 (2019 08:38)        I&O's Summary    2019 07:  -  2019 07:00  --------------------------------------------------------  IN: 439.6 mL / OUT: 3460 mL / NET: -3020.4 mL    2019 07:01  -  2019 17:04  --------------------------------------------------------  IN: 460 mL / OUT: 1550 mL / NET: -1090 mL        Physical Exam:  Appearance: No Acute Distress  HEENT: No JVD  Cardiovascular: Irreg Irreg  Respiratory: Clear to auscultation bilaterally  Gastrointestinal: Soft, distended, liver palpable. 	  Skin: No cyanosis	  Neurologic: Non-focal  Extremities: No LE edema  Psychiatry: A & O x 3, Mood & affect appropriate      Labs:                        13.5   17.29 )-----------( 261      ( 2019 09:29 )             39.7     04-12    123<L>  |  71<L>  |  69<H>  ----------------------------<  146<H>  3.3<L>   |  37<H>  |  2.46<H>    Ca    9.1      2019 11:24  Mg     2.3     04-12      PT/INR - ( 2019 07:12 )   PT: 13.4 sec;   INR: 1.17 ratio         PTT - ( 2019 14:06 )  PTT:186.5 sec      TELEMETRY:    [ ] Echocardiogram:

## 2019-04-12 NOTE — PROGRESS NOTE ADULT - SUBJECTIVE AND OBJECTIVE BOX
[facility] [phone]        Patient seen and examined in bed.  Urinating well and on Lasix gtt and Milrinone gtt as well  Breathing easier as well        MEDICATIONS  (STANDING):  ergocalciferol 53169 Unit(s) Oral every week  famotidine    Tablet 20 milliGRAM(s) Oral daily  furosemide Infusion 10 mG/Hr (5 mL/Hr) IV Continuous <Continuous>  heparin  Infusion.  Unit(s)/Hr (14 mL/Hr) IV Continuous <Continuous>  hydrALAZINE 25 milliGRAM(s) Oral every 8 hours  milrinone Infusion 0.25 MICROgram(s)/kG/Min (5.865 mL/Hr) IV Continuous <Continuous>  multivitamin 1 Tablet(s) Oral daily      VITAL:  T(C): , Max: 36.7 (04-11-19 @ 20:22)  T(F): , Max: 98 (04-11-19 @ 20:22)  HR: 101 (04-12-19 @ 07:30)  BP: 115/72 (04-12-19 @ 07:30)  BP(mean): --  RR: 16 (04-12-19 @ 07:30)  SpO2: 99% (04-12-19 @ 07:30)  Wt(kg): --    I and O's:    04-11 @ 07:01  -  04-12 @ 07:00  --------------------------------------------------------  IN: 439.6 mL / OUT: 3460 mL / NET: -3020.4 mL    04-12 @ 07:01  -  04-12 @ 08:50  --------------------------------------------------------  IN: 0 mL / OUT: 400 mL / NET: -400 mL          PHYSICAL EXAM:    Constitutional: NAD  Neck:  No JVD  Respiratory: CTAB/L  Cardiovascular: S1 and S2  Gastrointestinal: BS+, soft, NT/ND  Extremities: No peripheral edema  Neurological: A/O x 3, no focal deficits  Psychiatric: Normal mood, normal affect  : No Aguirre  Skin: No rashes  Access: Not applicable    LABS:                        13.5   15.79 )-----------( 259      ( 11 Apr 2019 08:20 )             39.8     04-12    121<L>  |  70<L>  |  66<H>  ----------------------------<  183<H>  3.3<L>   |  35<H>  |  2.52<H>    Ca    9.1      12 Apr 2019 07:06  Mg     2.4     04-11            Urine Studies:          RADIOLOGY & ADDITIONAL STUDIES: [facility] [phone]        Patient seen and examined in bed.  Urinating well and on Lasix gtt and Milrinone gtt as well  Breathing easier as well  More Vtach overnight         MEDICATIONS  (STANDING):  ergocalciferol 10651 Unit(s) Oral every week  famotidine    Tablet 20 milliGRAM(s) Oral daily  furosemide Infusion 10 mG/Hr (5 mL/Hr) IV Continuous <Continuous>  heparin  Infusion.  Unit(s)/Hr (14 mL/Hr) IV Continuous <Continuous>  hydrALAZINE 25 milliGRAM(s) Oral every 8 hours  milrinone Infusion 0.25 MICROgram(s)/kG/Min (5.865 mL/Hr) IV Continuous <Continuous>  multivitamin 1 Tablet(s) Oral daily      VITAL:  T(C): , Max: 36.7 (04-11-19 @ 20:22)  T(F): , Max: 98 (04-11-19 @ 20:22)  HR: 101 (04-12-19 @ 07:30)  BP: 115/72 (04-12-19 @ 07:30)  BP(mean): --  RR: 16 (04-12-19 @ 07:30)  SpO2: 99% (04-12-19 @ 07:30)  Wt(kg): --    I and O's:    04-11 @ 07:01  -  04-12 @ 07:00  --------------------------------------------------------  IN: 439.6 mL / OUT: 3460 mL / NET: -3020.4 mL    04-12 @ 07:01  -  04-12 @ 08:50  --------------------------------------------------------  IN: 0 mL / OUT: 400 mL / NET: -400 mL          PHYSICAL EXAM:    Constitutional: NAD  Neck:  No JVD  Respiratory: CTAB/L  Cardiovascular: S1 and S2  Gastrointestinal: BS+, soft, NT/ND  Extremities: No peripheral edema  Neurological: A/O x 3, no focal deficits  Psychiatric: Normal mood, normal affect  : No Aguirre  Skin: No rashes  Access: Not applicable    LABS:                        13.5   15.79 )-----------( 259      ( 11 Apr 2019 08:20 )             39.8     04-12    121<L>  |  70<L>  |  66<H>  ----------------------------<  183<H>  3.3<L>   |  35<H>  |  2.52<H>    Ca    9.1      12 Apr 2019 07:06  Mg     2.4     04-11            Urine Studies:          RADIOLOGY & ADDITIONAL STUDIES:

## 2019-04-12 NOTE — PROGRESS NOTE ADULT - SUBJECTIVE AND OBJECTIVE BOX
Patient is a 79y old  Male who presents with a chief complaint of dyspnea, abdominal swelling (06 Apr 2019 14:22)      SUBJECTIVE / OVERNIGHT EVENTS:  Patient states that he feels much improved, reports orthopnea getting better.     Still with abdominal and flank distension.  No events over night.     T(C): 36.4 (04-12-19 @ 11:48), Max: 36.4 (04-12-19 @ 07:30)  HR: 94 (04-12-19 @ 13:13) (91 - 102)  BP: 135/96 (04-12-19 @ 13:13) (111/70 - 135/96)  RR: 18 (04-12-19 @ 11:48) (16 - 18)  SpO2: 100% (04-12-19 @ 11:48) (95% - 100%)      MEDICATIONS  (STANDING):  docusate sodium 100 milliGRAM(s) Oral three times a day  ergocalciferol 10206 Unit(s) Oral every week  famotidine    Tablet 20 milliGRAM(s) Oral daily  furosemide Infusion 10 mG/Hr (5 mL/Hr) IV Continuous <Continuous>  heparin  Infusion.  Unit(s)/Hr (14 mL/Hr) IV Continuous <Continuous>  hydrALAZINE 25 milliGRAM(s) Oral every 8 hours  milrinone Infusion 0.25 MICROgram(s)/kG/Min (5.865 mL/Hr) IV Continuous <Continuous>  multivitamin 1 Tablet(s) Oral daily  polyethylene glycol 3350 17 Gram(s) Oral daily  potassium chloride    Tablet ER 40 milliEquivalent(s) Oral once    MEDICATIONS  (PRN):  calcium carbonate    500 mG (Tums) Chewable 2 Tablet(s) Chew three times a day PRN Heartburn  heparin  Injectable 6500 Unit(s) IV Push every 6 hours PRN For aPTT less than 40  heparin  Injectable 6500 Unit(s) IV Push every 6 hours PRN For aPTT less than 40  heparin  Injectable 3000 Unit(s) IV Push every 6 hours PRN For aPTT between 40 - 57  simethicone 80 milliGRAM(s) Chew three times a day PRN Dyspepsia    PHYSICAL EXAM:      GENERAL: NAD, well-developed  HEAD:  Atraumatic, Normocephalic  EYES: EOMI, conjunctiva and sclera clear  NECK: Supple, No JVD  CHEST/LUNG: crackles at bases, no wheeze  HEART: Irregular rate and rhythm; No murmurs, rubs, or gallops  ABDOMEN: Soft, Nontender, distended, Bowel sounds present, flank and abdominal edema  EXTREMITIES:  2+ Peripheral Pulses, No clubbing, cyanosis, or edema  cool extremities   PSYCH: AAOx3  NEUROLOGY: non-focal  SKIN: No rashes or lesions                           13.5   17.29 )-----------( 261      ( 12 Apr 2019 09:29 )             39.7             PT/INR - ( 12 Apr 2019 07:12 )   PT: 13.4 sec;   INR: 1.17 ratio         PTT - ( 12 Apr 2019 07:12 )  PTT:35.3 sec  123|71|69<146  3.3|37|2.46  9.1,2.3,--  04-12 @ 11:24  121|70|66<183  3.3|35|2.52  9.1,--,--  04-12 @ 07:06    RADIOLOGY & ADDITIONAL TESTS:    Imaging Personally Reviewed:    Consultant(s) Notes Reviewed:  cards     Care Discussed with Consultants/Other Providers:

## 2019-04-12 NOTE — PROGRESS NOTE ADULT - PROBLEM SELECTOR PLAN 1
- c/w Milrinone 0.25 and Lasix 10mg/hr  - Keep K >4 and Mg >2  - Strict I/Os  - Daily standing weights  - BMP BID  - Received Tolvaptan 15 and then 30mg today.  - Please increase Hydralazine to 35mg TID as long as SBPs remain above 100.

## 2019-04-13 DIAGNOSIS — I47.2 VENTRICULAR TACHYCARDIA: ICD-10-CM

## 2019-04-13 LAB
ANION GAP SERPL CALC-SCNC: 15 MMOL/L — SIGNIFICANT CHANGE UP (ref 5–17)
ANION GAP SERPL CALC-SCNC: 16 MMOL/L — SIGNIFICANT CHANGE UP (ref 5–17)
ANION GAP SERPL CALC-SCNC: 17 MMOL/L — SIGNIFICANT CHANGE UP (ref 5–17)
ANION GAP SERPL CALC-SCNC: 34 MMOL/L — HIGH (ref 5–17)
APTT BLD: 159.1 SEC — CRITICAL HIGH (ref 27.5–36.3)
APTT BLD: 34.5 SEC — SIGNIFICANT CHANGE UP (ref 27.5–36.3)
APTT BLD: 67.1 SEC — HIGH (ref 27.5–36.3)
BUN SERPL-MCNC: 62 MG/DL — HIGH (ref 7–23)
BUN SERPL-MCNC: 69 MG/DL — HIGH (ref 7–23)
BUN SERPL-MCNC: 76 MG/DL — HIGH (ref 7–23)
BUN SERPL-MCNC: 82 MG/DL — HIGH (ref 7–23)
CALCIUM SERPL-MCNC: 7.7 MG/DL — LOW (ref 8.4–10.5)
CALCIUM SERPL-MCNC: 9 MG/DL — SIGNIFICANT CHANGE UP (ref 8.4–10.5)
CALCIUM SERPL-MCNC: 9.2 MG/DL — SIGNIFICANT CHANGE UP (ref 8.4–10.5)
CALCIUM SERPL-MCNC: 9.3 MG/DL — SIGNIFICANT CHANGE UP (ref 8.4–10.5)
CHLORIDE SERPL-SCNC: 72 MMOL/L — LOW (ref 96–108)
CHLORIDE SERPL-SCNC: 72 MMOL/L — LOW (ref 96–108)
CHLORIDE SERPL-SCNC: 75 MMOL/L — LOW (ref 96–108)
CHLORIDE SERPL-SCNC: <70 MMOL/L — LOW (ref 96–108)
CO2 SERPL-SCNC: 26 MMOL/L — SIGNIFICANT CHANGE UP (ref 22–31)
CO2 SERPL-SCNC: 33 MMOL/L — HIGH (ref 22–31)
CO2 SERPL-SCNC: 34 MMOL/L — HIGH (ref 22–31)
CO2 SERPL-SCNC: 34 MMOL/L — HIGH (ref 22–31)
CREAT SERPL-MCNC: 1.91 MG/DL — HIGH (ref 0.5–1.3)
CREAT SERPL-MCNC: 2.06 MG/DL — HIGH (ref 0.5–1.3)
CREAT SERPL-MCNC: 2.15 MG/DL — HIGH (ref 0.5–1.3)
CREAT SERPL-MCNC: 2.21 MG/DL — HIGH (ref 0.5–1.3)
GLUCOSE SERPL-MCNC: 199 MG/DL — HIGH (ref 70–99)
GLUCOSE SERPL-MCNC: 200 MG/DL — HIGH (ref 70–99)
GLUCOSE SERPL-MCNC: 206 MG/DL — HIGH (ref 70–99)
GLUCOSE SERPL-MCNC: 752 MG/DL — CRITICAL HIGH (ref 70–99)
HCT VFR BLD CALC: 36.1 % — LOW (ref 39–50)
HCT VFR BLD CALC: 40.5 % — SIGNIFICANT CHANGE UP (ref 39–50)
HGB BLD-MCNC: 12.1 G/DL — LOW (ref 13–17)
HGB BLD-MCNC: 13.7 G/DL — SIGNIFICANT CHANGE UP (ref 13–17)
INR BLD: 1.16 RATIO — SIGNIFICANT CHANGE UP (ref 0.88–1.16)
MAGNESIUM SERPL-MCNC: 2.4 MG/DL — SIGNIFICANT CHANGE UP (ref 1.6–2.6)
MCHC RBC-ENTMCNC: 28 PG — SIGNIFICANT CHANGE UP (ref 27–34)
MCHC RBC-ENTMCNC: 29.2 PG — SIGNIFICANT CHANGE UP (ref 27–34)
MCHC RBC-ENTMCNC: 33.5 GM/DL — SIGNIFICANT CHANGE UP (ref 32–36)
MCHC RBC-ENTMCNC: 33.9 GM/DL — SIGNIFICANT CHANGE UP (ref 32–36)
MCV RBC AUTO: 83.6 FL — SIGNIFICANT CHANGE UP (ref 80–100)
MCV RBC AUTO: 86.4 FL — SIGNIFICANT CHANGE UP (ref 80–100)
PHOSPHATE SERPL-MCNC: 2.4 MG/DL — LOW (ref 2.5–4.5)
PLATELET # BLD AUTO: 233 K/UL — SIGNIFICANT CHANGE UP (ref 150–400)
PLATELET # BLD AUTO: 239 K/UL — SIGNIFICANT CHANGE UP (ref 150–400)
POTASSIUM SERPL-MCNC: 3.2 MMOL/L — LOW (ref 3.5–5.3)
POTASSIUM SERPL-MCNC: 3.7 MMOL/L — SIGNIFICANT CHANGE UP (ref 3.5–5.3)
POTASSIUM SERPL-MCNC: 3.8 MMOL/L — SIGNIFICANT CHANGE UP (ref 3.5–5.3)
POTASSIUM SERPL-MCNC: 3.9 MMOL/L — SIGNIFICANT CHANGE UP (ref 3.5–5.3)
POTASSIUM SERPL-SCNC: 3.2 MMOL/L — LOW (ref 3.5–5.3)
POTASSIUM SERPL-SCNC: 3.7 MMOL/L — SIGNIFICANT CHANGE UP (ref 3.5–5.3)
POTASSIUM SERPL-SCNC: 3.8 MMOL/L — SIGNIFICANT CHANGE UP (ref 3.5–5.3)
POTASSIUM SERPL-SCNC: 3.9 MMOL/L — SIGNIFICANT CHANGE UP (ref 3.5–5.3)
PROTHROM AB SERPL-ACNC: 13.4 SEC — HIGH (ref 10–12.9)
RBC # BLD: 4.32 M/UL — SIGNIFICANT CHANGE UP (ref 4.2–5.8)
RBC # BLD: 4.69 M/UL — SIGNIFICANT CHANGE UP (ref 4.2–5.8)
RBC # FLD: 13.2 % — SIGNIFICANT CHANGE UP (ref 10.3–14.5)
RBC # FLD: 13.4 % — SIGNIFICANT CHANGE UP (ref 10.3–14.5)
SODIUM SERPL-SCNC: 119 MMOL/L — CRITICAL LOW (ref 135–145)
SODIUM SERPL-SCNC: 121 MMOL/L — LOW (ref 135–145)
SODIUM SERPL-SCNC: 122 MMOL/L — LOW (ref 135–145)
SODIUM SERPL-SCNC: 125 MMOL/L — LOW (ref 135–145)
WBC # BLD: 13.15 K/UL — HIGH (ref 3.8–10.5)
WBC # BLD: 16.1 K/UL — HIGH (ref 3.8–10.5)
WBC # FLD AUTO: 13.15 K/UL — HIGH (ref 3.8–10.5)
WBC # FLD AUTO: 16.1 K/UL — HIGH (ref 3.8–10.5)

## 2019-04-13 PROCEDURE — 99233 SBSQ HOSP IP/OBS HIGH 50: CPT

## 2019-04-13 PROCEDURE — 99233 SBSQ HOSP IP/OBS HIGH 50: CPT | Mod: GC

## 2019-04-13 RX ORDER — FUROSEMIDE 40 MG
80 TABLET ORAL DAILY
Qty: 0 | Refills: 0 | Status: DISCONTINUED | OUTPATIENT
Start: 2019-04-13 | End: 2019-04-15

## 2019-04-13 RX ORDER — POTASSIUM CHLORIDE 20 MEQ
20 PACKET (EA) ORAL ONCE
Qty: 0 | Refills: 0 | Status: COMPLETED | OUTPATIENT
Start: 2019-04-13 | End: 2019-04-13

## 2019-04-13 RX ORDER — HYDRALAZINE HCL 50 MG
50 TABLET ORAL EVERY 8 HOURS
Qty: 0 | Refills: 0 | Status: DISCONTINUED | OUTPATIENT
Start: 2019-04-13 | End: 2019-04-16

## 2019-04-13 RX ORDER — SODIUM,POTASSIUM PHOSPHATES 278-250MG
2 POWDER IN PACKET (EA) ORAL ONCE
Qty: 0 | Refills: 0 | Status: COMPLETED | OUTPATIENT
Start: 2019-04-13 | End: 2019-04-13

## 2019-04-13 RX ORDER — TOLVAPTAN 15 MG/1
30 TABLET ORAL ONCE
Qty: 0 | Refills: 0 | Status: COMPLETED | OUTPATIENT
Start: 2019-04-13 | End: 2019-04-13

## 2019-04-13 RX ADMIN — Medication 5 MG/HR: at 09:05

## 2019-04-13 RX ADMIN — Medication 100 MILLIGRAM(S): at 05:15

## 2019-04-13 RX ADMIN — Medication 100 MILLIGRAM(S): at 13:14

## 2019-04-13 RX ADMIN — Medication 35 MILLIGRAM(S): at 05:15

## 2019-04-13 RX ADMIN — Medication 20 MILLIEQUIVALENT(S): at 15:22

## 2019-04-13 RX ADMIN — TOLVAPTAN 30 MILLIGRAM(S): 15 TABLET ORAL at 17:55

## 2019-04-13 RX ADMIN — HEPARIN SODIUM 0 UNIT(S)/HR: 5000 INJECTION INTRAVENOUS; SUBCUTANEOUS at 13:30

## 2019-04-13 RX ADMIN — POLYETHYLENE GLYCOL 3350 17 GRAM(S): 17 POWDER, FOR SOLUTION ORAL at 08:57

## 2019-04-13 RX ADMIN — Medication 50 MILLIGRAM(S): at 21:46

## 2019-04-13 RX ADMIN — Medication 1 TABLET(S): at 08:57

## 2019-04-13 RX ADMIN — Medication 35 MILLIGRAM(S): at 13:14

## 2019-04-13 RX ADMIN — Medication 100 MILLIGRAM(S): at 21:46

## 2019-04-13 RX ADMIN — HEPARIN SODIUM 800 UNIT(S)/HR: 5000 INJECTION INTRAVENOUS; SUBCUTANEOUS at 21:45

## 2019-04-13 RX ADMIN — HEPARIN SODIUM 800 UNIT(S)/HR: 5000 INJECTION INTRAVENOUS; SUBCUTANEOUS at 14:45

## 2019-04-13 RX ADMIN — Medication 62.5 MILLIMOLE(S): at 03:15

## 2019-04-13 RX ADMIN — HEPARIN SODIUM 6500 UNIT(S): 5000 INJECTION INTRAVENOUS; SUBCUTANEOUS at 05:54

## 2019-04-13 RX ADMIN — FAMOTIDINE 20 MILLIGRAM(S): 10 INJECTION INTRAVENOUS at 08:57

## 2019-04-13 RX ADMIN — HEPARIN SODIUM 1100 UNIT(S)/HR: 5000 INJECTION INTRAVENOUS; SUBCUTANEOUS at 05:53

## 2019-04-13 RX ADMIN — Medication 2 PACKET(S): at 15:23

## 2019-04-13 RX ADMIN — MILRINONE LACTATE 5.87 MICROGRAM(S)/KG/MIN: 1 INJECTION, SOLUTION INTRAVENOUS at 09:06

## 2019-04-13 NOTE — PROGRESS NOTE ADULT - PROBLEM SELECTOR PLAN 8
WBC 15 possibly reactive, afebrile, VS stable  - CT chest/A/P without source of infection  - blood cultures with NGTD from 4/4  -Hem consult appreciated, sent for peripheral msear, abr/bcl.   - Follow up.

## 2019-04-13 NOTE — PROGRESS NOTE ADULT - ASSESSMENT
79-year-old gentleman, past medical history of diabetes, hypertension presents with decompensated heart failure along with rapid atrial fibrillation.  Renal failure is new and likely has cardiorenal syndrome.     Hypervolemic hyponatremia  Vtach: HR <100 in 24 hrs   Hepatitis   Hypokalemia  Hypophosphotemia       1.  Cardiology:   Milrinone gtt and lasix gtt.-    More Vtach.  Move pt to CCU?  2.  Hyponatremia due to hypervolemic: s/p  Samsca 30mg po x 1 and  Urea-Na+ 30grams x 1- Na+ today 119-122 repeated      -  Avoid excessive liquids without calories   3. Volume status: no wt gained in 24hrs. UOP 1.3L/24hrs  4. Trend lowering in serum K+, due to diuresis- K+ 3.7 today s/p  KDUR 40meq po x 1 yesterday- KDUR 20 mEq x 1 for now  5. Hypophosphotemia: PO4 2.4 today      - a/w giving Phos-NaK 2 packs   4. Transaminitis- hepatitis panel negative; CT A/P with no acute abdominal pathology  5. BMP bid

## 2019-04-13 NOTE — PROGRESS NOTE ADULT - SUBJECTIVE AND OBJECTIVE BOX
Patient seen and examined.  OOB to chair. "feel better than yesterday," but still SOB. Complained zabala has to urinate overnight. Urinating well. Wife at bedside.  Remains on Lasix gtt and Milrinone gtt     REVIEW OF SYSTEMS:  As per HPI, otherwise 8 full 10 ROS were unremarkable    MEDICATIONS  (STANDING):  docusate sodium 100 milliGRAM(s) Oral three times a day  ergocalciferol 09785 Unit(s) Oral every week  famotidine    Tablet 20 milliGRAM(s) Oral daily  furosemide Infusion 10 mG/Hr (5 mL/Hr) IV Continuous <Continuous>  heparin  Infusion.  Unit(s)/Hr (14 mL/Hr) IV Continuous <Continuous>  hydrALAZINE 35 milliGRAM(s) Oral every 8 hours  milrinone Infusion 0.25 MICROgram(s)/kG/Min (5.865 mL/Hr) IV Continuous <Continuous>  multivitamin 1 Tablet(s) Oral daily  polyethylene glycol 3350 17 Gram(s) Oral daily  potassium phosphate / sodium phosphate powder 2 Packet(s) Oral once      VITAL:  T(C): , Max: 36.6 (04-13-19 @ 04:50)  T(F): , Max: 97.9 (04-13-19 @ 04:50)  HR: 96 (04-13-19 @ 13:13)  BP: 130/86 (04-13-19 @ 13:13)  BP(mean): --  RR: 18 (04-13-19 @ 13:13)  SpO2: 98% (04-13-19 @ 13:13)  Wt(kg): --    I and O's:    04-12 @ 07:01  -  04-13 @ 07:00  --------------------------------------------------------  IN: 1598.2 mL / OUT: 6150 mL / NET: -4551.8 mL    04-13 @ 07:01  -  04-13 @ 13:30  --------------------------------------------------------  IN: 173.6 mL / OUT: 1275 mL / NET: -1101.4 mL          PHYSICAL EXAM:    Constitutional: NAD  Neck:  No JVD  Respiratory: CTA B/L  Cardiovascular: S1 and S2  Gastrointestinal: BS+, soft, NT/ND  Extremities: No peripheral edema  Neurological: A/O x 3, no focal deficits  Psychiatric: Normal mood, normal affect  : No Aguirre      LABS:                        12.1   13.15 )-----------( 233      ( 13 Apr 2019 09:39 )             36.1     04-13    122<L>  |  72<L>  |  76<H>  ----------------------------<  206<H>  3.7   |  34<H>  |  2.15<H>    Ca    9.3      13 Apr 2019 06:44  Phos  2.4     04-13  Mg     2.4     04-13

## 2019-04-13 NOTE — PROGRESS NOTE ADULT - SUBJECTIVE AND OBJECTIVE BOX
Patient is a 79y old  Male who presents with a chief complaint of dyspnea, abdominal swelling (06 Apr 2019 14:22)      SUBJECTIVE / OVERNIGHT EVENTS:  Patient states that he feels much improved, reports constipation.     Still with abdominal and flank distension.  Tele show 15 beats WCT last night and this morning.        T(C): 36.3 (04-13-19 @ 08:20), Max: 36.6 (04-13-19 @ 04:50)  HR: 90 (04-13-19 @ 08:20) (90 - 92)  BP: 159/107 (04-13-19 @ 08:20) (159/107 - 163/92)  RR: 18 (04-13-19 @ 08:20) (18 - 18)  SpO2: 97% (04-13-19 @ 08:20) (97% - 99%)    MEDICATIONS  (STANDING):  docusate sodium 100 milliGRAM(s) Oral three times a day  ergocalciferol 58714 Unit(s) Oral every week  famotidine    Tablet 20 milliGRAM(s) Oral daily  furosemide Infusion 10 mG/Hr (5 mL/Hr) IV Continuous <Continuous>  heparin  Infusion.  Unit(s)/Hr (14 mL/Hr) IV Continuous <Continuous>  hydrALAZINE 35 milliGRAM(s) Oral every 8 hours  milrinone Infusion 0.25 MICROgram(s)/kG/Min (5.865 mL/Hr) IV Continuous <Continuous>  multivitamin 1 Tablet(s) Oral daily  polyethylene glycol 3350 17 Gram(s) Oral daily    MEDICATIONS  (PRN):  calcium carbonate    500 mG (Tums) Chewable 2 Tablet(s) Chew three times a day PRN Heartburn  heparin  Injectable 6500 Unit(s) IV Push every 6 hours PRN For aPTT less than 40  heparin  Injectable 6500 Unit(s) IV Push every 6 hours PRN For aPTT less than 40  heparin  Injectable 3000 Unit(s) IV Push every 6 hours PRN For aPTT between 40 - 57  simethicone 80 milliGRAM(s) Chew three times a day PRN Dyspepsia      PHYSICAL EXAM:      GENERAL: NAD, well-developed  HEAD:  Atraumatic, Normocephalic  EYES: EOMI, conjunctiva and sclera clear  NECK: Supple, No JVD  CHEST/LUNG: crackles at bases, no wheeze  HEART: Irregular rate and rhythm; No murmurs, rubs, or gallops  ABDOMEN: Soft, Nontender, distended, Bowel sounds present, flank and abdominal edema  EXTREMITIES:  2+ Peripheral Pulses, No clubbing, cyanosis, or edema  cold extremities   PSYCH: AAOx3  NEUROLOGY: non-focal  SKIN: No rashes or lesions                                             12.1   13.15 )-----------( 233      ( 13 Apr 2019 09:39 )             36.1             PT/INR - ( 13 Apr 2019 05:10 )   PT: 13.4 sec;   INR: 1.16 ratio         PTT - ( 13 Apr 2019 05:10 )  PTT:34.5 sec  122|72|76<206  3.7|34|2.15  9.3,--,--  04-13 @ 06:44  119|<70|62<752  3.2|26|1.91  7.7,--,--  04-13 @ 05:10  121|72|82<200  3.9|34|2.21  9.0,2.4,2.4  04-13 @ 00:40  121|72|85<203  4.1|35|2.36  9.3,--,--  04-12 @ 18:23  RADIOLOGY & ADDITIONAL TESTS:    Imaging Personally Reviewed:    Consultant(s) Notes Reviewed:  cards     Care Discussed with Consultants/Other Providers:

## 2019-04-13 NOTE — PROGRESS NOTE ADULT - ASSESSMENT
79y.o. Male with no PMH now admitted with new HFrEF (TTE 04/04/2019 with LVEF 25%, biventricular dysfunction, moderate MR, moderate TR, mild ID) and AFIB with RVR, abdominal discomfort/nausea concerning for hepatic congestion in the setting of acute decompensated CHF (BNP>50K), now s/p LHC/RHC (non-obstructive CAD). RHC:  RA 15, RV 58/14, PA 59/34/43, Wedge 33 PA sat 44%, CI 1.57. Pt now received tolvaptan x2 and initiated on milrinone and lasix gtt with improvment in symptoms and uop.

## 2019-04-13 NOTE — PROGRESS NOTE ADULT - PROBLEM SELECTOR PLAN 6
- likely hypervolemic hyponatremia given fluid overloaded state.   - s/p Samsca and Urea as per renal.   - FeNa demonstrating intrinsic renal disease (1.1%)- possibly ATN from   hypovolemia   - Monitor Na levels.

## 2019-04-13 NOTE — PROGRESS NOTE ADULT - SUBJECTIVE AND OBJECTIVE BOX
**********************INCOMPLETE NOTE - PLEASE DISREGARD*****************    Subjective:    Medications:  calcium carbonate    500 mG (Tums) Chewable 2 Tablet(s) Chew three times a day PRN  docusate sodium 100 milliGRAM(s) Oral three times a day  ergocalciferol 60363 Unit(s) Oral every week  famotidine    Tablet 20 milliGRAM(s) Oral daily  furosemide Infusion 10 mG/Hr IV Continuous <Continuous>  heparin  Infusion.  Unit(s)/Hr IV Continuous <Continuous>  heparin  Injectable 6500 Unit(s) IV Push every 6 hours PRN  heparin  Injectable 6500 Unit(s) IV Push every 6 hours PRN  heparin  Injectable 3000 Unit(s) IV Push every 6 hours PRN  hydrALAZINE 35 milliGRAM(s) Oral every 8 hours  milrinone Infusion 0.25 MICROgram(s)/kG/Min IV Continuous <Continuous>  multivitamin 1 Tablet(s) Oral daily  polyethylene glycol 3350 17 Gram(s) Oral daily  simethicone 80 milliGRAM(s) Chew three times a day PRN    Vitals:  T(C): 36.3 (04-13-19 @ 08:20), Max: 36.6 (04-13-19 @ 04:50)  HR: 90 (04-13-19 @ 08:20) (90 - 102)  BP: 159/107 (04-13-19 @ 08:20) (111/70 - 163/92)  BP(mean): --  ABP: --  ABP(mean): --  RR: 18 (04-13-19 @ 08:20) (18 - 18)  SpO2: 97% (04-13-19 @ 08:20) (97% - 100%)  Wt(kg): --  CVP(cm H2O): --  CO: --  CI: --  PA: --  PA(mean): --  PCWP: --  SVR: --  PVR: --    Daily     Daily         I&O's Summary    12 Apr 2019 07:01  -  13 Apr 2019 07:00  --------------------------------------------------------  IN: 1598.2 mL / OUT: 6150 mL / NET: -4551.8 mL    13 Apr 2019 07:01  -  13 Apr 2019 08:41  --------------------------------------------------------  IN: 0 mL / OUT: 700 mL / NET: -700 mL        Physical Exam:  Appearance: No Acute Distress  HEENT: JVP ___ cm H2O, no HJR  Cardiovascular: RRR, Normal S1 S2, No murmurs/rubs/gallops  Respiratory: Clear to auscultation bilaterally  Gastrointestinal: Soft, Non-tender, non-distended	  Skin: no skin lesions  Neurologic: Non-focal  Extremities: No LE edema, warm and well perfused  Psychiatry: A & O x 3, Mood & affect appropriate      Labs:                        13.7   16.1  )-----------( 239      ( 13 Apr 2019 00:40 )             40.5     04-13    122<L>  |  72<L>  |  76<H>  ----------------------------<  206<H>  3.7   |  34<H>  |  2.15<H>    Ca    9.3      13 Apr 2019 06:44  Phos  2.4     04-13  Mg     2.4     04-13        PT/INR - ( 13 Apr 2019 05:10 )   PT: 13.4 sec;   INR: 1.16 ratio         PTT - ( 13 Apr 2019 05:10 )  PTT:34.5 sec                  TELEMETRY:    [ ] Echocardiogram: Subjective: No acute events overnight.     Medications:  calcium carbonate    500 mG (Tums) Chewable 2 Tablet(s) Chew three times a day PRN  docusate sodium 100 milliGRAM(s) Oral three times a day  ergocalciferol 20067 Unit(s) Oral every week  famotidine    Tablet 20 milliGRAM(s) Oral daily  furosemide Infusion 10 mG/Hr IV Continuous <Continuous>  heparin  Infusion.  Unit(s)/Hr IV Continuous <Continuous>  heparin  Injectable 6500 Unit(s) IV Push every 6 hours PRN  heparin  Injectable 6500 Unit(s) IV Push every 6 hours PRN  heparin  Injectable 3000 Unit(s) IV Push every 6 hours PRN  hydrALAZINE 35 milliGRAM(s) Oral every 8 hours  milrinone Infusion 0.25 MICROgram(s)/kG/Min IV Continuous <Continuous>  multivitamin 1 Tablet(s) Oral daily  polyethylene glycol 3350 17 Gram(s) Oral daily  simethicone 80 milliGRAM(s) Chew three times a day PRN    Vitals:  T(C): 36.3 (04-13-19 @ 08:20), Max: 36.6 (04-13-19 @ 04:50)  HR: 90 (04-13-19 @ 08:20) (90 - 102)  BP: 159/107 (04-13-19 @ 08:20) (111/70 - 163/92)  RR: 18 (04-13-19 @ 08:20) (18 - 18)  SpO2: 97% (04-13-19 @ 08:20) (97% - 100%)        I&O's Summary    12 Apr 2019 07:01  -  13 Apr 2019 07:00  --------------------------------------------------------  IN: 1598.2 mL / OUT: 6150 mL / NET: -4551.8 mL    13 Apr 2019 07:01  -  13 Apr 2019 08:41  --------------------------------------------------------  IN: 0 mL / OUT: 700 mL / NET: -700 mL        Physical Exam:  Appearance: No Acute Distress  HEENT: JVP not elevated   Cardiovascular: RRR, Normal S1 S2, No murmurs/rubs/gallops  Respiratory: Clear to auscultation bilaterally  Gastrointestinal: Soft, Non-tender, non-distended	  Skin: no skin lesions  Neurologic: Non-focal  Extremities: No LE edema, warm and well perfused  Psychiatry: A & O x 3, Mood & affect appropriate      Labs:                        13.7   16.1  )-----------( 239      ( 13 Apr 2019 00:40 )             40.5     04-13    122<L>  |  72<L>  |  76<H>  ----------------------------<  206<H>  3.7   |  34<H>  |  2.15<H>    Ca    9.3      13 Apr 2019 06:44  Phos  2.4     04-13  Mg     2.4     04-13        PT/INR - ( 13 Apr 2019 05:10 )   PT: 13.4 sec;   INR: 1.16 ratio         PTT - ( 13 Apr 2019 05:10 )  PTT:34.5 sec          TELEMETRY: Recurrent NSVT     [ ] Echocardiogram:

## 2019-04-13 NOTE — PROGRESS NOTE ADULT - PROBLEM SELECTOR PLAN 9
-Patient with INR of 1.5. Unclear etiology but noted to have liver dysfxn. Low suspicion for malnutrition  -Continue with Nepro for  now.   - no overt bleeding

## 2019-04-13 NOTE — PROVIDER CONTACT NOTE (OTHER) - ASSESSMENT
Pt asymptomatic. denies cp, palpitations, BP-149/93, HR-99. Pt on heparin drip, Lasix drip and  milrinone drip

## 2019-04-13 NOTE — PROGRESS NOTE ADULT - ATTENDING COMMENTS
meds Lasix 10/hr, milrinone 0.25. tolvaptan 30  HDZN 37.5 Q8.   111/-163/, 90Afib.  I/O: -4.6   04-13    122<L>  |  72<L>  |  76<H>  ----------------------------<  206<H>  3.7   |  34<H>  |  2.15<H>  Ca    9.3      13 Apr 2019 06:44  Phos  2.4     04-13  Mg     2.4     04-13 04-13    122<L>  |  72<L>  |  76<H>  ----------------------------<  206<H>  3.7   |  34<H>  |  2.15<H> (2.5)    Ca    9.3      13 Apr 2019 06:44  Phos  2.4     04-13  Mg     2.4     04-13                        12.1   13.15 )-----------( 233      ( 13 Apr 2019 09:39 )             36.1 meds Lasix 10/hr, milrinone 0.25. tolvaptan 30  HDZN 37.5 Q8.   111/-163/, 90Afib.  I/O: ? -4.6   04-13    122<L>  |  72<L>  |  76<H>  ----------------------------<  206<H>  3.7   |  34<H>  |  2.15<H>  Ca    9.3      13 Apr 2019 06:44  Phos  2.4     04-13  Mg     2.4     04-13 04-13    122<L>  |  72<L>  |  76<H>  ----------------------------<  206<H>  3.7   |  34<H>  |  2.15<H> (2.5)    Ca    9.3      13 Apr 2019 06:44  Phos  2.4     04-13  Mg     2.4     04-13                        12.1   13.15 )-----------( 233      ( 13 Apr 2019 09:39 )             36.1 meds Lasix 10/hr, milrinone 0.25. tolvaptan 30  HDZN 37.5 Q8.   111/-163/, 90Afib.  JVP not seen. no LE edema  I/O: ? -4.6   04-13    122<L>  |  72<L>  |  76<H>  ----------------------------<  206<H>  3.7   |  34<H>  |  2.15<H>  Ca    9.3      13 Apr 2019 06:44  Phos  2.4     04-13  Mg     2.4     04-13 04-13    122<L>  |  72<L>  |  76<H>  ----------------------------<  206<H>  3.7   |  34<H>  |  2.15<H> (2.5)    Ca    9.3      13 Apr 2019 06:44  Phos  2.4     04-13  Mg     2.4     04-13                        12.1   13.15 )-----------( 233      ( 13 Apr 2019 09:39 )             36.1  Improved. renal function stabilizing.  Change to Lasix 80mg PO QD. Give one further dose tolvaptan 30.  Increase HDZN 50 tid.   Possible ablation next week after milrinone wean.  Lon Trejo

## 2019-04-13 NOTE — PROGRESS NOTE ADULT - PROBLEM SELECTOR PLAN 1
Tele show WCT, from Milrinone drip/electrolyte imbalance.   EP follow up, continue to monitor on tele.   Keep K >4.

## 2019-04-14 LAB
ANION GAP SERPL CALC-SCNC: 17 MMOL/L — SIGNIFICANT CHANGE UP (ref 5–17)
ANION GAP SERPL CALC-SCNC: 19 MMOL/L — HIGH (ref 5–17)
APTT BLD: 155.2 SEC — CRITICAL HIGH (ref 27.5–36.3)
APTT BLD: 52.7 SEC — HIGH (ref 27.5–36.3)
APTT BLD: 56.3 SEC — HIGH (ref 27.5–36.3)
BASOPHILS # BLD AUTO: 0.01 K/UL — SIGNIFICANT CHANGE UP (ref 0–0.2)
BASOPHILS NFR BLD AUTO: 0.1 % — SIGNIFICANT CHANGE UP (ref 0–2)
BUN SERPL-MCNC: 66 MG/DL — HIGH (ref 7–23)
BUN SERPL-MCNC: 70 MG/DL — HIGH (ref 7–23)
CALCIUM SERPL-MCNC: 9.4 MG/DL — SIGNIFICANT CHANGE UP (ref 8.4–10.5)
CALCIUM SERPL-MCNC: 9.8 MG/DL — SIGNIFICANT CHANGE UP (ref 8.4–10.5)
CHLORIDE SERPL-SCNC: 74 MMOL/L — LOW (ref 96–108)
CHLORIDE SERPL-SCNC: 74 MMOL/L — LOW (ref 96–108)
CO2 SERPL-SCNC: 32 MMOL/L — HIGH (ref 22–31)
CO2 SERPL-SCNC: 34 MMOL/L — HIGH (ref 22–31)
CREAT SERPL-MCNC: 1.9 MG/DL — HIGH (ref 0.5–1.3)
CREAT SERPL-MCNC: 2.1 MG/DL — HIGH (ref 0.5–1.3)
EOSINOPHIL # BLD AUTO: 0.06 K/UL — SIGNIFICANT CHANGE UP (ref 0–0.5)
EOSINOPHIL NFR BLD AUTO: 0.4 % — SIGNIFICANT CHANGE UP (ref 0–6)
GLUCOSE SERPL-MCNC: 218 MG/DL — HIGH (ref 70–99)
GLUCOSE SERPL-MCNC: 221 MG/DL — HIGH (ref 70–99)
HCT VFR BLD CALC: 40 % — SIGNIFICANT CHANGE UP (ref 39–50)
HGB BLD-MCNC: 13 G/DL — SIGNIFICANT CHANGE UP (ref 13–17)
IMM GRANULOCYTES NFR BLD AUTO: 0.6 % — SIGNIFICANT CHANGE UP (ref 0–1.5)
LYMPHOCYTES # BLD AUTO: 1.24 K/UL — SIGNIFICANT CHANGE UP (ref 1–3.3)
LYMPHOCYTES # BLD AUTO: 7.9 % — LOW (ref 13–44)
MAGNESIUM SERPL-MCNC: 2.5 MG/DL — SIGNIFICANT CHANGE UP (ref 1.6–2.6)
MCHC RBC-ENTMCNC: 27.3 PG — SIGNIFICANT CHANGE UP (ref 27–34)
MCHC RBC-ENTMCNC: 32.5 GM/DL — SIGNIFICANT CHANGE UP (ref 32–36)
MCV RBC AUTO: 84 FL — SIGNIFICANT CHANGE UP (ref 80–100)
MONOCYTES # BLD AUTO: 1.29 K/UL — HIGH (ref 0–0.9)
MONOCYTES NFR BLD AUTO: 8.2 % — SIGNIFICANT CHANGE UP (ref 2–14)
NEUTROPHILS # BLD AUTO: 13.03 K/UL — HIGH (ref 1.8–7.4)
NEUTROPHILS NFR BLD AUTO: 82.8 % — HIGH (ref 43–77)
PHOSPHATE SERPL-MCNC: 3.3 MG/DL — SIGNIFICANT CHANGE UP (ref 2.5–4.5)
PLATELET # BLD AUTO: 277 K/UL — SIGNIFICANT CHANGE UP (ref 150–400)
POTASSIUM SERPL-MCNC: 3.6 MMOL/L — SIGNIFICANT CHANGE UP (ref 3.5–5.3)
POTASSIUM SERPL-MCNC: 4.4 MMOL/L — SIGNIFICANT CHANGE UP (ref 3.5–5.3)
POTASSIUM SERPL-SCNC: 3.6 MMOL/L — SIGNIFICANT CHANGE UP (ref 3.5–5.3)
POTASSIUM SERPL-SCNC: 4.4 MMOL/L — SIGNIFICANT CHANGE UP (ref 3.5–5.3)
RBC # BLD: 4.76 M/UL — SIGNIFICANT CHANGE UP (ref 4.2–5.8)
RBC # FLD: 13.5 % — SIGNIFICANT CHANGE UP (ref 10.3–14.5)
SODIUM SERPL-SCNC: 123 MMOL/L — LOW (ref 135–145)
SODIUM SERPL-SCNC: 127 MMOL/L — LOW (ref 135–145)
WBC # BLD: 15.73 K/UL — HIGH (ref 3.8–10.5)
WBC # FLD AUTO: 15.73 K/UL — HIGH (ref 3.8–10.5)

## 2019-04-14 PROCEDURE — 99233 SBSQ HOSP IP/OBS HIGH 50: CPT

## 2019-04-14 RX ORDER — POTASSIUM CHLORIDE 20 MEQ
40 PACKET (EA) ORAL ONCE
Qty: 0 | Refills: 0 | Status: DISCONTINUED | OUTPATIENT
Start: 2019-04-14 | End: 2019-04-14

## 2019-04-14 RX ORDER — DOCUSATE SODIUM 100 MG
100 CAPSULE ORAL THREE TIMES A DAY
Qty: 0 | Refills: 0 | Status: DISCONTINUED | OUTPATIENT
Start: 2019-04-14 | End: 2019-04-25

## 2019-04-14 RX ORDER — SENNA PLUS 8.6 MG/1
2 TABLET ORAL AT BEDTIME
Qty: 0 | Refills: 0 | Status: DISCONTINUED | OUTPATIENT
Start: 2019-04-14 | End: 2019-04-25

## 2019-04-14 RX ORDER — MILRINONE LACTATE 1 MG/ML
0.12 INJECTION, SOLUTION INTRAVENOUS
Qty: 20 | Refills: 0 | Status: DISCONTINUED | OUTPATIENT
Start: 2019-04-14 | End: 2019-04-17

## 2019-04-14 RX ORDER — LACTULOSE 10 G/15ML
10 SOLUTION ORAL ONCE
Qty: 0 | Refills: 0 | Status: COMPLETED | OUTPATIENT
Start: 2019-04-14 | End: 2019-04-14

## 2019-04-14 RX ORDER — POTASSIUM CHLORIDE 20 MEQ
40 PACKET (EA) ORAL ONCE
Qty: 0 | Refills: 0 | Status: COMPLETED | OUTPATIENT
Start: 2019-04-14 | End: 2019-04-14

## 2019-04-14 RX ORDER — ACETAMINOPHEN 500 MG
650 TABLET ORAL EVERY 6 HOURS
Qty: 0 | Refills: 0 | Status: DISCONTINUED | OUTPATIENT
Start: 2019-04-14 | End: 2019-04-25

## 2019-04-14 RX ADMIN — Medication 2 TABLET(S): at 21:12

## 2019-04-14 RX ADMIN — POLYETHYLENE GLYCOL 3350 17 GRAM(S): 17 POWDER, FOR SOLUTION ORAL at 11:13

## 2019-04-14 RX ADMIN — Medication 40 MILLIEQUIVALENT(S): at 12:18

## 2019-04-14 RX ADMIN — SENNA PLUS 2 TABLET(S): 8.6 TABLET ORAL at 21:12

## 2019-04-14 RX ADMIN — SIMETHICONE 80 MILLIGRAM(S): 80 TABLET, CHEWABLE ORAL at 21:13

## 2019-04-14 RX ADMIN — HEPARIN SODIUM 3000 UNIT(S): 5000 INJECTION INTRAVENOUS; SUBCUTANEOUS at 04:39

## 2019-04-14 RX ADMIN — Medication 80 MILLIGRAM(S): at 05:12

## 2019-04-14 RX ADMIN — Medication 100 MILLIGRAM(S): at 05:11

## 2019-04-14 RX ADMIN — HEPARIN SODIUM 3000 UNIT(S): 5000 INJECTION INTRAVENOUS; SUBCUTANEOUS at 18:41

## 2019-04-14 RX ADMIN — HEPARIN SODIUM 1000 UNIT(S)/HR: 5000 INJECTION INTRAVENOUS; SUBCUTANEOUS at 04:38

## 2019-04-14 RX ADMIN — HEPARIN SODIUM 700 UNIT(S)/HR: 5000 INJECTION INTRAVENOUS; SUBCUTANEOUS at 12:19

## 2019-04-14 RX ADMIN — Medication 1 TABLET(S): at 11:14

## 2019-04-14 RX ADMIN — Medication 50 MILLIGRAM(S): at 13:26

## 2019-04-14 RX ADMIN — MILRINONE LACTATE 5.87 MICROGRAM(S)/KG/MIN: 1 INJECTION, SOLUTION INTRAVENOUS at 16:29

## 2019-04-14 RX ADMIN — Medication 2 TABLET(S): at 07:46

## 2019-04-14 RX ADMIN — Medication 650 MILLIGRAM(S): at 09:57

## 2019-04-14 RX ADMIN — Medication 650 MILLIGRAM(S): at 10:23

## 2019-04-14 RX ADMIN — Medication 10 MILLIGRAM(S): at 12:19

## 2019-04-14 RX ADMIN — Medication 50 MILLIGRAM(S): at 05:12

## 2019-04-14 RX ADMIN — Medication 100 MILLIGRAM(S): at 21:12

## 2019-04-14 RX ADMIN — SIMETHICONE 80 MILLIGRAM(S): 80 TABLET, CHEWABLE ORAL at 07:47

## 2019-04-14 RX ADMIN — HEPARIN SODIUM 900 UNIT(S)/HR: 5000 INJECTION INTRAVENOUS; SUBCUTANEOUS at 18:39

## 2019-04-14 RX ADMIN — MILRINONE LACTATE 5.87 MICROGRAM(S)/KG/MIN: 1 INJECTION, SOLUTION INTRAVENOUS at 01:03

## 2019-04-14 RX ADMIN — FAMOTIDINE 20 MILLIGRAM(S): 10 INJECTION INTRAVENOUS at 11:14

## 2019-04-14 RX ADMIN — MILRINONE LACTATE 2.93 MICROGRAM(S)/KG/MIN: 1 INJECTION, SOLUTION INTRAVENOUS at 16:59

## 2019-04-14 RX ADMIN — Medication 50 MILLIGRAM(S): at 21:12

## 2019-04-14 RX ADMIN — HEPARIN SODIUM 0 UNIT(S)/HR: 5000 INJECTION INTRAVENOUS; SUBCUTANEOUS at 11:12

## 2019-04-14 RX ADMIN — LACTULOSE 10 GRAM(S): 10 SOLUTION ORAL at 13:31

## 2019-04-14 RX ADMIN — Medication 100 MILLIGRAM(S): at 13:26

## 2019-04-14 RX ADMIN — MILRINONE LACTATE 5.87 MICROGRAM(S)/KG/MIN: 1 INJECTION, SOLUTION INTRAVENOUS at 11:14

## 2019-04-14 NOTE — PROGRESS NOTE ADULT - SUBJECTIVE AND OBJECTIVE BOX
Subjective:    Medications:  calcium carbonate    500 mG (Tums) Chewable 2 Tablet(s) Chew three times a day PRN  docusate sodium 100 milliGRAM(s) Oral three times a day  ergocalciferol 51987 Unit(s) Oral every week  famotidine    Tablet 20 milliGRAM(s) Oral daily  furosemide    Tablet 80 milliGRAM(s) Oral daily  heparin  Infusion.  Unit(s)/Hr IV Continuous <Continuous>  heparin  Injectable 6500 Unit(s) IV Push every 6 hours PRN  heparin  Injectable 6500 Unit(s) IV Push every 6 hours PRN  heparin  Injectable 3000 Unit(s) IV Push every 6 hours PRN  hydrALAZINE 50 milliGRAM(s) Oral every 8 hours  milrinone Infusion 0.25 MICROgram(s)/kG/Min IV Continuous <Continuous>  multivitamin 1 Tablet(s) Oral daily  polyethylene glycol 3350 17 Gram(s) Oral daily  simethicone 80 milliGRAM(s) Chew three times a day PRN    Vitals:  T(C): 36.8 (19 @ 08:16), Max: 36.8 (19 @ 08:16)  HR: 98 (19 @ 08:16) (95 - 111)  BP: 160/94 (19 @ 08:16) (130/86 - 160/94)  RR: 17 (19 @ 08:16) (17 - 18)  SpO2: 98% (19 @ 08:16) (97% - 100%)    Daily Weight in k.2 (2019 10:39)        I&O's Summary    2019 07:  -  2019 07:00  --------------------------------------------------------  IN: 1222.2 mL / OUT: 4125 mL / NET: -2902.8 mL    2019 07:  -  2019 08:54  --------------------------------------------------------  IN: 0 mL / OUT: 450 mL / NET: -450 mL        Physical Exam:  Appearance: No Acute Distress  HEENT: JVP ___ cm H2O, no HJR  Cardiovascular: RRR, Normal S1 S2, No murmurs/rubs/gallops  Respiratory: Clear to auscultation bilaterally  Gastrointestinal: Soft, Non-tender, non-distended	  Skin: no skin lesions  Neurologic: Non-focal  Extremities: No LE edema, warm and well perfused  Psychiatry: A & O x 3, Mood & affect appropriate      Labs:                        12.1   13.15 )-----------( 233      ( 2019 09:39 )             36.1     04-14    127<L>  |  74<L>  |  70<H>  ----------------------------<  221<H>  3.6   |  34<H>  |  2.10<H>    Ca    9.4      2019 07:14  Phos  3.3     04-14  Mg     2.5     04-14        PT/INR - ( 2019 05:10 )   PT: 13.4 sec;   INR: 1.16 ratio         PTT - ( 2019 04:03 )  PTT:56.3 sec Subjective: No acute events overnight.     Medications:  calcium carbonate    500 mG (Tums) Chewable 2 Tablet(s) Chew three times a day PRN  docusate sodium 100 milliGRAM(s) Oral three times a day  ergocalciferol 31629 Unit(s) Oral every week  famotidine    Tablet 20 milliGRAM(s) Oral daily  furosemide    Tablet 80 milliGRAM(s) Oral daily  heparin  Infusion.  Unit(s)/Hr IV Continuous <Continuous>  heparin  Injectable 6500 Unit(s) IV Push every 6 hours PRN  heparin  Injectable 6500 Unit(s) IV Push every 6 hours PRN  heparin  Injectable 3000 Unit(s) IV Push every 6 hours PRN  hydrALAZINE 50 milliGRAM(s) Oral every 8 hours  milrinone Infusion 0.25 MICROgram(s)/kG/Min IV Continuous <Continuous>  multivitamin 1 Tablet(s) Oral daily  polyethylene glycol 3350 17 Gram(s) Oral daily  simethicone 80 milliGRAM(s) Chew three times a day PRN    Vitals:  T(C): 36.8 (19 @ 08:16), Max: 36.8 (19 @ 08:16)  HR: 98 (19 @ 08:16) (95 - 111)  BP: 160/94 (19 @ 08:16) (130/86 - 160/94)  RR: 17 (19 @ 08:16) (17 - 18)  SpO2: 98% (19 @ 08:16) (97% - 100%)    Daily Weight in k.2 (2019 10:39)        I&O's Summary    2019 07:  -  2019 07:00  --------------------------------------------------------  IN: 1222.2 mL / OUT: 4125 mL / NET: -2902.8 mL    2019 07:  -  2019 08:54  --------------------------------------------------------  IN: 0 mL / OUT: 450 mL / NET: -450 mL        Physical Exam:  Appearance: No Acute Distress  HEENT: JVP not visualized  Cardiovascular: RRR, Normal S1 S2, No murmurs/rubs/gallops  Respiratory: Clear to auscultation bilaterally  Gastrointestinal: Soft, Non-tender, non-distended	  Skin: no skin lesions  Neurologic: Non-focal  Extremities: No LE edema, warm and well perfused  Psychiatry: A & O x 3, Mood & affect appropriate      Labs:                        12.1   13.15 )-----------( 233      ( 2019 09:39 )             36.1     04-14    127<L>  |  74<L>  |  70<H>  ----------------------------<  221<H>  3.6   |  34<H>  |  2.10<H>    Ca    9.4      2019 07:14  Phos  3.3     04-14  Mg     2.5     04-14        PT/INR - ( 2019 05:10 )   PT: 13.4 sec;   INR: 1.16 ratio         PTT - ( 2019 04:03 )  PTT:56.3 sec

## 2019-04-14 NOTE — PROGRESS NOTE ADULT - ASSESSMENT
79-year-old gentleman, past medical history of diabetes, hypertension presents with decompensated heart failure along with rapid atrial fibrillation.  Renal failure is new and likely has cardiorenal syndrome.     Hypervolemic hyponatremia  Vtach: HR <100 in 24 hrs   Hepatitis   Hypokalemia  Hypophosphotemia       1.  Cardiology:   Milrinone gtt and lasix gtt.-    More Vtach.  Move pt to CCU?    2.  Hyponatremia due to hypervolemic: s/p  Samsca 30mg po x 1 and  Urea-Na+ 30grams x 1-today Na+ raised to 127      -  Avoid excessive liquids without calories   3. Volume status: no wt gained in 24hrs. UOP  4.1L/24hrs    4. Trend serum K+ daily with lasix - K+ 3.6  today s/p  KDUR 20 mEq x 1 yeste.: Today give KDUR 40 mEq x1 dose for now     5. Hypophosphotemia: PO4 3.3  today s/p PHos-NaK    4. Transaminitis- hepatitis panel negative; CT A/P with no acute abdominal pathology    5. trend BMP

## 2019-04-14 NOTE — PROGRESS NOTE ADULT - ASSESSMENT
79y.o. Male with no PMH now admitted with new HFrEF (TTE 04/04/2019 with LVEF 25%, biventricular dysfunction, moderate MR, moderate TR, mild IN) and AFIB with RVR, abdominal discomfort/nausea concerning for hepatic congestion in the setting of ADHF (BNP>50K), now s/p LHC/RHC (non-obstructive CAD). RHC:  RA 15, RV 58/14, PA 59/34/43, Wedge 33 PA sat 44%, CI 1.57. Pt has received tolvaptan, initiated on milrinone and lasix gtt (now transitioned to PO therapy) with improvement in symptoms.

## 2019-04-14 NOTE — PROGRESS NOTE ADULT - PROBLEM SELECTOR PLAN 3
ELVA from low perfusion and diuretics, continue to monitor on oral Lasix and Milrinone drip for now.   Monitor renal function.

## 2019-04-14 NOTE — PROGRESS NOTE ADULT - SUBJECTIVE AND OBJECTIVE BOX
Patient is a 79y old  Male who presents with a chief complaint of dyspnea, abdominal swelling (06 Apr 2019 14:22)      SUBJECTIVE / OVERNIGHT EVENTS:  Patient states that he feels much improved, reports constipation.     Still with abdominal and flank distension.  Tele show a.fib @110/mt with pvcs.      T(C): 36.3 (04-14-19 @ 20:05), Max: 36.7 (04-14-19 @ 12:00)  HR: 115 (04-14-19 @ 20:05) (101 - 115)  BP: 146/95 (04-14-19 @ 20:05) (143/89 - 154/89)  RR: 17 (04-14-19 @ 20:05) (17 - 17)  SpO2: 99% (04-14-19 @ 20:05) (98% - 100%)    MEDICATIONS  (STANDING):  docusate sodium 100 milliGRAM(s) Oral three times a day  ergocalciferol 11536 Unit(s) Oral every week  famotidine    Tablet 20 milliGRAM(s) Oral daily  furosemide    Tablet 80 milliGRAM(s) Oral daily  heparin  Infusion.  Unit(s)/Hr (14 mL/Hr) IV Continuous <Continuous>  hydrALAZINE 50 milliGRAM(s) Oral every 8 hours  milrinone Infusion 0.125 MICROgram(s)/kG/Min (2.933 mL/Hr) IV Continuous <Continuous>  multivitamin 1 Tablet(s) Oral daily  polyethylene glycol 3350 17 Gram(s) Oral daily  senna 2 Tablet(s) Oral at bedtime    MEDICATIONS  (PRN):  acetaminophen   Tablet .. 650 milliGRAM(s) Oral every 6 hours PRN Mild Pain (1 - 3)  calcium carbonate    500 mG (Tums) Chewable 2 Tablet(s) Chew three times a day PRN Heartburn  heparin  Injectable 6500 Unit(s) IV Push every 6 hours PRN For aPTT less than 40  heparin  Injectable 6500 Unit(s) IV Push every 6 hours PRN For aPTT less than 40  heparin  Injectable 3000 Unit(s) IV Push every 6 hours PRN For aPTT between 40 - 57  simethicone 80 milliGRAM(s) Chew three times a day PRN Dyspepsia  PHYSICAL EXAM:      GENERAL: NAD, well-developed  HEAD:  Atraumatic, Normocephalic  EYES: EOMI, conjunctiva and sclera clear  NECK: Supple, No JVD  CHEST/LUNG: crackles at bases, no wheeze  HEART: Irregular rate and rhythm; No murmurs, rubs, or gallops  ABDOMEN: Soft, Nontender, distended, Bowel sounds present, flank and abdominal edema  EXTREMITIES:  2+ Peripheral Pulses, No clubbing, cyanosis, or edema  PSYCH: AAOx3  NEUROLOGY: non-focal, tremors  present   SKIN: No rashes or lesions                                              13.0   15.73 )-----------( 277      ( 14 Apr 2019 10:19 )             40.0             PT/INR - ( 13 Apr 2019 05:10 )   PT: 13.4 sec;   INR: 1.16 ratio         PTT - ( 14 Apr 2019 18:13 )  PTT:52.7 sec  123|74|66<218  4.4|32|1.90  9.8,--,--  04-14 @ 18:13  127|74|70<221  3.6|34|2.10  9.4,2.5,3.3  04-14 @ 07:14            PT/INR - ( 13 Apr 2019 05:10 )   PT: 13.4 sec;   INR: 1.16 ratio         PTT - ( 13 Apr 2019 05:10 )  PTT:34.5 sec  122|72|76<206  3.7|34|2.15  9.3,--,--  04-13 @ 06:44  119|<70|62<752  3.2|26|1.91  7.7,--,--  04-13 @ 05:10  121|72|82<200  3.9|34|2.21  9.0,2.4,2.4  04-13 @ 00:40  121|72|85<203  4.1|35|2.36  9.3,--,--  04-12 @ 18:23  RADIOLOGY & ADDITIONAL TESTS:    Imaging Personally Reviewed:    Consultant(s) Notes Reviewed:  cards     Care Discussed with Consultants/Other Providers:

## 2019-04-14 NOTE — PROGRESS NOTE ADULT - SUBJECTIVE AND OBJECTIVE BOX
Patient seen and examined. Lying in bed. Feel better than yesterday. Denies chest pain.  Wife at bedside.    REVIEW OF SYSTEMS:  As per HPI, otherwise 8 full 10 ROS were unremarkable    MEDICATIONS  (STANDING):  docusate sodium 100 milliGRAM(s) Oral three times a day  ergocalciferol 73837 Unit(s) Oral every week  famotidine    Tablet 20 milliGRAM(s) Oral daily  furosemide    Tablet 80 milliGRAM(s) Oral daily  heparin  Infusion.  Unit(s)/Hr (14 mL/Hr) IV Continuous <Continuous>  hydrALAZINE 50 milliGRAM(s) Oral every 8 hours  milrinone Infusion 0.25 MICROgram(s)/kG/Min (5.865 mL/Hr) IV Continuous <Continuous>  multivitamin 1 Tablet(s) Oral daily  polyethylene glycol 3350 17 Gram(s) Oral daily  senna 2 Tablet(s) Oral at bedtime      VITAL:  T(C): , Max: 36.8 (04-14-19 @ 08:16)  T(F): , Max: 98.3 (04-14-19 @ 08:16)  HR: 105 (04-14-19 @ 16:05)  BP: 154/89 (04-14-19 @ 16:05)  BP(mean): --  RR: 17 (04-14-19 @ 16:05)  SpO2: 100% (04-14-19 @ 16:05)  Wt(kg): --    I and O's:    04-13 @ 07:01  -  04-14 @ 07:00  --------------------------------------------------------  IN: 1222.2 mL / OUT: 4125 mL / NET: -2902.8 mL    04-14 @ 07:01  -  04-14 @ 16:19  --------------------------------------------------------  IN: 458.6 mL / OUT: 850 mL / NET: -391.4 mL          PHYSICAL EXAM:    Constitutional: NAD  Neck: No JVD  Respiratory: CTAB  Cardiovascular: S1 and S2  Gastrointestinal: BS+, soft, NT/ND  Extremities: No peripheral edema  Neurological: A/O x 3, no focal deficits  Psychiatric: Normal mood, normal affect  : No Aguirre        LABS:                        13.0   15.73 )-----------( 277      ( 14 Apr 2019 10:19 )             40.0     04-14    127<L>  |  74<L>  |  70<H>  ----------------------------<  221<H>  3.6   |  34<H>  |  2.10<H>    Ca    9.4      14 Apr 2019 07:14  Phos  3.3     04-14  Mg     2.5     04-14

## 2019-04-14 NOTE — PROGRESS NOTE ADULT - PROBLEM SELECTOR PLAN 2
-ECHO shows EF 23% with right and left ventricular dysfunction, elevated pulm pressures  - Continue with oral Lasix and Midodrine drip as per CHF team.   - - Continue with Metolazone.   - Cath show non obstructive coronaries.   - Monitor on tele, monitor UOP, strict I+O and daily weight  -Discussed plan at length with patient and family.

## 2019-04-14 NOTE — PROGRESS NOTE ADULT - PROBLEM SELECTOR PLAN 1
No repeat episodes of WCT, continue to monitor on tele.    EP following, keep K >4 and Mg >2  - Strict I/Os  - Daily standing weights

## 2019-04-14 NOTE — PROGRESS NOTE ADULT - PROBLEM SELECTOR PLAN 1
- c/w Milrinone 0.25 and Lasix 10mg/hr  - Keep K >4 and Mg >2  - Strict I/Os  - Daily standing weights  - BMP BID  - Received Tolvaptan 15 and then 30mg today.  - Please increase Hydralazine to 35mg TID as long as SBPs remain above 100. - decrease Milrinone to 0.125 and continue current PO diuretics   - Keep K >4 and Mg >2  - Strict I/Os  - Daily standing weights  - BMP BID  - Received Tolvaptan 15 and then 30mg today.  - Please increase Hydralazine to 35mg TID as long as SBPs remain above 100.

## 2019-04-14 NOTE — PROGRESS NOTE ADULT - ASSESSMENT
78 y/o male with no known PMH who presented with abdominal distension and severe HEAD likely 2/2 to new acute on chronic HF, found to be in Afib with RVR, along with transaminitis and ELVA- likely cardiorenal syndrome.

## 2019-04-15 DIAGNOSIS — K59.00 CONSTIPATION, UNSPECIFIED: ICD-10-CM

## 2019-04-15 LAB
ALBUMIN SERPL ELPH-MCNC: 3.9 G/DL — SIGNIFICANT CHANGE UP (ref 3.3–5)
ALP SERPL-CCNC: 104 U/L — SIGNIFICANT CHANGE UP (ref 40–120)
ALT FLD-CCNC: 118 U/L — HIGH (ref 10–45)
ANION GAP SERPL CALC-SCNC: 14 MMOL/L — SIGNIFICANT CHANGE UP (ref 5–17)
ANION GAP SERPL CALC-SCNC: 16 MMOL/L — SIGNIFICANT CHANGE UP (ref 5–17)
APTT BLD: 68 SEC — HIGH (ref 27.5–36.3)
APTT BLD: 70.4 SEC — HIGH (ref 27.5–36.3)
APTT BLD: 97.3 SEC — HIGH (ref 27.5–36.3)
AST SERPL-CCNC: 64 U/L — HIGH (ref 10–40)
BILIRUB DIRECT SERPL-MCNC: 0.2 MG/DL — SIGNIFICANT CHANGE UP (ref 0–0.2)
BILIRUB INDIRECT FLD-MCNC: 0.5 MG/DL — SIGNIFICANT CHANGE UP (ref 0.2–1)
BILIRUB SERPL-MCNC: 0.7 MG/DL — SIGNIFICANT CHANGE UP (ref 0.2–1.2)
BUN SERPL-MCNC: 58 MG/DL — HIGH (ref 7–23)
BUN SERPL-MCNC: 62 MG/DL — HIGH (ref 7–23)
CALCIUM SERPL-MCNC: 10 MG/DL — SIGNIFICANT CHANGE UP (ref 8.4–10.5)
CALCIUM SERPL-MCNC: 9.8 MG/DL — SIGNIFICANT CHANGE UP (ref 8.4–10.5)
CHLORIDE SERPL-SCNC: 76 MMOL/L — LOW (ref 96–108)
CHLORIDE SERPL-SCNC: 78 MMOL/L — LOW (ref 96–108)
CO2 SERPL-SCNC: 33 MMOL/L — HIGH (ref 22–31)
CO2 SERPL-SCNC: 34 MMOL/L — HIGH (ref 22–31)
CREAT SERPL-MCNC: 1.82 MG/DL — HIGH (ref 0.5–1.3)
CREAT SERPL-MCNC: 1.84 MG/DL — HIGH (ref 0.5–1.3)
GLUCOSE SERPL-MCNC: 193 MG/DL — HIGH (ref 70–99)
GLUCOSE SERPL-MCNC: 208 MG/DL — HIGH (ref 70–99)
HCT VFR BLD CALC: 39.3 % — SIGNIFICANT CHANGE UP (ref 39–50)
HGB BLD-MCNC: 13 G/DL — SIGNIFICANT CHANGE UP (ref 13–17)
INR BLD: 1.02 RATIO — SIGNIFICANT CHANGE UP (ref 0.88–1.16)
MAGNESIUM SERPL-MCNC: 2.6 MG/DL — SIGNIFICANT CHANGE UP (ref 1.6–2.6)
MCHC RBC-ENTMCNC: 27.8 PG — SIGNIFICANT CHANGE UP (ref 27–34)
MCHC RBC-ENTMCNC: 33.1 GM/DL — SIGNIFICANT CHANGE UP (ref 32–36)
MCV RBC AUTO: 84 FL — SIGNIFICANT CHANGE UP (ref 80–100)
PHOSPHATE SERPL-MCNC: 3 MG/DL — SIGNIFICANT CHANGE UP (ref 2.5–4.5)
PLATELET # BLD AUTO: 276 K/UL — SIGNIFICANT CHANGE UP (ref 150–400)
POTASSIUM SERPL-MCNC: 4.1 MMOL/L — SIGNIFICANT CHANGE UP (ref 3.5–5.3)
POTASSIUM SERPL-MCNC: 4.7 MMOL/L — SIGNIFICANT CHANGE UP (ref 3.5–5.3)
POTASSIUM SERPL-SCNC: 4.1 MMOL/L — SIGNIFICANT CHANGE UP (ref 3.5–5.3)
POTASSIUM SERPL-SCNC: 4.7 MMOL/L — SIGNIFICANT CHANGE UP (ref 3.5–5.3)
PROT SERPL-MCNC: 7.7 G/DL — SIGNIFICANT CHANGE UP (ref 6–8.3)
PROTHROM AB SERPL-ACNC: 11.5 SEC — SIGNIFICANT CHANGE UP (ref 10–13.1)
RBC # BLD: 4.68 M/UL — SIGNIFICANT CHANGE UP (ref 4.2–5.8)
RBC # FLD: 13.9 % — SIGNIFICANT CHANGE UP (ref 10.3–14.5)
SODIUM SERPL-SCNC: 125 MMOL/L — LOW (ref 135–145)
SODIUM SERPL-SCNC: 126 MMOL/L — LOW (ref 135–145)
WBC # BLD: 15 K/UL — HIGH (ref 3.8–10.5)
WBC # FLD AUTO: 15 K/UL — HIGH (ref 3.8–10.5)

## 2019-04-15 PROCEDURE — 99233 SBSQ HOSP IP/OBS HIGH 50: CPT

## 2019-04-15 PROCEDURE — 93926 LOWER EXTREMITY STUDY: CPT | Mod: 26,RT

## 2019-04-15 PROCEDURE — 99233 SBSQ HOSP IP/OBS HIGH 50: CPT | Mod: GC

## 2019-04-15 RX ORDER — BUMETANIDE 0.25 MG/ML
2 INJECTION INTRAMUSCULAR; INTRAVENOUS EVERY 12 HOURS
Qty: 0 | Refills: 0 | Status: DISCONTINUED | OUTPATIENT
Start: 2019-04-15 | End: 2019-04-16

## 2019-04-15 RX ADMIN — Medication 80 MILLIGRAM(S): at 05:15

## 2019-04-15 RX ADMIN — Medication 1 TABLET(S): at 11:32

## 2019-04-15 RX ADMIN — HEPARIN SODIUM 900 UNIT(S)/HR: 5000 INJECTION INTRAVENOUS; SUBCUTANEOUS at 08:23

## 2019-04-15 RX ADMIN — MILRINONE LACTATE 2.93 MICROGRAM(S)/KG/MIN: 1 INJECTION, SOLUTION INTRAVENOUS at 08:24

## 2019-04-15 RX ADMIN — SIMETHICONE 80 MILLIGRAM(S): 80 TABLET, CHEWABLE ORAL at 14:05

## 2019-04-15 RX ADMIN — MILRINONE LACTATE 2.93 MICROGRAM(S)/KG/MIN: 1 INJECTION, SOLUTION INTRAVENOUS at 18:46

## 2019-04-15 RX ADMIN — Medication 50 MILLIGRAM(S): at 05:15

## 2019-04-15 RX ADMIN — Medication 100 MILLIGRAM(S): at 21:41

## 2019-04-15 RX ADMIN — FAMOTIDINE 20 MILLIGRAM(S): 10 INJECTION INTRAVENOUS at 11:32

## 2019-04-15 RX ADMIN — SENNA PLUS 2 TABLET(S): 8.6 TABLET ORAL at 21:41

## 2019-04-15 RX ADMIN — POLYETHYLENE GLYCOL 3350 17 GRAM(S): 17 POWDER, FOR SOLUTION ORAL at 11:33

## 2019-04-15 RX ADMIN — BUMETANIDE 2 MILLIGRAM(S): 0.25 INJECTION INTRAMUSCULAR; INTRAVENOUS at 18:51

## 2019-04-15 RX ADMIN — Medication 50 MILLIGRAM(S): at 21:41

## 2019-04-15 RX ADMIN — Medication 100 MILLIGRAM(S): at 05:15

## 2019-04-15 RX ADMIN — HEPARIN SODIUM 900 UNIT(S)/HR: 5000 INJECTION INTRAVENOUS; SUBCUTANEOUS at 01:21

## 2019-04-15 RX ADMIN — MILRINONE LACTATE 2.93 MICROGRAM(S)/KG/MIN: 1 INJECTION, SOLUTION INTRAVENOUS at 23:30

## 2019-04-15 RX ADMIN — Medication 100 MILLIGRAM(S): at 13:21

## 2019-04-15 RX ADMIN — Medication 50 MILLIGRAM(S): at 13:21

## 2019-04-15 NOTE — PROGRESS NOTE ADULT - ASSESSMENT
80 y/o male with no known PMH who presented with abdominal distension and severe HEAD likely 2/2 to new acute on chronic HF, found to be in Afib with RVR, along with transaminitis and ELVA- likely cardiorenal syndrome.

## 2019-04-15 NOTE — PROGRESS NOTE ADULT - PROBLEM SELECTOR PLAN 2
ECHO: LA size 5.7cm, severe global LV systolic dysfunction, right ventricular enlargement with decreased RV systolic fxn.  BNP on admission +50, 000  - On IV Milrinone/ PO Lasix   - Daily weight, I& O.

## 2019-04-15 NOTE — PROGRESS NOTE ADULT - PROBLEM SELECTOR PLAN 1
Repeat episodes of WCT, likely from Milrinone drip, continue to monitor on tele.    EP following, keep K >4 and Mg >2  - Strict I/Os  - Daily standing weights

## 2019-04-15 NOTE — PROGRESS NOTE ADULT - ASSESSMENT
79y.o. Male with no PMH now admitted with new HFrEF (TTE 04/04/2019 with LVEF 25%, biventricular dysfunction, moderate MR, moderate TR, mild ND) and AFIB with RVR, abdominal discomfort/nausea concerning for hepatic congestion in the setting of ADHF (BNP>50K), now s/p LHC/RHC (non-obstructive CAD). RHC:  RA 15, RV 58/14, PA 59/34/43, Wedge 33 PA sat 44%, CI 1.57. Pt has received tolvaptan, initiated on milrinone and lasix gtt (now transitioned to PO therapy) with improvement in symptoms.        Problem/Plan - 1:  ·  Problem: Acute systolic heart failure, first episode.  Plan: - decrease Milrinone to 0.125 and continue current PO diuretics   - Keep K >4 and Mg >2  - Strict I/Os  - Daily standing weights  - BMP BID  - Increase hydralazine as tolerated, currently on 50mg PO TID     Problem/Plan - 2:  ·  Problem: ELVA (acute kidney injury).  Plan: likely 2/2 to cardiorenal/low output   - continue to trend  - avoid nephrotoxins.      Problem/Plan - 3:  ·  Problem: Atrial fibrillation, unspecified type. Patient in afib in 90-120s.  Plan: - c/w heparin gtt  - Cardioversion when more euvolemic.   - f/u EP  - monitor on tele. 79y.o. Male with no PMH now admitted with new HFrEF (TTE 04/04/2019 with LVEF 25%, biventricular dysfunction, moderate MR, moderate TR, mild ND) and AFIB with RVR, abdominal discomfort/nausea concerning for hepatic congestion in the setting of ADHF (BNP>50K), now s/p LHC/RHC (non-obstructive CAD). RHC:  RA 15, RV 58/14, PA 59/34/43, Wedge 33 PA sat 44%, CI 1.57. Pt has received tolvaptan, initiated on milrinone and lasix gtt (now transitioned to PO therapy) with improvement in symptoms.        Problem/Plan - 1:  ·  Problem: Acute systolic heart failure, first episode.  Plan: - continue Milrinone to 0.125 and change diuretics to bumex 2mg daily  - Keep K >4 and Mg >2  - Strict I/Os  - Daily standing weights  - BMP BID  - Increase hydralazine as tolerated, currently on 50mg PO TID. Would increase to 75mg TID.     Problem/Plan - 2:  ·  Problem: ELVA (acute kidney injury).  Plan: likely 2/2 to cardiorenal/low output   - continue to trend  - avoid nephrotoxins.      Problem/Plan - 3:  ·  Problem: Atrial fibrillation, unspecified type. Patient in afib in 90-120s.  Plan: - c/w heparin gtt, would hold on BB for now  - Cardioversion when more euvolemic.   - f/u EP  - monitor on tele.

## 2019-04-15 NOTE — PROGRESS NOTE ADULT - PROBLEM SELECTOR PLAN 4
- continue to monitor LFT's  -Recall EP once patient is medically optimized for RICH/ Cardioversion.     TOD Lawton  [phone]

## 2019-04-15 NOTE — PROGRESS NOTE ADULT - SUBJECTIVE AND OBJECTIVE BOX
24H hour events: Patient states improvement in SOB. Denies c/o CP, palpitations or dizziness.     MEDICATIONS:  furosemide    Tablet 80 milliGRAM(s) Oral daily  heparin  Infusion.  Unit(s)/Hr IV Continuous <Continuous>  heparin  Injectable 6500 Unit(s) IV Push every 6 hours PRN  heparin  Injectable 3000 Unit(s) IV Push every 6 hours PRN  hydrALAZINE 50 milliGRAM(s) Oral every 8 hours  milrinone Infusion 0.125 MICROgram(s)/kG/Min IV Continuous <Continuous>    acetaminophen   Tablet .. 650 milliGRAM(s) Oral every 6 hours PRN    calcium carbonate    500 mG (Tums) Chewable 2 Tablet(s) Chew three times a day PRN  docusate sodium 100 milliGRAM(s) Oral three times a day  famotidine    Tablet 20 milliGRAM(s) Oral daily  polyethylene glycol 3350 17 Gram(s) Oral daily  senna 2 Tablet(s) Oral at bedtime  simethicone 80 milliGRAM(s) Chew three times a day PRN      ergocalciferol 99268 Unit(s) Oral every week  multivitamin 1 Tablet(s) Oral daily      REVIEW OF SYSTEMS:  Complete 10point ROS negative.    PHYSICAL EXAM:  T(C): 37.1 (04-15-19 @ 11:46), Max: 37.1 (04-15-19 @ 11:46)  HR: 90 (04-15-19 @ 13:15) (60 - 115)  BP: 151/90 (04-15-19 @ 13:15) (144/78 - 158/85)  RR: 18 (04-15-19 @ 11:46) (17 - 18)  SpO2: 98% (04-15-19 @ 11:46) (98% - 100%)    14 Apr 2019 07:01  -  15 Apr 2019 07:00  --------------------------------------------------------  IN: 1116.9 mL / OUT: 2770 mL / NET: -1653.1 mL    15 Apr 2019 07:01  -  15 Apr 2019 14:01  --------------------------------------------------------  IN: 350 mL / OUT: 0 mL / NET: 350 mL    Appearance: Normal	  Cardiovascular: Normal S1 S2, irregular.   Respiratory: Lungs diminished at BL/ bases   Psychiatry: A & O x 3, Mood & affect appropriate  Gastrointestinal:  Soft, Non-tender, + BS	  Extremities: Normal range of motion, trace BL/LE edema.   Vascular: Peripheral pulses palpable 2+ bilaterally      LABS:	 	    CBC Full  -  ( 15 Apr 2019 11:22 )  WBC Count : 15.00 K/uL  Hemoglobin : 13.0 g/dL  Hematocrit : 39.3 %  Platelet Count - Automated : 276 K/uL  Mean Cell Volume : 84.0 fl  Mean Cell Hemoglobin : 27.8 pg  Mean Cell Hemoglobin Concentration : 33.1 gm/dL  Auto Neutrophil # : x  Auto Lymphocyte # : x  Auto Monocyte # : x  Auto Eosinophil # : x  Auto Basophil # : x  Auto Neutrophil % : x  Auto Lymphocyte % : x  Auto Monocyte % : x  Auto Eosinophil % : x  Auto Basophil % : x    04-15    126<L>  |  76<L>  |  62<H>  ----------------------------<  208<H>  4.7   |  34<H>  |  1.82<H>  04-14    123<L>  |  74<L>  |  66<H>  ----------------------------<  218<H>  4.4   |  32<H>  |  1.90<H>    Ca    9.8      15 Apr 2019 07:20  Ca    9.8      14 Apr 2019 18:13  Phos  3.0     04-15  Phos  3.3     04-14  Mg     2.6     04-15  Mg     2.5     04-14        TELEMETRY: 	  AFib

## 2019-04-15 NOTE — PROGRESS NOTE ADULT - SUBJECTIVE AND OBJECTIVE BOX
Overnight Events:       Medications:  acetaminophen   Tablet .. 650 milliGRAM(s) Oral every 6 hours PRN  calcium carbonate    500 mG (Tums) Chewable 2 Tablet(s) Chew three times a day PRN  docusate sodium 100 milliGRAM(s) Oral three times a day  ergocalciferol 67716 Unit(s) Oral every week  famotidine    Tablet 20 milliGRAM(s) Oral daily  furosemide    Tablet 80 milliGRAM(s) Oral daily  heparin  Infusion.  Unit(s)/Hr IV Continuous <Continuous>  heparin  Injectable 6500 Unit(s) IV Push every 6 hours PRN  heparin  Injectable 3000 Unit(s) IV Push every 6 hours PRN  hydrALAZINE 50 milliGRAM(s) Oral every 8 hours  milrinone Infusion 0.125 MICROgram(s)/kG/Min IV Continuous <Continuous>  multivitamin 1 Tablet(s) Oral daily  polyethylene glycol 3350 17 Gram(s) Oral daily  senna 2 Tablet(s) Oral at bedtime  simethicone 80 milliGRAM(s) Chew three times a day PRN      PAST MEDICAL & SURGICAL HISTORY:  No pertinent past medical history  No significant past surgical history      Vitals:  T(F): 98.8 (04-15), Max: 98.8 (04-15)  HR: 90 (04-15) (60 - 115)  BP: 151/90 (04-15) (144/78 - 158/85)  RR: 18 (04-15)  SpO2: 98% (04-15)  I&O's Summary    14 Apr 2019 07:01  -  15 Apr 2019 07:00  --------------------------------------------------------  IN: 1116.9 mL / OUT: 2770 mL / NET: -1653.1 mL    15 Apr 2019 07:01  -  15 Apr 2019 16:33  --------------------------------------------------------  IN: 650 mL / OUT: 600 mL / NET: 50 mL        Physical Exam:  Appearance: No acute distress; well appearing  Eyes: PERRL, EOMI, pink conjunctiva  HENT: Normal oral muscosa  Cardiovascular: RRR, S1, S2, no murmurs, rubs, or gallops; no edema; no JVD  Respiratory: Clear to auscultation bilaterally  Gastrointestinal: soft, non-tender, non-distended with normal bowel sounds  Musculoskeletal: No clubbing; no joint deformity   Neurologic: Non-focal  Lymphatic: No lymphadenopathy  Psychiatry: AAOx3, mood & affect appropriate  Skin: No rashes, ecchymoses, or cyanosis                          13.0   15.00 )-----------( 276      ( 15 Apr 2019 11:22 )             39.3     04-15    125<L>  |  78<L>  |  58<H>  ----------------------------<  193<H>  4.1   |  33<H>  |  1.84<H>    Ca    10.0      15 Apr 2019 15:03  Phos  3.0     04-15  Mg     2.6     04-15    TPro  7.7  /  Alb  3.9  /  TBili  0.7  /  DBili  0.2  /  AST  64<H>  /  ALT  118<H>  /  AlkPhos  104  04-15    PT/INR - ( 15 Apr 2019 08:56 )   PT: 11.5 sec;   INR: 1.02 ratio         PTT - ( 15 Apr 2019 08:56 )  PTT:68.0 sec              New ECG(s): Personally reviewed    Echo:    Stress Testing:     Cath:    Imaging:    Interpretation of Telemetry: Overnight Events:   Less N/V than yesterday.    Medications:  acetaminophen   Tablet .. 650 milliGRAM(s) Oral every 6 hours PRN  calcium carbonate    500 mG (Tums) Chewable 2 Tablet(s) Chew three times a day PRN  docusate sodium 100 milliGRAM(s) Oral three times a day  ergocalciferol 66977 Unit(s) Oral every week  famotidine    Tablet 20 milliGRAM(s) Oral daily  furosemide    Tablet 80 milliGRAM(s) Oral daily  heparin  Infusion.  Unit(s)/Hr IV Continuous <Continuous>  heparin  Injectable 6500 Unit(s) IV Push every 6 hours PRN  heparin  Injectable 3000 Unit(s) IV Push every 6 hours PRN  hydrALAZINE 50 milliGRAM(s) Oral every 8 hours  milrinone Infusion 0.125 MICROgram(s)/kG/Min IV Continuous <Continuous>  multivitamin 1 Tablet(s) Oral daily  polyethylene glycol 3350 17 Gram(s) Oral daily  senna 2 Tablet(s) Oral at bedtime  simethicone 80 milliGRAM(s) Chew three times a day PRN      PAST MEDICAL & SURGICAL HISTORY:  No pertinent past medical history  No significant past surgical history      Vitals:  T(F): 98.8 (04-15), Max: 98.8 (04-15)  HR: 90 (04-15) (60 - 115)  BP: 151/90 (04-15) (144/78 - 158/85)  RR: 18 (04-15)  SpO2: 98% (04-15)  I&O's Summary    14 Apr 2019 07:01  -  15 Apr 2019 07:00  --------------------------------------------------------  IN: 1116.9 mL / OUT: 2770 mL / NET: -1653.1 mL    15 Apr 2019 07:01  -  15 Apr 2019 16:33  --------------------------------------------------------  IN: 650 mL / OUT: 600 mL / NET: 50 mL        Physical Exam:  Appearance: No acute distress; well appearing  Eyes: PERRL, EOMI, pink conjunctiva  HENT: Normal oral muscosa  Cardiovascular: RRR, S1, S2, no murmurs, rubs, or gallops; no edema; no JVD  Respiratory: Clear to auscultation bilaterally  Gastrointestinal: soft, non-tender, non-distended with normal bowel sounds  Musculoskeletal: No clubbing; no joint deformity   Neurologic: Non-focal  Lymphatic: No lymphadenopathy  Psychiatry: AAOx3, mood & affect appropriate  Skin: No rashes, ecchymoses, or cyanosis                          13.0   15.00 )-----------( 276      ( 15 Apr 2019 11:22 )             39.3     04-15    125<L>  |  78<L>  |  58<H>  ----------------------------<  193<H>  4.1   |  33<H>  |  1.84<H>    Ca    10.0      15 Apr 2019 15:03  Phos  3.0     04-15  Mg     2.6     04-15    TPro  7.7  /  Alb  3.9  /  TBili  0.7  /  DBili  0.2  /  AST  64<H>  /  ALT  118<H>  /  AlkPhos  104  04-15    PT/INR - ( 15 Apr 2019 08:56 )   PT: 11.5 sec;   INR: 1.02 ratio         PTT - ( 15 Apr 2019 08:56 )  PTT:68.0 sec              New ECG(s): Personally reviewed    Echo:    Stress Testing:     Cath:    Imaging:    Interpretation of Telemetry:

## 2019-04-15 NOTE — PROGRESS NOTE ADULT - SUBJECTIVE AND OBJECTIVE BOX
Patient is a 79y old  Male who presents with a chief complaint of dyspnea, abdominal swelling (06 Apr 2019 14:22)      SUBJECTIVE / OVERNIGHT EVENTS:  Patient states that he feels much improved, had bowel movement yesterday.      Still with abdominal and flank distension.    Tele show a.fib @ 80- 110/mt with  8 and 4 episodes of WCT this morning and last night.     T(C): 37.1 (04-15-19 @ 11:46), Max: 37.1 (04-15-19 @ 11:46)  HR: 90 (04-15-19 @ 13:15) (90 - 107)  BP: 151/90 (04-15-19 @ 13:15) (151/90 - 158/85)  RR: 18 (04-15-19 @ 11:46) (18 - 18)  SpO2: 98% (04-15-19 @ 11:46) (98% - 98%)      MEDICATIONS  (STANDING):  docusate sodium 100 milliGRAM(s) Oral three times a day  ergocalciferol 14911 Unit(s) Oral every week  famotidine    Tablet 20 milliGRAM(s) Oral daily  furosemide    Tablet 80 milliGRAM(s) Oral daily  heparin  Infusion.  Unit(s)/Hr (14 mL/Hr) IV Continuous <Continuous>  hydrALAZINE 50 milliGRAM(s) Oral every 8 hours  milrinone Infusion 0.125 MICROgram(s)/kG/Min (2.933 mL/Hr) IV Continuous <Continuous>  multivitamin 1 Tablet(s) Oral daily  polyethylene glycol 3350 17 Gram(s) Oral daily  senna 2 Tablet(s) Oral at bedtime    MEDICATIONS  (PRN):  acetaminophen   Tablet .. 650 milliGRAM(s) Oral every 6 hours PRN Mild Pain (1 - 3)  calcium carbonate    500 mG (Tums) Chewable 2 Tablet(s) Chew three times a day PRN Heartburn  heparin  Injectable 6500 Unit(s) IV Push every 6 hours PRN For aPTT less than 40  heparin  Injectable 3000 Unit(s) IV Push every 6 hours PRN For aPTT between 40 - 57  simethicone 80 milliGRAM(s) Chew three times a day PRN Dyspepsia    PHYSICAL EXAM:      GENERAL: NAD, well-developed  HEAD:  Atraumatic, Normocephalic  EYES: EOMI, conjunctiva and sclera clear  NECK: Supple, No JVD  CHEST/LUNG: crackles at bases, no wheeze  HEART: Irregular rate and rhythm; No murmurs, rubs, or gallops  ABDOMEN: Soft, Nontender, distended, Bowel sounds present, flank and abdominal edema  EXTREMITIES:  2+ Peripheral Pulses, No clubbing, cyanosis, or edema  PSYCH: AAOx3  NEUROLOGY: non-focal, tremors  present   SKIN: No rashes or lesions                                         13.0   15.00 )-----------( 276      ( 15 Apr 2019 11:22 )             39.3             PT/INR - ( 15 Apr 2019 08:56 )   PT: 11.5 sec;   INR: 1.02 ratio         PTT - ( 15 Apr 2019 08:56 )  PTT:68.0 sec  126|76|62<208  4.7|34|1.82  9.8,2.6,3.0  04-15 @ 07:20  123|74|66<218  4.4|32|1.90  9.8,--,--  04-14 @ 18:13                                           RADIOLOGY & ADDITIONAL TESTS:    Imaging Personally Reviewed:    Consultant(s) Notes Reviewed:  cards     Care Discussed with Consultants/Other Providers:

## 2019-04-15 NOTE — PROGRESS NOTE ADULT - ASSESSMENT
79-year-old gentleman, past medical history of diabetes, hypertension presents with decompensated heart failure along with rapid atrial fibrillation.  Renal failure is new and likely has cardiorenal syndrome.     Hypervolemic hyponatremia  Vtach and afib with RVR  Hepatitis          1.  Cardiology:   Milrinone gtt and PO  lasix.  The renal function is improving and the sodium is also improving ;  Can Lopressor be started for heart rate control?     2.  Hyponatremia due to hypervolemic: s/p  Samsca 30mg po x 1 and  Urea-Na+ 30grams x 1; Improving     3. GI-LFT will be added to todays labs(i called)

## 2019-04-15 NOTE — PROGRESS NOTE ADULT - ASSESSMENT
79 year old male denying any past medical or surgical history and reports normal state of health prior  who now present  to [facility] with complaint of progressively worsening SOB over the last 2-3 weeks and noted to have increasing abdominal girth, pain, increased belching and n/v.  Seen in Ed was found to be  in AFIB w/ RVR 's was given IVF and lopresor IV and lopressor 25mg PO x 1, later became rate controlled to HR 90's as per documentation. Pt denies any past history of AFIB, however patient's son who is a physician recalls a past EKG done in Dr. Johnston office may have revealed AFIB. S/P cardiac Cath, which revealed NCA, EP consulted for further management of AFIB.

## 2019-04-15 NOTE — PROGRESS NOTE ADULT - PROBLEM SELECTOR PLAN 1
Tele: AF HR 90's-115  -S/P cardiac Cath, which revealed NCA, elevated pulmonary pressures  - ChadVasc Score= 3 (Age 2, HF 1)  - Currently on Heparin drip, with eventual transition to oral AC once labs stabilize  - Eventual RICH/Cardioversion once medically stable

## 2019-04-15 NOTE — PROGRESS NOTE ADULT - PROBLEM SELECTOR PLAN 2
-ECHO shows EF 23% with right and left ventricular dysfunction, elevated pulm pressures.   - Continue with oral Lasix and Milrinone drip as per CHF team.   - Cath show non obstructive coronaries.   - Monitor on tele, monitor UOP, strict I+O and daily weight  -Discussed plan at length with patient and family.

## 2019-04-15 NOTE — PROGRESS NOTE ADULT - SUBJECTIVE AND OBJECTIVE BOX
NEPHROLOGY-[facility] [phone]        Patient seen and examined in bed.  He felt good   On Milrinone gtt at present         MEDICATIONS  (STANDING):  docusate sodium 100 milliGRAM(s) Oral three times a day  ergocalciferol 79199 Unit(s) Oral every week  famotidine    Tablet 20 milliGRAM(s) Oral daily  furosemide    Tablet 80 milliGRAM(s) Oral daily  heparin  Infusion.  Unit(s)/Hr (14 mL/Hr) IV Continuous <Continuous>  hydrALAZINE 50 milliGRAM(s) Oral every 8 hours  milrinone Infusion 0.125 MICROgram(s)/kG/Min (2.933 mL/Hr) IV Continuous <Continuous>  multivitamin 1 Tablet(s) Oral daily  polyethylene glycol 3350 17 Gram(s) Oral daily  senna 2 Tablet(s) Oral at bedtime      VITAL:  T(C): , Max: 37.1 (04-15-19 @ 11:46)  T(F): , Max: 98.8 (04-15-19 @ 11:46)  HR: 90 (04-15-19 @ 13:15)  BP: 151/90 (04-15-19 @ 13:15)  BP(mean): --  RR: 18 (04-15-19 @ 11:46)  SpO2: 98% (04-15-19 @ 11:46)  Wt(kg): --    I and O's:    04-14 @ 07:01  -  04-15 @ 07:00  --------------------------------------------------------  IN: 1116.9 mL / OUT: 2770 mL / NET: -1653.1 mL    04-15 @ 07:01  -  04-15 @ 13:58  --------------------------------------------------------  IN: 350 mL / OUT: 0 mL / NET: 350 mL          PHYSICAL EXAM:    Constitutional: NAD  Neck:  No JVD  Respiratory: CTAB/L  Cardiovascular: S1 and S2  Gastrointestinal: BS+, soft, NT/ND  Extremities: No peripheral edema  Neurological: A/O x 3, no focal deficits  Psychiatric: Normal mood, normal affect  : No Aguirre  Skin: No rashes  Access: Not applicable    LABS:                        13.0   15.00 )-----------( 276      ( 15 Apr 2019 11:22 )             39.3     04-15    126<L>  |  76<L>  |  62<H>  ----------------------------<  208<H>  4.7   |  34<H>  |  1.82<H>    Ca    9.8      15 Apr 2019 07:20  Phos  3.0     04-15  Mg     2.6     04-15            Urine Studies:          RADIOLOGY & ADDITIONAL STUDIES:

## 2019-04-16 LAB
ANION GAP SERPL CALC-SCNC: 14 MMOL/L — SIGNIFICANT CHANGE UP (ref 5–17)
ANION GAP SERPL CALC-SCNC: 17 MMOL/L — SIGNIFICANT CHANGE UP (ref 5–17)
APTT BLD: 74.6 SEC — HIGH (ref 27.5–36.3)
BUN SERPL-MCNC: 60 MG/DL — HIGH (ref 7–23)
BUN SERPL-MCNC: 78 MG/DL — HIGH (ref 7–23)
CALCIUM SERPL-MCNC: 9.1 MG/DL — SIGNIFICANT CHANGE UP (ref 8.4–10.5)
CALCIUM SERPL-MCNC: 9.8 MG/DL — SIGNIFICANT CHANGE UP (ref 8.4–10.5)
CHLORIDE SERPL-SCNC: 76 MMOL/L — LOW (ref 96–108)
CHLORIDE SERPL-SCNC: 76 MMOL/L — LOW (ref 96–108)
CO2 SERPL-SCNC: 29 MMOL/L — SIGNIFICANT CHANGE UP (ref 22–31)
CO2 SERPL-SCNC: 30 MMOL/L — SIGNIFICANT CHANGE UP (ref 22–31)
CREAT SERPL-MCNC: 1.55 MG/DL — HIGH (ref 0.5–1.3)
CREAT SERPL-MCNC: 1.72 MG/DL — HIGH (ref 0.5–1.3)
CULTURE RESULTS: SIGNIFICANT CHANGE UP
CULTURE RESULTS: SIGNIFICANT CHANGE UP
GLUCOSE SERPL-MCNC: 175 MG/DL — HIGH (ref 70–99)
GLUCOSE SERPL-MCNC: 232 MG/DL — HIGH (ref 70–99)
HCT VFR BLD CALC: 39.3 % — SIGNIFICANT CHANGE UP (ref 39–50)
HGB BLD-MCNC: 12.8 G/DL — LOW (ref 13–17)
INR BLD: 1.01 RATIO — SIGNIFICANT CHANGE UP (ref 0.88–1.16)
KAPPA LC SER QL IFE: 8.28 MG/DL — HIGH (ref 0.33–1.94)
KAPPA/LAMBDA FREE LIGHT CHAIN RATIO, SERUM: 1.11 RATIO — SIGNIFICANT CHANGE UP (ref 0.26–1.65)
LAMBDA LC SER QL IFE: 7.49 MG/DL — HIGH (ref 0.57–2.63)
MAGNESIUM SERPL-MCNC: 2.3 MG/DL — SIGNIFICANT CHANGE UP (ref 1.6–2.6)
MAGNESIUM SERPL-MCNC: 2.5 MG/DL — SIGNIFICANT CHANGE UP (ref 1.6–2.6)
MCHC RBC-ENTMCNC: 27.6 PG — SIGNIFICANT CHANGE UP (ref 27–34)
MCHC RBC-ENTMCNC: 32.6 GM/DL — SIGNIFICANT CHANGE UP (ref 32–36)
MCV RBC AUTO: 84.9 FL — SIGNIFICANT CHANGE UP (ref 80–100)
PHOSPHATE SERPL-MCNC: 2.5 MG/DL — SIGNIFICANT CHANGE UP (ref 2.5–4.5)
PLATELET # BLD AUTO: 276 K/UL — SIGNIFICANT CHANGE UP (ref 150–400)
POTASSIUM SERPL-MCNC: 3.8 MMOL/L — SIGNIFICANT CHANGE UP (ref 3.5–5.3)
POTASSIUM SERPL-MCNC: 4 MMOL/L — SIGNIFICANT CHANGE UP (ref 3.5–5.3)
POTASSIUM SERPL-SCNC: 3.8 MMOL/L — SIGNIFICANT CHANGE UP (ref 3.5–5.3)
POTASSIUM SERPL-SCNC: 4 MMOL/L — SIGNIFICANT CHANGE UP (ref 3.5–5.3)
PROTHROM AB SERPL-ACNC: 11.6 SEC — SIGNIFICANT CHANGE UP (ref 10–12.9)
RBC # BLD: 4.63 M/UL — SIGNIFICANT CHANGE UP (ref 4.2–5.8)
RBC # FLD: 14 % — SIGNIFICANT CHANGE UP (ref 10.3–14.5)
SODIUM SERPL-SCNC: 120 MMOL/L — CRITICAL LOW (ref 135–145)
SODIUM SERPL-SCNC: 122 MMOL/L — LOW (ref 135–145)
SPECIMEN SOURCE: SIGNIFICANT CHANGE UP
SPECIMEN SOURCE: SIGNIFICANT CHANGE UP
WBC # BLD: 14.77 K/UL — HIGH (ref 3.8–10.5)
WBC # FLD AUTO: 14.77 K/UL — HIGH (ref 3.8–10.5)

## 2019-04-16 PROCEDURE — 99233 SBSQ HOSP IP/OBS HIGH 50: CPT | Mod: GC

## 2019-04-16 PROCEDURE — 99233 SBSQ HOSP IP/OBS HIGH 50: CPT

## 2019-04-16 RX ORDER — POTASSIUM CHLORIDE 20 MEQ
20 PACKET (EA) ORAL ONCE
Qty: 0 | Refills: 0 | Status: COMPLETED | OUTPATIENT
Start: 2019-04-16 | End: 2019-04-16

## 2019-04-16 RX ORDER — HYDRALAZINE HCL 50 MG
75 TABLET ORAL EVERY 8 HOURS
Qty: 0 | Refills: 0 | Status: DISCONTINUED | OUTPATIENT
Start: 2019-04-16 | End: 2019-04-20

## 2019-04-16 RX ORDER — BUMETANIDE 0.25 MG/ML
2 INJECTION INTRAMUSCULAR; INTRAVENOUS DAILY
Qty: 0 | Refills: 0 | Status: DISCONTINUED | OUTPATIENT
Start: 2019-04-16 | End: 2019-04-16

## 2019-04-16 RX ORDER — TOLVAPTAN 15 MG/1
30 TABLET ORAL ONCE
Qty: 0 | Refills: 0 | Status: COMPLETED | OUTPATIENT
Start: 2019-04-16 | End: 2019-04-16

## 2019-04-16 RX ORDER — BUMETANIDE 0.25 MG/ML
4 INJECTION INTRAMUSCULAR; INTRAVENOUS EVERY 12 HOURS
Qty: 0 | Refills: 0 | Status: DISCONTINUED | OUTPATIENT
Start: 2019-04-16 | End: 2019-04-19

## 2019-04-16 RX ORDER — ONDANSETRON 8 MG/1
4 TABLET, FILM COATED ORAL ONCE
Qty: 0 | Refills: 0 | Status: COMPLETED | OUTPATIENT
Start: 2019-04-16 | End: 2019-04-16

## 2019-04-16 RX ORDER — BUMETANIDE 0.25 MG/ML
4 INJECTION INTRAMUSCULAR; INTRAVENOUS EVERY 12 HOURS
Qty: 0 | Refills: 0 | Status: DISCONTINUED | OUTPATIENT
Start: 2019-04-16 | End: 2019-04-16

## 2019-04-16 RX ADMIN — Medication 50 MILLIGRAM(S): at 06:13

## 2019-04-16 RX ADMIN — Medication 100 MILLIGRAM(S): at 06:13

## 2019-04-16 RX ADMIN — Medication 75 MILLIGRAM(S): at 13:54

## 2019-04-16 RX ADMIN — Medication 75 MILLIGRAM(S): at 21:49

## 2019-04-16 RX ADMIN — TOLVAPTAN 30 MILLIGRAM(S): 15 TABLET ORAL at 12:50

## 2019-04-16 RX ADMIN — BUMETANIDE 2 MILLIGRAM(S): 0.25 INJECTION INTRAMUSCULAR; INTRAVENOUS at 06:13

## 2019-04-16 RX ADMIN — POLYETHYLENE GLYCOL 3350 17 GRAM(S): 17 POWDER, FOR SOLUTION ORAL at 07:03

## 2019-04-16 RX ADMIN — SIMETHICONE 80 MILLIGRAM(S): 80 TABLET, CHEWABLE ORAL at 21:48

## 2019-04-16 RX ADMIN — SENNA PLUS 2 TABLET(S): 8.6 TABLET ORAL at 22:01

## 2019-04-16 RX ADMIN — Medication 20 MILLIEQUIVALENT(S): at 08:58

## 2019-04-16 RX ADMIN — Medication 1 TABLET(S): at 11:13

## 2019-04-16 RX ADMIN — Medication 20 MILLIEQUIVALENT(S): at 14:39

## 2019-04-16 RX ADMIN — BUMETANIDE 4 MILLIGRAM(S): 0.25 INJECTION INTRAMUSCULAR; INTRAVENOUS at 18:10

## 2019-04-16 RX ADMIN — HEPARIN SODIUM 900 UNIT(S)/HR: 5000 INJECTION INTRAVENOUS; SUBCUTANEOUS at 14:08

## 2019-04-16 RX ADMIN — FAMOTIDINE 20 MILLIGRAM(S): 10 INJECTION INTRAVENOUS at 11:13

## 2019-04-16 RX ADMIN — Medication 100 MILLIGRAM(S): at 13:52

## 2019-04-16 RX ADMIN — Medication 650 MILLIGRAM(S): at 23:36

## 2019-04-16 NOTE — PROVIDER CONTACT NOTE (CRITICAL VALUE NOTIFICATION) - SITUATION
Na 120
APTT 155.2
BMP- sodium- 119 and glucose-752
K+ 2.6
Patient has a critical lab value Mf=747
Per Lab potassium 2.5
Per lab .1
Per lab .7
aPTT 186.5
apTT 132.7
pt VBG sodium 118

## 2019-04-16 NOTE — PROVIDER CONTACT NOTE (CRITICAL VALUE NOTIFICATION) - NAME OF MD/NP/PA/DO NOTIFIED:
Dr. Hilton
Kari Slater, NP
Kori Anderson, NP
MACHO De Guzman
MD John Bonilla
Nathaniel Saintus, NP
Palmira Slater, NP
Tracy Piña
Lucy Cabezas, NP

## 2019-04-16 NOTE — PROGRESS NOTE ADULT - PROBLEM SELECTOR PLAN 2
-ECHO shows EF 23% with right and left ventricular dysfunction, elevated pulm pressures.   - Continue with oral Bumex and Milrinone drip as per CHF team.   - Cath show non obstructive coronaries.   - Monitor on tele, monitor UOP, strict I+O and daily weight  -Discussed plan at length with patient and family.

## 2019-04-16 NOTE — PROGRESS NOTE ADULT - ASSESSMENT
79y.o. Male with no PMH now admitted with new HFrEF (TTE 04/04/2019 with LVEF 25%, biventricular dysfunction, moderate MR, moderate TR, mild MI) and AFIB with RVR, abdominal discomfort/nausea concerning for hepatic congestion in the setting of ADHF (BNP>50K), now s/p LHC/RHC (non-obstructive CAD). RHC:  RA 15, RV 58/14, PA 59/34/43, Wedge 33 PA sat 44%, CI 1.57. Pt has received tolvaptan, initiated on milrinone and lasix gtt (now transitioned to PO therapy) with improvement in symptoms.        Problem/Plan - 1:  ·  Problem: Acute systolic heart failure, first episode.  Plan: - continue Milrinone to 0.125 and change diuretics to bumex 4mg IV BID as patient with evidence of high filling pressures on exam  - Keep K >4 and Mg >2  - would dose tolvaptan today  - Please get limited TTE to assess for IVC size  - Please check BNP  - Strict I/Os  - Daily standing weights  - BMP BID  - Now on hydralazine 75mg PO TID     Problem/Plan - 2:  ·  Problem: ELVA (acute kidney injury).  Plan: likely 2/2 to cardiorenal/low output   - continue to trend  - avoid nephrotoxins.      Problem/Plan - 3:  ·  Problem: Atrial fibrillation, unspecified type. Patient in afib in 90-120s.  Plan: - c/w heparin gtt, would hold on BB for now  - Cardioversion when more euvolemic.   - f/u EP  - monitor on tele.

## 2019-04-16 NOTE — PROVIDER CONTACT NOTE (CRITICAL VALUE NOTIFICATION) - ASSESSMENT
pt asymptomatic
AOx4 VSS. No distress. No s/s bleeding
AOx4 vss no distress. No s/s bleeding
Aox4 VSS No distress
NO s/s of bleeding noted
NO s/s of bleeding noted
Patient A & O x 4. denies any discomfort. VSS. patient on heparin drip, Lasix drip and milrinone drip.
Patient A&Ox4, VSS, no signs and symptoms of bleeding, no complaints of distress.
Patient is alert and oriented x 4. Started on Heparin gtt @14ml/hr for A-Fib, therapeutic. lasix 40mg IV every 6 hours for diuresis prior to possible cath on Monday. Denies CP/SOB/Palpitations.
Pt A&oX4. VSS. No events on Tele. BMP sodium resulted as 126
See vitals flow sheet for details on vital signs

## 2019-04-16 NOTE — PROVIDER CONTACT NOTE (CRITICAL VALUE NOTIFICATION) - RECOMMENDATIONS
NP made aware
Continue to monitor
Inform provider
Inform provider. Hold gtt for 60min and restart at 9mL per protocol.
Inform provider. Hold heparin gtt for 60 min per protocol and restart at 8mL.
MD notified of the critical K+.
Made NP aware, follow heparin nomogram.
follow heparin nomogram
follow heparin nomogram
repeat labs.

## 2019-04-16 NOTE — PROGRESS NOTE ADULT - SUBJECTIVE AND OBJECTIVE BOX
Patient is a 79y old  Male who presents with a chief complaint of dyspnea, abdominal swelling (06 Apr 2019 14:22)      SUBJECTIVE / OVERNIGHT EVENTS:  Patient states that he feels much improved, had bowel movement this morning.      ROS other wise negative.     Tele show a.fib @ 80- 110/mt with 4 and 6 beats of WCT this morning and last night.     T(C): 36.7 (04-16-19 @ 13:50), Max: 36.7 (04-16-19 @ 13:50)  HR: 107 (04-16-19 @ 18:08) (105 - 108)  BP: 149/81 (04-16-19 @ 18:08) (149/81 - 159/84)  RR: 16 (04-16-19 @ 13:50) (16 - 18)  SpO2: 100% (04-16-19 @ 13:50) (97% - 100%)      MEDICATIONS  (STANDING):  buMETAnide Injectable 4 milliGRAM(s) IV Push every 12 hours  docusate sodium 100 milliGRAM(s) Oral three times a day  ergocalciferol 58315 Unit(s) Oral every week  famotidine    Tablet 20 milliGRAM(s) Oral daily  heparin  Infusion.  Unit(s)/Hr (14 mL/Hr) IV Continuous <Continuous>  hydrALAZINE 75 milliGRAM(s) Oral every 8 hours  milrinone Infusion 0.125 MICROgram(s)/kG/Min (2.933 mL/Hr) IV Continuous <Continuous>  multivitamin 1 Tablet(s) Oral daily  polyethylene glycol 3350 17 Gram(s) Oral daily  senna 2 Tablet(s) Oral at bedtime    MEDICATIONS  (PRN):  acetaminophen   Tablet .. 650 milliGRAM(s) Oral every 6 hours PRN Mild Pain (1 - 3)  calcium carbonate    500 mG (Tums) Chewable 2 Tablet(s) Chew three times a day PRN Heartburn  heparin  Injectable 6500 Unit(s) IV Push every 6 hours PRN For aPTT less than 40  heparin  Injectable 3000 Unit(s) IV Push every 6 hours PRN For aPTT between 40 - 57  simethicone 80 milliGRAM(s) Chew three times a day PRN Dyspepsia  PHYSICAL EXAM:      GENERAL: NAD, well-developed  HEAD:  Atraumatic, Normocephalic  EYES: EOMI, conjunctiva and sclera clear  NECK: Supple, No JVD  CHEST/LUNG: crackles at bases, no wheeze  HEART: Irregular rate and rhythm; No murmurs, rubs, or gallops  ABDOMEN: Soft, Nontender, distended, Bowel sounds present, flank and abdominal edema  EXTREMITIES:  2+ Peripheral Pulses, No clubbing, cyanosis, or edema  PSYCH: AAOx3  NEUROLOGY: non-focal, tremors  present   SKIN: No rashes or lesions                                         12.8   14.77 )-----------( 276      ( 16 Apr 2019 09:07 )             39.3           LIVER FUNCTIONS - ( 15 Apr 2019 07:20 )  Alb: 3.9 g/dL / Pro: 7.7 g/dL / ALK PHOS: 104 U/L / ALT: 118 U/L / AST: 64 U/L / GGT: x           PT/INR - ( 16 Apr 2019 12:20 )   PT: 11.6 sec;   INR: 1.01 ratio         PTT - ( 16 Apr 2019 12:20 )  PTT:74.6 sec  122|76|78<232  4.0|29|1.55  9.1,--,2.5  04-16 @ 16:17  120|76|60<175  3.8|30|1.72  9.8,2.5,--  04-16 @ 07:07                                           RADIOLOGY & ADDITIONAL TESTS:    Imaging Personally Reviewed:    Consultant(s) Notes Reviewed:  cards     Care Discussed with Consultants/Other Providers:

## 2019-04-16 NOTE — PROGRESS NOTE ADULT - ATTENDING COMMENTS
78 y/o M w/ no PMH who was admitted for worsening abdominal distention, orthopnea, dyspnea with minimal exertion and b/l LE edema, noted to have new HFrEF (TTE 04/04/2019 with LVEF 25%, LVEDD 4.8 cm, severe biatrial enlargement, moderate MR, moderate TR, mild MI) and noted to be in afib in ER. Started on diuresis and milrinone with some improvement. Tolerating uptitration of vasodilators. Underwent LHC/RHC (non-obstructive CAD). RHC:  RA 15, RV 58/14, PA 59/34/43, Wedge 33 PA sat 44%, CI 1.57. Exam difficult to appreciate JVP but likely [id number] with mild HJR, irreg irreg rhythm, dec BS at bases b/l, nontender abdomen, no pedal edema, warm. Labs notable for hyponatremia (120-125), BUN/Cr 60/1.72 (b/l 1.5). Overall stage C HF, NYHA class III with possible ongoign volume overload given no significant change in weight.   - change diuretics to bumex 4mg IV q12  - repeat limited TTE to evaluate IVC  - uptitrate hydral/isordil as above  - TTE suggestive of possible restrictive CM; light chains wnl and negative for AL amyloid; will consider Tc-Pyp scan  - standing weights daily  - check BNP  - hyponatremia - tolvaptan; would benefit from possible hypertonic saline

## 2019-04-16 NOTE — PROGRESS NOTE ADULT - PROBLEM SELECTOR PLAN 3
ELVA from low perfusion and diuretics, continue to monitor on oral Bumex and Milrinone drip for now.   Monitor renal function.

## 2019-04-16 NOTE — PROVIDER CONTACT NOTE (CRITICAL VALUE NOTIFICATION) - ACTION/TREATMENT ORDERED:
MD aware continue to monitor.
Noted.
Add on Mg to labs
Hold heparin for 1 hour, restart at 7ml/hr. Continue to monitor and maintain safety.
MD aware. Orders to follow for potassium supplement. Will continue to monitor.
Np ordered repeat BMP
Per provider - continue to monitor.
Per provider continue to monitor.
follow heparin nomogram
noted.  Follow heparin nomogram
per provider K+ to be ordered

## 2019-04-16 NOTE — PROGRESS NOTE ADULT - SUBJECTIVE AND OBJECTIVE BOX
Overnight Events:   Patient feels "OK."    Medications:  acetaminophen   Tablet .. 650 milliGRAM(s) Oral every 6 hours PRN  buMETAnide 2 milliGRAM(s) Oral daily  calcium carbonate    500 mG (Tums) Chewable 2 Tablet(s) Chew three times a day PRN  docusate sodium 100 milliGRAM(s) Oral three times a day  ergocalciferol 75774 Unit(s) Oral every week  famotidine    Tablet 20 milliGRAM(s) Oral daily  heparin  Infusion.  Unit(s)/Hr IV Continuous <Continuous>  heparin  Injectable 6500 Unit(s) IV Push every 6 hours PRN  heparin  Injectable 3000 Unit(s) IV Push every 6 hours PRN  hydrALAZINE 75 milliGRAM(s) Oral every 8 hours  milrinone Infusion 0.125 MICROgram(s)/kG/Min IV Continuous <Continuous>  multivitamin 1 Tablet(s) Oral daily  polyethylene glycol 3350 17 Gram(s) Oral daily  senna 2 Tablet(s) Oral at bedtime  simethicone 80 milliGRAM(s) Chew three times a day PRN      PAST MEDICAL & SURGICAL HISTORY:  No pertinent past medical history  No significant past surgical history      Vitals:  T(F): 98.1 (04-16), Max: 98.2 (04-15)  HR: 105 (04-16) (96 - 108)  BP: 151/74 (04-16) (126/79 - 166/90)  RR: 16 (04-16)  SpO2: 100% (04-16)  I&O's Summary    15 Apr 2019 07:01  -  16 Apr 2019 07:00  --------------------------------------------------------  IN: 1724.7 mL / OUT: 3000 mL / NET: -1275.3 mL    16 Apr 2019 07:01  -  16 Apr 2019 16:59  --------------------------------------------------------  IN: 600 mL / OUT: 900 mL / NET: -300 mL        Physical Exam:  Appearance: No acute distress; well appearing  Eyes: PERRL, EOMI, pink conjunctiva  HENT: Normal oral muscosa  Cardiovascular: RRR, S1, S2, no edema. + JVD  Respiratory: Clear to auscultation bilaterally  Gastrointestinal: soft, non-tender, distended  Musculoskeletal: No clubbing; no joint deformity   Neurologic: Non-focal  Psychiatry: AAOx3, mood & affect appropriate  Skin: No rashes, ecchymoses, or cyanosis                          12.8   14.77 )-----------( 276      ( 16 Apr 2019 09:07 )             39.3     04-16    122<L>  |  76<L>  |  78<H>  ----------------------------<  232<H>  4.0   |  29  |  1.55<H>    Ca    9.1      16 Apr 2019 16:17  Phos  2.5     04-16  Mg     2.5     04-16    TPro  7.7  /  Alb  3.9  /  TBili  0.7  /  DBili  0.2  /  AST  64<H>  /  ALT  118<H>  /  AlkPhos  104  04-15    PT/INR - ( 16 Apr 2019 12:20 )   PT: 11.6 sec;   INR: 1.01 ratio         PTT - ( 16 Apr 2019 12:20 )  PTT:74.6 sec

## 2019-04-16 NOTE — PROGRESS NOTE ADULT - SUBJECTIVE AND OBJECTIVE BOX
[facility] [phone]        Patient seen and examined in bed.  He feels well and offered no complaints         MEDICATIONS  (STANDING):  buMETAnide 2 milliGRAM(s) Oral daily  docusate sodium 100 milliGRAM(s) Oral three times a day  ergocalciferol 81698 Unit(s) Oral every week  famotidine    Tablet 20 milliGRAM(s) Oral daily  heparin  Infusion.  Unit(s)/Hr (14 mL/Hr) IV Continuous <Continuous>  hydrALAZINE 75 milliGRAM(s) Oral every 8 hours  milrinone Infusion 0.125 MICROgram(s)/kG/Min (2.933 mL/Hr) IV Continuous <Continuous>  multivitamin 1 Tablet(s) Oral daily  polyethylene glycol 3350 17 Gram(s) Oral daily  senna 2 Tablet(s) Oral at bedtime  urea (URE-NA) packet 15 Gram(s) 1 Packet(s) Oral once      VITAL:  T(C): , Max: 36.8 (04-15-19 @ 20:02)  T(F): , Max: 98.2 (04-15-19 @ 20:02)  HR: 101 (04-16-19 @ 04:41)  BP: 126/79 (04-16-19 @ 04:41)  BP(mean): --  RR: 18 (04-16-19 @ 04:41)  SpO2: 100% (04-16-19 @ 04:41)  Wt(kg): --    I and O's:    04-15 @ 07:01  -  04-16 @ 07:00  --------------------------------------------------------  IN: 1724.7 mL / OUT: 3000 mL / NET: -1275.3 mL          PHYSICAL EXAM:    Constitutional: NAD  Neck:  No JVD  Respiratory: CTAB/L  Cardiovascular: S1 and S2  Gastrointestinal: BS+, soft, NT/ND  Extremities: No peripheral edema  Neurological: A/O x 3, no focal deficits  Psychiatric: Normal mood, normal affect  : No Aguirre  Skin: No rashes  Access: Not applicable    LABS:                        12.8   14.77 )-----------( 276      ( 16 Apr 2019 09:07 )             39.3     04-16    120<LL>  |  76<L>  |  60<H>  ----------------------------<  175<H>  3.8   |  30  |  1.72<H>    Ca    9.8      16 Apr 2019 07:07  Phos  3.0     04-15  Mg     2.5     04-16    TPro  7.7  /  Alb  3.9  /  TBili  0.7  /  DBili  0.2  /  AST  64<H>  /  ALT  118<H>  /  AlkPhos  104  04-15          Urine Studies:          RADIOLOGY & ADDITIONAL STUDIES:

## 2019-04-16 NOTE — PROVIDER CONTACT NOTE (CRITICAL VALUE NOTIFICATION) - BACKGROUND
admitted with CHF, hyponatremia
Admited with Afib, HF; on heparin gtt, diuresing with iv lasix bid
Admitted for new Afib.
Admitting diagnosis Afib
Dx Afib rvr, HF. On heparin, milrinone, and lasix gtt
Dx Afib, HF - Pt on heparin gtt.
On Heparin gtt for Afib
Patient 79 years old male admitted with abdominal distention and severe HEAD  2/2 to new acute on chronic HF, found to be in Afib with RVR, along with transaminitis, ELVA.
Patient has an admitting diagnosis of afib, abdominal swelling, hyponatremia and hypokalemia
on heparin gtt for afib.
pt admitted with new onset HF

## 2019-04-16 NOTE — PROGRESS NOTE ADULT - ASSESSMENT
79-year-old gentleman, past medical history of diabetes, hypertension presents with decompensated heart failure along with rapid atrial fibrillation.  Renal failure is new and likely has cardiorenal syndrome.     Hypervolemic hyponatremia  Vtach and afib with RVR  Hepatitis          1.  Cardiology:   Milrinone gtt and PO Bumex.   Spoke to heart failure who will change to IVP.  The renal function is improving but the sodium dropped.     TTE today to assess mitral p diuresis  2. Renal- Hyponatremia due to heart failure: Samsca 30mg po x 1 and  Urea-Na+ 15grams x 1 again today;  KDUR 40meq x 1  3. GI-LFT are improving

## 2019-04-17 LAB
ANION GAP SERPL CALC-SCNC: 13 MMOL/L — SIGNIFICANT CHANGE UP (ref 5–17)
ANION GAP SERPL CALC-SCNC: 16 MMOL/L — SIGNIFICANT CHANGE UP (ref 5–17)
APTT BLD: 39.5 SEC — HIGH (ref 27.5–36.3)
APTT BLD: 66.9 SEC — HIGH (ref 27.5–36.3)
BUN SERPL-MCNC: 76 MG/DL — HIGH (ref 7–23)
BUN SERPL-MCNC: 77 MG/DL — HIGH (ref 7–23)
CALCIUM SERPL-MCNC: 9.6 MG/DL — SIGNIFICANT CHANGE UP (ref 8.4–10.5)
CALCIUM SERPL-MCNC: 9.7 MG/DL — SIGNIFICANT CHANGE UP (ref 8.4–10.5)
CHLORIDE SERPL-SCNC: 79 MMOL/L — LOW (ref 96–108)
CHLORIDE SERPL-SCNC: 81 MMOL/L — LOW (ref 96–108)
CO2 SERPL-SCNC: 31 MMOL/L — SIGNIFICANT CHANGE UP (ref 22–31)
CO2 SERPL-SCNC: 31 MMOL/L — SIGNIFICANT CHANGE UP (ref 22–31)
CREAT SERPL-MCNC: 1.72 MG/DL — HIGH (ref 0.5–1.3)
CREAT SERPL-MCNC: 1.78 MG/DL — HIGH (ref 0.5–1.3)
GLUCOSE SERPL-MCNC: 181 MG/DL — HIGH (ref 70–99)
GLUCOSE SERPL-MCNC: 200 MG/DL — HIGH (ref 70–99)
MAGNESIUM SERPL-MCNC: 2.5 MG/DL — SIGNIFICANT CHANGE UP (ref 1.6–2.6)
PHOSPHATE SERPL-MCNC: 4 MG/DL — SIGNIFICANT CHANGE UP (ref 2.5–4.5)
POTASSIUM SERPL-MCNC: 4.2 MMOL/L — SIGNIFICANT CHANGE UP (ref 3.5–5.3)
POTASSIUM SERPL-MCNC: 4.2 MMOL/L — SIGNIFICANT CHANGE UP (ref 3.5–5.3)
POTASSIUM SERPL-SCNC: 4.2 MMOL/L — SIGNIFICANT CHANGE UP (ref 3.5–5.3)
POTASSIUM SERPL-SCNC: 4.2 MMOL/L — SIGNIFICANT CHANGE UP (ref 3.5–5.3)
SODIUM SERPL-SCNC: 123 MMOL/L — LOW (ref 135–145)
SODIUM SERPL-SCNC: 128 MMOL/L — LOW (ref 135–145)

## 2019-04-17 PROCEDURE — 99233 SBSQ HOSP IP/OBS HIGH 50: CPT | Mod: GC

## 2019-04-17 PROCEDURE — 99233 SBSQ HOSP IP/OBS HIGH 50: CPT

## 2019-04-17 PROCEDURE — 99232 SBSQ HOSP IP/OBS MODERATE 35: CPT | Mod: GC

## 2019-04-17 RX ORDER — ISOSORBIDE DINITRATE 5 MG/1
10 TABLET ORAL THREE TIMES A DAY
Qty: 0 | Refills: 0 | Status: DISCONTINUED | OUTPATIENT
Start: 2019-04-17 | End: 2019-04-19

## 2019-04-17 RX ORDER — POTASSIUM CHLORIDE 20 MEQ
40 PACKET (EA) ORAL ONCE
Qty: 0 | Refills: 0 | Status: COMPLETED | OUTPATIENT
Start: 2019-04-17 | End: 2019-04-17

## 2019-04-17 RX ADMIN — ISOSORBIDE DINITRATE 10 MILLIGRAM(S): 5 TABLET ORAL at 21:18

## 2019-04-17 RX ADMIN — FAMOTIDINE 20 MILLIGRAM(S): 10 INJECTION INTRAVENOUS at 11:52

## 2019-04-17 RX ADMIN — Medication 75 MILLIGRAM(S): at 14:56

## 2019-04-17 RX ADMIN — BUMETANIDE 4 MILLIGRAM(S): 0.25 INJECTION INTRAMUSCULAR; INTRAVENOUS at 06:43

## 2019-04-17 RX ADMIN — ISOSORBIDE DINITRATE 10 MILLIGRAM(S): 5 TABLET ORAL at 14:55

## 2019-04-17 RX ADMIN — Medication 1 TABLET(S): at 11:52

## 2019-04-17 RX ADMIN — Medication 650 MILLIGRAM(S): at 00:36

## 2019-04-17 RX ADMIN — HEPARIN SODIUM 1100 UNIT(S)/HR: 5000 INJECTION INTRAVENOUS; SUBCUTANEOUS at 18:54

## 2019-04-17 RX ADMIN — POLYETHYLENE GLYCOL 3350 17 GRAM(S): 17 POWDER, FOR SOLUTION ORAL at 11:52

## 2019-04-17 RX ADMIN — Medication 2 TABLET(S): at 19:49

## 2019-04-17 RX ADMIN — Medication 40 MILLIEQUIVALENT(S): at 14:55

## 2019-04-17 RX ADMIN — Medication 75 MILLIGRAM(S): at 06:44

## 2019-04-17 RX ADMIN — Medication 75 MILLIGRAM(S): at 21:18

## 2019-04-17 RX ADMIN — SENNA PLUS 2 TABLET(S): 8.6 TABLET ORAL at 21:18

## 2019-04-17 RX ADMIN — BUMETANIDE 4 MILLIGRAM(S): 0.25 INJECTION INTRAMUSCULAR; INTRAVENOUS at 18:53

## 2019-04-17 RX ADMIN — HEPARIN SODIUM 1100 UNIT(S)/HR: 5000 INJECTION INTRAVENOUS; SUBCUTANEOUS at 11:50

## 2019-04-17 RX ADMIN — MILRINONE LACTATE 2.93 MICROGRAM(S)/KG/MIN: 1 INJECTION, SOLUTION INTRAVENOUS at 06:45

## 2019-04-17 RX ADMIN — Medication 100 MILLIGRAM(S): at 21:18

## 2019-04-17 NOTE — PROGRESS NOTE ADULT - ASSESSMENT
79y.o. Male with no PMH now admitted with new HFrEF (TTE 04/04/2019 with LVEF 25%, biventricular dysfunction, moderate MR, moderate TR, mild KS) and AFIB with RVR, abdominal discomfort/nausea concerning for hepatic congestion in the setting of ADHF (BNP>50K), now s/p LHC/RHC (non-obstructive CAD). RHC:  RA 15, RV 58/14, PA 59/34/43, Wedge 33 PA sat 44%, CI 1.57. Pt has received tolvaptan, initiated on milrinone and lasix gtt (now transitioned to PO therapy) with improvement in symptoms.        Problem/Plan - 1:  ·  Problem: Acute systolic heart failure, first episode.  Plan: - discontinue milrinone and continue bumex 4mg IV BID as patient with evidence of high filling pressures on exam  - Keep K >4 and Mg >2  - Please get limited TTE to assess for IVC size  - Please check BNP  - Strict I/Os  - Daily standing weights  - BMP BID  - Continue hydralazine 75mg PO TID. Start isordil 10mg TID. Hold paramaters should be SBP <90     Problem/Plan - 2:  ·  Problem: ELVA (acute kidney injury).  Plan: likely 2/2 to cardiorenal/low output   - continue to trend  - avoid nephrotoxins.      Problem/Plan - 3:  ·  Problem: Atrial fibrillation, unspecified type. Patient in afib in 90-120s.  Plan: - c/w heparin gtt  - Cardioversion when more euvolemic.   - if patient has WCT while off milrinone would contact EP to consider amiodarone  - f/u EP  - monitor on tele.

## 2019-04-17 NOTE — PROGRESS NOTE ADULT - PROBLEM SELECTOR PLAN 3
ELVA from low perfusion, continue to monitor on oral Bumex and monitor renal function.  Renal following.

## 2019-04-17 NOTE — PROGRESS NOTE ADULT - PROBLEM SELECTOR PLAN 2
-ECHO shows EF 23% with right and left ventricular dysfunction, elevated pulm pressures.   - Continue with oral Bumex as per CHF team.   - Cath show non obstructive coronaries.   - Monitor on tele, monitor UOP, strict I+O and daily weight  -Discussed plan at length with patient and family.

## 2019-04-17 NOTE — PROGRESS NOTE ADULT - ASSESSMENT
79-year-old gentleman, past medical history of diabetes, hypertension presents with decompensated heart failure along with rapid atrial fibrillation.  Renal failure is new and likely has cardiorenal syndrome.     Hypervolemic hyponatremia  Vtach and afib with RVR  Hepatitis          1.  Cardiology:   Milrinone gtt and IV Bumex.  The serum sodium is improving .     TTE today to assess mitral p diuresis;  NM scan to ro cardiac amyloid   2. Renal- Hyponatremia due to heart failure:  KDUR 40meq x 1;  Mag is appropriate;  Cards may add Amio for Vtach   3. GI-LFT are improving

## 2019-04-17 NOTE — PROGRESS NOTE ADULT - SUBJECTIVE AND OBJECTIVE BOX
INTERVAL HPI/OVERNIGHT EVENTS:  Patient S&E at bedside. No o/n events,     MEDICATIONS  (STANDING):  buMETAnide Injectable 4 milliGRAM(s) IV Push every 12 hours  docusate sodium 100 milliGRAM(s) Oral three times a day  ergocalciferol 06666 Unit(s) Oral every week  famotidine    Tablet 20 milliGRAM(s) Oral daily  heparin  Infusion.  Unit(s)/Hr (14 mL/Hr) IV Continuous <Continuous>  hydrALAZINE 75 milliGRAM(s) Oral every 8 hours  isosorbide   dinitrate Tablet (ISORDIL) 10 milliGRAM(s) Oral three times a day  milrinone Infusion 0.125 MICROgram(s)/kG/Min (2.933 mL/Hr) IV Continuous <Continuous>  multivitamin 1 Tablet(s) Oral daily  polyethylene glycol 3350 17 Gram(s) Oral daily  senna 2 Tablet(s) Oral at bedtime    MEDICATIONS  (PRN):  acetaminophen   Tablet .. 650 milliGRAM(s) Oral every 6 hours PRN Mild Pain (1 - 3)  calcium carbonate    500 mG (Tums) Chewable 2 Tablet(s) Chew three times a day PRN Heartburn  heparin  Injectable 6500 Unit(s) IV Push every 6 hours PRN For aPTT less than 40  heparin  Injectable 3000 Unit(s) IV Push every 6 hours PRN For aPTT between 40 - 57  simethicone 80 milliGRAM(s) Chew three times a day PRN Dyspepsia    Allergies    erythromycin (Unknown)    Intolerances        VITAL SIGNS:  T(F): 97.4 (04-17-19 @ 04:41)  HR: 102 (04-17-19 @ 04:41)  BP: 135/87 (04-17-19 @ 04:41)  RR: 18 (04-17-19 @ 04:41)  SpO2: 99% (04-17-19 @ 04:41)  Wt(kg): --    PHYSICAL EXAM:    Constitutional: NAD  Eyes: EOMI, sclera non-icteric  Neck: supple  Respiratory: CTAB  Cardiovascular: RRR, S1/S2  Gastrointestinal: +BS, soft, NTND  Extremities: no LE edema  Neurological: AAOx3    LABS:                        12.8   14.77 )-----------( 276      ( 16 Apr 2019 09:07 )             39.3     04-17    128<L>  |  81<L>  |  77<H>  ----------------------------<  181<H>  4.2   |  31  |  1.72<H>    Ca    9.7      17 Apr 2019 06:12  Phos  2.5     04-16  Mg     2.3     04-16      PT/INR - ( 16 Apr 2019 12:20 )   PT: 11.6 sec;   INR: 1.01 ratio    PTT - ( 17 Apr 2019 09:20 )  PTT:39.5 sec    RADIOLOGY & ADDITIONAL TESTS:  - TM Interpretation:   DIAGNOSIS:  Peripheral blood:   - The lymphocyte immunophenotypic findings show no diagnostic abnormalities  with minute population of monotypic B cells.  - The myeloid immunophenotypic findings show normal myeloid granularity, no increase in myeloid immaturity, and normal myeloid antigen maturation pattern.  INTERPRETATION:  The immunophenotypic findings show a minute monoclonal B cells with the phenotype of small lymphocytic lymphoma/chronic lymphocytic leukemia. Given that the absolute monoclonal B cell lymphocytosis is less than that necessary for the diagnosis of chronic lymphocytic leukemia (greater than 5,000/uL monoclonal cells), this is best classified as monoclonal B cells of undetermined significance, unless cytopenias or disease-related symptoms are present. INTERVAL HPI/OVERNIGHT EVENTS:  Patient S&E at bedside. No o/n events, currently pt with no complaints    MEDICATIONS  (STANDING):  buMETAnide Injectable 4 milliGRAM(s) IV Push every 12 hours  docusate sodium 100 milliGRAM(s) Oral three times a day  ergocalciferol 44393 Unit(s) Oral every week  famotidine    Tablet 20 milliGRAM(s) Oral daily  heparin  Infusion.  Unit(s)/Hr (14 mL/Hr) IV Continuous <Continuous>  hydrALAZINE 75 milliGRAM(s) Oral every 8 hours  isosorbide   dinitrate Tablet (ISORDIL) 10 milliGRAM(s) Oral three times a day  milrinone Infusion 0.125 MICROgram(s)/kG/Min (2.933 mL/Hr) IV Continuous <Continuous>  multivitamin 1 Tablet(s) Oral daily  polyethylene glycol 3350 17 Gram(s) Oral daily  senna 2 Tablet(s) Oral at bedtime    MEDICATIONS  (PRN):  acetaminophen   Tablet .. 650 milliGRAM(s) Oral every 6 hours PRN Mild Pain (1 - 3)  calcium carbonate    500 mG (Tums) Chewable 2 Tablet(s) Chew three times a day PRN Heartburn  heparin  Injectable 6500 Unit(s) IV Push every 6 hours PRN For aPTT less than 40  heparin  Injectable 3000 Unit(s) IV Push every 6 hours PRN For aPTT between 40 - 57  simethicone 80 milliGRAM(s) Chew three times a day PRN Dyspepsia    Allergies    erythromycin (Unknown)    Intolerances        VITAL SIGNS:  T(F): 97.4 (04-17-19 @ 04:41)  HR: 102 (04-17-19 @ 04:41)  BP: 135/87 (04-17-19 @ 04:41)  RR: 18 (04-17-19 @ 04:41)  SpO2: 99% (04-17-19 @ 04:41)  Wt(kg): --    PHYSICAL EXAM:    Constitutional: NAD  Eyes: EOMI, sclera non-icteric  Neck: supple  Respiratory: CTAB  Cardiovascular: RRR, S1/S2  Gastrointestinal: +BS, soft, NTND  Extremities: no LE edema  Neurological: AAOx3    LABS:                        12.8   14.77 )-----------( 276      ( 16 Apr 2019 09:07 )             39.3     04-17    128<L>  |  81<L>  |  77<H>  ----------------------------<  181<H>  4.2   |  31  |  1.72<H>    Ca    9.7      17 Apr 2019 06:12  Phos  2.5     04-16  Mg     2.3     04-16      PT/INR - ( 16 Apr 2019 12:20 )   PT: 11.6 sec;   INR: 1.01 ratio    PTT - ( 17 Apr 2019 09:20 )  PTT:39.5 sec    RADIOLOGY & ADDITIONAL TESTS:  - TM Interpretation:   DIAGNOSIS:  Peripheral blood:   - The lymphocyte immunophenotypic findings show no diagnostic abnormalities  with minute population of monotypic B cells.  - The myeloid immunophenotypic findings show normal myeloid granularity, no increase in myeloid immaturity, and normal myeloid antigen maturation pattern.  INTERPRETATION:  The immunophenotypic findings show a minute monoclonal B cells with the phenotype of small lymphocytic lymphoma/chronic lymphocytic leukemia. Given that the absolute monoclonal B cell lymphocytosis is less than that necessary for the diagnosis of chronic lymphocytic leukemia (greater than 5,000/uL monoclonal cells), this is best classified as monoclonal B cells of undetermined significance, unless cytopenias or disease-related symptoms are present.

## 2019-04-17 NOTE — PROGRESS NOTE ADULT - ATTENDING COMMENTS
78 y/o M w/ no PMH who was admitted for worsening abdominal distention, orthopnea, dyspnea with minimal exertion and b/l LE edema, noted to have new HFrEF (TTE 04/04/2019 with LVEF 25%, LVEDD 4.8 cm, severe biatrial enlargement, moderate MR, moderate TR, mild MA) and noted to be in afib in ER. Started on diuresis and milrinone with some improvement. Tolerating uptitration of vasodilators. Underwent LHC/RHC (non-obstructive CAD). RHC:  RA 15, RV 58/14, PA 59/34/43, Wedge 33 PA sat 44%, CI 1.57. Increased diuresis with improvement. Had VT this morning (asymptomatic). Exam difficult to appreciate JVP but likely approx 15 with mild HJR, irreg irreg rhythm, dec BS at bases b/l, nontender abdomen, no pedal edema, warm. Labs notable for hyponatremia (120-125) but improved to 128, BUN/Cr 60/1.72 (b/l 1.5). Overall stage C HF, NYHA class III with possible ongoig volume overload.   - continue bumex 4 mg IV q12  - repeat limited TTE to evaluate IVC  - uptitrate hydral/isordil as above  - TTE suggestive of possible restrictive CM; light chains wnl and negative for AL amyloid; will consider Tc-Pyp scan  - standing weights daily  - check BNP  - hyponatremia - tolvaptan; would benefit from possible hypertonic saline  - VT; please c/s EP; avoid BB at this moment; will d/c milrinone for now; keep K>4, Mg>2

## 2019-04-17 NOTE — PROGRESS NOTE ADULT - SUBJECTIVE AND OBJECTIVE BOX
Overnight Events:   Patient denies SOB or CP at rest. In afib up to 120s. Had 30 beats of WCT around 10AM, did not have any symptoms at that time.    Medications:  acetaminophen   Tablet .. 650 milliGRAM(s) Oral every 6 hours PRN  buMETAnide Injectable 4 milliGRAM(s) IV Push every 12 hours  calcium carbonate    500 mG (Tums) Chewable 2 Tablet(s) Chew three times a day PRN  docusate sodium 100 milliGRAM(s) Oral three times a day  ergocalciferol 39959 Unit(s) Oral every week  famotidine    Tablet 20 milliGRAM(s) Oral daily  heparin  Infusion.  Unit(s)/Hr IV Continuous <Continuous>  heparin  Injectable 6500 Unit(s) IV Push every 6 hours PRN  heparin  Injectable 3000 Unit(s) IV Push every 6 hours PRN  hydrALAZINE 75 milliGRAM(s) Oral every 8 hours  isosorbide   dinitrate Tablet (ISORDIL) 10 milliGRAM(s) Oral three times a day  milrinone Infusion 0.125 MICROgram(s)/kG/Min IV Continuous <Continuous>  multivitamin 1 Tablet(s) Oral daily  polyethylene glycol 3350 17 Gram(s) Oral daily  potassium chloride    Tablet ER 40 milliEquivalent(s) Oral once  senna 2 Tablet(s) Oral at bedtime  simethicone 80 milliGRAM(s) Chew three times a day PRN      PAST MEDICAL & SURGICAL HISTORY:  No pertinent past medical history  No significant past surgical history      Vitals:  T(F): 97.9 (04-17), Max: 98.1 (04-16)  HR: 108 (04-17) (102 - 108)  BP: 140/77 (04-17) (134/76 - 149/81)  RR: 18 (04-17)  SpO2: 98% (04-17)  I&O's Summary    16 Apr 2019 07:01  -  17 Apr 2019 07:00  --------------------------------------------------------  IN: 1154.7 mL / OUT: 1980 mL / NET: -825.3 mL    17 Apr 2019 07:01  -  17 Apr 2019 13:50  --------------------------------------------------------  IN: 590 mL / OUT: 1000 mL / NET: -410 mL        Physical Exam:  Appearance: No acute distress  Eyes: PERRL, EOMI, pink conjunctiva  HENT: Normal oral muscosa  Cardiovascular: irregularly irregular, tachycardic, S1,S2, +JVD, +HJR  Respiratory: Clear to auscultation bilaterally  Gastrointestinal: soft, non-tender, distended  Musculoskeletal: No clubbing; no joint deformity   Neurologic: Non-focal  Psychiatry: AAOx3, mood & affect appropriate  Skin: No rashes, ecchymoses, or cyanosis                          12.8   14.77 )-----------( 276      ( 16 Apr 2019 09:07 )             39.3     04-17    128<L>  |  81<L>  |  77<H>  ----------------------------<  181<H>  4.2   |  31  |  1.72<H>    Ca    9.7      17 Apr 2019 06:12  Phos  2.5     04-16  Mg     2.3     04-16      PT/INR - ( 16 Apr 2019 12:20 )   PT: 11.6 sec;   INR: 1.01 ratio         PTT - ( 17 Apr 2019 09:20 )  PTT:39.5 sec              New ECG(s): Personally reviewed    Echo:    Stress Testing:     Cath:    Imaging:    Interpretation of Telemetry:

## 2019-04-17 NOTE — PROGRESS NOTE ADULT - ATTENDING COMMENTS
Patient seen and agree with Dr. Almendarez's note. Briefly, patient is 78 yo M with no significant medical history p/w progressive abdominal distension and SOB admitted for newly diagnosed acute systolic heart failure and Afib with RVR, found to have leukocytosis with WBC ranging from 15k to 19k with neutrophilic predominance, no apparent infection, for which hematology is consulted.    1. Leukocytosis: peripheral blood flow cytometry consistent with monoclonal B cells of undetermined significance, ALC is 1240 (<5000), and no other cytopenias. Does not meet criteria for CLL  - please send BCR/ABL also  - infectious work up negative  - he has had RVP, blood cultures, and CT C/A/P which were all negative for infection.   - outpatient heme follow up after d/c, will refer to [facility]

## 2019-04-17 NOTE — PROGRESS NOTE ADULT - SUBJECTIVE AND OBJECTIVE BOX
[facility] [phone]        Patient seen and examined in the chair.  He had 30 beats of wide complex tachycardia         MEDICATIONS  (STANDING):  buMETAnide Injectable 4 milliGRAM(s) IV Push every 12 hours  docusate sodium 100 milliGRAM(s) Oral three times a day  ergocalciferol 82481 Unit(s) Oral every week  famotidine    Tablet 20 milliGRAM(s) Oral daily  heparin  Infusion.  Unit(s)/Hr (14 mL/Hr) IV Continuous <Continuous>  hydrALAZINE 75 milliGRAM(s) Oral every 8 hours  isosorbide   dinitrate Tablet (ISORDIL) 10 milliGRAM(s) Oral three times a day  milrinone Infusion 0.125 MICROgram(s)/kG/Min (2.933 mL/Hr) IV Continuous <Continuous>  multivitamin 1 Tablet(s) Oral daily  polyethylene glycol 3350 17 Gram(s) Oral daily  senna 2 Tablet(s) Oral at bedtime      VITAL:  T(C): , Max: 36.7 (04-16-19 @ 13:50)  T(F): , Max: 98.1 (04-16-19 @ 13:50)  HR: 102 (04-17-19 @ 04:41)  BP: 135/87 (04-17-19 @ 04:41)  BP(mean): --  RR: 18 (04-17-19 @ 04:41)  SpO2: 99% (04-17-19 @ 04:41)  Wt(kg): --    I and O's:    04-16 @ 07:01  -  04-17 @ 07:00  --------------------------------------------------------  IN: 1154.7 mL / OUT: 1980 mL / NET: -825.3 mL    04-17 @ 07:01  -  04-17 @ 11:59  --------------------------------------------------------  IN: 290 mL / OUT: 700 mL / NET: -410 mL          PHYSICAL EXAM:    Constitutional: NAD  Neck:  No JVD  Respiratory: CTAB/L  Cardiovascular: S1 and S2  Gastrointestinal: BS+, soft, NT/ND  Extremities: No peripheral edema  Neurological: A/O x 3, no focal deficits  Psychiatric: Normal mood, normal affect  : No Aguirre  Skin: No rashes  Access: Not applicable    LABS:                        12.8   14.77 )-----------( 276      ( 16 Apr 2019 09:07 )             39.3     04-17    128<L>  |  81<L>  |  77<H>  ----------------------------<  181<H>  4.2   |  31  |  1.72<H>    Ca    9.7      17 Apr 2019 06:12  Phos  2.5     04-16  Mg     2.3     04-16            Urine Studies:          RADIOLOGY & ADDITIONAL STUDIES:

## 2019-04-17 NOTE — PROGRESS NOTE ADULT - PROBLEM SELECTOR PLAN 1
Repeat episodes of WCT, likely from Milrinone drip, d/c Milrinone, follow up repeat Echo, continue to monitor on tele.    EP following, keep K >4 and Mg >2  - Strict I/Os  - Daily standing weights

## 2019-04-17 NOTE — PROGRESS NOTE ADULT - ASSESSMENT
78 yo M with no significant medical history p/w progressive abdominal distension and SOB admitted for newly diagnosed acute systolic heart failure and Afib with RVR, found to have leukocytosis with WBC ranging from 15k to 19k with neutrophilic predominance, no apparent infection, for which hematology is consulted.    1. Leukocytosis: peripheral blood flow cytometry consistent with monoclonal B cells of undetermined significance, ALC is 1240 (<5000) and therefore does not meet criteria for SLL/CLL  - infectious work up negative,  - he has had RVP, blood cultures, and CT C/A/P which were all negative for infection.   - outpatient heme follow up after d/c, will refer to [facility].    James Almendarez, PGY-5  Hematology-Oncology Fellow  Pager: [phone] ([facility]), [phone] ([facility])  (After 5 pm or on weekends please page the on-call fellow) 80 yo M with no significant medical history p/w progressive abdominal distension and SOB admitted for newly diagnosed acute systolic heart failure and Afib with RVR, found to have leukocytosis with WBC ranging from 15k to 19k with neutrophilic predominance, no apparent infection, for which hematology is consulted.    1. Leukocytosis: peripheral blood flow cytometry consistent with monoclonal B cells of undetermined significance, ALC is 1240 (<5000), and no other cytopenias. Does not meet criteria for SLL/CLL  - infectious work up negative,  - he has had RVP, blood cultures, and CT C/A/P which were all negative for infection.   - outpatient heme follow up after d/c, will refer to [facility].    James Almendarez, PGY-5  Hematology-Oncology Fellow  Pager: [phone] ([facility]), [phone] ([facility])  (After 5 pm or on weekends please page the on-call fellow) 78 yo M with no significant medical history p/w progressive abdominal distension and SOB admitted for newly diagnosed acute systolic heart failure and Afib with RVR, found to have leukocytosis with WBC ranging from 15k to 19k with neutrophilic predominance, no apparent infection, for which hematology is consulted.    1. Leukocytosis: peripheral blood flow cytometry consistent with monoclonal B cells of undetermined significance, ALC is 1240 (<5000), and no other cytopenias. Does not meet criteria for SLL/CLL  - please send BCR/ABL  - infectious work up negative  - he has had RVP, blood cultures, and CT C/A/P which were all negative for infection.   - outpatient heme follow up after d/c, will refer to [facility].    James Almendarez, PGY-5  Hematology-Oncology Fellow  Pager: [phone] ([facility]), [phone] ([facility])  (After 5 pm or on weekends please page the on-call fellow)

## 2019-04-17 NOTE — PROGRESS NOTE ADULT - SUBJECTIVE AND OBJECTIVE BOX
Patient is a 79y old  Male who presents with a chief complaint of dyspnea, abdominal swelling (06 Apr 2019 14:22)      SUBJECTIVE / OVERNIGHT EVENTS:  Patient states that he feels much improved, had 30 beats of WCT this morning. Asymptomatic.   ROS other wise negative.     Tele show a.fib @ 80- 110/mt with 30 beats of WCT    T(C): 36.7 (04-16-19 @ 13:50), Max: 36.7 (04-16-19 @ 13:50)  HR: 107 (04-16-19 @ 18:08) (105 - 108)  BP: 149/81 (04-16-19 @ 18:08) (149/81 - 159/84)  RR: 16 (04-16-19 @ 13:50) (16 - 18)  SpO2: 100% (04-16-19 @ 13:50) (97% - 100%)      MEDICATIONS  (STANDING):  buMETAnide Injectable 4 milliGRAM(s) IV Push every 12 hours  docusate sodium 100 milliGRAM(s) Oral three times a day  ergocalciferol 46771 Unit(s) Oral every week  famotidine    Tablet 20 milliGRAM(s) Oral daily  heparin  Infusion.  Unit(s)/Hr (14 mL/Hr) IV Continuous <Continuous>  hydrALAZINE 75 milliGRAM(s) Oral every 8 hours  milrinone Infusion 0.125 MICROgram(s)/kG/Min (2.933 mL/Hr) IV Continuous <Continuous>  multivitamin 1 Tablet(s) Oral daily  polyethylene glycol 3350 17 Gram(s) Oral daily  senna 2 Tablet(s) Oral at bedtime    MEDICATIONS  (PRN):  acetaminophen   Tablet .. 650 milliGRAM(s) Oral every 6 hours PRN Mild Pain (1 - 3)  calcium carbonate    500 mG (Tums) Chewable 2 Tablet(s) Chew three times a day PRN Heartburn  heparin  Injectable 6500 Unit(s) IV Push every 6 hours PRN For aPTT less than 40  heparin  Injectable 3000 Unit(s) IV Push every 6 hours PRN For aPTT between 40 - 57  simethicone 80 milliGRAM(s) Chew three times a day PRN Dyspepsia      PHYSICAL EXAM:      GENERAL: NAD, well-developed  HEAD:  Atraumatic, Normocephalic  EYES: EOMI, conjunctiva and sclera clear  NECK: Supple, No JVD  CHEST/LUNG: crackles at bases, no wheeze  HEART: Irregular rate and rhythm; No murmurs, rubs, or gallops  ABDOMEN: Soft, Nontender, distended, Bowel sounds present, flank and abdominal edema  EXTREMITIES:  2+ Peripheral Pulses, No clubbing, cyanosis, or edema  PSYCH: AAOx3  NEUROLOGY: non-focal, tremors  present   SKIN: No rashes or lesions                                                              12.8   14.77 )-----------( 276      ( 16 Apr 2019 09:07 )             39.3             PT/INR - ( 16 Apr 2019 12:20 )   PT: 11.6 sec;   INR: 1.01 ratio         PTT - ( 17 Apr 2019 09:20 )  PTT:39.5 sec  128|81|77<181  4.2|31|1.72  9.7,--,--  04-17 @ 06:12  122|76|78<232  4.0|29|1.55  9.1,2.3,2.5  04-16 @ 16:17                                           RADIOLOGY & ADDITIONAL TESTS:    Imaging Personally Reviewed:    Consultant(s) Notes Reviewed:  cards     Care Discussed with Consultants/Other Providers:

## 2019-04-18 LAB
ALBUMIN SERPL ELPH-MCNC: 4 G/DL — SIGNIFICANT CHANGE UP (ref 3.3–5)
ALP SERPL-CCNC: 100 U/L — SIGNIFICANT CHANGE UP (ref 40–120)
ALT FLD-CCNC: 146 U/L — HIGH (ref 10–45)
ANION GAP SERPL CALC-SCNC: 16 MMOL/L — SIGNIFICANT CHANGE UP (ref 5–17)
ANION GAP SERPL CALC-SCNC: 17 MMOL/L — SIGNIFICANT CHANGE UP (ref 5–17)
APTT BLD: 85.7 SEC — HIGH (ref 27.5–36.3)
AST SERPL-CCNC: 71 U/L — HIGH (ref 10–40)
BILIRUB SERPL-MCNC: 0.6 MG/DL — SIGNIFICANT CHANGE UP (ref 0.2–1.2)
BUN SERPL-MCNC: 76 MG/DL — HIGH (ref 7–23)
BUN SERPL-MCNC: 77 MG/DL — HIGH (ref 7–23)
CALCIUM SERPL-MCNC: 10 MG/DL — SIGNIFICANT CHANGE UP (ref 8.4–10.5)
CALCIUM SERPL-MCNC: 9.7 MG/DL — SIGNIFICANT CHANGE UP (ref 8.4–10.5)
CHLORIDE SERPL-SCNC: 81 MMOL/L — LOW (ref 96–108)
CHLORIDE SERPL-SCNC: 81 MMOL/L — LOW (ref 96–108)
CO2 SERPL-SCNC: 30 MMOL/L — SIGNIFICANT CHANGE UP (ref 22–31)
CO2 SERPL-SCNC: 30 MMOL/L — SIGNIFICANT CHANGE UP (ref 22–31)
CREAT SERPL-MCNC: 1.8 MG/DL — HIGH (ref 0.5–1.3)
CREAT SERPL-MCNC: 1.82 MG/DL — HIGH (ref 0.5–1.3)
GLUCOSE SERPL-MCNC: 146 MG/DL — HIGH (ref 70–99)
GLUCOSE SERPL-MCNC: 155 MG/DL — HIGH (ref 70–99)
HCT VFR BLD CALC: 40 % — SIGNIFICANT CHANGE UP (ref 39–50)
HGB BLD-MCNC: 13.2 G/DL — SIGNIFICANT CHANGE UP (ref 13–17)
MAGNESIUM SERPL-MCNC: 2.4 MG/DL — SIGNIFICANT CHANGE UP (ref 1.6–2.6)
MCHC RBC-ENTMCNC: 27.7 PG — SIGNIFICANT CHANGE UP (ref 27–34)
MCHC RBC-ENTMCNC: 33 GM/DL — SIGNIFICANT CHANGE UP (ref 32–36)
MCV RBC AUTO: 84 FL — SIGNIFICANT CHANGE UP (ref 80–100)
PHOSPHATE SERPL-MCNC: 4.4 MG/DL — SIGNIFICANT CHANGE UP (ref 2.5–4.5)
PLATELET # BLD AUTO: 282 K/UL — SIGNIFICANT CHANGE UP (ref 150–400)
POTASSIUM SERPL-MCNC: 4 MMOL/L — SIGNIFICANT CHANGE UP (ref 3.5–5.3)
POTASSIUM SERPL-MCNC: 4.2 MMOL/L — SIGNIFICANT CHANGE UP (ref 3.5–5.3)
POTASSIUM SERPL-SCNC: 4 MMOL/L — SIGNIFICANT CHANGE UP (ref 3.5–5.3)
POTASSIUM SERPL-SCNC: 4.2 MMOL/L — SIGNIFICANT CHANGE UP (ref 3.5–5.3)
PROT SERPL-MCNC: 7.6 G/DL — SIGNIFICANT CHANGE UP (ref 6–8.3)
RBC # BLD: 4.76 M/UL — SIGNIFICANT CHANGE UP (ref 4.2–5.8)
RBC # FLD: 14.4 % — SIGNIFICANT CHANGE UP (ref 10.3–14.5)
SODIUM SERPL-SCNC: 127 MMOL/L — LOW (ref 135–145)
SODIUM SERPL-SCNC: 128 MMOL/L — LOW (ref 135–145)
WBC # BLD: 11.77 K/UL — HIGH (ref 3.8–10.5)
WBC # FLD AUTO: 11.77 K/UL — HIGH (ref 3.8–10.5)

## 2019-04-18 PROCEDURE — 99233 SBSQ HOSP IP/OBS HIGH 50: CPT

## 2019-04-18 PROCEDURE — 99233 SBSQ HOSP IP/OBS HIGH 50: CPT | Mod: GC

## 2019-04-18 PROCEDURE — G0452: CPT | Mod: 26

## 2019-04-18 PROCEDURE — 78803 RP LOCLZJ TUM SPECT 1 AREA: CPT | Mod: 26

## 2019-04-18 PROCEDURE — 99222 1ST HOSP IP/OBS MODERATE 55: CPT | Mod: 24

## 2019-04-18 RX ORDER — ONDANSETRON 8 MG/1
4 TABLET, FILM COATED ORAL EVERY 8 HOURS
Qty: 0 | Refills: 0 | Status: DISCONTINUED | OUTPATIENT
Start: 2019-04-18 | End: 2019-04-25

## 2019-04-18 RX ADMIN — ONDANSETRON 4 MILLIGRAM(S): 8 TABLET, FILM COATED ORAL at 14:17

## 2019-04-18 RX ADMIN — ISOSORBIDE DINITRATE 10 MILLIGRAM(S): 5 TABLET ORAL at 21:17

## 2019-04-18 RX ADMIN — Medication 100 MILLIGRAM(S): at 13:18

## 2019-04-18 RX ADMIN — BUMETANIDE 4 MILLIGRAM(S): 0.25 INJECTION INTRAMUSCULAR; INTRAVENOUS at 06:28

## 2019-04-18 RX ADMIN — SENNA PLUS 2 TABLET(S): 8.6 TABLET ORAL at 21:17

## 2019-04-18 RX ADMIN — POLYETHYLENE GLYCOL 3350 17 GRAM(S): 17 POWDER, FOR SOLUTION ORAL at 13:18

## 2019-04-18 RX ADMIN — HEPARIN SODIUM 1100 UNIT(S)/HR: 5000 INJECTION INTRAVENOUS; SUBCUTANEOUS at 01:35

## 2019-04-18 RX ADMIN — SIMETHICONE 80 MILLIGRAM(S): 80 TABLET, CHEWABLE ORAL at 20:46

## 2019-04-18 RX ADMIN — Medication 1 TABLET(S): at 13:18

## 2019-04-18 RX ADMIN — Medication 75 MILLIGRAM(S): at 21:17

## 2019-04-18 RX ADMIN — Medication 100 MILLIGRAM(S): at 06:28

## 2019-04-18 RX ADMIN — Medication 75 MILLIGRAM(S): at 13:18

## 2019-04-18 RX ADMIN — SIMETHICONE 80 MILLIGRAM(S): 80 TABLET, CHEWABLE ORAL at 02:38

## 2019-04-18 RX ADMIN — ERGOCALCIFEROL 50000 UNIT(S): 1.25 CAPSULE ORAL at 13:18

## 2019-04-18 RX ADMIN — Medication 2 TABLET(S): at 22:26

## 2019-04-18 RX ADMIN — ISOSORBIDE DINITRATE 10 MILLIGRAM(S): 5 TABLET ORAL at 06:28

## 2019-04-18 RX ADMIN — Medication 2 TABLET(S): at 06:34

## 2019-04-18 RX ADMIN — FAMOTIDINE 20 MILLIGRAM(S): 10 INJECTION INTRAVENOUS at 13:18

## 2019-04-18 RX ADMIN — BUMETANIDE 4 MILLIGRAM(S): 0.25 INJECTION INTRAMUSCULAR; INTRAVENOUS at 17:22

## 2019-04-18 RX ADMIN — ISOSORBIDE DINITRATE 10 MILLIGRAM(S): 5 TABLET ORAL at 13:18

## 2019-04-18 RX ADMIN — Medication 75 MILLIGRAM(S): at 06:28

## 2019-04-18 RX ADMIN — Medication 100 MILLIGRAM(S): at 21:17

## 2019-04-18 NOTE — PROGRESS NOTE ADULT - PROBLEM SELECTOR PLAN 2
-ECHO shows EF 23% with right and left ventricular dysfunction, elevated pulm pressures.   - Continue with IV Bumex as per CHF team.   - Cath show non obstructive coronaries.   - Monitor on tele, monitor UOP, strict I+O and daily weight  -Discussed plan at length with patient and family.

## 2019-04-18 NOTE — CONSULT NOTE ADULT - REASON FOR ADMISSION
dyspnea, abdominal swelling

## 2019-04-18 NOTE — PROGRESS NOTE ADULT - SUBJECTIVE AND OBJECTIVE BOX
Patient is a 79y old  Male who presents with a chief complaint of dyspnea, abdominal swelling (06 Apr 2019 14:22)      SUBJECTIVE / OVERNIGHT EVENTS:  Patient states that he feels much improved.  ROS other wise negative.     Tele show a.fib @ 90 - 110/mt     T(C): 36.8 (04-18-19 @ 20:19), Max: 37.1 (04-18-19 @ 12:41)  HR: 93 (04-18-19 @ 20:19) (93 - 103)  BP: 135/91 (04-18-19 @ 20:19) (125/73 - 135/91)  RR: 18 (04-18-19 @ 20:19) (18 - 18)  SpO2: 99% (04-18-19 @ 20:19) (99% - 99%)    MEDICATIONS  (STANDING):  buMETAnide Injectable 4 milliGRAM(s) IV Push every 12 hours  docusate sodium 100 milliGRAM(s) Oral three times a day  ergocalciferol 13593 Unit(s) Oral every week  famotidine    Tablet 20 milliGRAM(s) Oral daily  heparin  Infusion.  Unit(s)/Hr (14 mL/Hr) IV Continuous <Continuous>  hydrALAZINE 75 milliGRAM(s) Oral every 8 hours  isosorbide   dinitrate Tablet (ISORDIL) 10 milliGRAM(s) Oral three times a day  multivitamin 1 Tablet(s) Oral daily  polyethylene glycol 3350 17 Gram(s) Oral daily  senna 2 Tablet(s) Oral at bedtime    MEDICATIONS  (PRN):  acetaminophen   Tablet .. 650 milliGRAM(s) Oral every 6 hours PRN Mild Pain (1 - 3)  calcium carbonate    500 mG (Tums) Chewable 2 Tablet(s) Chew three times a day PRN Heartburn  heparin  Injectable 6500 Unit(s) IV Push every 6 hours PRN For aPTT less than 40  heparin  Injectable 3000 Unit(s) IV Push every 6 hours PRN For aPTT between 40 - 57  ondansetron Injectable 4 milliGRAM(s) IV Push every 8 hours PRN Nausea and/or Vomiting  simethicone 80 milliGRAM(s) Chew three times a day PRN Dyspepsia              PHYSICAL EXAM:      GENERAL: NAD, well-developed  HEAD:  Atraumatic, Normocephalic  EYES: EOMI, conjunctiva and sclera clear  NECK: Supple, No JVD  CHEST/LUNG: crackles at bases, no wheeze  HEART: Irregular rate and rhythm; No murmurs, rubs, or gallops  ABDOMEN: Soft, Nontender, distended, Bowel sounds present, flank and abdominal edema  EXTREMITIES:  2+ Peripheral Pulses, No clubbing, cyanosis, or edema  PSYCH: AAOx3  NEUROLOGY: non-focal, tremors  present   SKIN: No rashes or lesions                                                             13.2   11.77 )-----------( 282      ( 18 Apr 2019 07:47 )             40.0           LIVER FUNCTIONS - ( 18 Apr 2019 06:18 )  Alb: 4.0 g/dL / Pro: 7.6 g/dL / ALK PHOS: 100 U/L / ALT: 146 U/L / AST: 71 U/L / GGT: x           PTT - ( 18 Apr 2019 00:45 )  PTT:85.7 sec  128|81|76<146  4.2|30|1.80  10.0,--,--  04-18 @ 06:18      RADIOLOGY & ADDITIONAL TESTS:    Imaging Personally Reviewed:    Consultant(s) Notes Reviewed:  cards     Care Discussed with Consultants/Other Providers:

## 2019-04-18 NOTE — CONSULT NOTE ADULT - PROBLEM SELECTOR PROBLEM 2
ELVA (acute kidney injury)
Acute congestive heart failure, unspecified heart failure type
ELVA (acute kidney injury)

## 2019-04-18 NOTE — CONSULT NOTE ADULT - PROBLEM SELECTOR RECOMMENDATION 2
Renal following  Continue diuresis on IV bumex  Supplement mag and K prn
- ECHO: LA size 5.7cm, severe global LV systolic dysfunction, right ventricular enlargement with decreased RV systolic fxn.  BNP on admission +50, 000  - Continue with Diuresis, lasix 40mg IV BID  - Daily weight, I& O
likely 2/2 from contrast and diuresis  - will monitor UOP  - avoid nephrotoxins

## 2019-04-18 NOTE — PROGRESS NOTE ADULT - PROBLEM SELECTOR PLAN 9
WBC 15 possibly reactive, afebrile, VS stable  - CT chest/A/P without source of infection  - blood cultures with NGTD from 4/4  -Hem consult appreciated, BCR/ABL sent- 04/18, follow up.   - Recommend out patient follow up.

## 2019-04-18 NOTE — CONSULT NOTE ADULT - PROBLEM SELECTOR RECOMMENDATION 9
CT Surgery consulted for LVAD evaluation/workup  Currently weaned off milrinone drip and continued on IVP bumex for diuresis per heart failure  Continue medical optimization and plan for repeat TTE

## 2019-04-18 NOTE — PROGRESS NOTE ADULT - PROBLEM SELECTOR PLAN 3
ELVA from low perfusion, continue to monitor on IV Bumex and monitor renal function.  Renal following.

## 2019-04-18 NOTE — CONSULT NOTE ADULT - CONSULT REASON
ADHF
Congestive Heart Failure
Newly diagnosed AFIB
acute decompensated heart failure
elevated LFTs
leukocytosis

## 2019-04-18 NOTE — CONSULT NOTE ADULT - CONSULT REQUESTED DATE/TIME
04-Apr-2019 06:47
04-Apr-2019 21:08
09-Apr-2019 15:46
10-Apr-2019 13:47
18-Apr-2019 15:00
11-Apr-2019 16:37

## 2019-04-18 NOTE — PROGRESS NOTE ADULT - ASSESSMENT
79y.o. Male with no PMH now admitted with new HFrEF (TTE 04/04/2019 with LVEF 25%, biventricular dysfunction, moderate MR, moderate TR, mild ND) and AFIB with RVR, abdominal discomfort/nausea concerning for hepatic congestion in the setting of ADHF (BNP>50K), now s/p LHC/RHC (non-obstructive CAD). RHC:  RA 15, RV 58/14, PA 59/34/43, Wedge 33 PA sat 44%, CI 1.57. Pt has received tolvaptan, initiated on milrinone and lasix gtt (now transitioned to PO therapy) with improvement in symptoms.        Problem/Plan - 1:  ·  Problem: Acute systolic heart failure, first episode.  Plan:   - now off milrinone  - Keep K >4 and Mg >2  - Please get limited TTE to assess for IVC size  - Please check BNP  - Strict I/Os  - Daily standing weights  - BMP BID  - Continue hydralazine 75mg PO TID, increase isordil to 20mg TID. Hold paramaters should be SBP <90     Problem/Plan - 2:  ·  Problem: ELVA (acute kidney injury).  Plan: likely 2/2 to cardiorenal/low output   - continue to trend  - avoid nephrotoxins.      Problem/Plan - 3:  ·  Problem: Atrial fibrillation/NSVT Patient in afib in 90-120s.  Plan: - c/w heparin gtt  - Cardioversion when more euvolemic.   - patient with 7 beats WCT off milrinone, please touch base with EP about considering initiating amiodarone  - f/u EP  - monitor on tele.

## 2019-04-18 NOTE — CONSULT NOTE ADULT - ASSESSMENT
80 y/o Male Mandaen speaking with no PMH now admitted with new HFrEF (TTE 04/04/2019 with LVEF 25%, biventricular dysfunction, moderate MR, moderate TR, mild MI) and AFIB with RVR, abdominal discomfort/nausea concerning for hepatic congestion in the setting of ADHF (BNP>50K), now s/p LHC/RHC (non-obstructive CAD). Symptoms improved after initiation of milrinone and bumex drip, now weaned off.

## 2019-04-18 NOTE — PROGRESS NOTE ADULT - PROBLEM SELECTOR PLAN 4
- LA severely dilated on TTE  - Heparin gtt for AC given renal   function  - TSH wnl  - RICH and cardioversion when euvolemic.

## 2019-04-18 NOTE — PROGRESS NOTE ADULT - ASSESSMENT
79 year old male denying any past medical or surgical history and reports normal state of health prior  who now present  to [facility] with complaint of progressively worsening SOB over the last 2-3 weeks and noted to have increasing abdominal girth, pain, increased belching and n/v, found to be  in AFIB w/ RVR. Pt denies any past history of AFIB, however patient's son who is a physician recalls a past EKG done (December 2018) in Dr. Johnston office may have revealed AFIB. S/P cardiac Cath, which revealed NCA, Hospital course c/b transaminitis and ELVA likely cardiorenal syndrome. EP consulted for further management of AFIB. Called by HF team today regarding use of amiodarone due to patient continue to have NSVT off milrinone gtt.      Afib w/ RVR in the setting of severely dilated LA w/ JF 55  -Unsure duration of AF as patient without clear symptoms   -Off milrinone gtt since 4/17 2pm  -Tele: AF w/ VHR 's (up to 120's), PVCs, with 1 episode of NSVT x 7 beats since off milrinone 4/17 2pm    -Currently on Heparin gtt for A/C  -Improving transaminitis with down trending LFTs  -f/u NM SPEC result r/o amyloidosis  -Eventual RICH/DCCV when medically optimized    -Will discuss with Dr. Hartmann regarding amiodarone     [phone] 79 year old male denying any past medical or surgical history and reports normal state of health prior  who now present  to [facility] with complaint of progressively worsening SOB over the last 2-3 weeks and noted to have increasing abdominal girth, pain, increased belching and n/v, found to be  in AFIB w/ RVR. Pt denies any past history of AFIB, however patient's son who is a physician recalls a past EKG done (December 2018) in Dr. Johnston office may have revealed AFIB. S/P cardiac Cath, which revealed NCA, Hospital course c/b transaminitis and ELVA likely cardiorenal syndrome. EP consulted for further management of AFIB. Called by HF team today regarding use of amiodarone due to patient continue to have NSVT off milrinone gtt.      Afib w/ RVR in the setting of severely dilated LA w/ JF 55  -Unsure duration of AF as patient without clear symptoms   -Off milrinone gtt since 4/17 2pm  -Tele: AF w/ VHR 's (up to 120's), PVCs, with 1 episode of NSVT x 7 beats since off milrinone 4/17 2pm    -Currently on Heparin gtt for A/C  -Improving transaminitis with down trending LFTs  -f/u NM SPEC result r/o amyloidosis  -Eventual RICH/DCCV when medically optimized    -ok to use amiodarone but would need RICH (r/o LA thrombus) prior to initiate amiodarone and would perform DCCV at time of RICH.  -CT surgery consulted for LVAD w/u    [phone] 79 year old male denying any past medical or surgical history and reports normal state of health prior  who now present  to [facility] with complaint of progressively worsening SOB over the last 2-3 weeks and noted to have increasing abdominal girth, pain, increased belching and n/v, found to be  in AFIB w/ RVR. Pt denies any past history of AFIB, however patient's son who is a physician recalls a past EKG done (December 2018) in Dr. Johnston office may have revealed AFIB. S/P cardiac Cath, which revealed NCA, Hospital course c/b transaminitis and ELVA likely cardiorenal syndrome. EP consulted for further management of AFIB. Called by HF team today regarding use of amiodarone due to patient continue to have NSVT off milrinone gtt.      Afib w/ RVR in the setting of severely dilated LA w/ JF 55  -Unsure duration of AF as patient without clear symptoms   -Off milrinone gtt since 4/17 2pm  -Tele: AF w/ VHR 's (up to 120's), PVCs, with 1 episode of NSVT x 7 beats since off milrinone 4/17 2pm    -Currently on Heparin gtt for A/C  -Improving transaminitis with down trending LFTs  -NM SPEC negative for amyloidosis  -Eventual RICH/DCCV when medically optimized    -ok to use amiodarone but would need RICH (r/o LA thrombus) prior to initiate amiodarone and would perform DCCV at time of RICH.  -CT surgery consulted for LVAD w/u    [phone]

## 2019-04-18 NOTE — PROGRESS NOTE ADULT - PROBLEM SELECTOR PLAN 1
Repeat episodes of WCT from Milrinone, tapered off, monitor on tele.   Currently tele shows A.fib @/mt.   Follow up repeat Echo, CTS consulting to eval for LVAD.   EP following, keep K >4 and Mg >2  - Strict I/Os  - Daily standing weights

## 2019-04-18 NOTE — CONSULT NOTE ADULT - SUBJECTIVE AND OBJECTIVE BOX
History of Present Illness:  80 y/o M, some english but mostly gay/Maori speaking, c hx [place] and no other significant PMH who presents with a 10 day h/x of progressive abdominal distension and pain with progressive shortness of breath.     Pt's son (Dr. Zhang is a hospitalist @ [facility] in [facility]) at bedside provided collateral. The pt had been in his usual state of health until 10 days ago, when he began belching and first started to notice abdominal pain. Pt also had decreased PO intake and has decreased caliber of stools. He describes feeling constantly nauseous and has had ~3 episodes of non-bloody non-bilious emesis over the past week . He notes a burning abdominal pain that he attributes to heartburn which has not been relieved by Protonix. However about 5 days ago, he noticed progressive abdominal and lower extremity swelling within the last 2-3 days. He also noted progressive dyspnea on exertion and orthopnea over the past 10 days which has progressed to the point where he is unable to take even a few steps without sitting down. He also notes a non productive cough that has developed over the same period of time. He denies fevers, chills, sore throat, and chest pain. Pt was seen in recent months by his PCP and no abnormalities were noted. He states his labs were within normal ranges aside from a slightly elevated cholesterol and that IFOBT testing for colon cancer were negative, although the pt has never had a colonoscopy. Outpt records are currently unavailable.    Initial ED vitals: T 97.5F, , /110, RR 18 on RA. He was initially given 1L NS bolus, Zofran, and Reglan. Pt became tachycardic to the 140s and was noted to be in AFib with RVR on EKG. Pt was administered Lopressor 5mg IV and 25mg PO, which improved rate control to 90s. ED echo showed decreased EF and has was administered Lasix 40mg IV. Pt's most recent vitals: T 36.4C, HR 90, /84, RR 18, satO2 100% on 2.5L O2 nasal canula.    Seen at beside, the pt denies any headache, fever, chills, chest pain, diarrhea, constipation, hematuria, or dysuria. He states that he felt palpatations earlier and that he had felt his heart beating fast intermittently over the past couple of weeks. He states that his abdominal discomfort feels like a "fullness" and rates it at a 5/10 in severity. He denies any shortness of breath at rest. (04 Apr 2019 00:12)       Past Medical History  No pertinent past medical history      Past Surgical History  No significant past surgical history      MEDICATIONS  (STANDING):  buMETAnide Injectable 4 milliGRAM(s) IV Push every 12 hours  docusate sodium 100 milliGRAM(s) Oral three times a day  ergocalciferol 07908 Unit(s) Oral every week  famotidine    Tablet 20 milliGRAM(s) Oral daily  heparin  Infusion.  Unit(s)/Hr (14 mL/Hr) IV Continuous <Continuous>  hydrALAZINE 75 milliGRAM(s) Oral every 8 hours  isosorbide   dinitrate Tablet (ISORDIL) 10 milliGRAM(s) Oral three times a day  multivitamin 1 Tablet(s) Oral daily  polyethylene glycol 3350 17 Gram(s) Oral daily  senna 2 Tablet(s) Oral at bedtime    MEDICATIONS  (PRN):  acetaminophen   Tablet .. 650 milliGRAM(s) Oral every 6 hours PRN Mild Pain (1 - 3)  calcium carbonate    500 mG (Tums) Chewable 2 Tablet(s) Chew three times a day PRN Heartburn  heparin  Injectable 6500 Unit(s) IV Push every 6 hours PRN For aPTT less than 40  heparin  Injectable 3000 Unit(s) IV Push every 6 hours PRN For aPTT between 40 - 57  ondansetron Injectable 4 milliGRAM(s) IV Push every 8 hours PRN Nausea and/or Vomiting  simethicone 80 milliGRAM(s) Chew three times a day PRN Dyspepsia      Vital Signs Last 24 Hrs  T(C): 37.1 (04-18-19 @ 12:41), Max: 37.1 (04-18-19 @ 12:41)  T(F): 98.8 (04-18-19 @ 12:41), Max: 98.8 (04-18-19 @ 12:41)  HR: 103 (04-18-19 @ 12:41) (90 - 105)  BP: 125/73 (04-18-19 @ 12:41) (108/69 - 150/66)  RR: 18 (04-18-19 @ 12:41) (18 - 18)  SpO2: 99% (04-18-19 @ 12:41) (96% - 99%)             Height (cm): 167.64 (03 Apr 2019 13:42)  Weight (kg): 78.2 (08 Apr 2019 07:00)  BMI (kg/m2): 27.8 (08 Apr 2019 07:00)      Allergies: erythromycin (Unknown)      SOCIAL HISTORY:  	Denies alcohol use  	Denies illict drugs  	, Lives with wife at home  	Ambulates without assistance, independent for all ADL and IADL  	has 1 son and 1 daughter  retired     FAMILY HISTORY:  FH: liver disease: unknown etiology, mother      Review of Systems  GENERAL:  no weakness, fatigue, fevers or chills  NEURO: no dizziness, numbness, tingling or weakness  SKIN: no itching, burning, rashes, or lesions   HEENT: no visual changes;  no headache, no vertigo, no recent colds  RESPIRATORY: no shortness of breath, no cough, sputum, wheezing  CARDIOVASCULAR:  no chest pain,  or palpitations  GI: no abd pain. no N/V/D.  PERIPHERAL VASCULAR: no swelling, no tenderness, no erythema    PHYSICAL EXAM  General: Well nourished, well developed, NAD.                                              Neuro: Normal exam oriented to person/place & time with no focal motor or sensory  deficits.                    Eyes: Normal exam of conjunctiva & lids, pupils equally reactive.   ENT: Normal exam of nasal/oral mucosa with absence of cyanosis.   Neck: Normal exam of jugular veins, trachea & thyroid.   Chest: Normal lung exam with good air movement absence of wheezes, rales, or rhonchi.                                                                         CV:  Auscultation: normal S1S2, RRR   Carotids: No Bruits[X]  Abdominal Aorta: normal [X] nonpalpable[X]                                                                         GI: Normal exam of abdomen with no noted masses or tenderness. +[id number]                                                                                            Extremities: Normal no evidence of cyanosis or deformity, Edema: none  Lower Extremity Pulses: Right[+2DP] Left[+2DP] Varicosities[none]  SKIN : Normal exam to inspection & palpation.                                                           LABS:                        13.2   11.77 )-----------( 282      ( 18 Apr 2019 07:47 )             40.0     128<L>  |  81<L>  |  76<H>  ----------------------------<  146<H>  4.2   |  30  |  1.80<H>    Ca    10.0      18 Apr 2019 06:18  Phos  4.0     04-17  Mg     2.5     04-17    TPro  7.6  /  Alb  4.0  /  TBili  0.6  /  DBili  x   /  AST  71<H>  /  ALT  146<H>  /  AlkPhos  100  04-18    PTT - ( 18 Apr 2019 00:45 )  PTT:85.7 sec      < from: Cardiac Cath Lab - Adult (04.08.19 @ 18:00) >  CORONARY VESSELS: The coronary circulation is right dominant.  LM:   --  Ostial LM: There was a 20 % stenosis.  --  Distal left main: There was a 30 % stenosis.  LAD:   --  LAD: Normal.  CX:   --  Circumflex: Normal.  RCA:   --  RCA: Normal.    HEMODYNAMIC TABLES  Pressures:  Baseline  Pressures:  - HR: 109  Pressures:  - Rhythm:  Pressures:  -- Aortic Pressure (S/D/M): 164/103/131  Pressures:  -- Pulmonary Artery (S/D/M): 59/34/43  Pressures:  -- Pulmonary Capillary Wedge: 36/34/33  Pressures:  -- Right Atrium (a/v/M): 15/19/15  Pressures:  -- Right Ventricle (s/edp): 58/14/--    < from: Transthoracic Echocardiogram (04.04.19 @ 19:23) >  Observations:  Mitral Valve: Tethered mitral valve leaflets with normal  opening. Malcoaptation of leaflet tips.  Moderate mitral  regurgitation. Peak mitral valve gradient equals 4 mm Hg,  mean transmitral valve gradient equals 1 mm Hg.  Aortic Valve/Aorta: Normal trileaflet aortic valve. Mild  aortic regurgitation.  Aortic Root: 3.3 cm.  LVOT diameter: 2 cm.  Left Atrium: Severely dilated left atrium.  LA volume index  =55 cc/m2.  Left Ventricle: Severe global left ventricular systolic  dysfunction. Normal left ventricular internal dimensions  and wall thicknesses. Increased E/e'  is consistent with  elevated left ventricular filling pressure. EF 20 - 25%  Right Heart: Severe right atrial enlargement. Right  ventricular enlargement with decreased right ventricular  systolic function. Normal tricuspid valve. Moderate  tricuspid regurgitation. Pulmonic valve not well  visualized. Mild pulmonic regurgitation.  Pericardium/Pleura: Normal pericardium with no pericardial  effusion.  Large left pleural effusion.  Hemodynamic: Estimated right atrial pressure is 8 mm Hg.  Estimated right ventricular systolic pressure equals 37 mm  Hg, assuming right atrial pressure equals 8 mm Hg,  consistent with borderline pulmonary hypertension.

## 2019-04-18 NOTE — CONSULT NOTE ADULT - PROBLEM SELECTOR PROBLEM 1
Acute decompensated heart failure
Atrial fibrillation with RVR
Acute systolic heart failure, first episode

## 2019-04-18 NOTE — PROGRESS NOTE ADULT - SUBJECTIVE AND OBJECTIVE BOX
24H hour events: No acute events overnight, off milrinone drip since yesterday     MEDICATIONS:  buMETAnide Injectable 4 milliGRAM(s) IV Push every 12 hours  heparin  Infusion.  Unit(s)/Hr IV Continuous <Continuous>  heparin  Injectable 6500 Unit(s) IV Push every 6 hours PRN  heparin  Injectable 3000 Unit(s) IV Push every 6 hours PRN  hydrALAZINE 75 milliGRAM(s) Oral every 8 hours  isosorbide   dinitrate Tablet (ISORDIL) 10 milliGRAM(s) Oral three times a day    acetaminophen   Tablet .. 650 milliGRAM(s) Oral every 6 hours PRN    calcium carbonate    500 mG (Tums) Chewable 2 Tablet(s) Chew three times a day PRN  docusate sodium 100 milliGRAM(s) Oral three times a day  famotidine    Tablet 20 milliGRAM(s) Oral daily  polyethylene glycol 3350 17 Gram(s) Oral daily  senna 2 Tablet(s) Oral at bedtime  simethicone 80 milliGRAM(s) Chew three times a day PRN      ergocalciferol 90320 Unit(s) Oral every week  multivitamin 1 Tablet(s) Oral daily      REVIEW OF SYSTEMS:  Complete 10point ROS negative.    PHYSICAL EXAM:  T(C): 37.1 (04-18-19 @ 12:41), Max: 37.1 (04-18-19 @ 12:41)  HR: 103 (04-18-19 @ 12:41) (90 - 105)  BP: 125/73 (04-18-19 @ 12:41) (108/69 - 150/66)  RR: 18 (04-18-19 @ 12:41) (18 - 18)  SpO2: 99% (04-18-19 @ 12:41) (96% - 99%)  I&O's Summary    17 Apr 2019 07:01  -  18 Apr 2019 07:00  --------------------------------------------------------  IN: 743.2 mL / OUT: 2800 mL / NET: -2056.8 mL      Appearance: NAD, OOB sitting up in chair  Neck: Supple  Cardiovascular: Normal S1 S2, Irregular, No JVD  Respiratory: Lungs clear to auscultation	  Psychiatry: A & O x 3, Mood & affect appropriate  Gastrointestinal:  Soft, Non-tender, + BS	  Skin: No rashes, No ecchymoses, No cyanosis	  Extremities: Normal range of motion, No clubbing, cyanosis or edema  Vascular: Peripheral pulses palpable 2+ bilaterally      LABS:	 	    CBC Full  -  ( 18 Apr 2019 07:47 )  WBC Count : 11.77 K/uL  Hemoglobin : 13.2 g/dL  Hematocrit : 40.0 %  Platelet Count - Automated : 282 K/uL  Mean Cell Volume : 84.0 fl  Mean Cell Hemoglobin : 27.7 pg  Mean Cell Hemoglobin Concentration : 33.0 gm/dL      04-18    128<L>  |  81<L>  |  76<H>  ----------------------------<  146<H>  4.2   |  30  |  1.80<H>  04-17    123<L>  |  79<L>  |  76<H>  ----------------------------<  200<H>  4.2   |  31  |  1.78<H>    Ca    10.0      18 Apr 2019 06:18  Ca    9.6      17 Apr 2019 18:00  Phos  4.0     04-17  Phos  2.5     04-16  Mg     2.5     04-17  Mg     2.3     04-16    TPro  7.6  /  Alb  4.0  /  TBili  0.6  /  DBili  x   /  AST  71<H>  /  ALT  146<H>  /  AlkPhos  100  04-18    TSH (4/4/19): 1.05       TELEMETRY: Afib with VHR 's (up to 120's at times), PVCs, 7 beats NSVT this morning    	    < from: Transthoracic Echocardiogram (04.04.19 @ 19:23) >  Dimensions:    Normal Values:  LA:     5.7    2.0 - 4.0 cm  Ao:     3.3    2.0 - 3.8 cm  SEPTUM: 0.9    0.6 - 1.2 cm  PWT:    0.9    0.6 - 1.1 cm  LVIDd:  4.8    3.0 - 5.6 cm  LVIDs:  4.3    1.8 - 4.0 cm  Derived variables:  LVMI: 79 g/m2  RWT: 0.37  Fractional short: 10 %  EF (Visual Estimate): 20-25 %  EF (Teicholtz): 23 %  Doppler Peak Velocity (m/sec): MV=1.0  ------------------------------------------------------------------------  Observations:  Mitral Valve: Tethered mitral valve leaflets with normal  opening. Malcoaptation of leaflet tips.  Moderate mitral  regurgitation. Peak mitral valve gradient equals 4 mm Hg,  mean transmitral valve gradient equals 1 mm Hg.  Aortic Valve/Aorta: Normal trileaflet aortic valve. Mild  aortic regurgitation.  Aortic Root: 3.3 cm.  LVOT diameter: 2 cm.  Left Atrium: Severely dilated left atrium.  LA volume index  =55 cc/m2.  Left Ventricle: Severe global left ventricular systolic  dysfunction. Normal left ventricular internal dimensions  and wall thicknesses. Increased E/e'  is consistent with  elevated left ventricular filling pressure.  Right Heart: Severe right atrial enlargement. Right  ventricular enlargement with decreased right ventricular  systolic function. Normal tricuspid valve. Moderate  tricuspid regurgitation. Pulmonic valve not well  visualized. Mild pulmonic regurgitation.  Pericardium/Pleura: Normal pericardium with no pericardial  effusion.  Large left pleural effusion.  Hemodynamic: Estimated right atrial pressure is 8 mm Hg.  Estimated right ventricular systolic pressure equals 37 mm  Hg, assuming right atrial pressure equals 8 mm Hg,  consistent with borderline pulmonary hypertension.  ------------------------------------------------------------------------  Conclusions:  1. Tethered mitral valve leaflets with normal opening.  Malcoaptation of leaflet tips.  Moderate mitral  regurgitation. Peak mitral valve gradient equals 4 mm Hg,  mean transmitral valve gradient equals 1 mm Hg.  2. Severely dilated left atrium.  LA volume index = 55  cc/m2.  3. Normal left ventricular internal dimensions and wall  thicknesses.  4. Severe global left ventricular systolic dysfunction.  5. Increased E/e'  is consistent with elevated left  ventricular filling pressure.  6. Right ventricular enlargement with decreased right  ventricular systolic function.  7. Normal tricuspid valve. Moderate-severe tricuspid  regurgitation.  8. Large left pleural effusion.  *** No previous Echo exam.    < from: Cardiac Cath Lab - Adult (04.08.19 @ 18:00) >  CORONARY VESSELS: The coronary circulation is right dominant.  LM:   --  Ostial LM: There was a 20 % stenosis.  --  Distal left main: There was a 30 % stenosis.  LAD:   --  LAD: Normal.  CX:   --  Circumflex: Normal.  RCA:   --  RCA: Normal.  COMPLICATIONS: There were no complications.  DIAGNOSTIC RECOMMENDATIONS: The patient should continue with the present  medications.

## 2019-04-18 NOTE — CONSULT NOTE ADULT - PROBLEM SELECTOR RECOMMENDATION 3
EP following  -Currently on Heparin gtt for A/C  Eventual RICH/DCCV when medically optimized
Bun 42/ Creat 1.93  ACE/ARB once ELVA improves

## 2019-04-18 NOTE — CONSULT NOTE ADULT - ATTENDING COMMENTS
Patient was seen and examined. I agree with above assessment and plan. Currently, he is doing well off inotropic support. I explained that given his advanced age and small/normal size left ventricular chamber, should he decompensate on medical therapy he likely would not be a candidate for either heart transplant or left ventricular assist device implantation.

## 2019-04-18 NOTE — PROGRESS NOTE ADULT - SUBJECTIVE AND OBJECTIVE BOX
Overnight Events:     REVIEW OF SYSTEMS:  CONSTITUTIONAL: No weakness, fevers or chills  EYES/ENT: No visual changes;  No dysphagia  NECK: No pain or stiffness  RESPIRATORY: No cough, wheezing, hemoptysis; No shortness of breath  CARDIOVASCULAR: No chest pain or palpitations; No lower extremity edema  GASTROINTESTINAL: No abdominal or epigastric pain. No nausea, vomiting, or hematemesis; No diarrhea or constipation. No melena or hematochezia.  BACK: No back pain  GENITOURINARY: No dysuria, frequency or hematuria  NEUROLOGICAL: No numbness or weakness  SKIN: No itching, burning, rashes, or lesions   All other review of systems is negative unless indicated above.            Medications:  acetaminophen   Tablet .. 650 milliGRAM(s) Oral every 6 hours PRN  buMETAnide Injectable 4 milliGRAM(s) IV Push every 12 hours  calcium carbonate    500 mG (Tums) Chewable 2 Tablet(s) Chew three times a day PRN  docusate sodium 100 milliGRAM(s) Oral three times a day  ergocalciferol 93801 Unit(s) Oral every week  famotidine    Tablet 20 milliGRAM(s) Oral daily  heparin  Infusion.  Unit(s)/Hr IV Continuous <Continuous>  heparin  Injectable 6500 Unit(s) IV Push every 6 hours PRN  heparin  Injectable 3000 Unit(s) IV Push every 6 hours PRN  hydrALAZINE 75 milliGRAM(s) Oral every 8 hours  isosorbide   dinitrate Tablet (ISORDIL) 10 milliGRAM(s) Oral three times a day  multivitamin 1 Tablet(s) Oral daily  polyethylene glycol 3350 17 Gram(s) Oral daily  senna 2 Tablet(s) Oral at bedtime  simethicone 80 milliGRAM(s) Chew three times a day PRN      PAST MEDICAL & SURGICAL HISTORY:  No pertinent past medical history  No significant past surgical history      Vitals:  T(F): 98.3 (04-18), Max: 98.3 (04-18)  HR: 90 (04-18) (90 - 108)  BP: 148/80 (04-18) (108/69 - 150/66)  RR: 18 (04-18)  SpO2: 99% (04-18)  I&O's Summary    17 Apr 2019 07:01  -  18 Apr 2019 07:00  --------------------------------------------------------  IN: 743.2 mL / OUT: 2800 mL / NET: -2056.8 mL        Physical Exam:  Appearance: No acute distress; well appearing  Eyes: PERRL, EOMI, pink conjunctiva  HENT: Normal oral muscosa  Cardiovascular: RRR, S1, S2, no murmurs, rubs, or gallops; no edema; no JVD  Respiratory: Clear to auscultation bilaterally  Gastrointestinal: soft, non-tender, non-distended with normal bowel sounds  Musculoskeletal: No clubbing; no joint deformity   Neurologic: Non-focal  Lymphatic: No lymphadenopathy  Psychiatry: AAOx3, mood & affect appropriate  Skin: No rashes, ecchymoses, or cyanosis                          13.2   11.77 )-----------( 282      ( 18 Apr 2019 07:47 )             40.0     04-18    128<L>  |  81<L>  |  76<H>  ----------------------------<  146<H>  4.2   |  30  |  1.80<H>    Ca    10.0      18 Apr 2019 06:18  Phos  4.0     04-17  Mg     2.5     04-17    TPro  7.6  /  Alb  4.0  /  TBili  0.6  /  DBili  x   /  AST  71<H>  /  ALT  146<H>  /  AlkPhos  100  04-18    PT/INR - ( 16 Apr 2019 12:20 )   PT: 11.6 sec;   INR: 1.01 ratio         PTT - ( 18 Apr 2019 00:45 )  PTT:85.7 sec              New ECG(s): Personally reviewed    Echo:    Stress Testing:     Cath:    Imaging:    Interpretation of Telemetry:

## 2019-04-19 LAB
ANION GAP SERPL CALC-SCNC: 15 MMOL/L — SIGNIFICANT CHANGE UP (ref 5–17)
ANION GAP SERPL CALC-SCNC: 17 MMOL/L — SIGNIFICANT CHANGE UP (ref 5–17)
APTT BLD: 81 SEC — HIGH (ref 27.5–36.3)
BUN SERPL-MCNC: 73 MG/DL — HIGH (ref 7–23)
BUN SERPL-MCNC: 93 MG/DL — HIGH (ref 7–23)
CALCIUM SERPL-MCNC: 10 MG/DL — SIGNIFICANT CHANGE UP (ref 8.4–10.5)
CALCIUM SERPL-MCNC: 9.9 MG/DL — SIGNIFICANT CHANGE UP (ref 8.4–10.5)
CHLORIDE SERPL-SCNC: 78 MMOL/L — LOW (ref 96–108)
CHLORIDE SERPL-SCNC: 79 MMOL/L — LOW (ref 96–108)
CO2 SERPL-SCNC: 29 MMOL/L — SIGNIFICANT CHANGE UP (ref 22–31)
CO2 SERPL-SCNC: 31 MMOL/L — SIGNIFICANT CHANGE UP (ref 22–31)
CREAT SERPL-MCNC: 1.78 MG/DL — HIGH (ref 0.5–1.3)
CREAT SERPL-MCNC: 1.82 MG/DL — HIGH (ref 0.5–1.3)
GLUCOSE SERPL-MCNC: 131 MG/DL — HIGH (ref 70–99)
GLUCOSE SERPL-MCNC: 162 MG/DL — HIGH (ref 70–99)
MAGNESIUM SERPL-MCNC: 2.5 MG/DL — SIGNIFICANT CHANGE UP (ref 1.6–2.6)
PHOSPHATE SERPL-MCNC: 4.4 MG/DL — SIGNIFICANT CHANGE UP (ref 2.5–4.5)
POTASSIUM SERPL-MCNC: 3.7 MMOL/L — SIGNIFICANT CHANGE UP (ref 3.5–5.3)
POTASSIUM SERPL-MCNC: 4 MMOL/L — SIGNIFICANT CHANGE UP (ref 3.5–5.3)
POTASSIUM SERPL-SCNC: 3.7 MMOL/L — SIGNIFICANT CHANGE UP (ref 3.5–5.3)
POTASSIUM SERPL-SCNC: 4 MMOL/L — SIGNIFICANT CHANGE UP (ref 3.5–5.3)
SODIUM SERPL-SCNC: 124 MMOL/L — LOW (ref 135–145)
SODIUM SERPL-SCNC: 125 MMOL/L — LOW (ref 135–145)
VIT D25+D1,25 OH+D1,25 PNL SERPL-MCNC: 44.8 PG/ML — SIGNIFICANT CHANGE UP (ref 19.9–79.3)

## 2019-04-19 PROCEDURE — 93308 TTE F-UP OR LMTD: CPT | Mod: 26

## 2019-04-19 PROCEDURE — 99231 SBSQ HOSP IP/OBS SF/LOW 25: CPT

## 2019-04-19 PROCEDURE — 93321 DOPPLER ECHO F-UP/LMTD STD: CPT | Mod: 26

## 2019-04-19 PROCEDURE — 99233 SBSQ HOSP IP/OBS HIGH 50: CPT

## 2019-04-19 PROCEDURE — 99233 SBSQ HOSP IP/OBS HIGH 50: CPT | Mod: GC

## 2019-04-19 RX ORDER — ISOSORBIDE DINITRATE 5 MG/1
20 TABLET ORAL THREE TIMES A DAY
Qty: 0 | Refills: 0 | Status: DISCONTINUED | OUTPATIENT
Start: 2019-04-19 | End: 2019-04-20

## 2019-04-19 RX ORDER — BUMETANIDE 0.25 MG/ML
4 INJECTION INTRAMUSCULAR; INTRAVENOUS DAILY
Qty: 0 | Refills: 0 | Status: DISCONTINUED | OUTPATIENT
Start: 2019-04-19 | End: 2019-04-19

## 2019-04-19 RX ORDER — TOLVAPTAN 15 MG/1
30 TABLET ORAL ONCE
Qty: 0 | Refills: 0 | Status: COMPLETED | OUTPATIENT
Start: 2019-04-19 | End: 2019-04-19

## 2019-04-19 RX ORDER — APIXABAN 2.5 MG/1
5 TABLET, FILM COATED ORAL EVERY 12 HOURS
Qty: 0 | Refills: 0 | Status: DISCONTINUED | OUTPATIENT
Start: 2019-04-19 | End: 2019-04-25

## 2019-04-19 RX ORDER — BUMETANIDE 0.25 MG/ML
4 INJECTION INTRAMUSCULAR; INTRAVENOUS EVERY 12 HOURS
Qty: 0 | Refills: 0 | Status: DISCONTINUED | OUTPATIENT
Start: 2019-04-19 | End: 2019-04-20

## 2019-04-19 RX ADMIN — APIXABAN 5 MILLIGRAM(S): 2.5 TABLET, FILM COATED ORAL at 18:47

## 2019-04-19 RX ADMIN — BUMETANIDE 4 MILLIGRAM(S): 0.25 INJECTION INTRAMUSCULAR; INTRAVENOUS at 05:28

## 2019-04-19 RX ADMIN — Medication 75 MILLIGRAM(S): at 13:17

## 2019-04-19 RX ADMIN — SIMETHICONE 80 MILLIGRAM(S): 80 TABLET, CHEWABLE ORAL at 18:23

## 2019-04-19 RX ADMIN — HEPARIN SODIUM 1100 UNIT(S)/HR: 5000 INJECTION INTRAVENOUS; SUBCUTANEOUS at 09:24

## 2019-04-19 RX ADMIN — Medication 75 MILLIGRAM(S): at 06:30

## 2019-04-19 RX ADMIN — SIMETHICONE 80 MILLIGRAM(S): 80 TABLET, CHEWABLE ORAL at 07:41

## 2019-04-19 RX ADMIN — BUMETANIDE 4 MILLIGRAM(S): 0.25 INJECTION INTRAMUSCULAR; INTRAVENOUS at 18:23

## 2019-04-19 RX ADMIN — ISOSORBIDE DINITRATE 20 MILLIGRAM(S): 5 TABLET ORAL at 21:13

## 2019-04-19 RX ADMIN — Medication 75 MILLIGRAM(S): at 21:14

## 2019-04-19 RX ADMIN — ONDANSETRON 4 MILLIGRAM(S): 8 TABLET, FILM COATED ORAL at 13:23

## 2019-04-19 RX ADMIN — FAMOTIDINE 20 MILLIGRAM(S): 10 INJECTION INTRAVENOUS at 13:16

## 2019-04-19 RX ADMIN — TOLVAPTAN 30 MILLIGRAM(S): 15 TABLET ORAL at 13:17

## 2019-04-19 RX ADMIN — ISOSORBIDE DINITRATE 10 MILLIGRAM(S): 5 TABLET ORAL at 05:28

## 2019-04-19 RX ADMIN — Medication 1 TABLET(S): at 13:16

## 2019-04-19 RX ADMIN — SENNA PLUS 2 TABLET(S): 8.6 TABLET ORAL at 21:14

## 2019-04-19 RX ADMIN — ISOSORBIDE DINITRATE 10 MILLIGRAM(S): 5 TABLET ORAL at 13:16

## 2019-04-19 RX ADMIN — Medication 100 MILLIGRAM(S): at 21:14

## 2019-04-19 NOTE — PROGRESS NOTE ADULT - SUBJECTIVE AND OBJECTIVE BOX
24H hour events: No acute events overnight, Tele: Afib 's with couplets and triplets, no further NSVT since 4/18 4:27am    MEDICATIONS:  buMETAnide Injectable 4 milliGRAM(s) IV Push every 12 hours  heparin  Infusion.  Unit(s)/Hr IV Continuous <Continuous>  heparin  Injectable 6500 Unit(s) IV Push every 6 hours PRN  heparin  Injectable 3000 Unit(s) IV Push every 6 hours PRN  hydrALAZINE 75 milliGRAM(s) Oral every 8 hours  isosorbide   dinitrate Tablet (ISORDIL) 10 milliGRAM(s) Oral three times a day    acetaminophen   Tablet .. 650 milliGRAM(s) Oral every 6 hours PRN  ondansetron Injectable 4 milliGRAM(s) IV Push every 8 hours PRN    calcium carbonate    500 mG (Tums) Chewable 2 Tablet(s) Chew three times a day PRN  docusate sodium 100 milliGRAM(s) Oral three times a day  famotidine    Tablet 20 milliGRAM(s) Oral daily  polyethylene glycol 3350 17 Gram(s) Oral daily  senna 2 Tablet(s) Oral at bedtime  simethicone 80 milliGRAM(s) Chew three times a day PRN    ergocalciferol 70566 Unit(s) Oral every week  multivitamin 1 Tablet(s) Oral daily      REVIEW OF SYSTEMS:  Complete 10point ROS negative.    PHYSICAL EXAM:  T(C): 36.6 (04-19-19 @ 07:52), Max: 37.1 (04-18-19 @ 12:41)  HR: 92 (04-19-19 @ 07:52) (90 - 103)  BP: 115/73 (04-19-19 @ 07:52) (115/73 - 162/98)  RR: 18 (04-19-19 @ 07:52) (18 - 18)  SpO2: 100% (04-19-19 @ 07:52) (96% - 100%)  I&O's Summary    18 Apr 2019 07:01  -  19 Apr 2019 07:00  --------------------------------------------------------  IN: 370 mL / OUT: 2900 mL / NET: -2530 mL    19 Apr 2019 07:01  -  19 Apr 2019 10:14  --------------------------------------------------------  IN: 0 mL / OUT: 350 mL / NET: -350 mL    Appearance: NAD, OOB sitting up in chair  Neck: Supple  Cardiovascular: Normal S1 S2, Irregular, No JVD  Respiratory: Lungs clear to auscultation	  Psychiatry: A & O x 3, Mood & affect appropriate  Gastrointestinal:  Soft, Non-tender, + BS	  Skin: No rashes, No ecchymoses, No cyanosis	  Extremities: Normal range of motion, No clubbing, cyanosis or edema  Vascular: Peripheral pulses palpable 2+ bilaterally      LABS:	 	    CBC Full  -  ( 18 Apr 2019 07:47 )  WBC Count : 11.77 K/uL  Hemoglobin : 13.2 g/dL  Hematocrit : 40.0 %  Platelet Count - Automated : 282 K/uL  Mean Cell Volume : 84.0 fl  Mean Cell Hemoglobin : 27.7 pg  Mean Cell Hemoglobin Concentration : 33.0 gm/dL    04-19    124<L>  |  78<L>  |  73<H>  ----------------------------<  162<H>  4.0   |  31  |  1.78<H>  04-18    127<L>  |  81<L>  |  77<H>  ----------------------------<  155<H>  4.0   |  30  |  1.82<H>    Ca    10.0      19 Apr 2019 06:54  Ca    9.7      18 Apr 2019 22:39  Phos  4.4     04-19  Phos  4.4     04-18  Mg     2.5     04-19  Mg     2.4     04-18    TPro  7.6  /  Alb  4.0  /  TBili  0.6  /  DBili  x   /  AST  71<H>  /  ALT  146<H>  /  AlkPhos  100  04-18      TELEMETRY: Afib 's with couplets and triplets, no further NSVT since 4/18 4:27am	      < from: Transthoracic Echocardiogram (04.04.19 @ 19:23) >  Dimensions:    Normal Values:  LA:     5.7    2.0 - 4.0 cm  Ao:     3.3    2.0 - 3.8 cm  SEPTUM: 0.9    0.6 - 1.2 cm  PWT:    0.9    0.6 - 1.1 cm  LVIDd:  4.8    3.0 - 5.6 cm  LVIDs:  4.3    1.8 - 4.0 cm  Derived variables:  LVMI: 79 g/m2  RWT: 0.37  Fractional short: 10 %  EF (Visual Estimate): 20-25 %  EF (Teicholtz): 23 %  Doppler Peak Velocity (m/sec): MV=1.0  ------------------------------------------------------------------------  Observations:  Mitral Valve: Tethered mitral valve leaflets with normal  opening. Malcoaptation of leaflet tips.  Moderate mitral  regurgitation. Peak mitral valve gradient equals 4 mm Hg,  mean transmitral valve gradient equals 1 mm Hg.  Aortic Valve/Aorta: Normal trileaflet aortic valve. Mild  aortic regurgitation.  Aortic Root: 3.3 cm.  LVOT diameter: 2 cm.  Left Atrium: Severely dilated left atrium.  LA volume index  =55 cc/m2.  Left Ventricle: Severe global left ventricular systolic  dysfunction. Normal left ventricular internal dimensions  and wall thicknesses. Increased E/e'  is consistent with  elevated left ventricular filling pressure.  Right Heart: Severe right atrial enlargement. Right  ventricular enlargement with decreased right ventricular  systolic function. Normal tricuspid valve. Moderate  tricuspid regurgitation. Pulmonic valve not well  visualized. Mild pulmonic regurgitation.  Pericardium/Pleura: Normal pericardium with no pericardial  effusion.  Large left pleural effusion.  Hemodynamic: Estimated right atrial pressure is 8 mm Hg.  Estimated right ventricular systolic pressure equals 37 mm  Hg, assuming right atrial pressure equals 8 mm Hg,  consistent with borderline pulmonary hypertension.  ------------------------------------------------------------------------Conclusions:  1. Tethered mitral valve leaflets with normal opening.  Malcoaptation of leaflet tips.  Moderate mitral  regurgitation. Peak mitral valve gradient equals 4 mm Hg,  mean transmitral valve gradient equals 1 mm Hg.  2. Severely dilated left atrium.  LA volume index = 55  cc/m2.  3. Normal left ventricular internal dimensions and wall  thicknesses.  4. Severe global left ventricular systolic dysfunction.  5. Increased E/e'  is consistent with elevated left  ventricular filling pressure.  6. Right ventricular enlargement with decreased right  ventricular systolic function.  7. Normal tricuspid valve. Moderate-severe tricuspid  regurgitation.  8. Large left pleural effusion.

## 2019-04-19 NOTE — PROGRESS NOTE ADULT - ASSESSMENT
79y.o. Male with no PMH now admitted with new HFrEF (TTE 04/04/2019 with LVEF 25%, biventricular dysfunction, moderate MR, moderate TR, mild KS) and AFIB with RVR, abdominal discomfort/nausea concerning for hepatic congestion in the setting of ADHF (BNP>50K), now s/p LHC/RHC (non-obstructive CAD). RHC:  RA 15, RV 58/14, PA 59/34/43, Wedge 33 PA sat 44%, CI 1.57. Pt has received tolvaptan, initiated on milrinone and diuretics with improvement in symptoms. Milrinone now off.        Problem/Plan - 1:  ·  Problem: Acute systolic heart failure, first episode.  Plan:   - now off milrinone  - Keep K >4 and Mg >2  - IVC size <2.1cm, less JVD on exam. Would decrease bumex to 4mg IV daily  - Please check BNP  - Strict I/Os  - Daily standing weights  - BMP BID  - Continue hydralazine 75mg PO TID, increase isordil to 20mg TID. Hold parameters should be SBP <90     Problem/Plan - 2:  ·  Problem: ELVA (acute kidney injury).  Plan: likely 2/2 to cardiorenal/low output   - continue to trend  - avoid nephrotoxins.      Problem/Plan - 3:  ·  Problem: Atrial fibrillation/NSVT Patient in afib in 90-120s.  Plan: - c/w heparin gtt, will discuss switching to eliquis if no more procedures planned  - Cardioversion when more euvolemic per EP  - monitor on tele. 79y.o. Male with no PMH now admitted with new HFrEF (TTE 04/04/2019 with LVEF 25%, biventricular dysfunction, moderate MR, moderate TR, mild SD) and AFIB with RVR, abdominal discomfort/nausea concerning for hepatic congestion in the setting of ADHF (BNP>50K), now s/p LHC/RHC (non-obstructive CAD). RHC:  RA 15, RV 58/14, PA 59/34/43, Wedge 33 PA sat 44%, CI 1.57. Pt has received tolvaptan, initiated on milrinone and diuretics with improvement in symptoms. Milrinone now off.        Problem/Plan - 1:  ·  Problem: Acute systolic heart failure, first episode.  Plan:   - now off milrinone  - Keep K >4 and Mg >2  - Still with hepatojugular reflex. Would continue bumex to 4mg IV BID  - Please check BNP  - Strict I/Os  - Daily standing weights  - BMP BID  - Continue hydralazine 75mg PO TID, increase isordil to 20mg TID. Hold parameters should be SBP <90     Problem/Plan - 2:  ·  Problem: ELVA (acute kidney injury).  Plan: likely 2/2 to cardiorenal/low output   - continue to trend  - avoid nephrotoxins.      Problem/Plan - 3:  ·  Problem: Atrial fibrillation/NSVT Patient in afib in 90-120s.  Plan: can switch to eliquis  - Cardioversion when more euvolemic per EP  - monitor on tele.

## 2019-04-19 NOTE — PROGRESS NOTE ADULT - PROBLEM SELECTOR PLAN 3
ELVA from low perfusion, continue to monitor on IV Bumex and monitor renal function. Cr stable around 1.7.  Renal following, continue monitoring

## 2019-04-19 NOTE — PROGRESS NOTE ADULT - PROBLEM SELECTOR PLAN 2
-ECHO shows EF 23% with right and left ventricular dysfunction, elevated pulm pressures. Pt with minimal jvd but overall euvolemic  - decreased bumex to 4mg iv q day  - Cath show non obstructive coronaries.   - Monitor on tele, monitor UOP, strict I+O and daily weight -ECHO shows EF 23% with right and left ventricular dysfunction, elevated pulm pressures. Pt with minimal jvd but overall euvolemic  - cont bumex 4mg iv bid as per cardiology  - Cath show non obstructive coronaries.   - Monitor on tele, monitor UOP, strict I+O and daily weight

## 2019-04-19 NOTE — PROGRESS NOTE ADULT - SUBJECTIVE AND OBJECTIVE BOX
[facility] [phone]        Patient seen and examined in bed.  He was in good spirits and offered no complaints         MEDICATIONS  (STANDING):  buMETAnide Injectable 4 milliGRAM(s) IV Push every 12 hours  docusate sodium 100 milliGRAM(s) Oral three times a day  ergocalciferol 27811 Unit(s) Oral every week  famotidine    Tablet 20 milliGRAM(s) Oral daily  heparin  Infusion.  Unit(s)/Hr (14 mL/Hr) IV Continuous <Continuous>  hydrALAZINE 75 milliGRAM(s) Oral every 8 hours  isosorbide   dinitrate Tablet (ISORDIL) 10 milliGRAM(s) Oral three times a day  multivitamin 1 Tablet(s) Oral daily  polyethylene glycol 3350 17 Gram(s) Oral daily  senna 2 Tablet(s) Oral at bedtime      VITAL:  T(C): , Max: 37.1 (04-18-19 @ 12:41)  T(F): , Max: 98.8 (04-18-19 @ 12:41)  HR: 92 (04-19-19 @ 07:52)  BP: 115/73 (04-19-19 @ 07:52)  BP(mean): --  RR: 18 (04-19-19 @ 07:52)  SpO2: 100% (04-19-19 @ 07:52)  Wt(kg): --    I and O's:    04-18 @ 07:01  -  04-19 @ 07:00  --------------------------------------------------------  IN: 370 mL / OUT: 2900 mL / NET: -2530 mL    04-19 @ 07:01  -  04-19 @ 11:08  --------------------------------------------------------  IN: 120 mL / OUT: 350 mL / NET: -230 mL          PHYSICAL EXAM:    Constitutional: NAD  Neck:  No JVD  Respiratory: CTAB/L  Cardiovascular: S1 and S2  Gastrointestinal: BS+, soft, NT/ND  Extremities: No peripheral edema  Neurological: A/O x 3, no focal deficits  Psychiatric: Normal mood, normal affect  : No Aguirre  Skin: No rashes  Access: Not applicable    LABS:                        13.2   11.77 )-----------( 282      ( 18 Apr 2019 07:47 )             40.0     04-19    124<L>  |  78<L>  |  73<H>  ----------------------------<  162<H>  4.0   |  31  |  1.78<H>    Ca    10.0      19 Apr 2019 06:54  Phos  4.4     04-19  Mg     2.5     04-19    TPro  7.6  /  Alb  4.0  /  TBili  0.6  /  DBili  x   /  AST  71<H>  /  ALT  146<H>  /  AlkPhos  100  04-18          Urine Studies:          RADIOLOGY & ADDITIONAL STUDIES:

## 2019-04-19 NOTE — PROGRESS NOTE ADULT - SUBJECTIVE AND OBJECTIVE BOX
Hospitalist- Osmani Hernandez MD  Pager: [phone]  If no response or off-hours, page [phone]  -------------------------------------  Patient is a 79y old  Male who presents with a chief complaint of dyspnea, abdominal swelling (19 Apr 2019 13:55)  Subjective: No acute events overnight. Today, patient reports breathing is improved, no cp/palpitations, no fevers/chills, no orthopnea.    14 point ros otherwise negative.     VITAL SIGNS:  Vital Signs Last 24 Hrs  T(C): 36.6 (19 Apr 2019 11:48), Max: 36.8 (18 Apr 2019 20:19)  T(F): 97.8 (19 Apr 2019 11:48), Max: 98.2 (18 Apr 2019 20:19)  HR: 101 (19 Apr 2019 13:15) (90 - 103)  BP: 145/85 (19 Apr 2019 13:15) (115/73 - 162/98)  BP(mean): --  RR: 18 (19 Apr 2019 13:15) (18 - 18)  SpO2: 99% (19 Apr 2019 13:15) (96% - 100%)      PHYSICAL EXAM:     GENERAL: no acute distress  HEENT: PERRLA, EOMI, moist oropharynx, +minimal JVD   RESPIRATORY: CTAB, no w/c   CARDIOVASCULAR: RRR, no murmurs, gallops, rubs  ABDOMINAL: soft, non-tender, non-distended, positive bowel sounds   EXTREMITIES: no clubbing, cyanosis, +no edema  NEUROLOGICAL: alert and oriented x 3, non-focal  SKIN: no rashes or lesions   MUSCULOSKELETAL: no gross joint deformity                          13.2   11.77 )-----------( 282      ( 18 Apr 2019 07:47 )             40.0     04-19    124<L>  |  78<L>  |  73<H>  ----------------------------<  162<H>  4.0   |  31  |  1.78<H>    Ca    10.0      19 Apr 2019 06:54  Phos  4.4     04-19  Mg     2.5     04-19    TPro  7.6  /  Alb  4.0  /  TBili  0.6  /  DBili  x   /  AST  71<H>  /  ALT  146<H>  /  AlkPhos  100  04-18      CAPILLARY BLOOD GLUCOSE      MEDICATIONS  (STANDING):  buMETAnide Injectable 4 milliGRAM(s) IV Push daily  docusate sodium 100 milliGRAM(s) Oral three times a day  ergocalciferol 35124 Unit(s) Oral every week  famotidine    Tablet 20 milliGRAM(s) Oral daily  heparin  Infusion.  Unit(s)/Hr (14 mL/Hr) IV Continuous <Continuous>  hydrALAZINE 75 milliGRAM(s) Oral every 8 hours  isosorbide   dinitrate Tablet (ISORDIL) 20 milliGRAM(s) Oral three times a day  multivitamin 1 Tablet(s) Oral daily  polyethylene glycol 3350 17 Gram(s) Oral daily  senna 2 Tablet(s) Oral at bedtime    MEDICATIONS  (PRN):  acetaminophen   Tablet .. 650 milliGRAM(s) Oral every 6 hours PRN Mild Pain (1 - 3)  calcium carbonate    500 mG (Tums) Chewable 2 Tablet(s) Chew three times a day PRN Heartburn  heparin  Injectable 6500 Unit(s) IV Push every 6 hours PRN For aPTT less than 40  heparin  Injectable 3000 Unit(s) IV Push every 6 hours PRN For aPTT between 40 - 57  ondansetron Injectable 4 milliGRAM(s) IV Push every 8 hours PRN Nausea and/or Vomiting  simethicone 80 milliGRAM(s) Chew three times a day PRN Dyspepsia

## 2019-04-19 NOTE — PROGRESS NOTE ADULT - ASSESSMENT
79M w/ no significant PMH p/w progressively worsening SOB over the last 2-3 weeks and noted to have increasing abdominal girth, pain, increased belching and n/v, found to be in AFIB w/ RVR. Pt denies any past history of AFIB, however patient's son who is a physician recalls a past EKG done (December 2018) in Dr. Johnston office may have revealed AFIB. S/P cardiac Cath, which revealed NCA, Hospital course c/b transaminitis and ELVA likely cardiorenal syndrome. EP consulted for further management of AFIB. Called by HF team 4/18 regarding use of amiodarone due to patient continue to have NSVT off milrinone gtt.      Afib w/ RVR in the setting of severely dilated LA w/ JF 55  -Unsure duration of AF as patient without clear symptoms   -Off milrinone gtt since 4/17 2pm  -Tele: Afib 's with couplets and triplets, no further NSVT since 4/18 4:27am  -Consider change A/C from Heparin to eliquis   -Improving transaminitis with down trending LFTs  -NM SPEC negative for amyloidosis  -CTS consulted: given advanced age and small/mod LV chamber, likely would not be candidate for heart transplant or LVAD  -Would need RICH (r/o LA thrombus) prior to initiate amiodarone and would perform DCCV at time of RICH  -Please call EPS for RICH/DCCV when medically optimized  -Plan discussed with patient and patient's son who is a physician    [id number]

## 2019-04-19 NOTE — PROGRESS NOTE ADULT - SUBJECTIVE AND OBJECTIVE BOX
Overnight Events:     REVIEW OF SYSTEMS:  CONSTITUTIONAL: No weakness, fevers or chills  EYES/ENT: No visual changes;  No dysphagia  NECK: No pain or stiffness  RESPIRATORY: No cough, wheezing, hemoptysis; No shortness of breath  CARDIOVASCULAR: No chest pain or palpitations; No lower extremity edema  GASTROINTESTINAL: No abdominal or epigastric pain. No nausea, vomiting, or hematemesis; No diarrhea or constipation. No melena or hematochezia.  BACK: No back pain  GENITOURINARY: No dysuria, frequency or hematuria  NEUROLOGICAL: No numbness or weakness  SKIN: No itching, burning, rashes, or lesions   All other review of systems is negative unless indicated above.            Medications:  acetaminophen   Tablet .. 650 milliGRAM(s) Oral every 6 hours PRN  buMETAnide Injectable 4 milliGRAM(s) IV Push every 12 hours  calcium carbonate    500 mG (Tums) Chewable 2 Tablet(s) Chew three times a day PRN  docusate sodium 100 milliGRAM(s) Oral three times a day  ergocalciferol 61469 Unit(s) Oral every week  famotidine    Tablet 20 milliGRAM(s) Oral daily  heparin  Infusion.  Unit(s)/Hr IV Continuous <Continuous>  heparin  Injectable 6500 Unit(s) IV Push every 6 hours PRN  heparin  Injectable 3000 Unit(s) IV Push every 6 hours PRN  hydrALAZINE 75 milliGRAM(s) Oral every 8 hours  isosorbide   dinitrate Tablet (ISORDIL) 10 milliGRAM(s) Oral three times a day  multivitamin 1 Tablet(s) Oral daily  ondansetron Injectable 4 milliGRAM(s) IV Push every 8 hours PRN  polyethylene glycol 3350 17 Gram(s) Oral daily  senna 2 Tablet(s) Oral at bedtime  simethicone 80 milliGRAM(s) Chew three times a day PRN      PAST MEDICAL & SURGICAL HISTORY:  No pertinent past medical history  No significant past surgical history      Vitals:  T(F): 97.8 (04-19), Max: 98.2 (04-18)  HR: 101 (04-19) (90 - 103)  BP: 145/85 (04-19) (115/73 - 162/98)  RR: 18 (04-19)  SpO2: 99% (04-19)  I&O's Summary    18 Apr 2019 07:01  -  19 Apr 2019 07:00  --------------------------------------------------------  IN: 370 mL / OUT: 2900 mL / NET: -2530 mL    19 Apr 2019 07:01  -  19 Apr 2019 13:55  --------------------------------------------------------  IN: 360 mL / OUT: 350 mL / NET: 10 mL        Physical Exam:  Appearance: No acute distress; well appearing  Eyes: PERRL, EOMI, pink conjunctiva  HENT: Normal oral muscosa  Cardiovascular: RRR, S1, S2, no murmurs, rubs, or gallops; no edema; no JVD  Respiratory: Clear to auscultation bilaterally  Gastrointestinal: soft, non-tender, non-distended with normal bowel sounds  Musculoskeletal: No clubbing; no joint deformity   Neurologic: Non-focal  Lymphatic: No lymphadenopathy  Psychiatry: AAOx3, mood & affect appropriate  Skin: No rashes, ecchymoses, or cyanosis                          13.2   11.77 )-----------( 282      ( 18 Apr 2019 07:47 )             40.0     04-19    124<L>  |  78<L>  |  73<H>  ----------------------------<  162<H>  4.0   |  31  |  1.78<H>    Ca    10.0      19 Apr 2019 06:54  Phos  4.4     04-19  Mg     2.5     04-19    TPro  7.6  /  Alb  4.0  /  TBili  0.6  /  DBili  x   /  AST  71<H>  /  ALT  146<H>  /  AlkPhos  100  04-18    PTT - ( 19 Apr 2019 08:47 )  PTT:81.0 sec Overnight Events:   NAEON    Medications:  acetaminophen   Tablet .. 650 milliGRAM(s) Oral every 6 hours PRN  buMETAnide Injectable 4 milliGRAM(s) IV Push every 12 hours  calcium carbonate    500 mG (Tums) Chewable 2 Tablet(s) Chew three times a day PRN  docusate sodium 100 milliGRAM(s) Oral three times a day  ergocalciferol 99471 Unit(s) Oral every week  famotidine    Tablet 20 milliGRAM(s) Oral daily  heparin  Infusion.  Unit(s)/Hr IV Continuous <Continuous>  heparin  Injectable 6500 Unit(s) IV Push every 6 hours PRN  heparin  Injectable 3000 Unit(s) IV Push every 6 hours PRN  hydrALAZINE 75 milliGRAM(s) Oral every 8 hours  isosorbide   dinitrate Tablet (ISORDIL) 10 milliGRAM(s) Oral three times a day  multivitamin 1 Tablet(s) Oral daily  ondansetron Injectable 4 milliGRAM(s) IV Push every 8 hours PRN  polyethylene glycol 3350 17 Gram(s) Oral daily  senna 2 Tablet(s) Oral at bedtime  simethicone 80 milliGRAM(s) Chew three times a day PRN      PAST MEDICAL & SURGICAL HISTORY:  No pertinent past medical history  No significant past surgical history      Vitals:  T(F): 97.8 (04-19), Max: 98.2 (04-18)  HR: 101 (04-19) (90 - 103)  BP: 145/85 (04-19) (115/73 - 162/98)  RR: 18 (04-19)  SpO2: 99% (04-19)  I&O's Summary    18 Apr 2019 07:01  -  19 Apr 2019 07:00  --------------------------------------------------------  IN: 370 mL / OUT: 2900 mL / NET: -2530 mL    19 Apr 2019 07:01  -  19 Apr 2019 13:55  --------------------------------------------------------  IN: 360 mL / OUT: 350 mL / NET: 10 mL        Physical Exam:  Appearance: No acute distress; well appearing  Eyes: PERRL, EOMI, pink conjunctiva  HENT: Normal oral muscosa  Cardiovascular: RRR, S1, S2, no murmurs, rubs, or gallops; no edema; no JVD  Respiratory: Clear to auscultation bilaterally  Gastrointestinal: soft, non-tender, non-distended with normal bowel sounds  Musculoskeletal: No clubbing; no joint deformity   Neurologic: Non-focal  Lymphatic: No lymphadenopathy  Psychiatry: AAOx3, mood & affect appropriate  Skin: No rashes, ecchymoses, or cyanosis                          13.2   11.77 )-----------( 282      ( 18 Apr 2019 07:47 )             40.0     04-19    124<L>  |  78<L>  |  73<H>  ----------------------------<  162<H>  4.0   |  31  |  1.78<H>    Ca    10.0      19 Apr 2019 06:54  Phos  4.4     04-19  Mg     2.5     04-19    TPro  7.6  /  Alb  4.0  /  TBili  0.6  /  DBili  x   /  AST  71<H>  /  ALT  146<H>  /  AlkPhos  100  04-18    PTT - ( 19 Apr 2019 08:47 )  PTT:81.0 sec

## 2019-04-19 NOTE — PROGRESS NOTE ADULT - ASSESSMENT
79-year-old gentleman, past medical history of diabetes, hypertension presents with decompensated heart failure along with rapid atrial fibrillation.  Renal failure is new and likely has cardiorenal syndrome.     Hypervolemic hyponatremia  Vtach and afib with RVR  Hepatitis          1.  Cardiology:   IV Bumex at present.  The serum sodium worse    2. Renal- Hyponatremia due to heart failure:  Samsca 30mg po x 1 and urea -Na 15 x 1 as well  3. GI-LFT are improving  4 EPS-RICH and DCCV and then amiodarone    CTS eval noted   Nutritional support

## 2019-04-19 NOTE — PROGRESS NOTE ADULT - PROBLEM SELECTOR PLAN 1
Repeat episodes of WCT from Milrinone, tapered off, monitor on tele.   Currently tele shows A.fib @/mt.   Follow up repeat Echo official read  EP following, keep K >4 and Mg >2  - Strict I/Os  - Daily standing weights

## 2019-04-20 LAB
ALBUMIN SERPL ELPH-MCNC: 3.7 G/DL — SIGNIFICANT CHANGE UP (ref 3.3–5)
ALP SERPL-CCNC: 96 U/L — SIGNIFICANT CHANGE UP (ref 40–120)
ALT FLD-CCNC: 125 U/L — HIGH (ref 10–45)
APTT BLD: 30.6 SEC — SIGNIFICANT CHANGE UP (ref 27.5–36.3)
AST SERPL-CCNC: 57 U/L — HIGH (ref 10–40)
BILIRUB DIRECT SERPL-MCNC: 0.2 MG/DL — SIGNIFICANT CHANGE UP (ref 0–0.2)
BILIRUB INDIRECT FLD-MCNC: 0.3 MG/DL — SIGNIFICANT CHANGE UP (ref 0.2–1)
BILIRUB SERPL-MCNC: 0.5 MG/DL — SIGNIFICANT CHANGE UP (ref 0.2–1.2)
HBA1C BLD-MCNC: 6.9 % — HIGH (ref 4–5.6)
HCT VFR BLD CALC: 37.8 % — LOW (ref 39–50)
HGB BLD-MCNC: 12.8 G/DL — LOW (ref 13–17)
MCHC RBC-ENTMCNC: 28.3 PG — SIGNIFICANT CHANGE UP (ref 27–34)
MCHC RBC-ENTMCNC: 33.9 GM/DL — SIGNIFICANT CHANGE UP (ref 32–36)
MCV RBC AUTO: 83.4 FL — SIGNIFICANT CHANGE UP (ref 80–100)
NT-PROBNP SERPL-SCNC: HIGH PG/ML (ref 0–300)
PLATELET # BLD AUTO: 286 K/UL — SIGNIFICANT CHANGE UP (ref 150–400)
PROT SERPL-MCNC: 7.6 G/DL — SIGNIFICANT CHANGE UP (ref 6–8.3)
RBC # BLD: 4.53 M/UL — SIGNIFICANT CHANGE UP (ref 4.2–5.8)
RBC # FLD: 14.2 % — SIGNIFICANT CHANGE UP (ref 10.3–14.5)
WBC # BLD: 10.02 K/UL — SIGNIFICANT CHANGE UP (ref 3.8–10.5)
WBC # FLD AUTO: 10.02 K/UL — SIGNIFICANT CHANGE UP (ref 3.8–10.5)

## 2019-04-20 PROCEDURE — 99233 SBSQ HOSP IP/OBS HIGH 50: CPT | Mod: GC

## 2019-04-20 PROCEDURE — 99233 SBSQ HOSP IP/OBS HIGH 50: CPT

## 2019-04-20 PROCEDURE — 71045 X-RAY EXAM CHEST 1 VIEW: CPT | Mod: 26

## 2019-04-20 RX ORDER — POTASSIUM CHLORIDE 20 MEQ
20 PACKET (EA) ORAL
Qty: 0 | Refills: 0 | Status: COMPLETED | OUTPATIENT
Start: 2019-04-20 | End: 2019-04-20

## 2019-04-20 RX ORDER — METOPROLOL TARTRATE 50 MG
12.5 TABLET ORAL
Qty: 0 | Refills: 0 | Status: DISCONTINUED | OUTPATIENT
Start: 2019-04-20 | End: 2019-04-24

## 2019-04-20 RX ORDER — LANOLIN ALCOHOL/MO/W.PET/CERES
3 CREAM (GRAM) TOPICAL ONCE
Qty: 0 | Refills: 0 | Status: COMPLETED | OUTPATIENT
Start: 2019-04-20 | End: 2019-04-20

## 2019-04-20 RX ORDER — SPIRONOLACTONE 25 MG/1
25 TABLET, FILM COATED ORAL DAILY
Qty: 0 | Refills: 0 | Status: DISCONTINUED | OUTPATIENT
Start: 2019-04-20 | End: 2019-04-20

## 2019-04-20 RX ORDER — BUMETANIDE 0.25 MG/ML
4 INJECTION INTRAMUSCULAR; INTRAVENOUS
Qty: 0 | Refills: 0 | Status: DISCONTINUED | OUTPATIENT
Start: 2019-04-20 | End: 2019-04-20

## 2019-04-20 RX ORDER — BUMETANIDE 0.25 MG/ML
4 INJECTION INTRAMUSCULAR; INTRAVENOUS
Qty: 0 | Refills: 0 | Status: DISCONTINUED | OUTPATIENT
Start: 2019-04-20 | End: 2019-04-22

## 2019-04-20 RX ORDER — METOPROLOL TARTRATE 50 MG
12.5 TABLET ORAL
Qty: 0 | Refills: 0 | Status: DISCONTINUED | OUTPATIENT
Start: 2019-04-20 | End: 2019-04-20

## 2019-04-20 RX ORDER — ISOSORBIDE DINITRATE 5 MG/1
20 TABLET ORAL THREE TIMES A DAY
Qty: 0 | Refills: 0 | Status: DISCONTINUED | OUTPATIENT
Start: 2019-04-20 | End: 2019-04-25

## 2019-04-20 RX ORDER — METOPROLOL TARTRATE 50 MG
12.5 TABLET ORAL ONCE
Qty: 0 | Refills: 0 | Status: COMPLETED | OUTPATIENT
Start: 2019-04-20 | End: 2019-04-20

## 2019-04-20 RX ORDER — HYDRALAZINE HCL 50 MG
100 TABLET ORAL EVERY 8 HOURS
Qty: 0 | Refills: 0 | Status: DISCONTINUED | OUTPATIENT
Start: 2019-04-20 | End: 2019-04-25

## 2019-04-20 RX ORDER — SPIRONOLACTONE 25 MG/1
25 TABLET, FILM COATED ORAL DAILY
Qty: 0 | Refills: 0 | Status: DISCONTINUED | OUTPATIENT
Start: 2019-04-20 | End: 2019-04-24

## 2019-04-20 RX ADMIN — APIXABAN 5 MILLIGRAM(S): 2.5 TABLET, FILM COATED ORAL at 17:34

## 2019-04-20 RX ADMIN — SIMETHICONE 80 MILLIGRAM(S): 80 TABLET, CHEWABLE ORAL at 12:31

## 2019-04-20 RX ADMIN — SPIRONOLACTONE 25 MILLIGRAM(S): 25 TABLET, FILM COATED ORAL at 17:34

## 2019-04-20 RX ADMIN — Medication 100 MILLIGRAM(S): at 21:07

## 2019-04-20 RX ADMIN — Medication 650 MILLIGRAM(S): at 00:21

## 2019-04-20 RX ADMIN — SIMETHICONE 80 MILLIGRAM(S): 80 TABLET, CHEWABLE ORAL at 18:34

## 2019-04-20 RX ADMIN — Medication 75 MILLIGRAM(S): at 05:10

## 2019-04-20 RX ADMIN — ISOSORBIDE DINITRATE 20 MILLIGRAM(S): 5 TABLET ORAL at 05:10

## 2019-04-20 RX ADMIN — SIMETHICONE 80 MILLIGRAM(S): 80 TABLET, CHEWABLE ORAL at 21:12

## 2019-04-20 RX ADMIN — Medication 650 MILLIGRAM(S): at 01:00

## 2019-04-20 RX ADMIN — Medication 12.5 MILLIGRAM(S): at 17:33

## 2019-04-20 RX ADMIN — APIXABAN 5 MILLIGRAM(S): 2.5 TABLET, FILM COATED ORAL at 05:10

## 2019-04-20 RX ADMIN — Medication 20 MILLIEQUIVALENT(S): at 19:30

## 2019-04-20 RX ADMIN — Medication 1 TABLET(S): at 12:27

## 2019-04-20 RX ADMIN — Medication 75 MILLIGRAM(S): at 12:27

## 2019-04-20 RX ADMIN — BUMETANIDE 4 MILLIGRAM(S): 0.25 INJECTION INTRAMUSCULAR; INTRAVENOUS at 17:34

## 2019-04-20 RX ADMIN — ISOSORBIDE DINITRATE 20 MILLIGRAM(S): 5 TABLET ORAL at 12:27

## 2019-04-20 RX ADMIN — BUMETANIDE 4 MILLIGRAM(S): 0.25 INJECTION INTRAMUSCULAR; INTRAVENOUS at 05:10

## 2019-04-20 RX ADMIN — Medication 3 MILLIGRAM(S): at 00:22

## 2019-04-20 RX ADMIN — Medication 12.5 MILLIGRAM(S): at 12:46

## 2019-04-20 RX ADMIN — ISOSORBIDE DINITRATE 20 MILLIGRAM(S): 5 TABLET ORAL at 21:07

## 2019-04-20 RX ADMIN — FAMOTIDINE 20 MILLIGRAM(S): 10 INJECTION INTRAVENOUS at 12:27

## 2019-04-20 RX ADMIN — Medication 20 MILLIEQUIVALENT(S): at 21:07

## 2019-04-20 NOTE — PROVIDER CONTACT NOTE (OTHER) - ASSESSMENT
Patient Aox4, awake in chair. /80, returned to afib 100s after event. Patient states felt palpitations at the time but denies cp/chest pressure

## 2019-04-20 NOTE — PROGRESS NOTE ADULT - PROBLEM SELECTOR PLAN 3
ELVA from low perfusion, continue to monitor on IV Bumex and monitor renal function. Cr stable around 1.7-1.8  Renal following, continue monitoring

## 2019-04-20 NOTE — PROGRESS NOTE ADULT - PROBLEM SELECTOR PLAN 9
WBC 12- improving. Likely 2/2 stress reaction.  - CT chest/A/P without source of infection  - blood cultures with NGTD from 4/4  -Hem consult appreciated, BCR/ABL sent- 04/18, follow up.   - Recommend out patient follow up.

## 2019-04-20 NOTE — PROGRESS NOTE ADULT - SUBJECTIVE AND OBJECTIVE BOX
Patient seen and examined. resting in bed. No complaints.    REVIEW OF SYSTEMS:  As per HPI, otherwise 8 full 10 ROS were unremarkable    MEDICATIONS  (STANDING):  apixaban 5 milliGRAM(s) Oral every 12 hours  buMETAnide 4 milliGRAM(s) Oral two times a day  docusate sodium 100 milliGRAM(s) Oral three times a day  ergocalciferol 51682 Unit(s) Oral every week  famotidine    Tablet 20 milliGRAM(s) Oral daily  isosorbide   dinitrate Tablet (ISORDIL) 20 milliGRAM(s) Oral three times a day  metoprolol tartrate 12.5 milliGRAM(s) Oral two times a day  multivitamin 1 Tablet(s) Oral daily  polyethylene glycol 3350 17 Gram(s) Oral daily  senna 2 Tablet(s) Oral at bedtime  spironolactone 25 milliGRAM(s) Oral daily      VITAL:  T(C): , Max: 36.9 (04-19-19 @ 20:16)  T(F): , Max: 98.5 (04-19-19 @ 20:16)  HR: 78 (04-20-19 @ 17:20)  BP: 104/64 (04-20-19 @ 17:20)  BP(mean): --  RR: 18 (04-20-19 @ 16:15)  SpO2: 98% (04-20-19 @ 16:15)  Wt(kg): --    I and O's:    04-19 @ 07:01  -  04-20 @ 07:00  --------------------------------------------------------  IN: 580 mL / OUT: 3410 mL / NET: -2830 mL    04-20 @ 07:01  -  04-20 @ 17:36  --------------------------------------------------------  IN: 480 mL / OUT: 705 mL / NET: -225 mL          PHYSICAL EXAM:    Constitutional: NAD  Neck:  No JVD  Respiratory: CTAB/L  Cardiovascular: S1 and S2  Gastrointestinal: BS+, soft, NT/ND  Extremities: No peripheral edema  Neurological: A/O x 3, no focal deficits  Psychiatric: Normal mood, normal affect  : No Aguirre  Skin: No rashes  Access: Not applicable      LABS:                        12.8   10.02 )-----------( 286      ( 20 Apr 2019 10:13 )             37.8     04-19    125<L>  |  79<L>  |  93<H>  ----------------------------<  131<H>  3.7   |  29  |  1.82<H>    Ca    9.9      19 Apr 2019 17:35  Phos  4.4     04-19  Mg     2.5     04-19    TPro  7.6  /  Alb  3.7  /  TBili  0.5  /  DBili  0.2  /  AST  57<H>  /  ALT  125<H>  /  AlkPhos  96  04-20

## 2019-04-20 NOTE — PROGRESS NOTE ADULT - ASSESSMENT
79-year-old gentleman, past medical history of diabetes, hypertension presents with decompensated heart failure along with rapid atrial fibrillation.  Renal failure is new and likely has cardiorenal syndrome.     - Hypervolemic hyponatremia due to heart failure: 0.3 kg lost wt in 24h, UOP 705cc per shift, BNP 14660 (4/20)- s/p BUMEX IV 40mg x1 dose given in 0500 am today         :Na+ raised mildly s/p Samsca 30mg po x 1 and urea -Na 15 x 1 yesteday  - Vtach and afib with RVR  - Hepatitis   - BP soft    Recommendations:    - c/w BUNMEX  4mg po  bid   - give Kdur 40 mEq x1 for now   - BMP daily  - CXR  -  EPS-RICH and DCCV and then amiodarone

## 2019-04-20 NOTE — PROGRESS NOTE ADULT - ASSESSMENT
79y.o. Male with no PMH now admitted with new HFrEF (TTE 04/04/2019 with LVEF 25%, biventricular dysfunction, moderate MR, moderate TR, mild VT) and AFIB with RVR, abdominal discomfort/nausea concerning for hepatic congestion in the setting of ADHF (BNP>50K), now s/p LHC/RHC (non-obstructive CAD). RHC:  RA 15, RV 58/14, PA 59/34/43, Wedge 33 PA sat 44%, CI 1.57. Pt has received tolvaptan, initiated on milrinone and diuretics with improvement in symptoms. Milrinone now off.        Problem/Plan - 1:  ·  Problem: Acute systolic heart failure, first episode.  Plan:   - now off milrinone  - Keep K >4 and Mg >2  - Would continue bumex to 4mg IV BID  - Strict I/Os  - Daily standing weights  - BMP BID  - Increase hydralazine to 100mg PO TID, continue isordil 20mg TID. Start aldactone 25mg daily.  Hold parameters should be SBP <90     Problem/Plan - 2:  ·  Problem: ELVA (acute kidney injury).  Plan: likely 2/2 to cardiorenal/low output   - continue to trend  - avoid nephrotoxins.      Problem/Plan - 3:  ·  Problem: Atrial fibrillation/NSVT Patient in afib in 90-120s.  Plan: continue eliquis  - Cardioversion when more euvolemic per EP  - monitor on tele. 79y.o. Male with no PMH now admitted with new HFrEF (TTE 04/04/2019 with LVEF 25%, biventricular dysfunction, moderate MR, moderate TR, mild WA) and AFIB with RVR, abdominal discomfort/nausea concerning for hepatic congestion in the setting of ADHF (BNP>50K), now s/p LHC/RHC (non-obstructive CAD). RHC:  RA 15, RV 58/14, PA 59/34/43, Wedge 33 PA sat 44%, CI 1.57. Pt has received tolvaptan, initiated on milrinone and diuretics with improvement in symptoms. Milrinone now off.        Problem/Plan - 1:  ·  Problem: Acute systolic heart failure, first episode.  Plan:   - now off milrinone  - Keep K >4 and Mg >2  - Change bumex to 4mg PO BID  - Strict I/Os  - Daily standing weights  - BMP BID  - Increase hydralazine to 100mg PO TID, continue isordil 20mg TID. Start aldactone 25mg daily.  Hold parameters should be SBP <90     Problem/Plan - 2:  ·  Problem: ELVA (acute kidney injury).  Plan: likely 2/2 to cardiorenal/low output   - continue to trend  - avoid nephrotoxins.      Problem/Plan - 3:  ·  Problem: Atrial fibrillation/NSVT Patient in afib in 90-120s.  Plan: continue eliquis  - Cardioversion when more euvolemic per EP  - monitor on tele. 79y.o. Male with no PMH now admitted with new HFrEF (TTE 04/04/2019 with LVEF 25%, biventricular dysfunction, moderate MR, moderate TR, mild DC) and AFIB with RVR, abdominal discomfort/nausea concerning for hepatic congestion in the setting of ADHF (BNP>50K), now s/p LHC/RHC (non-obstructive CAD). RHC:  RA 15, RV 58/14, PA 59/34/43, Wedge 33 PA sat 44%, CI 1.57. Pt has received tolvaptan, initiated on milrinone and diuretics with improvement in symptoms. Milrinone now off.        Problem/Plan - 1:  ·  Problem: Acute systolic heart failure, first episode.  Plan:   - now off milrinone  - Keep K >4 and Mg >2  - Change bumex to 4mg PO BID  - Strict I/Os  - Daily standing weights  - BMP BID  - Increase hydralazine to 100mg PO TID, continue isordil 20mg TID. Start aldactone 25mg daily.  Hold parameters should be SBP <90     Problem/Plan - 2:  ·  Problem: ELVA (acute kidney injury).  Plan: likely 2/2 to cardiorenal/low output   - continue to trend  - avoid nephrotoxins.      Problem/Plan - 3:  ·  Problem: Atrial fibrillation/NSVT Patient in afib in 90-120s. With WCT today, started on lopressor    Plan:   -continue eliquis  - Cardioversion when more euvolemic per EP  - monitor on tele. 79y.o. Male with no PMH now admitted with new HFrEF (TTE 04/04/2019 with LVEF 25%, biventricular dysfunction, moderate MR, moderate TR, mild GA) and AFIB with RVR, abdominal discomfort/nausea concerning for hepatic congestion in the setting of ADHF (BNP>50K), now s/p LHC/RHC (non-obstructive CAD). RHC:  RA 15, RV 58/14, PA 59/34/43, Wedge 33 PA sat 44%, CI 1.57. Pt has received tolvaptan, initiated on milrinone and diuretics with improvement in symptoms. Milrinone now off.        Problem/Plan - 1:  ·  Problem: Acute systolic heart failure, first episode.  Plan:   - now off milrinone  - Keep K >4 and Mg >2  - Change bumex to 4mg PO BID  - Strict I/Os  - Daily standing weights  - BMP BID  - Increase hydralazine to 100mg PO TID, continue isordil 20mg TID. Start aldactone 25mg daily.  Hold parameters should be SBP <90     Problem/Plan - 2:  ·  Problem: ELVA (acute kidney injury).  Plan: likely 2/2 to cardiorenal/low output   - continue to trend  - avoid nephrotoxins.      Problem/Plan - 3:  ·  Problem: Atrial fibrillation/NSVT Patient in afib in 90-120s. With WCT today, started on lopressor    Plan:   -continue eliquis  - Cardioversion when more euvolemic per EP; possible Monday   - monitor on tele.

## 2019-04-20 NOTE — PROGRESS NOTE ADULT - PROBLEM SELECTOR PLAN 1
Repeat episodes of WCT from Milrinone, tapered off, still having some beats, 18 overnight.  Currently tele shows A.fib @/mt.   - start low dose lopressor 12.5mg po bid  EP following, keep K >4 and Mg >2  - Strict I/Os  - Daily standing weights

## 2019-04-20 NOTE — PROGRESS NOTE ADULT - PROBLEM SELECTOR PLAN 2
-ECHO shows EF 23% with right and left ventricular dysfunction, elevated pulm pressures. Pt with minimal jvd but overall euvolemic  - cont bumex 4mg po bid as per cardiology  - aldactone 25mg po daily  - Cath show non obstructive coronaries.   - Monitor on tele, monitor UOP, strict I+O and daily weight

## 2019-04-20 NOTE — PROGRESS NOTE ADULT - SUBJECTIVE AND OBJECTIVE BOX
Hospitalist- Osmani Hernandez MD  Pager: [phone]  If no response or off-hours, page [phone]  -------------------------------------  Patient is a 79y old  Male who presents with a chief complaint of dyspnea, abdominal swelling (20 Apr 2019 11:15)  Subjective: No acute events overnight. Tele showing 18 beats WCT. Asymptomatic. today reports breathing still improved, walked around the floor with PT, feels well overall.    14 point ros otherwise negative.     VITAL SIGNS:  Vital Signs Last 24 Hrs  T(C): 36.4 (20 Apr 2019 11:55), Max: 36.9 (19 Apr 2019 20:16)  T(F): 97.5 (20 Apr 2019 11:55), Max: 98.5 (19 Apr 2019 20:16)  HR: 99 (20 Apr 2019 12:21) (85 - 105)  BP: 165/96 (20 Apr 2019 12:21) (106/65 - 165/96)  BP(mean): --  RR: 18 (20 Apr 2019 11:55) (18 - 18)  SpO2: 99% (20 Apr 2019 11:55) (97% - 99%)      PHYSICAL EXAM:     GENERAL: no acute distress  HEENT: PERRLA, EOMI, moist oropharynx   RESPIRATORY: CTAB, no w/c   CARDIOVASCULAR: RRR, no murmurs, gallops, rubs  ABDOMINAL: soft, non-tender, non-distended, positive bowel sounds   EXTREMITIES: no clubbing, cyanosis, or edema  NEUROLOGICAL: alert and oriented x 3, non-focal  SKIN: no rashes or lesions   MUSCULOSKELETAL: no gross joint deformity                          12.8   10.02 )-----------( 286      ( 20 Apr 2019 10:13 )             37.8     04-19    125<L>  |  79<L>  |  93<H>  ----------------------------<  131<H>  3.7   |  29  |  1.82<H>    Ca    9.9      19 Apr 2019 17:35  Phos  4.4     04-19  Mg     2.5     04-19    TPro  7.6  /  Alb  3.7  /  TBili  0.5  /  DBili  0.2  /  AST  57<H>  /  ALT  125<H>  /  AlkPhos  96  04-20      CAPILLARY BLOOD GLUCOSE          MEDICATIONS  (STANDING):  apixaban 5 milliGRAM(s) Oral every 12 hours  buMETAnide Injectable 4 milliGRAM(s) IV Push every 12 hours  docusate sodium 100 milliGRAM(s) Oral three times a day  ergocalciferol 65787 Unit(s) Oral every week  famotidine    Tablet 20 milliGRAM(s) Oral daily  hydrALAZINE 75 milliGRAM(s) Oral every 8 hours  isosorbide   dinitrate Tablet (ISORDIL) 20 milliGRAM(s) Oral three times a day  metoprolol tartrate 12.5 milliGRAM(s) Oral two times a day  multivitamin 1 Tablet(s) Oral daily  polyethylene glycol 3350 17 Gram(s) Oral daily  senna 2 Tablet(s) Oral at bedtime    MEDICATIONS  (PRN):  acetaminophen   Tablet .. 650 milliGRAM(s) Oral every 6 hours PRN Mild Pain (1 - 3)  calcium carbonate    500 mG (Tums) Chewable 2 Tablet(s) Chew three times a day PRN Heartburn  ondansetron Injectable 4 milliGRAM(s) IV Push every 8 hours PRN Nausea and/or Vomiting  simethicone 80 milliGRAM(s) Chew three times a day PRN Dyspepsia

## 2019-04-20 NOTE — PROGRESS NOTE ADULT - SUBJECTIVE AND OBJECTIVE BOX
Overnight Events:     REVIEW OF SYSTEMS:  CONSTITUTIONAL: No weakness, fevers or chills  EYES/ENT: No visual changes;  No dysphagia  NECK: No pain or stiffness  RESPIRATORY: No cough, wheezing, hemoptysis; No shortness of breath  CARDIOVASCULAR: No chest pain or palpitations; No lower extremity edema  GASTROINTESTINAL: No abdominal or epigastric pain. No nausea, vomiting, or hematemesis; No diarrhea or constipation. No melena or hematochezia.  BACK: No back pain  GENITOURINARY: No dysuria, frequency or hematuria  NEUROLOGICAL: No numbness or weakness  SKIN: No itching, burning, rashes, or lesions   All other review of systems is negative unless indicated above.            Medications:  acetaminophen   Tablet .. 650 milliGRAM(s) Oral every 6 hours PRN  apixaban 5 milliGRAM(s) Oral every 12 hours  buMETAnide Injectable 4 milliGRAM(s) IV Push every 12 hours  calcium carbonate    500 mG (Tums) Chewable 2 Tablet(s) Chew three times a day PRN  docusate sodium 100 milliGRAM(s) Oral three times a day  ergocalciferol 34731 Unit(s) Oral every week  famotidine    Tablet 20 milliGRAM(s) Oral daily  hydrALAZINE 75 milliGRAM(s) Oral every 8 hours  isosorbide   dinitrate Tablet (ISORDIL) 20 milliGRAM(s) Oral three times a day  metoprolol tartrate 12.5 milliGRAM(s) Oral two times a day  multivitamin 1 Tablet(s) Oral daily  ondansetron Injectable 4 milliGRAM(s) IV Push every 8 hours PRN  polyethylene glycol 3350 17 Gram(s) Oral daily  senna 2 Tablet(s) Oral at bedtime  simethicone 80 milliGRAM(s) Chew three times a day PRN      PAST MEDICAL & SURGICAL HISTORY:  No pertinent past medical history  No significant past surgical history      Vitals:  T(F): 97.5 (04-20), Max: 98.5 (04-19)  HR: 105 (04-20) (91 - 105)  BP: 127/80 (04-20) (106/65 - 156/99)  RR: 18 (04-20)  SpO2: 99% (04-20)  I&O's Summary    19 Apr 2019 07:01  -  20 Apr 2019 07:00  --------------------------------------------------------  IN: 580 mL / OUT: 3410 mL / NET: -2830 mL    20 Apr 2019 07:01  -  20 Apr 2019 11:16  --------------------------------------------------------  IN: 240 mL / OUT: 425 mL / NET: -185 mL        Physical Exam:  Appearance: No acute distress  Eyes: PERRL, EOMI, pink conjunctiva  HENT: Normal oral muscosa  Cardiovascular: irregularly irregular, tachy  Respiratory: Clear to auscultation bilaterally  Gastrointestinal: soft, non-tender  Musculoskeletal: No clubbing; no joint deformity   Neurologic: Non-focal  Psychiatry: AAOx3, mood & affect appropriate  Skin: No rashes, ecchymoses, or cyanosis      04-19    125<L>  |  79<L>  |  93<H>  ----------------------------<  131<H>  3.7   |  29  |  1.82<H>    Ca    9.9      19 Apr 2019 17:35  Phos  4.4     04-19  Mg     2.5     04-19    TPro  7.6  /  Alb  3.7  /  TBili  0.5  /  DBili  0.2  /  AST  57<H>  /  ALT  125<H>  /  AlkPhos  96  04-20    PTT - ( 20 Apr 2019 10:09 )  PTT:30.6 sec      Serum Pro-Brain Natriuretic Peptide: 91126 pg/mL (04-20 @ 07:01) Overnight Events:   With WCT overnight, feels overall better    REVIEW OF SYSTEMS:  CONSTITUTIONAL: No weakness, fevers or chills  EYES/ENT: No visual changes;  No dysphagia  NECK: No pain or stiffness  RESPIRATORY: No cough, wheezing, hemoptysis; No shortness of breath  CARDIOVASCULAR: No chest pain or palpitations; No lower extremity edema  GASTROINTESTINAL: No abdominal or epigastric pain. No nausea, vomiting, or hematemesis; No diarrhea or constipation. No melena or hematochezia.  BACK: No back pain  GENITOURINARY: No dysuria, frequency or hematuria  NEUROLOGICAL: No numbness or weakness  SKIN: No itching, burning, rashes, or lesions   All other review of systems is negative unless indicated above.            Medications:  acetaminophen   Tablet .. 650 milliGRAM(s) Oral every 6 hours PRN  apixaban 5 milliGRAM(s) Oral every 12 hours  buMETAnide Injectable 4 milliGRAM(s) IV Push every 12 hours  calcium carbonate    500 mG (Tums) Chewable 2 Tablet(s) Chew three times a day PRN  docusate sodium 100 milliGRAM(s) Oral three times a day  ergocalciferol 68997 Unit(s) Oral every week  famotidine    Tablet 20 milliGRAM(s) Oral daily  hydrALAZINE 75 milliGRAM(s) Oral every 8 hours  isosorbide   dinitrate Tablet (ISORDIL) 20 milliGRAM(s) Oral three times a day  metoprolol tartrate 12.5 milliGRAM(s) Oral two times a day  multivitamin 1 Tablet(s) Oral daily  ondansetron Injectable 4 milliGRAM(s) IV Push every 8 hours PRN  polyethylene glycol 3350 17 Gram(s) Oral daily  senna 2 Tablet(s) Oral at bedtime  simethicone 80 milliGRAM(s) Chew three times a day PRN      PAST MEDICAL & SURGICAL HISTORY:  No pertinent past medical history  No significant past surgical history      Vitals:  T(F): 97.5 (04-20), Max: 98.5 (04-19)  HR: 105 (04-20) (91 - 105)  BP: 127/80 (04-20) (106/65 - 156/99)  RR: 18 (04-20)  SpO2: 99% (04-20)  I&O's Summary    19 Apr 2019 07:01  -  20 Apr 2019 07:00  --------------------------------------------------------  IN: 580 mL / OUT: 3410 mL / NET: -2830 mL    20 Apr 2019 07:01  -  20 Apr 2019 11:16  --------------------------------------------------------  IN: 240 mL / OUT: 425 mL / NET: -185 mL        Physical Exam:  Appearance: No acute distress  Eyes: PERRL, EOMI, pink conjunctiva  HENT: Normal oral muscosa  Cardiovascular: irregularly irregular, tachy  Respiratory: Clear to auscultation bilaterally  Gastrointestinal: soft, non-tender  Musculoskeletal: No clubbing; no joint deformity   Neurologic: Non-focal  Psychiatry: AAOx3, mood & affect appropriate  Skin: No rashes, ecchymoses, or cyanosis      04-19    125<L>  |  79<L>  |  93<H>  ----------------------------<  131<H>  3.7   |  29  |  1.82<H>    Ca    9.9      19 Apr 2019 17:35  Phos  4.4     04-19  Mg     2.5     04-19    TPro  7.6  /  Alb  3.7  /  TBili  0.5  /  DBili  0.2  /  AST  57<H>  /  ALT  125<H>  /  AlkPhos  96  04-20    PTT - ( 20 Apr 2019 10:09 )  PTT:30.6 sec      Serum Pro-Brain Natriuretic Peptide: 49734 pg/mL (04-20 @ 07:01)

## 2019-04-21 LAB
ANION GAP SERPL CALC-SCNC: 16 MMOL/L — SIGNIFICANT CHANGE UP (ref 5–17)
ANION GAP SERPL CALC-SCNC: 17 MMOL/L — SIGNIFICANT CHANGE UP (ref 5–17)
BUN SERPL-MCNC: 90 MG/DL — HIGH (ref 7–23)
BUN SERPL-MCNC: 91 MG/DL — HIGH (ref 7–23)
CALCIUM SERPL-MCNC: 9.3 MG/DL — SIGNIFICANT CHANGE UP (ref 8.4–10.5)
CALCIUM SERPL-MCNC: 9.8 MG/DL — SIGNIFICANT CHANGE UP (ref 8.4–10.5)
CHLORIDE SERPL-SCNC: 81 MMOL/L — LOW (ref 96–108)
CHLORIDE SERPL-SCNC: 82 MMOL/L — LOW (ref 96–108)
CO2 SERPL-SCNC: 27 MMOL/L — SIGNIFICANT CHANGE UP (ref 22–31)
CO2 SERPL-SCNC: 29 MMOL/L — SIGNIFICANT CHANGE UP (ref 22–31)
CREAT SERPL-MCNC: 1.93 MG/DL — HIGH (ref 0.5–1.3)
CREAT SERPL-MCNC: 1.94 MG/DL — HIGH (ref 0.5–1.3)
GLUCOSE SERPL-MCNC: 134 MG/DL — HIGH (ref 70–99)
GLUCOSE SERPL-MCNC: 199 MG/DL — HIGH (ref 70–99)
HCT VFR BLD CALC: 42.9 % — SIGNIFICANT CHANGE UP (ref 39–50)
HGB BLD-MCNC: 13.9 G/DL — SIGNIFICANT CHANGE UP (ref 13–17)
MCHC RBC-ENTMCNC: 28.1 PG — SIGNIFICANT CHANGE UP (ref 27–34)
MCHC RBC-ENTMCNC: 32.4 GM/DL — SIGNIFICANT CHANGE UP (ref 32–36)
MCV RBC AUTO: 86.8 FL — SIGNIFICANT CHANGE UP (ref 80–100)
NT-PROBNP SERPL-SCNC: 6576 PG/ML — HIGH (ref 0–300)
PLATELET # BLD AUTO: 258 K/UL — SIGNIFICANT CHANGE UP (ref 150–400)
POTASSIUM SERPL-MCNC: 4.1 MMOL/L — SIGNIFICANT CHANGE UP (ref 3.5–5.3)
POTASSIUM SERPL-MCNC: 4.2 MMOL/L — SIGNIFICANT CHANGE UP (ref 3.5–5.3)
POTASSIUM SERPL-SCNC: 4.1 MMOL/L — SIGNIFICANT CHANGE UP (ref 3.5–5.3)
POTASSIUM SERPL-SCNC: 4.2 MMOL/L — SIGNIFICANT CHANGE UP (ref 3.5–5.3)
RBC # BLD: 4.94 M/UL — SIGNIFICANT CHANGE UP (ref 4.2–5.8)
RBC # FLD: 14.2 % — SIGNIFICANT CHANGE UP (ref 10.3–14.5)
SODIUM SERPL-SCNC: 125 MMOL/L — LOW (ref 135–145)
SODIUM SERPL-SCNC: 127 MMOL/L — LOW (ref 135–145)
WBC # BLD: 10.14 K/UL — SIGNIFICANT CHANGE UP (ref 3.8–10.5)
WBC # FLD AUTO: 10.14 K/UL — SIGNIFICANT CHANGE UP (ref 3.8–10.5)

## 2019-04-21 PROCEDURE — 99233 SBSQ HOSP IP/OBS HIGH 50: CPT | Mod: GC

## 2019-04-21 PROCEDURE — 99233 SBSQ HOSP IP/OBS HIGH 50: CPT

## 2019-04-21 RX ORDER — POTASSIUM CHLORIDE 20 MEQ
20 PACKET (EA) ORAL ONCE
Qty: 0 | Refills: 0 | Status: COMPLETED | OUTPATIENT
Start: 2019-04-21 | End: 2019-04-21

## 2019-04-21 RX ADMIN — Medication 100 MILLIGRAM(S): at 13:12

## 2019-04-21 RX ADMIN — Medication 100 MILLIGRAM(S): at 21:09

## 2019-04-21 RX ADMIN — ISOSORBIDE DINITRATE 20 MILLIGRAM(S): 5 TABLET ORAL at 21:09

## 2019-04-21 RX ADMIN — Medication 12.5 MILLIGRAM(S): at 05:20

## 2019-04-21 RX ADMIN — Medication 12.5 MILLIGRAM(S): at 17:13

## 2019-04-21 RX ADMIN — SIMETHICONE 80 MILLIGRAM(S): 80 TABLET, CHEWABLE ORAL at 05:27

## 2019-04-21 RX ADMIN — SPIRONOLACTONE 25 MILLIGRAM(S): 25 TABLET, FILM COATED ORAL at 05:20

## 2019-04-21 RX ADMIN — SIMETHICONE 80 MILLIGRAM(S): 80 TABLET, CHEWABLE ORAL at 23:26

## 2019-04-21 RX ADMIN — FAMOTIDINE 20 MILLIGRAM(S): 10 INJECTION INTRAVENOUS at 13:11

## 2019-04-21 RX ADMIN — SIMETHICONE 80 MILLIGRAM(S): 80 TABLET, CHEWABLE ORAL at 15:10

## 2019-04-21 RX ADMIN — APIXABAN 5 MILLIGRAM(S): 2.5 TABLET, FILM COATED ORAL at 17:13

## 2019-04-21 RX ADMIN — Medication 100 MILLIGRAM(S): at 05:20

## 2019-04-21 RX ADMIN — ISOSORBIDE DINITRATE 20 MILLIGRAM(S): 5 TABLET ORAL at 05:20

## 2019-04-21 RX ADMIN — APIXABAN 5 MILLIGRAM(S): 2.5 TABLET, FILM COATED ORAL at 05:20

## 2019-04-21 RX ADMIN — ISOSORBIDE DINITRATE 20 MILLIGRAM(S): 5 TABLET ORAL at 13:11

## 2019-04-21 RX ADMIN — Medication 1 TABLET(S): at 13:11

## 2019-04-21 RX ADMIN — Medication 20 MILLIEQUIVALENT(S): at 17:13

## 2019-04-21 RX ADMIN — BUMETANIDE 4 MILLIGRAM(S): 0.25 INJECTION INTRAMUSCULAR; INTRAVENOUS at 17:13

## 2019-04-21 RX ADMIN — BUMETANIDE 4 MILLIGRAM(S): 0.25 INJECTION INTRAMUSCULAR; INTRAVENOUS at 05:20

## 2019-04-21 NOTE — PROGRESS NOTE ADULT - PROBLEM SELECTOR PLAN 3
ELVA from low perfusion, continue to monitor on IV Bumex and monitor renal function. Cr 1.9 today  Renal following, continue monitoring

## 2019-04-21 NOTE — PROGRESS NOTE ADULT - ASSESSMENT
79y.o. Male with no PMH now admitted with new HFrEF (TTE 04/04/2019 with LVEF 25%, biventricular dysfunction, moderate MR, moderate TR, mild UT) and AFIB with RVR, abdominal discomfort/nausea concerning for hepatic congestion in the setting of ADHF (BNP>50K), now s/p LHC/RHC (non-obstructive CAD). RHC:  RA 15, RV 58/14, PA 59/34/43, Wedge 33 PA sat 44%, CI 1.57. Pt has received tolvaptan, initiated on milrinone and diuretics with improvement in symptoms. Milrinone now off.        Problem/Plan - 1:  ·  Problem: Acute systolic heart failure, first episode.  Plan:   - now off milrinone  - Keep K >4 and Mg >2  - Continue bumex to 4mg PO BID  - Strict I/Os  - Daily standing weights  - BMP BID  - Continue hydralazine to 100mg PO TID, continue isordil 20mg TID. Continue aldactone 25mg daily.  Hold parameters should be SBP <90     Problem/Plan - 2:  ·  Problem: ELVA (acute kidney injury).  Plan: likely 2/2 to cardiorenal/low output   - continue to trend  - avoid nephrotoxins.      Problem/Plan - 3:  ·  Problem: Atrial fibrillation/NSVT Patient in afib in 90-120s.    Plan:   -continue lopressor  -continue eliquis  - Plan for RICH/cardioversion tomorrow 4/22  - monitor on tele.

## 2019-04-21 NOTE — PROGRESS NOTE ADULT - PROBLEM SELECTOR PLAN 1
Repeat episodes of WCT from Milrinone, tapered off, still had some beats after stopping milrinone  Currently tele shows A.fib @/mt.   - low dose lopressor 12.5mg po bid uptitrate as tolerated  EP following, keep K >4 and Mg >2  - Strict I/Os  - Daily standing weights

## 2019-04-21 NOTE — PROGRESS NOTE ADULT - SUBJECTIVE AND OBJECTIVE BOX
Patient seen and examined.  Resting in bed.  No complaints. fasmily at bedside.  walked around [place] without issue.    REVIEW OF SYSTEMS:  As per HPI, otherwise 8 full 10 ROS were unremarkable    MEDICATIONS  (STANDING):  apixaban 5 milliGRAM(s) Oral every 12 hours  buMETAnide 4 milliGRAM(s) Oral two times a day  docusate sodium 100 milliGRAM(s) Oral three times a day  ergocalciferol 09755 Unit(s) Oral every week  famotidine    Tablet 20 milliGRAM(s) Oral daily  hydrALAZINE 100 milliGRAM(s) Oral every 8 hours  isosorbide   dinitrate Tablet (ISORDIL) 20 milliGRAM(s) Oral three times a day  metoprolol tartrate 12.5 milliGRAM(s) Oral two times a day  multivitamin 1 Tablet(s) Oral daily  polyethylene glycol 3350 17 Gram(s) Oral daily  senna 2 Tablet(s) Oral at bedtime  spironolactone 25 milliGRAM(s) Oral daily      VITAL:  T(C): , Max: 36.8 (04-21-19 @ 16:07)  T(F): , Max: 98.2 (04-21-19 @ 16:07)  HR: 85 (04-21-19 @ 16:07)  BP: 128/87 (04-21-19 @ 16:07)  BP(mean): --  RR: 18 (04-21-19 @ 16:07)  SpO2: 99% (04-21-19 @ 16:07)  Wt(kg): --    I and O's:    04-20 @ 07:01  -  04-21 @ 07:00  --------------------------------------------------------  IN: 710 mL / OUT: 1925 mL / NET: -1215 mL    04-21 @ 07:01  -  04-21 @ 16:12  --------------------------------------------------------  IN: 480 mL / OUT: 700 mL / NET: -220 mL          PHYSICAL EXAM:    Constitutional: No acute distress; well appearing  Cardiovascular: irreguarly irregular, +JVD  Respiratory: Clear to auscultation bilaterally  Gastrointestinal: soft, non-tender, non-distended with normal bowel sounds  Musculoskeletal: No clubbing; no joint deformity   Neurologic: Non-focal  : voids          LABS:                        13.9   10.14 )-----------( 258      ( 21 Apr 2019 08:56 )             42.9     04-21    127<L>  |  81<L>  |  90<H>  ----------------------------<  134<H>  4.1   |  29  |  1.94<H>    Ca    9.8      21 Apr 2019 06:08    TPro  7.6  /  Alb  3.7  /  TBili  0.5  /  DBili  0.2  /  AST  57<H>  /  ALT  125<H>  /  AlkPhos  96  04-20

## 2019-04-21 NOTE — PROGRESS NOTE ADULT - SUBJECTIVE AND OBJECTIVE BOX
Hospitalist- Osmain Hernandez MD  Pager: [phone]  If no response or off-hours, page [phone]  -------------------------------------  Patient is a 79y old  Male who presents with a chief complaint of dyspnea, abdominal swelling (21 Apr 2019 16:11)  Subjective: No acute events overnight. Feels well today, breathing improved. No fevers/chills, no new concerns.    14 point ros otherwise negative.     VITAL SIGNS:  Vital Signs Last 24 Hrs  T(C): 36.8 (21 Apr 2019 16:07), Max: 36.8 (21 Apr 2019 16:07)  T(F): 98.2 (21 Apr 2019 16:07), Max: 98.2 (21 Apr 2019 16:07)  HR: 85 (21 Apr 2019 16:07) (69 - 88)  BP: 128/87 (21 Apr 2019 16:07) (104/64 - 128/87)  BP(mean): --  RR: 18 (21 Apr 2019 16:07) (18 - 18)  SpO2: 99% (21 Apr 2019 16:07) (98% - 100%)      PHYSICAL EXAM:     GENERAL: no acute distress  HEENT: PERRLA, EOMI, moist oropharynx   RESPIRATORY: CTAB, no w/c   CARDIOVASCULAR: RRR, no murmurs, gallops, rubs  ABDOMINAL: soft, non-tender, non-distended, positive bowel sounds   EXTREMITIES: no clubbing, cyanosis, or edema  NEUROLOGICAL: alert and oriented x 3, non-focal  SKIN: no rashes or lesions   MUSCULOSKELETAL: no gross joint deformity                          13.9   10.14 )-----------( 258      ( 21 Apr 2019 08:56 )             42.9     04-21    127<L>  |  81<L>  |  90<H>  ----------------------------<  134<H>  4.1   |  29  |  1.94<H>    Ca    9.8      21 Apr 2019 06:08    TPro  7.6  /  Alb  3.7  /  TBili  0.5  /  DBili  0.2  /  AST  57<H>  /  ALT  125<H>  /  AlkPhos  96  04-20      CAPILLARY BLOOD GLUCOSE          MEDICATIONS  (STANDING):  apixaban 5 milliGRAM(s) Oral every 12 hours  buMETAnide 4 milliGRAM(s) Oral two times a day  docusate sodium 100 milliGRAM(s) Oral three times a day  ergocalciferol 31148 Unit(s) Oral every week  famotidine    Tablet 20 milliGRAM(s) Oral daily  hydrALAZINE 100 milliGRAM(s) Oral every 8 hours  isosorbide   dinitrate Tablet (ISORDIL) 20 milliGRAM(s) Oral three times a day  metoprolol tartrate 12.5 milliGRAM(s) Oral two times a day  multivitamin 1 Tablet(s) Oral daily  polyethylene glycol 3350 17 Gram(s) Oral daily  senna 2 Tablet(s) Oral at bedtime  spironolactone 25 milliGRAM(s) Oral daily    MEDICATIONS  (PRN):  acetaminophen   Tablet .. 650 milliGRAM(s) Oral every 6 hours PRN Mild Pain (1 - 3)  calcium carbonate    500 mG (Tums) Chewable 2 Tablet(s) Chew three times a day PRN Heartburn  ondansetron Injectable 4 milliGRAM(s) IV Push every 8 hours PRN Nausea and/or Vomiting  simethicone 80 milliGRAM(s) Chew three times a day PRN Dyspepsia

## 2019-04-21 NOTE — PROGRESS NOTE ADULT - PROBLEM SELECTOR PLAN 7
- likely hypervolemic hyponatremia given fluid overloaded state.   - s/p Samsca and Urea as per renal.   - FeNa demonstrating intrinsic renal disease (1.1%)- likely ATN from   hypovolemia   - Monitor Na levels.

## 2019-04-21 NOTE — PROGRESS NOTE ADULT - ASSESSMENT
79-year-old gentleman, past medical history of diabetes, hypertension presents with decompensated heart failure along with rapid atrial fibrillation.  Renal failure is new and likely has cardiorenal syndrome.     - Hypervolemic hyponatremia due to heart failure: no wt gained in 24h, UOP 700cc per shift, BNP dropped to 6576 from 74565 (4/20) s/p BUMEX IV 40mg x1 dose given in 0500 am yest.         :Na+ UPtrending s/p Samsca 30mg po x 1 and urea -Na 15 x 1 4/19/2019  - Vtach and afib with RVR  - Hepatitis       Recommendations:    - c/w BUNMEX  4mg po  bid   - give Kdur 20 mEq x1 for now   - BMP daily  - CXR  -  EPS-RICH and DCCV and then amiodarone  - Strict I/Os  - Daily standing weights

## 2019-04-21 NOTE — PROGRESS NOTE ADULT - SUBJECTIVE AND OBJECTIVE BOX
Overnight Events:   Patient denies SOB, walked around [place] without issue.         Medications:  acetaminophen   Tablet .. 650 milliGRAM(s) Oral every 6 hours PRN  apixaban 5 milliGRAM(s) Oral every 12 hours  buMETAnide 4 milliGRAM(s) Oral two times a day  calcium carbonate    500 mG (Tums) Chewable 2 Tablet(s) Chew three times a day PRN  docusate sodium 100 milliGRAM(s) Oral three times a day  ergocalciferol 32773 Unit(s) Oral every week  famotidine    Tablet 20 milliGRAM(s) Oral daily  hydrALAZINE 100 milliGRAM(s) Oral every 8 hours  isosorbide   dinitrate Tablet (ISORDIL) 20 milliGRAM(s) Oral three times a day  metoprolol tartrate 12.5 milliGRAM(s) Oral two times a day  multivitamin 1 Tablet(s) Oral daily  ondansetron Injectable 4 milliGRAM(s) IV Push every 8 hours PRN  polyethylene glycol 3350 17 Gram(s) Oral daily  senna 2 Tablet(s) Oral at bedtime  simethicone 80 milliGRAM(s) Chew three times a day PRN  spironolactone 25 milliGRAM(s) Oral daily      PAST MEDICAL & SURGICAL HISTORY:  No pertinent past medical history  No significant past surgical history      Vitals:  T(F): 97.6 (04-21), Max: 98.1 (04-20)  HR: 88 (04-21) (69 - 88)  BP: 115/75 (04-21) (104/64 - 119/67)  RR: 18 (04-21)  SpO2: 100% (04-21)  I&O's Summary    20 Apr 2019 07:01  -  21 Apr 2019 07:00  --------------------------------------------------------  IN: 710 mL / OUT: 1925 mL / NET: -1215 mL    21 Apr 2019 07:01  -  21 Apr 2019 13:33  --------------------------------------------------------  IN: 480 mL / OUT: 700 mL / NET: -220 mL        Physical Exam:  Appearance: No acute distress; well appearing  Eyes: PERRL, EOMI, pink conjunctiva  HENT: Normal oral muscosa  Cardiovascular: irreguarly irregular, +JVD  Respiratory: Clear to auscultation bilaterally  Gastrointestinal: soft, non-tender, non-distended with normal bowel sounds  Musculoskeletal: No clubbing; no joint deformity   Neurologic: Non-focal  Lymphatic: No lymphadenopathy  Psychiatry: AAOx3, mood & affect appropriate  Skin: No rashes, ecchymoses, or cyanosis                          13.9   10.14 )-----------( 258      ( 21 Apr 2019 08:56 )             42.9     04-21    127<L>  |  81<L>  |  90<H>  ----------------------------<  134<H>  4.1   |  29  |  1.94<H>    Ca    9.8      21 Apr 2019 06:08    TPro  7.6  /  Alb  3.7  /  TBili  0.5  /  DBili  0.2  /  AST  57<H>  /  ALT  125<H>  /  AlkPhos  96  04-20    PTT - ( 20 Apr 2019 10:09 )  PTT:30.6 sec      Serum Pro-Brain Natriuretic Peptide: 6576 pg/mL (04-21 @ 06:08)  Serum Pro-Brain Natriuretic Peptide: 05115 pg/mL (04-20 @ 07:01)          New ECG(s): Personally reviewed    Echo:    Stress Testing:     Cath:    Imaging:    Interpretation of Telemetry:

## 2019-04-21 NOTE — PROGRESS NOTE ADULT - PROBLEM SELECTOR PLAN 4
- LA severely dilated on TTE  - eliquis for AC  - TSH wnl  - RICH and cardioversion when euvolemic followed by amiodarone load

## 2019-04-22 LAB
ANION GAP SERPL CALC-SCNC: 16 MMOL/L — SIGNIFICANT CHANGE UP (ref 5–17)
ANION GAP SERPL CALC-SCNC: 17 MMOL/L — SIGNIFICANT CHANGE UP (ref 5–17)
BUN SERPL-MCNC: 92 MG/DL — HIGH (ref 7–23)
BUN SERPL-MCNC: 96 MG/DL — HIGH (ref 7–23)
CALCIUM SERPL-MCNC: 9.2 MG/DL — SIGNIFICANT CHANGE UP (ref 8.4–10.5)
CALCIUM SERPL-MCNC: 9.2 MG/DL — SIGNIFICANT CHANGE UP (ref 8.4–10.5)
CHLORIDE SERPL-SCNC: 83 MMOL/L — LOW (ref 96–108)
CHLORIDE SERPL-SCNC: 85 MMOL/L — LOW (ref 96–108)
CO2 SERPL-SCNC: 25 MMOL/L — SIGNIFICANT CHANGE UP (ref 22–31)
CO2 SERPL-SCNC: 27 MMOL/L — SIGNIFICANT CHANGE UP (ref 22–31)
CREAT SERPL-MCNC: 2.01 MG/DL — HIGH (ref 0.5–1.3)
CREAT SERPL-MCNC: 2.09 MG/DL — HIGH (ref 0.5–1.3)
GLUCOSE SERPL-MCNC: 120 MG/DL — HIGH (ref 70–99)
GLUCOSE SERPL-MCNC: 142 MG/DL — HIGH (ref 70–99)
HCT VFR BLD CALC: 38 % — LOW (ref 39–50)
HGB BLD-MCNC: 12.6 G/DL — LOW (ref 13–17)
MAGNESIUM SERPL-MCNC: 2.6 MG/DL — SIGNIFICANT CHANGE UP (ref 1.6–2.6)
MCHC RBC-ENTMCNC: 27.5 PG — SIGNIFICANT CHANGE UP (ref 27–34)
MCHC RBC-ENTMCNC: 33.2 GM/DL — SIGNIFICANT CHANGE UP (ref 32–36)
MCV RBC AUTO: 83 FL — SIGNIFICANT CHANGE UP (ref 80–100)
PLATELET # BLD AUTO: 251 K/UL — SIGNIFICANT CHANGE UP (ref 150–400)
POTASSIUM SERPL-MCNC: 3.8 MMOL/L — SIGNIFICANT CHANGE UP (ref 3.5–5.3)
POTASSIUM SERPL-MCNC: 4 MMOL/L — SIGNIFICANT CHANGE UP (ref 3.5–5.3)
POTASSIUM SERPL-SCNC: 3.8 MMOL/L — SIGNIFICANT CHANGE UP (ref 3.5–5.3)
POTASSIUM SERPL-SCNC: 4 MMOL/L — SIGNIFICANT CHANGE UP (ref 3.5–5.3)
RBC # BLD: 4.58 M/UL — SIGNIFICANT CHANGE UP (ref 4.2–5.8)
RBC # FLD: 14 % — SIGNIFICANT CHANGE UP (ref 10.3–14.5)
SODIUM SERPL-SCNC: 125 MMOL/L — LOW (ref 135–145)
SODIUM SERPL-SCNC: 128 MMOL/L — LOW (ref 135–145)
WBC # BLD: 9.79 K/UL — SIGNIFICANT CHANGE UP (ref 3.8–10.5)
WBC # FLD AUTO: 9.79 K/UL — SIGNIFICANT CHANGE UP (ref 3.8–10.5)

## 2019-04-22 PROCEDURE — 99233 SBSQ HOSP IP/OBS HIGH 50: CPT

## 2019-04-22 PROCEDURE — 99233 SBSQ HOSP IP/OBS HIGH 50: CPT | Mod: GC

## 2019-04-22 RX ORDER — BUMETANIDE 0.25 MG/ML
2 INJECTION INTRAMUSCULAR; INTRAVENOUS
Qty: 0 | Refills: 0 | Status: DISCONTINUED | OUTPATIENT
Start: 2019-04-22 | End: 2019-04-22

## 2019-04-22 RX ORDER — BUMETANIDE 0.25 MG/ML
4 INJECTION INTRAMUSCULAR; INTRAVENOUS
Qty: 0 | Refills: 0 | Status: DISCONTINUED | OUTPATIENT
Start: 2019-04-22 | End: 2019-04-23

## 2019-04-22 RX ORDER — TOLVAPTAN 15 MG/1
15 TABLET ORAL ONCE
Qty: 0 | Refills: 0 | Status: COMPLETED | OUTPATIENT
Start: 2019-04-22 | End: 2019-04-22

## 2019-04-22 RX ADMIN — Medication 12.5 MILLIGRAM(S): at 06:11

## 2019-04-22 RX ADMIN — TOLVAPTAN 15 MILLIGRAM(S): 15 TABLET ORAL at 12:08

## 2019-04-22 RX ADMIN — APIXABAN 5 MILLIGRAM(S): 2.5 TABLET, FILM COATED ORAL at 06:11

## 2019-04-22 RX ADMIN — Medication 100 MILLIGRAM(S): at 21:52

## 2019-04-22 RX ADMIN — Medication 100 MILLIGRAM(S): at 13:06

## 2019-04-22 RX ADMIN — Medication 12.5 MILLIGRAM(S): at 17:26

## 2019-04-22 RX ADMIN — APIXABAN 5 MILLIGRAM(S): 2.5 TABLET, FILM COATED ORAL at 17:31

## 2019-04-22 RX ADMIN — BUMETANIDE 4 MILLIGRAM(S): 0.25 INJECTION INTRAMUSCULAR; INTRAVENOUS at 17:30

## 2019-04-22 RX ADMIN — ISOSORBIDE DINITRATE 20 MILLIGRAM(S): 5 TABLET ORAL at 13:06

## 2019-04-22 RX ADMIN — BUMETANIDE 4 MILLIGRAM(S): 0.25 INJECTION INTRAMUSCULAR; INTRAVENOUS at 06:11

## 2019-04-22 RX ADMIN — SPIRONOLACTONE 25 MILLIGRAM(S): 25 TABLET, FILM COATED ORAL at 11:49

## 2019-04-22 RX ADMIN — SIMETHICONE 80 MILLIGRAM(S): 80 TABLET, CHEWABLE ORAL at 19:50

## 2019-04-22 RX ADMIN — ISOSORBIDE DINITRATE 20 MILLIGRAM(S): 5 TABLET ORAL at 21:52

## 2019-04-22 NOTE — PROGRESS NOTE ADULT - PROBLEM SELECTOR PLAN 1
Repeat episodes of WCT from Milrinone, tapered off, still had some beats after stopping milrinone  Currently tele shows A.fib @/mt.   - low dose lopressor 12.5mg po bid uptitrate as tolerated  - likely to start amiodarone after dilshad/dccv  EP following, keep K >4 and Mg >2  - Strict I/Os  - Daily standing weights

## 2019-04-22 NOTE — PROGRESS NOTE ADULT - SUBJECTIVE AND OBJECTIVE BOX
Patient seen and examined  No complaints     REVIEW OF SYSTEMS:  As per HPI, otherwise 8 full 10 ROS were unremarkable    MEDICATIONS  (STANDING):  apixaban 5 milliGRAM(s) Oral every 12 hours  buMETAnide 4 milliGRAM(s) Oral two times a day  docusate sodium 100 milliGRAM(s) Oral three times a day  ergocalciferol 73802 Unit(s) Oral every week  famotidine    Tablet 20 milliGRAM(s) Oral daily  hydrALAZINE 100 milliGRAM(s) Oral every 8 hours  isosorbide   dinitrate Tablet (ISORDIL) 20 milliGRAM(s) Oral three times a day  metoprolol tartrate 12.5 milliGRAM(s) Oral two times a day  multivitamin 1 Tablet(s) Oral daily  polyethylene glycol 3350 17 Gram(s) Oral daily  senna 2 Tablet(s) Oral at bedtime  spironolactone 25 milliGRAM(s) Oral daily      VITAL:  T(C): , Max: 36.8 (04-21-19 @ 16:07)  T(F): , Max: 98.2 (04-21-19 @ 16:07)  HR: 75 (04-22-19 @ 13:03)  BP: 114/75 (04-22-19 @ 13:03)  BP(mean): --  RR: 18 (04-22-19 @ 12:00)  SpO2: 100% (04-22-19 @ 12:00)  Wt(kg): --    I and O's:    04-21 @ 07:01  -  04-22 @ 07:00  --------------------------------------------------------  IN: 840 mL / OUT: 1880 mL / NET: -1040 mL    04-22 @ 07:01  -  04-22 @ 15:26  --------------------------------------------------------  IN: 0 mL / OUT: 700 mL / NET: -700 mL          PHYSICAL EXAM:    Constitutional: NAD  HEENT: PERRLA, EOMI,  MMM  Neck: No LAD, No JVD  Respiratory: CTAB  Cardiovascular: S1 and S2  Gastrointestinal: BS+, soft, NT/ND  Extremities: No peripheral edema  Neurological: A/O x 3, no focal deficits  Psychiatric: Normal mood, normal affect    LABS:                        12.6   9.79  )-----------( 251      ( 22 Apr 2019 08:28 )             38.0     04-22    125<L>  |  83<L>  |  92<H>  ----------------------------<  120<H>  4.0   |  25  |  2.01<H>    Ca    9.2      22 Apr 2019 07:06  Mg     2.6     04-22      Assessment and Plan:   · Assessment      79-year-old gentleman, past medical history of diabetes, hypertension presents with decompensated heart failure along with rapid atrial fibrillation.  Renal failure is new and likely has cardiorenal syndrome.     - Hypervolemic hyponatremia due to heart failure- Na remains low  - CKD IV with baseline s.cr ~ 2.0 mg/dl- remains stable       Recommendations:   - diuretics per HF team  - Fluid restriction 1.2L per day  - Samsca 15 mg po x 1   - BMP at 8 PM    d/w Medicine NP    Jemal Ivey MD  [facility]   [phone]

## 2019-04-22 NOTE — PROGRESS NOTE ADULT - SUBJECTIVE AND OBJECTIVE BOX
Overnight Events:   NAEON    Medications:  acetaminophen   Tablet .. 650 milliGRAM(s) Oral every 6 hours PRN  apixaban 5 milliGRAM(s) Oral every 12 hours  buMETAnide 4 milliGRAM(s) Oral two times a day  calcium carbonate    500 mG (Tums) Chewable 2 Tablet(s) Chew three times a day PRN  docusate sodium 100 milliGRAM(s) Oral three times a day  ergocalciferol 38991 Unit(s) Oral every week  famotidine    Tablet 20 milliGRAM(s) Oral daily  hydrALAZINE 100 milliGRAM(s) Oral every 8 hours  isosorbide   dinitrate Tablet (ISORDIL) 20 milliGRAM(s) Oral three times a day  metoprolol tartrate 12.5 milliGRAM(s) Oral two times a day  multivitamin 1 Tablet(s) Oral daily  ondansetron Injectable 4 milliGRAM(s) IV Push every 8 hours PRN  polyethylene glycol 3350 17 Gram(s) Oral daily  senna 2 Tablet(s) Oral at bedtime  simethicone 80 milliGRAM(s) Chew three times a day PRN  spironolactone 25 milliGRAM(s) Oral daily  tolvaptan 15 milliGRAM(s) Oral once      PAST MEDICAL & SURGICAL HISTORY:  No pertinent past medical history  No significant past surgical history      Vitals:  T(F): 98 (04-22), Max: 98.2 (04-21)  HR: 84 (04-22) (74 - 88)  BP: 120/74 (04-22) (104/64 - 138/90)  RR: 18 (04-22)  SpO2: 98% (04-22)  I&O's Summary    21 Apr 2019 07:01  -  22 Apr 2019 07:00  --------------------------------------------------------  IN: 840 mL / OUT: 1880 mL / NET: -1040 mL    22 Apr 2019 07:01  -  22 Apr 2019 09:51  --------------------------------------------------------  IN: 0 mL / OUT: 200 mL / NET: -200 mL        Physical Exam:  Appearance: No acute distress; well appearing  Eyes: PERRL, EOMI, pink conjunctiva  HENT: Normal oral muscosa  Cardiovascular: RRR, S1, S2, no murmurs, rubs, or gallops; no edema; no JVD  Respiratory: Clear to auscultation bilaterally  Gastrointestinal: soft, non-tender, non-distended with normal bowel sounds  Musculoskeletal: No clubbing; no joint deformity   Neurologic: Non-focal  Lymphatic: No lymphadenopathy  Psychiatry: AAOx3, mood & affect appropriate  Skin: No rashes, ecchymoses, or cyanosis                          12.6   9.79  )-----------( 251      ( 22 Apr 2019 08:28 )             38.0     04-22    125<L>  |  83<L>  |  92<H>  ----------------------------<  120<H>  4.0   |  25  |  2.01<H>    Ca    9.2      22 Apr 2019 07:06  Mg     2.6     04-22      PTT - ( 20 Apr 2019 10:09 )  PTT:30.6 sec      Serum Pro-Brain Natriuretic Peptide: 6576 pg/mL (04-21 @ 06:08)  Serum Pro-Brain Natriuretic Peptide: 98585 pg/mL (04-20 @ 07:01)          New ECG(s): Personally reviewed    Echo:    Stress Testing:     Cath:    Imaging:    Interpretation of Telemetry: Overnight Events:   NAEON    Medications:  acetaminophen   Tablet .. 650 milliGRAM(s) Oral every 6 hours PRN  apixaban 5 milliGRAM(s) Oral every 12 hours  buMETAnide 4 milliGRAM(s) Oral two times a day  calcium carbonate    500 mG (Tums) Chewable 2 Tablet(s) Chew three times a day PRN  docusate sodium 100 milliGRAM(s) Oral three times a day  ergocalciferol 89036 Unit(s) Oral every week  famotidine    Tablet 20 milliGRAM(s) Oral daily  hydrALAZINE 100 milliGRAM(s) Oral every 8 hours  isosorbide   dinitrate Tablet (ISORDIL) 20 milliGRAM(s) Oral three times a day  metoprolol tartrate 12.5 milliGRAM(s) Oral two times a day  multivitamin 1 Tablet(s) Oral daily  ondansetron Injectable 4 milliGRAM(s) IV Push every 8 hours PRN  polyethylene glycol 3350 17 Gram(s) Oral daily  senna 2 Tablet(s) Oral at bedtime  simethicone 80 milliGRAM(s) Chew three times a day PRN  spironolactone 25 milliGRAM(s) Oral daily  tolvaptan 15 milliGRAM(s) Oral once      PAST MEDICAL & SURGICAL HISTORY:  No pertinent past medical history  No significant past surgical history      Vitals:  T(F): 98 (04-22), Max: 98.2 (04-21)  HR: 84 (04-22) (74 - 88)  BP: 120/74 (04-22) (104/64 - 138/90)  RR: 18 (04-22)  SpO2: 98% (04-22)  I&O's Summary    21 Apr 2019 07:01  -  22 Apr 2019 07:00  --------------------------------------------------------  IN: 840 mL / OUT: 1880 mL / NET: -1040 mL    22 Apr 2019 07:01  -  22 Apr 2019 09:51  --------------------------------------------------------  IN: 0 mL / OUT: 200 mL / NET: -200 mL        Physical Exam:  Appearance: No acute distress  Eyes: PERRL, EOMI, pink conjunctiva  HENT: Normal oral muscosa  Cardiovascular: irregularly irregular, S1, S2, +HJR  Respiratory: Clear to auscultation bilaterally  Gastrointestinal: soft, non-tender  Musculoskeletal: No clubbing; no joint deformity   Neurologic: Non-focal  Psychiatry: AAOx3, mood & affect appropriate  Skin: No rashes, ecchymoses, or cyanosis                          12.6   9.79  )-----------( 251      ( 22 Apr 2019 08:28 )             38.0     04-22    125<L>  |  83<L>  |  92<H>  ----------------------------<  120<H>  4.0   |  25  |  2.01<H>    Ca    9.2      22 Apr 2019 07:06  Mg     2.6     04-22      PTT - ( 20 Apr 2019 10:09 )  PTT:30.6 sec      Serum Pro-Brain Natriuretic Peptide: 6576 pg/mL (04-21 @ 06:08)  Serum Pro-Brain Natriuretic Peptide: 76569 pg/mL (04-20 @ 07:01)          New ECG(s): Personally reviewed    Echo:    Stress Testing:     Cath:    Imaging:    Interpretation of Telemetry:

## 2019-04-22 NOTE — PROGRESS NOTE ADULT - ASSESSMENT
79M w/ no significant PMH p/w progressively worsening SOB over the last 2-3 weeks and noted to have increasing abdominal girth, pain, increased belching and n/v, found to be in AFIB w/ RVR. Pt denies any past history of AFIB, however patient's son who is a physician recalls a past EKG done (December 2018) in Dr. Johnston office may have revealed AFIB. S/P cardiac Cath, which revealed NCA, Hospital course c/b transaminitis and ELVA likely cardiorenal syndrome. EP consulted for further management of AFIB. Called by HF team 4/18 regarding use of amiodarone due to patient continue to have NSVT off milrinone gtt.      # AFIB w/ RVR  # HF  - Tele: AF HR 70- 90's, PVCs trigeminy  - Continue with Eliquis 5mg BID and BB  -Improving transaminitis with down trending LFTs  - Pt is medically optimized euvolemic seen by HF today  - Currently NPO for RICH/ DCCV today likely will initiate amio post DCCV.        [id number]

## 2019-04-22 NOTE — PROGRESS NOTE ADULT - PROBLEM SELECTOR PLAN 4
- LA severely dilated on TTE  - eliquis for AC  - TSH wnl  - RICH and cardioversion followed by amiodarone load

## 2019-04-22 NOTE — PROGRESS NOTE ADULT - PROBLEM SELECTOR PLAN 3
ELVA from low perfusion, continue to monitor on IV Bumex and monitor renal function. Cr 2.01 today  - continue bumex 4mg po bid as per HF  Renal following, continue monitoring

## 2019-04-22 NOTE — PROGRESS NOTE ADULT - ASSESSMENT
79y.o. Male with no PMH now admitted with new HFrEF (TTE 04/04/2019 with LVEF 25%, biventricular dysfunction, moderate MR, moderate TR, mild [place]) and AFIB with RVR, abdominal discomfort/nausea concerning for hepatic congestion in the setting of ADHF (BNP>50K), now s/p LHC/RHC (non-obstructive CAD). RHC:  RA 15, RV 58/14, PA 59/34/43, Wedge 33 PA sat 44%, CI 1.57. Pt has received tolvaptan, initiated on milrinone and diuretics with improvement in symptoms. Milrinone now off.        Problem/Plan - 1:  ·  Problem: Acute systolic heart failure, first episode.  Plan:   - now off milrinone  - Keep K >4 and Mg >2  - Continue bumex to 4mg PO BID  - Strict I/Os  - Daily standing weights  - BMP BID  - Continue hydralazine to 100mg PO TID, continue isordil 20mg TID. Continue aldactone 25mg daily.  Hold parameters should be SBP <90     Problem/Plan - 2:  ·  Problem: ELVA (acute kidney injury).  Plan: likely 2/2 to cardiorenal/low output   - continue to trend  - avoid nephrotoxins.      Problem/Plan - 3:  ·  Problem: Atrial fibrillation/NSVT Patient in afib in 90-120s.    Plan:   -continue lopressor  -continue eliquis  - Plan for RICH/cardioversion today 4/22  - monitor on tele. 79y.o. Male with no PMH now admitted with new HFrEF (TTE 04/04/2019 with LVEF 25%, biventricular dysfunction, moderate MR, moderate TR, mild [place]) and AFIB with RVR, abdominal discomfort/nausea concerning for hepatic congestion in the setting of ADHF (BNP>50K), now s/p LHC/RHC (non-obstructive CAD). RHC:  RA 15, RV 58/14, PA 59/34/43, Wedge 33 PA sat 44%, CI 1.57. Pt has received tolvaptan, initiated on milrinone and diuretics with improvement in symptoms. Milrinone now off.        Problem/Plan - 1:  ·  Problem: Acute systolic heart failure, first episode.  Plan:   - now off milrinone  - Keep K >4 and Mg >2  - Continue bumex 4mg PO BID  - Strict I/Os  - Daily standing weights  - BMP BID  - Continue hydralazine to 100mg PO TID, continue isordil 20mg TID. Continue aldactone 25mg daily.  Hold parameters should be SBP <90     Problem/Plan - 2:  ·  Problem: ELVA (acute kidney injury).  Plan: likely 2/2 to cardiorenal/low output   - continue to trend  - avoid nephrotoxins.      Problem/Plan - 3:  ·  Problem: Atrial fibrillation/NSVT Patient in afib in 70-90's    Plan:   -continue lopressor 12.5mg BID  -continue eliquis  - Plan for RICH/cardioversion, amiodarone aver cardioversion per EP  - monitor on tele. 79y.o. Male with no PMH now admitted with new HFrEF (TTE 04/04/2019 with LVEF 25%, biventricular dysfunction, moderate MR, moderate TR, mild [place]) and AFIB with RVR, abdominal discomfort/nausea concerning for hepatic congestion in the setting of ADHF (BNP>50K), now s/p LHC/RHC (non-obstructive CAD). RHC:  RA 15, RV 58/14, PA 59/34/43, Wedge 33 PA sat 44%, CI 1.57. Pt has received tolvaptan, initiated on milrinone and diuretics with improvement in symptoms. Milrinone now off.        Problem/Plan - 1:  ·  Problem: Acute systolic heart failure, first episode.  Plan:   - Continue bumex 4mg PO BID  - Strict I/Os  - Daily standing weights  - Continue hydralazine to 100mg PO TID, continue isordil 20mg TID. Continue aldactone 25mg daily.  Hold parameters should be SBP <90     Problem/Plan - 2:  ·  Problem: ELVA (acute kidney injury).  Plan: likely 2/2 to cardiorenal/low output   - continue to trend  - avoid nephrotoxins.      Problem/Plan - 3:  ·  Problem: Atrial fibrillation/NSVT Patient in afib in 70-90's    Plan:   -continue lopressor 12.5mg BID  -continue eliquis  - Plan for RICH/cardioversion, amiodarone aver cardioversion per EP  - monitor on tele.

## 2019-04-22 NOTE — PROGRESS NOTE ADULT - SUBJECTIVE AND OBJECTIVE BOX
INTERVAL HPI/OVERNIGHT EVENTS: Pt seen today sitting up in chair, stated his breathing much improved.     MEDICATIONS  (STANDING):  apixaban 5 milliGRAM(s) Oral every 12 hours  buMETAnide 4 milliGRAM(s) Oral two times a day  docusate sodium 100 milliGRAM(s) Oral three times a day  ergocalciferol 15922 Unit(s) Oral every week  famotidine    Tablet 20 milliGRAM(s) Oral daily  hydrALAZINE 100 milliGRAM(s) Oral every 8 hours  isosorbide   dinitrate Tablet (ISORDIL) 20 milliGRAM(s) Oral three times a day  metoprolol tartrate 12.5 milliGRAM(s) Oral two times a day  multivitamin 1 Tablet(s) Oral daily  polyethylene glycol 3350 17 Gram(s) Oral daily  senna 2 Tablet(s) Oral at bedtime  spironolactone 25 milliGRAM(s) Oral daily  tolvaptan 15 milliGRAM(s) Oral once    MEDICATIONS  (PRN):  acetaminophen   Tablet .. 650 milliGRAM(s) Oral every 6 hours PRN Mild Pain (1 - 3)  calcium carbonate    500 mG (Tums) Chewable 2 Tablet(s) Chew three times a day PRN Heartburn  ondansetron Injectable 4 milliGRAM(s) IV Push every 8 hours PRN Nausea and/or Vomiting  simethicone 80 milliGRAM(s) Chew three times a day PRN Dyspepsia      Allergies    erythromycin (Unknown)    Intolerances      ROS:  General: Pt denies fever/chills  Cardiovascular: denies chest pain/palpitations/leg edema  Respiratory and Thorax: denies SOB/cough/wheezing  Gastrointestinal: denies abdominal pain/diarrhea/constipation/bloody stool  Skin: denies rashes/sores  Hematologic: denies abnormal bleeding    Vital Signs Last 24 Hrs  T(C): 36.7 (22 Apr 2019 08:17), Max: 36.8 (21 Apr 2019 16:07)  T(F): 98 (22 Apr 2019 08:17), Max: 98.2 (21 Apr 2019 16:07)  HR: 84 (22 Apr 2019 08:17) (74 - 88)  BP: 120/74 (22 Apr 2019 08:17) (104/64 - 138/90)  BP(mean): --  RR: 18 (22 Apr 2019 08:17) (17 - 18)  SpO2: 98% (22 Apr 2019 08:17) (98% - 100%)    Physical Exam:  Neurological: Alert & Oriented x 3  Respiratory: CTA B/L, No wheezing/crackles/rhonchi  Cardiovascular: (+) S1 & S2, RRR  Gastrointestinal: soft, NT, nondistended, (+) BS  Extremities: No pedal edema, No clubbing, No cyanosis  Skin:  normal skin color and pigmentation, no skin lesions          LABS:                        12.6   9.79  )-----------( 251      ( 22 Apr 2019 08:28 )             38.0     04-22    125<L>  |  83<L>  |  92<H>  ----------------------------<  120<H>  4.0   |  25  |  2.01<H>    Ca    9.2      22 Apr 2019 07:06  Mg     2.6     04-22

## 2019-04-23 LAB
ANION GAP SERPL CALC-SCNC: 18 MMOL/L — HIGH (ref 5–17)
BUN SERPL-MCNC: 104 MG/DL — HIGH (ref 7–23)
CALCIUM SERPL-MCNC: 9.3 MG/DL — SIGNIFICANT CHANGE UP (ref 8.4–10.5)
CHLORIDE SERPL-SCNC: 85 MMOL/L — LOW (ref 96–108)
CO2 SERPL-SCNC: 26 MMOL/L — SIGNIFICANT CHANGE UP (ref 22–31)
CREAT SERPL-MCNC: 2.13 MG/DL — HIGH (ref 0.5–1.3)
GLUCOSE SERPL-MCNC: 113 MG/DL — HIGH (ref 70–99)
HCT VFR BLD CALC: 40.5 % — SIGNIFICANT CHANGE UP (ref 39–50)
HGB BLD-MCNC: 13.1 G/DL — SIGNIFICANT CHANGE UP (ref 13–17)
MCHC RBC-ENTMCNC: 27.8 PG — SIGNIFICANT CHANGE UP (ref 27–34)
MCHC RBC-ENTMCNC: 32.3 GM/DL — SIGNIFICANT CHANGE UP (ref 32–36)
MCV RBC AUTO: 86 FL — SIGNIFICANT CHANGE UP (ref 80–100)
PLATELET # BLD AUTO: 249 K/UL — SIGNIFICANT CHANGE UP (ref 150–400)
POTASSIUM SERPL-MCNC: 3.8 MMOL/L — SIGNIFICANT CHANGE UP (ref 3.5–5.3)
POTASSIUM SERPL-SCNC: 3.8 MMOL/L — SIGNIFICANT CHANGE UP (ref 3.5–5.3)
RBC # BLD: 4.71 M/UL — SIGNIFICANT CHANGE UP (ref 4.2–5.8)
RBC # FLD: 14.2 % — SIGNIFICANT CHANGE UP (ref 10.3–14.5)
SODIUM SERPL-SCNC: 129 MMOL/L — LOW (ref 135–145)
WBC # BLD: 8.62 K/UL — SIGNIFICANT CHANGE UP (ref 3.8–10.5)
WBC # FLD AUTO: 8.62 K/UL — SIGNIFICANT CHANGE UP (ref 3.8–10.5)

## 2019-04-23 PROCEDURE — 93320 DOPPLER ECHO COMPLETE: CPT | Mod: 26

## 2019-04-23 PROCEDURE — 93010 ELECTROCARDIOGRAM REPORT: CPT

## 2019-04-23 PROCEDURE — 93312 ECHO TRANSESOPHAGEAL: CPT | Mod: 26

## 2019-04-23 PROCEDURE — 93325 DOPPLER ECHO COLOR FLOW MAPG: CPT | Mod: 26

## 2019-04-23 PROCEDURE — 99233 SBSQ HOSP IP/OBS HIGH 50: CPT

## 2019-04-23 RX ORDER — TOLVAPTAN 15 MG/1
30 TABLET ORAL ONCE
Qty: 0 | Refills: 0 | Status: COMPLETED | OUTPATIENT
Start: 2019-04-23 | End: 2019-04-23

## 2019-04-23 RX ORDER — BUMETANIDE 0.25 MG/ML
2 INJECTION INTRAMUSCULAR; INTRAVENOUS
Qty: 0 | Refills: 0 | Status: DISCONTINUED | OUTPATIENT
Start: 2019-04-23 | End: 2019-04-24

## 2019-04-23 RX ADMIN — SIMETHICONE 80 MILLIGRAM(S): 80 TABLET, CHEWABLE ORAL at 22:30

## 2019-04-23 RX ADMIN — Medication 12.5 MILLIGRAM(S): at 17:11

## 2019-04-23 RX ADMIN — SPIRONOLACTONE 25 MILLIGRAM(S): 25 TABLET, FILM COATED ORAL at 05:34

## 2019-04-23 RX ADMIN — Medication 12.5 MILLIGRAM(S): at 05:35

## 2019-04-23 RX ADMIN — Medication 1 TABLET(S): at 17:12

## 2019-04-23 RX ADMIN — TOLVAPTAN 30 MILLIGRAM(S): 15 TABLET ORAL at 21:35

## 2019-04-23 RX ADMIN — FAMOTIDINE 20 MILLIGRAM(S): 10 INJECTION INTRAVENOUS at 17:12

## 2019-04-23 RX ADMIN — APIXABAN 5 MILLIGRAM(S): 2.5 TABLET, FILM COATED ORAL at 17:11

## 2019-04-23 RX ADMIN — ISOSORBIDE DINITRATE 20 MILLIGRAM(S): 5 TABLET ORAL at 17:11

## 2019-04-23 RX ADMIN — ISOSORBIDE DINITRATE 20 MILLIGRAM(S): 5 TABLET ORAL at 21:35

## 2019-04-23 RX ADMIN — Medication 650 MILLIGRAM(S): at 03:35

## 2019-04-23 RX ADMIN — BUMETANIDE 2 MILLIGRAM(S): 0.25 INJECTION INTRAMUSCULAR; INTRAVENOUS at 17:10

## 2019-04-23 RX ADMIN — APIXABAN 5 MILLIGRAM(S): 2.5 TABLET, FILM COATED ORAL at 05:35

## 2019-04-23 RX ADMIN — BUMETANIDE 4 MILLIGRAM(S): 0.25 INJECTION INTRAMUSCULAR; INTRAVENOUS at 05:34

## 2019-04-23 RX ADMIN — Medication 100 MILLIGRAM(S): at 17:10

## 2019-04-23 RX ADMIN — Medication 100 MILLIGRAM(S): at 05:35

## 2019-04-23 RX ADMIN — Medication 650 MILLIGRAM(S): at 04:23

## 2019-04-23 RX ADMIN — ISOSORBIDE DINITRATE 20 MILLIGRAM(S): 5 TABLET ORAL at 05:35

## 2019-04-23 NOTE — PROGRESS NOTE ADULT - SUBJECTIVE AND OBJECTIVE BOX
Overnight Events:     Medications:  acetaminophen   Tablet .. 650 milliGRAM(s) Oral every 6 hours PRN  apixaban 5 milliGRAM(s) Oral every 12 hours  buMETAnide 2 milliGRAM(s) Oral two times a day  calcium carbonate    500 mG (Tums) Chewable 2 Tablet(s) Chew three times a day PRN  docusate sodium 100 milliGRAM(s) Oral three times a day  ergocalciferol 76003 Unit(s) Oral every week  famotidine    Tablet 20 milliGRAM(s) Oral daily  hydrALAZINE 100 milliGRAM(s) Oral every 8 hours  isosorbide   dinitrate Tablet (ISORDIL) 20 milliGRAM(s) Oral three times a day  metoprolol tartrate 12.5 milliGRAM(s) Oral two times a day  multivitamin 1 Tablet(s) Oral daily  ondansetron Injectable 4 milliGRAM(s) IV Push every 8 hours PRN  polyethylene glycol 3350 17 Gram(s) Oral daily  senna 2 Tablet(s) Oral at bedtime  simethicone 80 milliGRAM(s) Chew three times a day PRN  spironolactone 25 milliGRAM(s) Oral daily      PAST MEDICAL & SURGICAL HISTORY:  No pertinent past medical history  No significant past surgical history      Vitals:  T(F): 98.2 (04-23), Max: 98.5 (04-22)  HR: 72 (04-23) (72 - 92)  BP: 102/71 (04-23) (102/71 - 152/79)  RR: 18 (04-23)  SpO2: 97% (04-23)  I&O's Summary    22 Apr 2019 07:01  -  23 Apr 2019 07:00  --------------------------------------------------------  IN: 530 mL / OUT: 2650 mL / NET: -2120 mL    23 Apr 2019 07:01  -  23 Apr 2019 12:39  --------------------------------------------------------  IN: 0 mL / OUT: 0 mL / NET: 0 mL        Physical Exam:  Appearance: No acute distress; well appearing  Eyes: PERRL, EOMI, pink conjunctiva  HENT: Normal oral muscosa  Cardiovascular: RRR, S1, S2, no murmurs, rubs, or gallops; no edema; no JVD  Respiratory: Clear to auscultation bilaterally  Gastrointestinal: soft, non-tender, non-distended with normal bowel sounds  Musculoskeletal: No clubbing; no joint deformity   Neurologic: Non-focal  Lymphatic: No lymphadenopathy  Psychiatry: AAOx3, mood & affect appropriate  Skin: No rashes, ecchymoses, or cyanosis                          13.1   8.62  )-----------( 249      ( 23 Apr 2019 09:28 )             40.5     04-23    129<L>  |  85<L>  |  104<H>  ----------------------------<  113<H>  3.8   |  26  |  2.13<H>    Ca    9.3      23 Apr 2019 07:04  Mg     2.6     04-22            Serum Pro-Brain Natriuretic Peptide: 6576 pg/mL (04-21 @ 06:08)  Serum Pro-Brain Natriuretic Peptide: 79181 pg/mL (04-20 @ 07:01)          New ECG(s): Personally reviewed    Echo:    Stress Testing:     Cath:    Imaging:    Interpretation of Telemetry:

## 2019-04-23 NOTE — PROGRESS NOTE ADULT - PROBLEM SELECTOR PLAN 1
Repeat episodes of WCT from Milrinone, tapered off, still had some beats after stopping milrinone  Currently tele shows A.fib @/mt.   - lopressor 25mg po bid, uptitrate as tolerated  - f/u RICH report  EP following, keep K >4 and Mg >2  - Strict I/Os  - Daily standing weights

## 2019-04-23 NOTE — PROGRESS NOTE ADULT - SUBJECTIVE AND OBJECTIVE BOX
Hospitalist- Osmani Hernandez MD  Pager: [phone]  If no response or off-hours, page [phone]  -------------------------------------  Patient is a 79y old  Male who presents with a chief complaint of dyspnea, abdominal swelling (23 Apr 2019 14:20)  Subjective: Pt seen s/p RICH, during which family reports he was found to have 'a clot.' DCCV not performed. Breathing comfortably, no cp/palpitations. No new complaints.    14 point ros otherwise negative.     VITAL SIGNS:  Vital Signs Last 24 Hrs  T(C): 36.6 (23 Apr 2019 16:05), Max: 36.9 (22 Apr 2019 19:59)  T(F): 97.8 (23 Apr 2019 16:05), Max: 98.5 (22 Apr 2019 19:59)  HR: 93 (23 Apr 2019 16:05) (72 - 93)  BP: 129/91 (23 Apr 2019 16:05) (102/71 - 152/79)  BP(mean): --  RR: 18 (23 Apr 2019 16:05) (18 - 18)  SpO2: 99% (23 Apr 2019 16:05) (96% - 99%)      PHYSICAL EXAM:     GENERAL: no acute distress  HEENT: PERRLA, EOMI, moist oropharynx   RESPIRATORY: CTAB, no w/c   CARDIOVASCULAR: RRR, no murmurs, gallops, rubs  ABDOMINAL: soft, non-tender, non-distended, positive bowel sounds   EXTREMITIES: no clubbing, cyanosis, or edema  NEUROLOGICAL: alert and oriented x 3, non-focal  SKIN: no rashes or lesions   MUSCULOSKELETAL: no gross joint deformity                          13.1   8.62  )-----------( 249      ( 23 Apr 2019 09:28 )             40.5     04-23    129<L>  |  85<L>  |  104<H>  ----------------------------<  113<H>  3.8   |  26  |  2.13<H>    Ca    9.3      23 Apr 2019 07:04  Mg     2.6     04-22        CAPILLARY BLOOD GLUCOSE          MEDICATIONS  (STANDING):  apixaban 5 milliGRAM(s) Oral every 12 hours  buMETAnide 2 milliGRAM(s) Oral two times a day  docusate sodium 100 milliGRAM(s) Oral three times a day  ergocalciferol 54897 Unit(s) Oral every week  famotidine    Tablet 20 milliGRAM(s) Oral daily  hydrALAZINE 100 milliGRAM(s) Oral every 8 hours  isosorbide   dinitrate Tablet (ISORDIL) 20 milliGRAM(s) Oral three times a day  metoprolol tartrate 12.5 milliGRAM(s) Oral two times a day  multivitamin 1 Tablet(s) Oral daily  polyethylene glycol 3350 17 Gram(s) Oral daily  senna 2 Tablet(s) Oral at bedtime  spironolactone 25 milliGRAM(s) Oral daily  tolvaptan 30 milliGRAM(s) Oral once    MEDICATIONS  (PRN):  acetaminophen   Tablet .. 650 milliGRAM(s) Oral every 6 hours PRN Mild Pain (1 - 3)  calcium carbonate    500 mG (Tums) Chewable 2 Tablet(s) Chew three times a day PRN Heartburn  ondansetron Injectable 4 milliGRAM(s) IV Push every 8 hours PRN Nausea and/or Vomiting  simethicone 80 milliGRAM(s) Chew three times a day PRN Dyspepsia

## 2019-04-23 NOTE — PROGRESS NOTE ADULT - SUBJECTIVE AND OBJECTIVE BOX
INTERVAL HPI/OVERNIGHT EVENTS: Pt seen resting comfortably in bed    MEDICATIONS  (STANDING):  apixaban 5 milliGRAM(s) Oral every 12 hours  buMETAnide 2 milliGRAM(s) Oral two times a day  docusate sodium 100 milliGRAM(s) Oral three times a day  ergocalciferol 65480 Unit(s) Oral every week  famotidine    Tablet 20 milliGRAM(s) Oral daily  hydrALAZINE 100 milliGRAM(s) Oral every 8 hours  isosorbide   dinitrate Tablet (ISORDIL) 20 milliGRAM(s) Oral three times a day  metoprolol tartrate 12.5 milliGRAM(s) Oral two times a day  multivitamin 1 Tablet(s) Oral daily  polyethylene glycol 3350 17 Gram(s) Oral daily  senna 2 Tablet(s) Oral at bedtime  spironolactone 25 milliGRAM(s) Oral daily    MEDICATIONS  (PRN):  acetaminophen   Tablet .. 650 milliGRAM(s) Oral every 6 hours PRN Mild Pain (1 - 3)  calcium carbonate    500 mG (Tums) Chewable 2 Tablet(s) Chew three times a day PRN Heartburn  ondansetron Injectable 4 milliGRAM(s) IV Push every 8 hours PRN Nausea and/or Vomiting  simethicone 80 milliGRAM(s) Chew three times a day PRN Dyspepsia      Allergies    erythromycin (Unknown)    Intolerances      ROS:  General: Pt denies fever/chills  Cardiovascular: denies chest pain/palpitations/leg edema  Respiratory and Thorax: denies SOB/cough/wheezing  Gastrointestinal: denies abdominal pain/diarrhea/constipation/bloody stool  Skin: denies rashes/sores        Vital Signs Last 24 Hrs  T(C): 36.8 (23 Apr 2019 05:28), Max: 36.9 (22 Apr 2019 19:59)  T(F): 98.2 (23 Apr 2019 05:28), Max: 98.5 (22 Apr 2019 19:59)  HR: 72 (23 Apr 2019 07:37) (72 - 92)  BP: 102/71 (23 Apr 2019 07:37) (102/71 - 152/79)  BP(mean): --  RR: 18 (23 Apr 2019 05:28) (18 - 18)  SpO2: 97% (23 Apr 2019 05:28) (96% - 100%)    Physical Exam:  Neurological: Alert & Oriented x 3  Respiratory: CTA B/L, No wheezing/crackles/rhonchi  Cardiovascular: (+) S1 & S2, RRR  Gastrointestinal: soft, NT, nondistended, (+) BS  Extremities: No pedal edema  Skin:  normal skin color and pigmentation, no skin lesions          LABS:                        12.6   9.79  )-----------( 251      ( 22 Apr 2019 08:28 )             38.0     04-23    129<L>  |  85<L>  |  104<H>  ----------------------------<  113<H>  3.8   |  26  |  2.13<H>    Ca    9.3      23 Apr 2019 07:04  Mg     2.6     04-22

## 2019-04-23 NOTE — PROGRESS NOTE ADULT - PROBLEM SELECTOR PLAN 4
- LA severely dilated on TTE  - eliquis for AC- will switch to heparin bridge to coumadin pending RICH report  - TSH wnl

## 2019-04-23 NOTE — PROGRESS NOTE ADULT - ASSESSMENT
79y.o. Male with no PMH now admitted with new HFrEF (TTE 04/04/2019 with LVEF 25%, biventricular dysfunction, moderate MR, moderate TR, mild WI) and AFIB with RVR, abdominal discomfort/nausea concerning for hepatic congestion in the setting of ADHF (BNP>50K), now s/p LHC/RHC (non-obstructive CAD). RHC:  RA 15, RV 58/14, PA 59/34/43, Wedge 33 PA sat 44%, CI 1.57. Pt has received tolvaptan, initiated on milrinone and diuretics with improvement in symptoms. Milrinone now off.        Problem/Plan - 1:  ·  Problem: Acute systolic heart failure, first episode.  Plan:   - agree with decreasing bumex to 2mg PO BID  - Strict I/Os  - Daily standing weights  - Continue hydralazine to 100mg PO TID, continue isordil 20mg TID. Continue aldactone 25mg daily.  Hold parameters should be SBP <90     Problem/Plan - 2:  ·  Problem: ELVA (acute kidney injury).  Plan: likely 2/2 to cardiorenal/low output   - continue to trend  - avoid nephrotoxins.      Problem/Plan - 3:  ·  Problem: Atrial fibrillation/NSVT Patient in afib in 70-90's    Plan:   -continue lopressor 12.5mg BID  -continue eliquis  - Plan for RICH/cardioversion, amiodarone after cardioversion per EP  - monitor on tele.

## 2019-04-23 NOTE — PROGRESS NOTE ADULT - ASSESSMENT
79M w/ no significant PMH p/w progressively worsening SOB over the last 2-3 weeks and noted to have increasing abdominal girth, pain, increased belching and n/v, found to be in AFIB w/ RVR. Pt denies any past history of AFIB, however patient's son who is a physician recalls a past EKG done (December 2018) in Dr. Johnston office may have revealed AFIB. S/P cardiac Cath, which revealed NCA, Hospital course c/b transaminitis and ELVA likely cardiorenal syndrome. EP consulted for further management of AFIB. Called by HF team 4/18 regarding use of amiodarone due to patient continue to have NSVT off milrinone gtt.      # AFIB w/ RVR  # HF  - Tele: AF HR 70- 90's 18 's ( asymptomatic)   - Continue with Eliquis 5mg BID  - May uptitrate lopressor as BP can tolerate  - Continue to trend LFT, add to am labs  - Currently NPO for RICH/ DCCV today likely will initiate amio post DCCV.        [id number] 79M w/ no significant PMH p/w progressively worsening SOB over the last 2-3 weeks and noted to have increasing abdominal girth, pain, increased belching and n/v, found to be in AFIB w/ RVR. Pt denies any past history of AFIB, however patient's son who is a physician recalls a past EKG done (December 2018) in Dr. Johnston office may have revealed AFIB. S/P cardiac Cath, which revealed NCA, Hospital course c/b transaminitis and ELVA likely cardiorenal syndrome. EP consulted for further management of AFIB. Called by HF team 4/18 regarding use of amiodarone due to patient continue to have NSVT off milrinone gtt.      # AFIB w/ RVR  # HF  - Tele: AF HR 70- 90's 18 's ( asymptomatic)   - Continue with Eliquis 5mg BID  - May uptitrate lopressor as BP can tolerate  - Continue to trend LFT, add to am labs  - Currently NPO for RICH/ DCCV today likely will initiate amio post DCCV.    1700 Addendum:   S/P RICH today which revealed +  SHERRY thrombus. DCCV  aborted. Follow up   with outpatient cardiologist  in one month to re-evaluate for possible DCCV in the future. Continue with  rate control for now. EP signing off. Plan discussed with medicine team.       [id number]

## 2019-04-23 NOTE — PROGRESS NOTE ADULT - PROBLEM SELECTOR PLAN 3
ELVA from low perfusion, continue to monitor on IV Bumex and monitor renal function. Cr 2.9 today  - decrease bumex to 2mg po bid  Renal following, continue monitoring

## 2019-04-23 NOTE — PROGRESS NOTE ADULT - SUBJECTIVE AND OBJECTIVE BOX
      Patient seen and examined in PACU. RICH/DCCV today.      MEDICATIONS  (STANDING):  apixaban 5 milliGRAM(s) Oral every 12 hours  buMETAnide 2 milliGRAM(s) Oral two times a day  docusate sodium 100 milliGRAM(s) Oral three times a day  ergocalciferol 61177 Unit(s) Oral every week  famotidine    Tablet 20 milliGRAM(s) Oral daily  hydrALAZINE 100 milliGRAM(s) Oral every 8 hours  isosorbide   dinitrate Tablet (ISORDIL) 20 milliGRAM(s) Oral three times a day  metoprolol tartrate 12.5 milliGRAM(s) Oral two times a day  multivitamin 1 Tablet(s) Oral daily  polyethylene glycol 3350 17 Gram(s) Oral daily  senna 2 Tablet(s) Oral at bedtime  spironolactone 25 milliGRAM(s) Oral daily      VITAL:  T(C): , Max: 36.9 (04-22-19 @ 19:59)  T(F): , Max: 98.5 (04-22-19 @ 19:59)  HR: 72 (04-23-19 @ 07:37)  BP: 102/71 (04-23-19 @ 07:37)  RR: 18 (04-23-19 @ 05:28)  SpO2: 97% (04-23-19 @ 05:28)    I and O's:    04-22 @ 07:01  -  04-23 @ 07:00  --------------------------------------------------------  IN: 530 mL / OUT: 2650 mL / NET: -2120 mL    04-23 @ 07:01  -  04-23 @ 14:20  --------------------------------------------------------  IN: 0 mL / OUT: 300 mL / NET: -300 mL          PHYSICAL EXAM:    Constitutional: NAD  Neck:  No JVD  Respiratory: CTAB/L  Cardiovascular: S1 and S2  Gastrointestinal: BS+, soft, NT/ND  Extremities: No peripheral edema  Neurological: A/O x 3, no focal deficits  Psychiatric: Normal mood, normal affect  : No Aguirre  Skin: No rashes    LABS:                        13.1   8.62  )-----------( 249      ( 23 Apr 2019 09:28 )             40.5     04-23    129<L>  |  85<L>  |  104<H>  ----------------------------<  113<H>  3.8   |  26  |  2.13<H>    Ca    9.3      23 Apr 2019 07:04  Mg     2.6     04-22    ASSESSMENT:  79-year-old gentleman with PMH of diabetes, hypertension, presents with decompensated heart failure along with rapid atrial fibrillation. Renal failure is new and likely has cardiorenal syndrome.   - Hypervolemic hyponatremia due to heart failure - Na 129 today  - CKD IV with baseline s.cr ~ 2.0 mg/dl - overall stable     RECOMMENDATIONS:   - diuretics per HF team  - Fluid restriction 1.2L per day  - Samsca 30mg po x 1 today  - BMP in AM    Discussed with MD Uche Edwards, NP-C  [facility]  [phone]       Patient seen and examined in bed. s/p RICH/DCCV today.      MEDICATIONS  (STANDING):  apixaban 5 milliGRAM(s) Oral every 12 hours  buMETAnide 2 milliGRAM(s) Oral two times a day  docusate sodium 100 milliGRAM(s) Oral three times a day  ergocalciferol 04311 Unit(s) Oral every week  famotidine    Tablet 20 milliGRAM(s) Oral daily  hydrALAZINE 100 milliGRAM(s) Oral every 8 hours  isosorbide   dinitrate Tablet (ISORDIL) 20 milliGRAM(s) Oral three times a day  metoprolol tartrate 12.5 milliGRAM(s) Oral two times a day  multivitamin 1 Tablet(s) Oral daily  polyethylene glycol 3350 17 Gram(s) Oral daily  senna 2 Tablet(s) Oral at bedtime  spironolactone 25 milliGRAM(s) Oral daily      VITAL:  T(C): , Max: 36.9 (04-22-19 @ 19:59)  T(F): , Max: 98.5 (04-22-19 @ 19:59)  HR: 72 (04-23-19 @ 07:37)  BP: 102/71 (04-23-19 @ 07:37)  RR: 18 (04-23-19 @ 05:28)  SpO2: 97% (04-23-19 @ 05:28)    I and O's:    04-22 @ 07:01  -  04-23 @ 07:00  --------------------------------------------------------  IN: 530 mL / OUT: 2650 mL / NET: -2120 mL    04-23 @ 07:01  -  04-23 @ 14:20  --------------------------------------------------------  IN: 0 mL / OUT: 300 mL / NET: -300 mL          PHYSICAL EXAM:    Constitutional: NAD  Neck:  No JVD  Respiratory: CTAB/L  Cardiovascular: S1 and S2  Gastrointestinal: BS+, soft, NT/ND  Extremities: No peripheral edema  Neurological: A/O x 3, no focal deficits  Psychiatric: Normal mood, normal affect  : No Aguirre  Skin: No rashes    LABS:                        13.1   8.62  )-----------( 249      ( 23 Apr 2019 09:28 )             40.5     04-23    129<L>  |  85<L>  |  104<H>  ----------------------------<  113<H>  3.8   |  26  |  2.13<H>    Ca    9.3      23 Apr 2019 07:04  Mg     2.6     04-22    ASSESSMENT:  79-year-old gentleman with PMH of diabetes, hypertension, presents with decompensated heart failure along with rapid atrial fibrillation. Renal failure is new and likely has cardiorenal syndrome.   - Hypervolemic hyponatremia due to heart failure - Na 129 today  - CKD IV with baseline s.cr ~ 2.0 mg/dl - overall stable     RECOMMENDATIONS:   - diuretics per HF team  - Fluid restriction 1.2L per day  - Samsca 30mg po x 1 today  - BMP in AM    Discussed with MD Uche Edwards, NP-C  [facility]  [phone]       Patient seen and examined sitting up in chair.  s/p RICH today, cardioversion was not done.      MEDICATIONS  (STANDING):  apixaban 5 milliGRAM(s) Oral every 12 hours  buMETAnide 2 milliGRAM(s) Oral two times a day  docusate sodium 100 milliGRAM(s) Oral three times a day  ergocalciferol 31883 Unit(s) Oral every week  famotidine    Tablet 20 milliGRAM(s) Oral daily  hydrALAZINE 100 milliGRAM(s) Oral every 8 hours  isosorbide   dinitrate Tablet (ISORDIL) 20 milliGRAM(s) Oral three times a day  metoprolol tartrate 12.5 milliGRAM(s) Oral two times a day  multivitamin 1 Tablet(s) Oral daily  polyethylene glycol 3350 17 Gram(s) Oral daily  senna 2 Tablet(s) Oral at bedtime  spironolactone 25 milliGRAM(s) Oral daily      VITAL:  T(C): , Max: 36.9 (04-22-19 @ 19:59)  T(F): , Max: 98.5 (04-22-19 @ 19:59)  HR: 72 (04-23-19 @ 07:37)  BP: 102/71 (04-23-19 @ 07:37)  RR: 18 (04-23-19 @ 05:28)  SpO2: 97% (04-23-19 @ 05:28)    I and O's:    04-22 @ 07:01  -  04-23 @ 07:00  --------------------------------------------------------  IN: 530 mL / OUT: 2650 mL / NET: -2120 mL    04-23 @ 07:01  -  04-23 @ 14:20  --------------------------------------------------------  IN: 0 mL / OUT: 300 mL / NET: -300 mL          PHYSICAL EXAM:    Constitutional: NAD  Neck:  No JVD  Respiratory: CTAB/L  Cardiovascular: S1 and S2  Gastrointestinal: BS+, soft, NT/ND  Extremities: No peripheral edema  : No Aguirre  Skin: No rashes    LABS:                        13.1   8.62  )-----------( 249      ( 23 Apr 2019 09:28 )             40.5     04-23    129<L>  |  85<L>  |  104<H>  ----------------------------<  113<H>  3.8   |  26  |  2.13<H>    Ca    9.3      23 Apr 2019 07:04  Mg     2.6     04-22    ASSESSMENT:  79-year-old gentleman with PMH of diabetes, hypertension, presents with decompensated heart failure along with rapid atrial fibrillation. Renal failure is new and likely has cardiorenal syndrome.   - Hypervolemic hyponatremia due to heart failure - Na 129 today  - CKD IV with baseline s.cr ~ 2.0 mg/dl - renal function overall stable     RECOMMENDATIONS:   - diuretics per HF team  - Fluid restriction 1.2L per day  - Samsca 30mg po x 1 today  - BMP in AM  - F/U RICH    Discussed with MD Uche Edwards, NP-C  [facility]  [phone]

## 2019-04-24 LAB
ANION GAP SERPL CALC-SCNC: 17 MMOL/L — SIGNIFICANT CHANGE UP (ref 5–17)
BCR/ABL BY RT - PCR QUANTITATIVE: SIGNIFICANT CHANGE UP
BUN SERPL-MCNC: 100 MG/DL — HIGH (ref 7–23)
CALCIUM SERPL-MCNC: 9.3 MG/DL — SIGNIFICANT CHANGE UP (ref 8.4–10.5)
CHLORIDE SERPL-SCNC: 86 MMOL/L — LOW (ref 96–108)
CO2 SERPL-SCNC: 27 MMOL/L — SIGNIFICANT CHANGE UP (ref 22–31)
CREAT SERPL-MCNC: 2.31 MG/DL — HIGH (ref 0.5–1.3)
GLUCOSE SERPL-MCNC: 122 MG/DL — HIGH (ref 70–99)
HCT VFR BLD CALC: 40.5 % — SIGNIFICANT CHANGE UP (ref 39–50)
HGB BLD-MCNC: 13.3 G/DL — SIGNIFICANT CHANGE UP (ref 13–17)
MAGNESIUM SERPL-MCNC: 2.8 MG/DL — HIGH (ref 1.6–2.6)
MCHC RBC-ENTMCNC: 28.2 PG — SIGNIFICANT CHANGE UP (ref 27–34)
MCHC RBC-ENTMCNC: 32.8 GM/DL — SIGNIFICANT CHANGE UP (ref 32–36)
MCV RBC AUTO: 85.8 FL — SIGNIFICANT CHANGE UP (ref 80–100)
PLATELET # BLD AUTO: 240 K/UL — SIGNIFICANT CHANGE UP (ref 150–400)
POTASSIUM SERPL-MCNC: 3.7 MMOL/L — SIGNIFICANT CHANGE UP (ref 3.5–5.3)
POTASSIUM SERPL-SCNC: 3.7 MMOL/L — SIGNIFICANT CHANGE UP (ref 3.5–5.3)
RBC # BLD: 4.72 M/UL — SIGNIFICANT CHANGE UP (ref 4.2–5.8)
RBC # FLD: 14.3 % — SIGNIFICANT CHANGE UP (ref 10.3–14.5)
SODIUM SERPL-SCNC: 130 MMOL/L — LOW (ref 135–145)
WBC # BLD: 8.24 K/UL — SIGNIFICANT CHANGE UP (ref 3.8–10.5)
WBC # FLD AUTO: 8.24 K/UL — SIGNIFICANT CHANGE UP (ref 3.8–10.5)

## 2019-04-24 PROCEDURE — 99233 SBSQ HOSP IP/OBS HIGH 50: CPT

## 2019-04-24 PROCEDURE — 99233 SBSQ HOSP IP/OBS HIGH 50: CPT | Mod: GC

## 2019-04-24 RX ORDER — POTASSIUM CHLORIDE 20 MEQ
20 PACKET (EA) ORAL ONCE
Qty: 0 | Refills: 0 | Status: COMPLETED | OUTPATIENT
Start: 2019-04-24 | End: 2019-04-24

## 2019-04-24 RX ORDER — METOPROLOL TARTRATE 50 MG
25 TABLET ORAL
Qty: 0 | Refills: 0 | Status: DISCONTINUED | OUTPATIENT
Start: 2019-04-24 | End: 2019-04-25

## 2019-04-24 RX ORDER — TOLVAPTAN 15 MG/1
30 TABLET ORAL ONCE
Qty: 0 | Refills: 0 | Status: COMPLETED | OUTPATIENT
Start: 2019-04-24 | End: 2019-04-24

## 2019-04-24 RX ADMIN — Medication 100 MILLIGRAM(S): at 22:05

## 2019-04-24 RX ADMIN — APIXABAN 5 MILLIGRAM(S): 2.5 TABLET, FILM COATED ORAL at 06:42

## 2019-04-24 RX ADMIN — Medication 100 MILLIGRAM(S): at 07:50

## 2019-04-24 RX ADMIN — SIMETHICONE 80 MILLIGRAM(S): 80 TABLET, CHEWABLE ORAL at 17:59

## 2019-04-24 RX ADMIN — POLYETHYLENE GLYCOL 3350 17 GRAM(S): 17 POWDER, FOR SOLUTION ORAL at 11:15

## 2019-04-24 RX ADMIN — BUMETANIDE 2 MILLIGRAM(S): 0.25 INJECTION INTRAMUSCULAR; INTRAVENOUS at 17:53

## 2019-04-24 RX ADMIN — Medication 100 MILLIGRAM(S): at 13:36

## 2019-04-24 RX ADMIN — TOLVAPTAN 30 MILLIGRAM(S): 15 TABLET ORAL at 13:37

## 2019-04-24 RX ADMIN — Medication 1 TABLET(S): at 11:16

## 2019-04-24 RX ADMIN — BUMETANIDE 2 MILLIGRAM(S): 0.25 INJECTION INTRAMUSCULAR; INTRAVENOUS at 06:42

## 2019-04-24 RX ADMIN — Medication 100 MILLIGRAM(S): at 06:43

## 2019-04-24 RX ADMIN — ISOSORBIDE DINITRATE 20 MILLIGRAM(S): 5 TABLET ORAL at 22:05

## 2019-04-24 RX ADMIN — APIXABAN 5 MILLIGRAM(S): 2.5 TABLET, FILM COATED ORAL at 17:53

## 2019-04-24 RX ADMIN — ISOSORBIDE DINITRATE 20 MILLIGRAM(S): 5 TABLET ORAL at 13:36

## 2019-04-24 RX ADMIN — SENNA PLUS 2 TABLET(S): 8.6 TABLET ORAL at 22:05

## 2019-04-24 RX ADMIN — Medication 20 MILLIEQUIVALENT(S): at 13:36

## 2019-04-24 RX ADMIN — ISOSORBIDE DINITRATE 20 MILLIGRAM(S): 5 TABLET ORAL at 06:42

## 2019-04-24 RX ADMIN — Medication 12.5 MILLIGRAM(S): at 01:33

## 2019-04-24 RX ADMIN — FAMOTIDINE 20 MILLIGRAM(S): 10 INJECTION INTRAVENOUS at 11:16

## 2019-04-24 RX ADMIN — SPIRONOLACTONE 25 MILLIGRAM(S): 25 TABLET, FILM COATED ORAL at 06:42

## 2019-04-24 RX ADMIN — Medication 25 MILLIGRAM(S): at 17:52

## 2019-04-24 NOTE — CHART NOTE - NSCHARTNOTEFT_GEN_A_CORE
Nutrition Follow Up Note  Patient seen for: nutrition follow up on 6TOW    Chart reviewed, events noted. Pt is 79yoM with no PMH per chart on admission, presenting with dyspnea, LE and abdomen swelling for several days PTA as well as nausea/vomiting and decreased po intake. On admission, pt with hypervolemic hyponatremia, leukocytosis, new Afib with RVR, transaminitis, ELVA, ascites, coagulopathy, and dyspepsia.   Per nephrology, renal dysfunction is likely cardiorenal syndrome, now noted with CKD IV at baseline. Pt s/p R/L cath indicative of non-obstructive CAD. EP following, pt s/p RICH 4/23, no cardioversion as pt found with SHERRY thrombus. Pt with new acute systolic HF (EF 23%). Pt noted with elevated HgbA1c 6.6% on admission as well, borderline vs new DM? Pt noted with leukocytosis, likely stress reaction, heme/onc consulted. Pt s/p abdominal sonogram, indicates no ascites s/p diuretics. Pt continues with low sodium (improving), likely hypervolemic hyponatremia.     Source: pt, pt daughter, pt wife, chart    Diet : Low Sodium + 1200ml Fluid Restriction + Lacto-Vegetarian + Nepro-2 per day (Provides additional 850kcal, 38grams protein per day)     Patient reports: No acute GI distress, last BM 4/24.     PO intake : good/fair. Pt with mostly 50-75% meal consumptions since 04-19, he is drinking 2 Nepro daily as well. Pt reports po intake is still below baseline, as he has limited vegetarian choices in-patient and does not like institutional foods all that much. Regarding protein sources, pt reports he does consume dairy provided at meals usually or some lentils from home. Per daughter, she does not feel the pt is getting more than 20 g protein from foods. Given this, will hold off on changing Nepro to Suplena, for lower protein content as pt is not consuming protein in excess. Potassium/phosphorus noted WNL currently.   Nutrition education provided at this time: discussed low sodium recommendations, foods high in sodium typically and appropriate substitutions, salt free substitutes for seasoning foods, fluid restrictions, foods/drinks that count towards daily fluid allowance, recommendation for daily weights and criteria for inappropriate wt gain and protocol for contacting MD. Also discussed continuing to avoid fruit juice and concentrated sweets (pt reports he does not eat/drink these items anyway, he does not like sweets) in setting of elevated HgbA1c. Reviewed high potassium foods to be avoided, however noted that potassium has been largely WNL on current admission. Pt/family also advised that with further improvement in po intake, pt would likely benefit from switching Nepro for Suplena at home so as not to be consuming too much protein in setting of CKD. Pt reports he only received 1 Nepro today and he would like to trial Suplena this evening. Pt/family able to teach back points, accept written materials and made aware RD remains available.     Source for PO intake: pt, chart     Enteral /Parenteral Nutrition: n/a      Daily Weight in kG (standing weights): 74.3 (04-24), pt wt appears largely stable, some shifts in setting of fluid shifts likely  75.1 (04-21)  75.4 (04-18)  76.6 (04-16)  75.7 (04-14)   76.5 (04-12), Pt noted with ~1.6 kG (2.0%) wt loss since 04-09 and fluid shifts noted.   77.4 (04-11)  77.8 (04-10)  78.1 (04-09)    Pertinent Medications: MEDICATIONS  (STANDING):  apixaban 5 milliGRAM(s) Oral every 12 hours  buMETAnide 2 milliGRAM(s) Oral two times a day  docusate sodium 100 milliGRAM(s) Oral three times a day  ergocalciferol 97506 Unit(s) Oral every week  famotidine    Tablet 20 milliGRAM(s) Oral daily  hydrALAZINE 100 milliGRAM(s) Oral every 8 hours  isosorbide   dinitrate Tablet (ISORDIL) 20 milliGRAM(s) Oral three times a day  metoprolol tartrate 25 milliGRAM(s) Oral two times a day  multivitamin 1 Tablet(s) Oral daily  polyethylene glycol 3350 17 Gram(s) Oral daily  senna 2 Tablet(s) Oral at bedtime    MEDICATIONS  (PRN):  acetaminophen   Tablet .. 650 milliGRAM(s) Oral every 6 hours PRN Mild Pain (1 - 3)  calcium carbonate    500 mG (Tums) Chewable 2 Tablet(s) Chew three times a day PRN Heartburn  ondansetron Injectable 4 milliGRAM(s) IV Push every 8 hours PRN Nausea and/or Vomiting  simethicone 80 milliGRAM(s) Chew three times a day PRN Dyspepsia    Pertinent Labs: 04-24 @ 03:55: Na 130<L>, <H>, Cr 2.31<H>, <H>, K+ 3.7, Phos --, Mg 2.8<H>, Alk Phos 91, ALT/SGPT 70<H>, AST/SGOT 40, HbA1c --    Finger Sticks:      Skin per nursing documentation: no pressure injuries noted  Edema: none nored    Estimated Needs:   [x] no change since previous assessment  [ ] recalculated:     Previous Nutrition Diagnosis:  Inadequate protein-energy intake  Nutrition Diagnosis is: ongoing, improved and being addressed with oral nutrition supplementation and micronutrient supplementation.    New Nutrition Diagnosis: Nutrition Related Knowledge Deficit  Related to: lack of prior exposure to nutrition-related information   As evidenced by: pt with new HFrEF, pre-DM vs. new DM, ELVA on CKD     Interventions:     Recommend  1) Continue low sodium + lacto-vegetarian diet per pt preference. Continue to obtain/provide food preferences as feasible. Monitor for adequate po intake. Fluids deferred to team.  2) Continue to provide Nepro-2 per day (Provides additional 850kcal, 38grams protein per day), continue to monitor for appropriateness of supplement. Monitor lytes, renal indices. With improvement in protein intake, would consider changing supplements to Suplena.  3) Continue micronutrient supplementation as indicated  4) Provide/review nutrition education as feasible    Monitoring and Evaluation:     Continue to monitor Nutritional intake, Tolerance to diet prescription, weights, labs, skin integrity    RD remains available upon request and will follow up per protocol. Kari Spears RD, CDN Pager: [phone] Nutrition Follow Up Note  Patient seen for: nutrition follow up on 6TOW    Chart reviewed, events noted. Pt is 79yoM with no PMH per chart on admission, presenting with dyspnea, LE and abdomen swelling for several days PTA as well as nausea/vomiting and decreased po intake. On admission, pt with hypervolemic hyponatremia, leukocytosis, new Afib with RVR, transaminitis, ELVA, ascites, coagulopathy, and dyspepsia.   Per nephrology, renal dysfunction is likely cardiorenal syndrome, now noted with CKD IV at baseline. Pt s/p R/L cath indicative of non-obstructive CAD. EP following, pt s/p RICH 4/23, no cardioversion as pt found with SHERRY thrombus. Pt with new acute systolic HF (EF 23%). Pt noted with elevated HgbA1c 6.6% on admission as well, borderline vs new DM? Pt noted with leukocytosis, likely stress reaction, heme/onc consulted. Pt s/p abdominal sonogram, indicates no ascites s/p diuretics. Pt continues with low sodium (improving), likely hypervolemic hyponatremia.     Source: pt, pt daughter, pt wife, chart    Diet : Low Sodium + 1200ml Fluid Restriction + Lacto-Vegetarian + Nepro-2 per day (Provides additional 850kcal, 38grams protein per day)     Patient reports: No acute GI distress, last BM 4/24.     PO intake : good/fair. Pt with mostly 50-75% meal consumptions since 04-19, he is drinking 2 Nepro daily as well. Pt reports po intake is still below baseline, as he has limited vegetarian choices in-patient and does not like institutional foods all that much. Regarding protein sources, pt reports he does consume dairy provided at meals usually or some lentils from home. Per daughter, she does not feel the pt is getting more than 20 g protein from foods. Given this, will hold off on changing Nepro to Suplena, for lower protein content as pt is not consuming protein in excess. Potassium/phosphorus noted WNL currently.   Nutrition education provided at this time: discussed low sodium recommendations, foods high in sodium typically and appropriate substitutions, salt free substitutes for seasoning foods, fluid restrictions, foods/drinks that count towards daily fluid allowance, recommendation for daily weights and criteria for inappropriate wt gain and protocol for contacting MD. Also discussed continuing to avoid fruit juice and concentrated sweets (pt reports he does not eat/drink these items anyway, he does not like sweets) in setting of elevated HgbA1c. Reviewed high potassium foods to be avoided, however noted that potassium has been largely WNL on current admission. Pt/family also advised that with further improvement in po intake, pt would likely benefit from switching Nepro for Suplena at home so as not to be consuming too much protein in setting of CKD. Pt reports he only received 1 Nepro today and he would like to trial Suplena this evening. Pt/family able to teach back points, accept written materials and made aware RD remains available.     Source for PO intake: pt, chart     Enteral /Parenteral Nutrition: n/a      Daily Weight in kG (standing weights): 74.3 (04-24), pt wt appears largely stable, some shifts in setting of fluid shifts likely  75.1 (04-21)  75.4 (04-18)  76.6 (04-16)  75.7 (04-14)   76.5 (04-12), Pt noted with ~1.6 kG (2.0%) wt loss since 04-09 and fluid shifts noted.   77.4 (04-11)  77.8 (04-10)  78.1 (04-09)    Pertinent Medications: MEDICATIONS  (STANDING):  apixaban 5 milliGRAM(s) Oral every 12 hours  buMETAnide 2 milliGRAM(s) Oral two times a day  docusate sodium 100 milliGRAM(s) Oral three times a day  ergocalciferol 80428 Unit(s) Oral every week  famotidine    Tablet 20 milliGRAM(s) Oral daily  hydrALAZINE 100 milliGRAM(s) Oral every 8 hours  isosorbide   dinitrate Tablet (ISORDIL) 20 milliGRAM(s) Oral three times a day  metoprolol tartrate 25 milliGRAM(s) Oral two times a day  multivitamin 1 Tablet(s) Oral daily  polyethylene glycol 3350 17 Gram(s) Oral daily  senna 2 Tablet(s) Oral at bedtime    MEDICATIONS  (PRN):  acetaminophen   Tablet .. 650 milliGRAM(s) Oral every 6 hours PRN Mild Pain (1 - 3)  calcium carbonate    500 mG (Tums) Chewable 2 Tablet(s) Chew three times a day PRN Heartburn  ondansetron Injectable 4 milliGRAM(s) IV Push every 8 hours PRN Nausea and/or Vomiting  simethicone 80 milliGRAM(s) Chew three times a day PRN Dyspepsia    Pertinent Labs: 04-24 @ 03:55: Na 130<L>, <H>, Cr 2.31<H>, <H>, K+ 3.7, Phos --, Mg 2.8<H>, Alk Phos 91, ALT/SGPT 70<H>, AST/SGOT 40, HbA1c --    Finger Sticks:      Skin per nursing documentation: no pressure injuries noted  Edema: none nored    Estimated Needs:   [x] no change since previous assessment  [ ] recalculated:     Previous Nutrition Diagnosis:  Inadequate protein-energy intake  Nutrition Diagnosis is: ongoing, improved and being addressed with oral nutrition supplementation and micronutrient supplementation.    New Nutrition Diagnosis: Nutrition Related Knowledge Deficit  Related to: lack of prior exposure to nutrition-related information   As evidenced by: pt with new HFrEF, pre-DM vs. new DM, ELVA on CKD     Interventions:     Recommend  1) Continue low sodium + lacto-vegetarian diet per pt preference. Continue to obtain/provide food preferences as feasible. Monitor for adequate po intake. Fluids deferred to team. Will not advise additional restrictions at this time given fair po intake mostly.  2) Continue to provide Nepro-2 per day (Provides additional 850kcal, 38grams protein per day), continue to monitor for appropriateness of supplement. Monitor lytes, renal indices. With improvement in protein intake, would consider changing supplements to Suplena.  3) Continue micronutrient supplementation as indicated  4) Provide/review nutrition education as feasible    Monitoring and Evaluation:     Continue to monitor Nutritional intake, Tolerance to diet prescription, weights, labs, skin integrity    RD remains available upon request and will follow up per protocol. Kari Spears RD, CDN Pager: [phone]

## 2019-04-24 NOTE — PROVIDER CONTACT NOTE (OTHER) - BACKGROUND
Pt here with CHF, EF of 25% Pt on metoporolol BID, hydralizine and bumax. During pt  has had 18 beats v-tach.

## 2019-04-24 NOTE — PROGRESS NOTE ADULT - SUBJECTIVE AND OBJECTIVE BOX
Overnight Events:   Patient with SHERRY thrombus on RICH, DCCV aborted  With WCT overnight  Patient denies SOB    Medications:  acetaminophen   Tablet .. 650 milliGRAM(s) Oral every 6 hours PRN  apixaban 5 milliGRAM(s) Oral every 12 hours  buMETAnide 2 milliGRAM(s) Oral two times a day  calcium carbonate    500 mG (Tums) Chewable 2 Tablet(s) Chew three times a day PRN  docusate sodium 100 milliGRAM(s) Oral three times a day  ergocalciferol 61141 Unit(s) Oral every week  famotidine    Tablet 20 milliGRAM(s) Oral daily  hydrALAZINE 100 milliGRAM(s) Oral every 8 hours  isosorbide   dinitrate Tablet (ISORDIL) 20 milliGRAM(s) Oral three times a day  metoprolol tartrate 25 milliGRAM(s) Oral two times a day  multivitamin 1 Tablet(s) Oral daily  ondansetron Injectable 4 milliGRAM(s) IV Push every 8 hours PRN  polyethylene glycol 3350 17 Gram(s) Oral daily  potassium chloride   Powder 20 milliEquivalent(s) Oral once  senna 2 Tablet(s) Oral at bedtime  simethicone 80 milliGRAM(s) Chew three times a day PRN  spironolactone 25 milliGRAM(s) Oral daily  tolvaptan 30 milliGRAM(s) Oral once      PAST MEDICAL & SURGICAL HISTORY:  No pertinent past medical history  No significant past surgical history      Vitals:  T(F): 97.7 (04-24), Max: 97.9 (04-24)  HR: 93 (04-24) (78 - 93)  BP: 122/79 (04-24) (103/68 - 129/91)  RR: 18 (04-24)  SpO2: 98% (04-24)  I&O's Summary    23 Apr 2019 07:01  -  24 Apr 2019 07:00  --------------------------------------------------------  IN: 240 mL / OUT: 1000 mL / NET: -760 mL    24 Apr 2019 07:01  -  24 Apr 2019 13:34  --------------------------------------------------------  IN: 520 mL / OUT: 600 mL / NET: -80 mL        Physical Exam:  Appearance: No acute distress  Eyes: PERRL, EOMI, pink conjunctiva  HENT: Normal oral muscosa  Cardiovascular: irregularly irregular, normal rate, S1, S2. No appreciable JVP with patient sitting up. +HJR  Respiratory: Clear to auscultation bilaterally  Gastrointestinal: soft, non-tender,  Neurologic: Non-focal  Psychiatry: AAOx3, mood & affect appropriate                          13.3   8.24  )-----------( 240      ( 24 Apr 2019 08:47 )             40.5     04-24    130<L>  |  86<L>  |  100<H>  ----------------------------<  122<H>  3.7   |  27  |  2.31<H>    Ca    9.3      24 Apr 2019 03:55  Mg     2.8     04-24    TPro  7.5  /  Alb  3.9  /  TBili  0.4  /  DBili  0.1  /  AST  40  /  ALT  70<H>  /  AlkPhos  91  04-24          Serum Pro-Brain Natriuretic Peptide: 6576 pg/mL (04-21 @ 06:08)  Serum Pro-Brain Natriuretic Peptide: 66477 pg/mL (04-20 @ 07:01)

## 2019-04-24 NOTE — PROGRESS NOTE ADULT - ASSESSMENT
79y.o. Male with no PMH now admitted with new HFrEF (TTE 04/04/2019 with LVEF 25%, biventricular dysfunction, moderate MR, moderate TR, mild [place]) and AFIB with RVR, abdominal discomfort/nausea concerning for hepatic congestion in the setting of ADHF (BNP>50K), now s/p LHC/RHC (non-obstructive CAD). RHC:  RA 15, RV 58/14, PA 59/34/43, Wedge 33 PA sat 44%, CI 1.57. Pt has received tolvaptan, initiated on milrinone and diuretics with improvement in symptoms. Milrinone now off.        Problem/Plan - 1:  ·  Problem: Acute systolic heart failure, first episode.  Plan:   - agree with decreasing bumex to 2mg PO BID  - Strict I/Os  - Daily standing weights  - Continue hydralazine to 100mg PO TID, continue isordil 20mg TID. Would hold aldactone given ELVA.  Hold parameters should be SBP <90     Problem/Plan - 2:  ·  Problem: ELVA (acute kidney injury).  Plan: likely 2/2 to cardiorenal/low output state  - continue to trend  - avoid nephrotoxins.      Problem/Plan - 3:  ·  Problem: Atrial fibrillation/NSVT Patient in afib in 70-90's    Plan:   -continue lopressor 25mg BID, convert to toprol LX prior to discharge  -Patient with SHERRY thrombus on RICH, DCCV aborted. Continue eliquis per EP  - monitor on tele. 79y.o. Male with no PMH now admitted with new HFrEF (TTE 04/04/2019 with LVEF 25%, biventricular dysfunction, moderate MR, moderate TR, mild ND) and AFIB with RVR, abdominal discomfort/nausea concerning for hepatic congestion in the setting of ADHF (BNP>50K), now s/p LHC/RHC (non-obstructive CAD). RHC:  RA 15, RV 58/14, PA 59/34/43, Wedge 33 PA sat 44%, CI 1.57. Pt has received tolvaptan, initiated on milrinone and diuretics with improvement in symptoms. Milrinone now off.        Problem/Plan - 1:  ·  Problem: Acute systolic heart failure, first episode.  Plan:   - hold diuretics, will reassess in AM  - Strict I/Os  - Daily standing weights  - Continue hydralazine to 100mg PO TID, continue isordil 20mg TID. Would hold aldactone given ELVA.  Hold parameters should be SBP <90     Problem/Plan - 2:  ·  Problem: ELVA (acute kidney injury).  Plan: likely 2/2 to cardiorenal/low output state  - continue to trend  - avoid nephrotoxins.      Problem/Plan - 3:  ·  Problem: Atrial fibrillation/NSVT Patient in afib in 70-90's    Plan:   -continue lopressor 25mg BID, convert to toprol LX prior to discharge  -Patient with SHERRY thrombus on RICH, DCCV aborted. Continue eliquis per EP  - monitor on tele.

## 2019-04-24 NOTE — PROGRESS NOTE ADULT - PROBLEM SELECTOR PLAN 3
ELVA from low perfusion, continue to monitor on IV Bumex and monitor renal function. Cr 2.9 today  - decrease bumex to 2mg po bid  - hold aldactone  Renal following, continue monitoring

## 2019-04-24 NOTE — CHART NOTE - NSCHARTNOTEFT_GEN_A_CORE
Interval events  Patient had 15 beats of AIVR  Patient asymptomatic    Vital Signs Last 24 Hrs  T(C): 36.5 (24 Apr 2019 00:26), Max: 36.8 (23 Apr 2019 05:28)  T(F): 97.7 (24 Apr 2019 00:26), Max: 98.2 (23 Apr 2019 05:28)  HR: 80 (24 Apr 2019 00:26) (72 - 93)  BP: 113/79 (24 Apr 2019 00:26) (102/71 - 134/86)  BP(mean): --  RR: 18 (24 Apr 2019 00:26) (18 - 18)  SpO2: 98% (24 Apr 2019 00:26) (97% - 99%)      80 y/o male with no known PMH who presented with abdominal distension and severe HEAD likely 2/2 to new acute on chronic HF, found to be in Afib with RVR, along with transaminitis and ELVA- likely cardiorenal syndrome.     Patient now with 15 beats of AIVR  C/W BB, administer am dose now  C/W Diuresis  Plan for RICH and cardioversion per cardiology  F/u with cardiology in am  Will follow    Saravanan Morales [facility]  [id number]

## 2019-04-24 NOTE — PROGRESS NOTE ADULT - SUBJECTIVE AND OBJECTIVE BOX
Hospitalist- Osmani Hernandez MD  Pager: [phone]  If no response or off-hours, page [phone]  -------------------------------------  Patient is a 79y old  Male who presents with a chief complaint of dyspnea, abdominal swelling (24 Apr 2019 13:34)  Subjective: No acute events overnight. today, patient without any new complaints. Wishes to speak to dietician to review dietary changes that need to be made re: CHF.    14 point ros otherwise negative.     VITAL SIGNS:  Vital Signs Last 24 Hrs  T(C): 36.3 (24 Apr 2019 16:23), Max: 36.6 (24 Apr 2019 10:45)  T(F): 97.4 (24 Apr 2019 16:23), Max: 97.9 (24 Apr 2019 10:45)  HR: 98 (24 Apr 2019 16:45) (78 - 107)  BP: 118/70 (24 Apr 2019 16:45) (103/68 - 139/81)  BP(mean): --  RR: 18 (24 Apr 2019 16:23) (18 - 18)  SpO2: 98% (24 Apr 2019 16:23) (98% - 98%)      PHYSICAL EXAM:     GENERAL: no acute distress  HEENT: PERRLA, EOMI, moist oropharynx   RESPIRATORY: CTAB, no w/c   CARDIOVASCULAR: +irreg irreg, no murmurs, gallops, rubs  ABDOMINAL: soft, non-tender, non-distended, positive bowel sounds   EXTREMITIES: no clubbing, cyanosis, or edema  NEUROLOGICAL: alert and oriented x 3, non-focal  SKIN: no rashes or lesions   MUSCULOSKELETAL: no gross joint deformity                          13.3   8.24  )-----------( 240      ( 24 Apr 2019 08:47 )             40.5     04-24    130<L>  |  86<L>  |  100<H>  ----------------------------<  122<H>  3.7   |  27  |  2.31<H>    Ca    9.3      24 Apr 2019 03:55  Mg     2.8     04-24    TPro  7.5  /  Alb  3.9  /  TBili  0.4  /  DBili  0.1  /  AST  40  /  ALT  70<H>  /  AlkPhos  91  04-24      CAPILLARY BLOOD GLUCOSE          MEDICATIONS  (STANDING):  apixaban 5 milliGRAM(s) Oral every 12 hours  buMETAnide 2 milliGRAM(s) Oral two times a day  docusate sodium 100 milliGRAM(s) Oral three times a day  ergocalciferol 52508 Unit(s) Oral every week  famotidine    Tablet 20 milliGRAM(s) Oral daily  hydrALAZINE 100 milliGRAM(s) Oral every 8 hours  isosorbide   dinitrate Tablet (ISORDIL) 20 milliGRAM(s) Oral three times a day  metoprolol tartrate 25 milliGRAM(s) Oral two times a day  multivitamin 1 Tablet(s) Oral daily  polyethylene glycol 3350 17 Gram(s) Oral daily  senna 2 Tablet(s) Oral at bedtime    MEDICATIONS  (PRN):  acetaminophen   Tablet .. 650 milliGRAM(s) Oral every 6 hours PRN Mild Pain (1 - 3)  calcium carbonate    500 mG (Tums) Chewable 2 Tablet(s) Chew three times a day PRN Heartburn  ondansetron Injectable 4 milliGRAM(s) IV Push every 8 hours PRN Nausea and/or Vomiting  simethicone 80 milliGRAM(s) Chew three times a day PRN Dyspepsia

## 2019-04-24 NOTE — PROGRESS NOTE ADULT - ASSESSMENT
Patient seen and examined  no complaints     REVIEW OF SYSTEMS:  As per HPI, otherwise 8 full 10 ROS were unremarkable    MEDICATIONS  (STANDING):  apixaban 5 milliGRAM(s) Oral every 12 hours  buMETAnide 2 milliGRAM(s) Oral two times a day  docusate sodium 100 milliGRAM(s) Oral three times a day  ergocalciferol 67953 Unit(s) Oral every week  famotidine    Tablet 20 milliGRAM(s) Oral daily  hydrALAZINE 100 milliGRAM(s) Oral every 8 hours  isosorbide   dinitrate Tablet (ISORDIL) 20 milliGRAM(s) Oral three times a day  metoprolol tartrate 25 milliGRAM(s) Oral two times a day  multivitamin 1 Tablet(s) Oral daily  polyethylene glycol 3350 17 Gram(s) Oral daily  senna 2 Tablet(s) Oral at bedtime  spironolactone 25 milliGRAM(s) Oral daily      VITAL:  T(C): , Max: 36.6 (04-23-19 @ 16:05)  T(F): , Max: 97.8 (04-23-19 @ 16:05)  HR: 78 (04-24-19 @ 06:43)  BP: 118/81 (04-24-19 @ 06:43)  BP(mean): --  RR: 18 (04-24-19 @ 06:43)  SpO2: 98% (04-24-19 @ 06:43)  Wt(kg): --    I and O's:    04-23 @ 07:01  -  04-24 @ 07:00  --------------------------------------------------------  IN: 240 mL / OUT: 1000 mL / NET: -760 mL    04-24 @ 07:01  -  04-24 @ 10:07  --------------------------------------------------------  IN: 240 mL / OUT: 0 mL / NET: 240 mL          PHYSICAL EXAM:    Constitutional: NAD  HEENT: PERRLA, EOMI,  MMM  Neck: No LAD, No JVD  Respiratory: CTAB  Cardiovascular: S1 and S2  Gastrointestinal: BS+, soft, NT/ND  Extremities: No peripheral edema  Neurological: A/O x 3, no focal deficits      LABS:                        13.3   8.24  )-----------( 240      ( 24 Apr 2019 08:47 )             40.5     04-24    130<L>  |  86<L>  |  100<H>  ----------------------------<  122<H>  3.7   |  27  |  2.31<H>    Ca    9.3      24 Apr 2019 03:55  Mg     2.8     04-24          ASSESSMENT:  79-year-old gentleman with PMH of diabetes, hypertension, presents with decompensated heart failure along with rapid atrial fibrillation. Renal failure is new and likely has cardiorenal syndrome.     - Hypervolemic hyponatremia due to heart failure - Na is higher post samsca   - CKD IV with baseline s.cr ~ 2.0 mg/dl   - Mild ELVA is likely 2ry to obligate diuresis- azotemia is higher  - Mild hypokalemia- watch for now as samsca increases K     RECOMMENDATIONS:   - diuretics per HF team- OK to tolerate higher level of serum creatinine to keep the patient euvolemic  - Fluid restriction 1.2L per day  - Samsca 30mg po x another dose today       Jemal Ivey MD  [facility]  [phone]

## 2019-04-24 NOTE — PROGRESS NOTE ADULT - PROBLEM SELECTOR PLAN 2
-ECHO shows EF 23% --> 40-45% on repeat TTE with right and left ventricular dysfunction, elevated pulm pressures. Pt with minimal jvd but overall euvolemic.   - cont bumex 2mg po bid  - hold aldactone 25mg po daily due to ELVA  - Cath show non obstructive coronaries.   - Monitor on tele, monitor UOP, strict I+O and daily weight

## 2019-04-24 NOTE — PROVIDER CONTACT NOTE (OTHER) - ACTION/TREATMENT ORDERED:
NP aware, will f/u w/heart failure. Continuing to monitor.
Will give one time dose of metoprolol at 130 as per NP orders
As per provider, will order simethicone tablet for patient at this time  will continue to monitor patient.
Increased hydralazine & possible cardioversion later on. NP aware. Continue to monitor and maintain safety,
NP ordered STAT labs. will continue to monitor the pt.
Noted.
meds given will continue to monitor
will continue to monitor

## 2019-04-25 ENCOUNTER — TRANSCRIPTION ENCOUNTER (OUTPATIENT)
Age: 79
End: 2019-04-25

## 2019-04-25 VITALS
DIASTOLIC BLOOD PRESSURE: 86 MMHG | OXYGEN SATURATION: 98 % | HEART RATE: 94 BPM | RESPIRATION RATE: 18 BRPM | SYSTOLIC BLOOD PRESSURE: 127 MMHG | TEMPERATURE: 98 F

## 2019-04-25 PROBLEM — Z00.00 ENCOUNTER FOR PREVENTIVE HEALTH EXAMINATION: Status: ACTIVE | Noted: 2019-04-25

## 2019-04-25 LAB
ANION GAP SERPL CALC-SCNC: 15 MMOL/L — SIGNIFICANT CHANGE UP (ref 5–17)
BUN SERPL-MCNC: 97 MG/DL — HIGH (ref 7–23)
CALCIUM SERPL-MCNC: 10.1 MG/DL — SIGNIFICANT CHANGE UP (ref 8.4–10.5)
CHLORIDE SERPL-SCNC: 93 MMOL/L — LOW (ref 96–108)
CO2 SERPL-SCNC: 26 MMOL/L — SIGNIFICANT CHANGE UP (ref 22–31)
CREAT SERPL-MCNC: 1.97 MG/DL — HIGH (ref 0.5–1.3)
GLUCOSE SERPL-MCNC: 137 MG/DL — HIGH (ref 70–99)
POTASSIUM SERPL-MCNC: 4.3 MMOL/L — SIGNIFICANT CHANGE UP (ref 3.5–5.3)
POTASSIUM SERPL-SCNC: 4.3 MMOL/L — SIGNIFICANT CHANGE UP (ref 3.5–5.3)
SODIUM SERPL-SCNC: 134 MMOL/L — LOW (ref 135–145)

## 2019-04-25 PROCEDURE — 93320 DOPPLER ECHO COMPLETE: CPT

## 2019-04-25 PROCEDURE — 84100 ASSAY OF PHOSPHORUS: CPT

## 2019-04-25 PROCEDURE — 96374 THER/PROPH/DIAG INJ IV PUSH: CPT

## 2019-04-25 PROCEDURE — 93308 TTE F-UP OR LMTD: CPT

## 2019-04-25 PROCEDURE — 84132 ASSAY OF SERUM POTASSIUM: CPT

## 2019-04-25 PROCEDURE — 82330 ASSAY OF CALCIUM: CPT

## 2019-04-25 PROCEDURE — 87040 BLOOD CULTURE FOR BACTERIA: CPT

## 2019-04-25 PROCEDURE — 84156 ASSAY OF PROTEIN URINE: CPT

## 2019-04-25 PROCEDURE — 93306 TTE W/DOPPLER COMPLETE: CPT

## 2019-04-25 PROCEDURE — 97530 THERAPEUTIC ACTIVITIES: CPT

## 2019-04-25 PROCEDURE — 82947 ASSAY GLUCOSE BLOOD QUANT: CPT

## 2019-04-25 PROCEDURE — 83550 IRON BINDING TEST: CPT

## 2019-04-25 PROCEDURE — 83735 ASSAY OF MAGNESIUM: CPT

## 2019-04-25 PROCEDURE — 82803 BLOOD GASES ANY COMBINATION: CPT

## 2019-04-25 PROCEDURE — 93926 LOWER EXTREMITY STUDY: CPT

## 2019-04-25 PROCEDURE — 71250 CT THORAX DX C-: CPT

## 2019-04-25 PROCEDURE — 82728 ASSAY OF FERRITIN: CPT

## 2019-04-25 PROCEDURE — 99291 CRITICAL CARE FIRST HOUR: CPT | Mod: 25

## 2019-04-25 PROCEDURE — 93312 ECHO TRANSESOPHAGEAL: CPT

## 2019-04-25 PROCEDURE — A9538: CPT

## 2019-04-25 PROCEDURE — 83690 ASSAY OF LIPASE: CPT

## 2019-04-25 PROCEDURE — 99152 MOD SED SAME PHYS/QHP 5/>YRS: CPT

## 2019-04-25 PROCEDURE — 83930 ASSAY OF BLOOD OSMOLALITY: CPT

## 2019-04-25 PROCEDURE — 85610 PROTHROMBIN TIME: CPT

## 2019-04-25 PROCEDURE — 78800 RP LOCLZJ TUM 1 AREA 1 D IMG: CPT

## 2019-04-25 PROCEDURE — 80162 ASSAY OF DIGOXIN TOTAL: CPT

## 2019-04-25 PROCEDURE — 82550 ASSAY OF CK (CPK): CPT

## 2019-04-25 PROCEDURE — 87205 SMEAR GRAM STAIN: CPT

## 2019-04-25 PROCEDURE — 96375 TX/PRO/DX INJ NEW DRUG ADDON: CPT

## 2019-04-25 PROCEDURE — 82390 ASSAY OF CERULOPLASMIN: CPT

## 2019-04-25 PROCEDURE — 86038 ANTINUCLEAR ANTIBODIES: CPT

## 2019-04-25 PROCEDURE — 84540 ASSAY OF URINE/UREA-N: CPT

## 2019-04-25 PROCEDURE — 83540 ASSAY OF IRON: CPT

## 2019-04-25 PROCEDURE — 93321 DOPPLER ECHO F-UP/LMTD STD: CPT

## 2019-04-25 PROCEDURE — 93005 ELECTROCARDIOGRAM TRACING: CPT

## 2019-04-25 PROCEDURE — C1769: CPT

## 2019-04-25 PROCEDURE — 86803 HEPATITIS C AB TEST: CPT

## 2019-04-25 PROCEDURE — 88185 FLOWCYTOMETRY/TC ADD-ON: CPT

## 2019-04-25 PROCEDURE — 83605 ASSAY OF LACTIC ACID: CPT

## 2019-04-25 PROCEDURE — 87633 RESP VIRUS 12-25 TARGETS: CPT

## 2019-04-25 PROCEDURE — 83521 IG LIGHT CHAINS FREE EACH: CPT

## 2019-04-25 PROCEDURE — 82570 ASSAY OF URINE CREATININE: CPT

## 2019-04-25 PROCEDURE — 93325 DOPPLER ECHO COLOR FLOW MAPG: CPT

## 2019-04-25 PROCEDURE — 82435 ASSAY OF BLOOD CHLORIDE: CPT

## 2019-04-25 PROCEDURE — C1894: CPT

## 2019-04-25 PROCEDURE — 86360 T CELL ABSOLUTE COUNT/RATIO: CPT

## 2019-04-25 PROCEDURE — 80074 ACUTE HEPATITIS PANEL: CPT

## 2019-04-25 PROCEDURE — 97116 GAIT TRAINING THERAPY: CPT

## 2019-04-25 PROCEDURE — 85732 THROMBOPLASTIN TIME PARTIAL: CPT

## 2019-04-25 PROCEDURE — 84550 ASSAY OF BLOOD/URIC ACID: CPT

## 2019-04-25 PROCEDURE — 82787 IGG 1 2 3 OR 4 EACH: CPT

## 2019-04-25 PROCEDURE — 81001 URINALYSIS AUTO W/SCOPE: CPT

## 2019-04-25 PROCEDURE — 84484 ASSAY OF TROPONIN QUANT: CPT

## 2019-04-25 PROCEDURE — 85014 HEMATOCRIT: CPT

## 2019-04-25 PROCEDURE — 78803 RP LOCLZJ TUM SPECT 1 AREA: CPT

## 2019-04-25 PROCEDURE — 82565 ASSAY OF CREATININE: CPT

## 2019-04-25 PROCEDURE — 84133 ASSAY OF URINE POTASSIUM: CPT

## 2019-04-25 PROCEDURE — 85730 THROMBOPLASTIN TIME PARTIAL: CPT

## 2019-04-25 PROCEDURE — 71045 X-RAY EXAM CHEST 1 VIEW: CPT

## 2019-04-25 PROCEDURE — 76705 ECHO EXAM OF ABDOMEN: CPT

## 2019-04-25 PROCEDURE — C1887: CPT

## 2019-04-25 PROCEDURE — 86140 C-REACTIVE PROTEIN: CPT

## 2019-04-25 PROCEDURE — 87581 M.PNEUMON DNA AMP PROBE: CPT

## 2019-04-25 PROCEDURE — 83036 HEMOGLOBIN GLYCOSYLATED A1C: CPT

## 2019-04-25 PROCEDURE — 87798 DETECT AGENT NOS DNA AMP: CPT

## 2019-04-25 PROCEDURE — 93456 R HRT CORONARY ARTERY ANGIO: CPT

## 2019-04-25 PROCEDURE — 88184 FLOWCYTOMETRY/ TC 1 MARKER: CPT

## 2019-04-25 PROCEDURE — 80076 HEPATIC FUNCTION PANEL: CPT

## 2019-04-25 PROCEDURE — 83880 ASSAY OF NATRIURETIC PEPTIDE: CPT

## 2019-04-25 PROCEDURE — 84295 ASSAY OF SERUM SODIUM: CPT

## 2019-04-25 PROCEDURE — 86381 MITOCHONDRIAL ANTIBODY EACH: CPT

## 2019-04-25 PROCEDURE — 81206 BCR/ABL1 GENE MAJOR BP: CPT

## 2019-04-25 PROCEDURE — 87486 CHLMYD PNEUM DNA AMP PROBE: CPT

## 2019-04-25 PROCEDURE — 99239 HOSP IP/OBS DSCHRG MGMT >30: CPT

## 2019-04-25 PROCEDURE — 83935 ASSAY OF URINE OSMOLALITY: CPT

## 2019-04-25 PROCEDURE — 80061 LIPID PANEL: CPT

## 2019-04-25 PROCEDURE — 84300 ASSAY OF URINE SODIUM: CPT

## 2019-04-25 PROCEDURE — 85027 COMPLETE CBC AUTOMATED: CPT

## 2019-04-25 PROCEDURE — 85652 RBC SED RATE AUTOMATED: CPT

## 2019-04-25 PROCEDURE — 82436 ASSAY OF URINE CHLORIDE: CPT

## 2019-04-25 PROCEDURE — 74176 CT ABD & PELVIS W/O CONTRAST: CPT

## 2019-04-25 PROCEDURE — 82652 VIT D 1 25-DIHYDROXY: CPT

## 2019-04-25 PROCEDURE — 81207 BCR/ABL1 GENE MINOR BP: CPT

## 2019-04-25 PROCEDURE — 86255 FLUORESCENT ANTIBODY SCREEN: CPT

## 2019-04-25 PROCEDURE — 84443 ASSAY THYROID STIM HORMONE: CPT

## 2019-04-25 PROCEDURE — 80053 COMPREHEN METABOLIC PANEL: CPT

## 2019-04-25 PROCEDURE — 97162 PT EVAL MOD COMPLEX 30 MIN: CPT

## 2019-04-25 PROCEDURE — 82553 CREATINE MB FRACTION: CPT

## 2019-04-25 PROCEDURE — 80048 BASIC METABOLIC PNL TOTAL CA: CPT

## 2019-04-25 RX ORDER — BUMETANIDE 0.25 MG/ML
1 INJECTION INTRAMUSCULAR; INTRAVENOUS
Qty: 60 | Refills: 0 | OUTPATIENT
Start: 2019-04-25 | End: 2019-05-24

## 2019-04-25 RX ORDER — TOLVAPTAN 15 MG/1
30 TABLET ORAL ONCE
Qty: 0 | Refills: 0 | Status: COMPLETED | OUTPATIENT
Start: 2019-04-25 | End: 2019-04-25

## 2019-04-25 RX ORDER — APIXABAN 2.5 MG/1
1 TABLET, FILM COATED ORAL
Qty: 60 | Refills: 0 | OUTPATIENT
Start: 2019-04-25 | End: 2019-05-24

## 2019-04-25 RX ORDER — ACETAMINOPHEN 500 MG
2 TABLET ORAL
Qty: 0 | Refills: 0 | COMMUNITY
Start: 2019-04-25

## 2019-04-25 RX ORDER — DOCUSATE SODIUM 100 MG
1 CAPSULE ORAL
Qty: 0 | Refills: 0 | COMMUNITY
Start: 2019-04-25

## 2019-04-25 RX ORDER — ISOSORBIDE DINITRATE 5 MG/1
1 TABLET ORAL
Qty: 90 | Refills: 0 | OUTPATIENT
Start: 2019-04-25 | End: 2019-05-24

## 2019-04-25 RX ORDER — BUMETANIDE 0.25 MG/ML
2 INJECTION INTRAMUSCULAR; INTRAVENOUS
Qty: 0 | Refills: 0 | Status: DISCONTINUED | OUTPATIENT
Start: 2019-04-25 | End: 2019-04-25

## 2019-04-25 RX ORDER — POLYETHYLENE GLYCOL 3350 17 G/17G
17 POWDER, FOR SOLUTION ORAL
Qty: 0 | Refills: 0 | COMMUNITY
Start: 2019-04-25

## 2019-04-25 RX ORDER — HYDRALAZINE HCL 50 MG
1 TABLET ORAL
Qty: 90 | Refills: 0 | OUTPATIENT
Start: 2019-04-25 | End: 2019-05-24

## 2019-04-25 RX ORDER — SPIRONOLACTONE 25 MG/1
1 TABLET, FILM COATED ORAL
Qty: 30 | Refills: 0 | OUTPATIENT
Start: 2019-04-25 | End: 2019-05-24

## 2019-04-25 RX ORDER — SIMETHICONE 80 MG/1
1 TABLET, CHEWABLE ORAL
Qty: 0 | Refills: 0 | COMMUNITY
Start: 2019-04-25

## 2019-04-25 RX ORDER — METOPROLOL TARTRATE 50 MG
1 TABLET ORAL
Qty: 30 | Refills: 0 | OUTPATIENT
Start: 2019-04-25 | End: 2019-05-24

## 2019-04-25 RX ORDER — FAMOTIDINE 10 MG/ML
1 INJECTION INTRAVENOUS
Qty: 0 | Refills: 0 | COMMUNITY
Start: 2019-04-25

## 2019-04-25 RX ORDER — SENNA PLUS 8.6 MG/1
2 TABLET ORAL
Qty: 0 | Refills: 0 | COMMUNITY
Start: 2019-04-25

## 2019-04-25 RX ORDER — METOPROLOL TARTRATE 50 MG
50 TABLET ORAL
Qty: 0 | Refills: 0 | Status: DISCONTINUED | OUTPATIENT
Start: 2019-04-25 | End: 2019-04-25

## 2019-04-25 RX ADMIN — ISOSORBIDE DINITRATE 20 MILLIGRAM(S): 5 TABLET ORAL at 06:35

## 2019-04-25 RX ADMIN — Medication 100 MILLIGRAM(S): at 13:17

## 2019-04-25 RX ADMIN — Medication 100 MILLIGRAM(S): at 06:34

## 2019-04-25 RX ADMIN — Medication 1 TABLET(S): at 13:17

## 2019-04-25 RX ADMIN — BUMETANIDE 2 MILLIGRAM(S): 0.25 INJECTION INTRAMUSCULAR; INTRAVENOUS at 17:07

## 2019-04-25 RX ADMIN — SIMETHICONE 80 MILLIGRAM(S): 80 TABLET, CHEWABLE ORAL at 13:45

## 2019-04-25 RX ADMIN — TOLVAPTAN 30 MILLIGRAM(S): 15 TABLET ORAL at 13:40

## 2019-04-25 RX ADMIN — APIXABAN 5 MILLIGRAM(S): 2.5 TABLET, FILM COATED ORAL at 06:35

## 2019-04-25 RX ADMIN — Medication 25 MILLIGRAM(S): at 06:34

## 2019-04-25 RX ADMIN — Medication 50 MILLIGRAM(S): at 17:07

## 2019-04-25 RX ADMIN — POLYETHYLENE GLYCOL 3350 17 GRAM(S): 17 POWDER, FOR SOLUTION ORAL at 13:41

## 2019-04-25 RX ADMIN — ISOSORBIDE DINITRATE 20 MILLIGRAM(S): 5 TABLET ORAL at 13:17

## 2019-04-25 RX ADMIN — Medication 2 TABLET(S): at 08:40

## 2019-04-25 RX ADMIN — FAMOTIDINE 20 MILLIGRAM(S): 10 INJECTION INTRAVENOUS at 13:40

## 2019-04-25 RX ADMIN — APIXABAN 5 MILLIGRAM(S): 2.5 TABLET, FILM COATED ORAL at 17:07

## 2019-04-25 NOTE — PROGRESS NOTE ADULT - PROBLEM SELECTOR PROBLEM 4
Transaminitis
Atrial fibrillation with RVR
Hyponatremia
Transaminitis
Atrial fibrillation with RVR

## 2019-04-25 NOTE — PROGRESS NOTE ADULT - PROBLEM SELECTOR PLAN 1
Repeat episodes of WCT from Milrinone, tapered off, still had some beats after stopping milrinone- likely 2/2 cardiomyopathy.   Currently tele shows [email or web address] @/mt.   - convert lopressor to toprol xl 50mg po daily- pt to uptitrate as tolerated as outpatient  - EP following, keep K >4 and Mg >2  - Strict I/Os  - Daily standing weights  - would not start amiodarone as patient has SHERRY clot

## 2019-04-25 NOTE — PROGRESS NOTE ADULT - PROBLEM SELECTOR PLAN 6
-LFT's trending down  -hepatitis panel negative; CT A/P with no acute abdominal pathology   - hepatology recs appreciated- possibly 2/2 to hepatic congestion vs. ischemic     hepatopathy  - improved  - Reviewed renal sono no ascites seen.

## 2019-04-25 NOTE — PROGRESS NOTE ADULT - PROBLEM SELECTOR PROBLEM 3
Atrial fibrillation with RVR
ELVA (acute kidney injury)
Atrial fibrillation with RVR
Atrial fibrillation with RVR
Atrial fibrillation, unspecified type
ELVA (acute kidney injury)
Transaminitis
Atrial fibrillation, unspecified type
ELVA (acute kidney injury)

## 2019-04-25 NOTE — PROGRESS NOTE ADULT - PROVIDER SPECIALTY LIST ADULT
Cardiology
Electrophysiology
Heart Failure
Heme/Onc
Hospitalist
Internal Medicine
Nephrology
Cardiology
Electrophysiology
Nephrology
Hospitalist
Hospitalist

## 2019-04-25 NOTE — PROGRESS NOTE ADULT - PROBLEM SELECTOR PROBLEM 8
Coagulopathy
Leukocytosis
Ascites
Coagulopathy
Coagulopathy
ELVA (acute kidney injury)
Leukocytosis
Ascites

## 2019-04-25 NOTE — DISCHARGE NOTE PROVIDER - CARE PROVIDER_API CALL
Jemal Ivey)  Internal Medicine  [address] [facility] [address]  [place] [address]  Phone: [phone]  Fax: [phone]  Follow Up Time: 1 week    Osmin Villareal)  Cardiac Electrophysiology; Cardiology; Internal Medicine  [address] [place] [address]  Phone: [phone]  Follow Up Time: 1 month    Jatinder Johnston)  [place] [address]  Phone: [phone]  Fax: [phone]  Follow Up Time: 1 week    Willem Hernandez)  Cardiovascular Disease; Internal Medicine  [address] [place] [address]  Phone: [phone]  Fax: [phone]  Follow Up Time:

## 2019-04-25 NOTE — DISCHARGE NOTE NURSING/CASE MANAGEMENT/SOCIAL WORK - NSDCDPATPORTLINK_GEN_ALL_CORE
You can access the [facility], offered by [facility], by registering with the following website: [email or web address]

## 2019-04-25 NOTE — PROGRESS NOTE ADULT - SUBJECTIVE AND OBJECTIVE BOX
Patient seen and examined  No complaints     REVIEW OF SYSTEMS:  As per HPI, otherwise 8 full 10 ROS were unremarkable    MEDICATIONS  (STANDING):  apixaban 5 milliGRAM(s) Oral every 12 hours  buMETAnide 2 milliGRAM(s) Oral two times a day  docusate sodium 100 milliGRAM(s) Oral three times a day  ergocalciferol 59117 Unit(s) Oral every week  famotidine    Tablet 20 milliGRAM(s) Oral daily  hydrALAZINE 100 milliGRAM(s) Oral every 8 hours  isosorbide   dinitrate Tablet (ISORDIL) 20 milliGRAM(s) Oral three times a day  metoprolol tartrate 50 milliGRAM(s) Oral two times a day  multivitamin 1 Tablet(s) Oral daily  polyethylene glycol 3350 17 Gram(s) Oral daily  senna 2 Tablet(s) Oral at bedtime  tolvaptan 30 milliGRAM(s) Oral once      VITAL:  T(C): , Max: 36.8 (04-25-19 @ 06:33)  T(F): , Max: 98.2 (04-25-19 @ 06:33)  HR: 79 (04-25-19 @ 10:19)  BP: 120/85 (04-25-19 @ 10:19)  BP(mean): --  RR: 18 (04-25-19 @ 10:19)  SpO2: 97% (04-25-19 @ 10:19)  Wt(kg): --    I and O's:    04-24 @ 07:01  -  04-25 @ 07:00  --------------------------------------------------------  IN: 800 mL / OUT: 2250 mL / NET: -1450 mL    04-25 @ 07:01  -  04-25 @ 12:31  --------------------------------------------------------  IN: 400 mL / OUT: 0 mL / NET: 400 mL          PHYSICAL EXAM:    Constitutional: NAD  HEENT: PERRLA, EOMI,  MMM  Neck: No LAD, No JVD  Respiratory: CTAB  Cardiovascular: S1 and S2  Gastrointestinal: BS+, soft, NT/ND  Extremities: No peripheral edema  Neurological: A/O x 3, no focal deficits  Psychiatric: Normal mood, normal affect    LABS:                        13.3   8.24  )-----------( 240      ( 24 Apr 2019 08:47 )             40.5     04-25    134<L>  |  93<L>  |  97<H>  ----------------------------<  137<H>  4.3   |  26  |  1.97<H>    Ca    10.1      25 Apr 2019 07:08  Mg     2.8     04-24    TPro  7.9  /  Alb  4.0  /  TBili  0.5  /  DBili  0.2  /  AST  35  /  ALT  63<H>  /  AlkPhos  92  04-25      ASSESSMENT:  79-year-old gentleman with PMH of diabetes, hypertension, presents with decompensated heart failure along with rapid atrial fibrillation. Renal failure is new and likely has cardiorenal syndrome.     - Hypervolemic hyponatremia due to heart failure - improving with samsca   - CKD IV with baseline s.cr ~ 2.0 mg/dl   - Mild ELVA is likely 2ry to obligate diuresis- azotemia is improving   - Mild hypokalemia- watch for now as samsca increases K     RECOMMENDATIONS:   - diuretics per HF team  - Fluid restriction 1.2L per day  - Samsca 30mg po x another dose today   - OK to DC from renal standpoint with fluid restriction and loop diuretics. No need for samsca as OP. BMP next week with my office and I will see him after 2-3 weeks as outpatient.      Jemal Ivey MD  [facility]  [phone]

## 2019-04-25 NOTE — PROGRESS NOTE ADULT - PROBLEM SELECTOR PLAN 7
- likely hypervolemic hyponatremia given fluid overloaded state.   - s/p Samsca and Urea as per renal.   - FeNa demonstrating intrinsic renal disease (1.1%)- likely ATN from   hypovolemia   - Monitor Na levels.  - pt to f/u with nephrology in 1 week post d/c.

## 2019-04-25 NOTE — PROGRESS NOTE ADULT - PROBLEM SELECTOR PLAN 2
-ECHO shows EF 23% --> 40-45% on repeat TTE with right and left ventricular dysfunction, elevated pulm pressures. Pt with minimal jvd but overall euvolemic.   - cont bumex 2mg po bid  - resume aldactone 25mg po daily  - Cath showed non obstructive coronaries.   - Monitor on tele, monitor UOP, strict I+O and daily weight

## 2019-04-25 NOTE — PROGRESS NOTE ADULT - ASSESSMENT
79y.o. Male with no PMH now admitted with new HFrEF (TTE 04/04/2019 with LVEF 25%, biventricular dysfunction, moderate MR, moderate TR, mild UT) and AFIB with RVR, abdominal discomfort/nausea concerning for hepatic congestion in the setting of ADHF (BNP>50K), now s/p LHC/RHC (non-obstructive CAD). RHC:  RA 15, RV 58/14, PA 59/34/43, Wedge 33 PA sat 44%, CI 1.57. Pt has received tolvaptan, initiated on milrinone and diuretics with improvement in symptoms. Milrinone now off.        Problem/Plan - 1:  ·  Problem: Acute systolic heart failure, first episode.  Plan:   - bumex 2mg PO BID, will need BMP on follow up visit. Has appt on May 7 at 11:30 AM  - Strict I/Os  - Daily standing weights  - Continue hydralazine to 100mg PO TID, continue isordil 20mg TID. Would hold aldactone given ELVA.  Hold parameters should be SBP <90     Problem/Plan - 2:  ·  Problem: ELVA (acute kidney injury).  Plan: likely 2/2 to cardiorenal/low output state  - continue to trend  - avoid nephrotoxins.      Problem/Plan - 3:  ·  Problem: Atrial fibrillation/NSVT Patient in afib in 70-90's    Plan:   -continue lopressor 25mg BID, convert to toprol XL 50mg daily prior to discharge  -Patient with SHERRY thrombus on RICH, DCCV aborted. Continue eliquis per EP  - monitor on tele. 79y.o. Male with no PMH now admitted with new HFrEF (TTE 04/04/2019 with LVEF 25%, biventricular dysfunction, moderate MR, moderate TR, mild IN) and AFIB with RVR, abdominal discomfort/nausea concerning for hepatic congestion in the setting of ADHF (BNP>50K), now s/p LHC/RHC (non-obstructive CAD). RHC:  RA 15, RV 58/14, PA 59/34/43, Wedge 33 PA sat 44%, CI 1.57. Pt has received tolvaptan, initiated on milrinone and diuretics with improvement in symptoms.        Problem/Plan - 1:  ·  Problem: Acute systolic heart failure, first episode.  Plan:   - bumex 2mg PO BID, will need BMP on follow up visit. Has appt on May 7 at 11:30 AM  - Strict I/Os  - Daily standing weights  - Continue hydralazine to 100mg PO TID, continue isordil 20mg TID. Would hold aldactone given ELVA.  Hold parameters should be SBP <90     Problem/Plan - 2:  ·  Problem: ELVA (acute kidney injury).  Plan: likely 2/2 to cardiorenal/low output state  - continue to trend  - avoid nephrotoxins.      Problem/Plan - 3:  ·  Problem: Atrial fibrillation/NSVT Patient in afib in 70-90's    Plan:   -continue lopressor 25mg BID, convert to toprol XL 50mg daily prior to discharge  -Patient with SHERRY thrombus on RICH, DCCV aborted. Continue eliquis per EP  - monitor on tele.

## 2019-04-25 NOTE — PROGRESS NOTE ADULT - PROBLEM SELECTOR PLAN 10
Patient with complaint of frequent belching and epigastric discomfort. No weight loss, melena, hematemesis. Recent IFOBT test neg in 12/2018  - famotidine daily  - consider H. pylori after acute exacerbation treated  - endoscopy/colonoscopy as outpatient    dispo: likely home today with f/u with HF, nephrology, H/O, EP. Plan discussed extensively with patient, family including pt's son Antoine (who is a hospitalist), and NP.    total discharge time: 43 minutes.

## 2019-04-25 NOTE — PROGRESS NOTE ADULT - PROBLEM SELECTOR PROBLEM 10
Dyspepsia
Need for prophylactic measure
Dyspepsia
Need for prophylactic measure

## 2019-04-25 NOTE — PROGRESS NOTE ADULT - PROBLEM SELECTOR PROBLEM 5
Hyponatremia
Transaminitis
Ascites
Constipation, unspecified constipation type
Hyponatremia
Hyponatremia
Transaminitis
Constipation, unspecified constipation type

## 2019-04-25 NOTE — DISCHARGE NOTE PROVIDER - NSDCCPCAREPLAN_GEN_ALL_CORE_FT
PRINCIPAL DISCHARGE DIAGNOSIS  Diagnosis: Rapid atrial fibrillation  Assessment and Plan of Treatment: Follow up with MARC Rivas in one month, call to make an appointment.   Atrial fibrillation is the most common heart rhythm problem & has the risk of stroke & heart attack  It helps if you control your blood pressure, not drink more than 1-2 alcohol drinks per day, cut down on caffeine, getting treatment for over active thyroid gland, & getting exercise  Call your doctor if you feel your heart racing or beating unusually, chest tightness or pain, lightheaded, faint, shortness of breath especially with exercise  It is important to take your heart medication as prescribed  You may be on anticoagulation which is very important to take as directed - you may need blood work to monitor drug levels        SECONDARY DISCHARGE DIAGNOSES  Diagnosis: Thrombus of left atrial appendage  Assessment and Plan of Treatment: Ensure that you take your Eliquis as prescribed by your physician.  Do not skip doses and do not stop taking this medication unless instructed to by your physician.  If you have chest pain or shortness of breath please go to your nearest emergency room and contact your physician.    Diagnosis: Transaminitis  Assessment and Plan of Treatment: Follow up with your PMD.  Improved with fluid removal.    Diagnosis: Leukocytosis  Assessment and Plan of Treatment: Now resolved.  [facility]  They will call you with an appointment.  If you do not hear from them, you can contact them at [phone]    Diagnosis: ELVA (acute kidney injury)  Assessment and Plan of Treatment: Follow up with your nephrologist, Dr Ivey next week, you will need to have a BMP drawn (bloodwork).   Avoid taking (NSAIDs) - (ex: Ibuprofen, Advil, Celebrex, Naprosyn)  Avoid taking any nephrotoxic agents (can harm kidneys) - Intravenous contrast for diagnostic testing, combination cold medications.  Have all medications adjusted for your renal function by your Health Care Provider.  Blood pressure control is important.  Take all medication as prescribed.      Diagnosis: Systolic CHF, acute on chronic  Assessment and Plan of Treatment: Follow up with Dr Hernandez at the heart failure clinic on ..................................  Weigh yourself daily.  If you gain 3lbs in 3 days, or 5lbs in a week call your Health Care Provider.  Do not eat or drink foods containing more than 2000mg of salt (sodium) in your diet every day.  Call your Health Care Provider if you have any swelling or increased swelling in your feet, ankles, and/or stomach.  The Pt was provided with CHF diet instruction (low sodium diet, daily weights, label reading, Heart Healthy Cooking Tips & Heart Healthy shopping Tips).  Take all of your medication as directed.  If you become dizzy call your Health Care Provider.      Diagnosis: Hyponatremia  Assessment and Plan of Treatment: Only take medicines as directed by your caregiver. Many medicines can make hyponatremia worse. Discuss all your medicines with your caregiver.  Carefully follow any recommended diet, including any fluid restrictions.  You may be asked to repeat lab tests. Follow these directions.  Avoid alcohol and recreational drugs.  SEEK MEDICAL CARE IF:  You develop worsening nausea, fatigue, headache, confusion, or weakness.  Your original hyponatremia symptoms return.  You have problems following the recommended diet.   SEEK IMMEDIATE MEDICAL CARE IF:  You have a seizure.  You faint.  You have ongoing diarrhea or vomiting PRINCIPAL DISCHARGE DIAGNOSIS  Diagnosis: Rapid atrial fibrillation  Assessment and Plan of Treatment: Follow up with MARC Rivas in one month, call to make an appointment.   Atrial fibrillation is the most common heart rhythm problem & has the risk of stroke & heart attack  It helps if you control your blood pressure, not drink more than 1-2 alcohol drinks per day, cut down on caffeine, getting treatment for over active thyroid gland, & getting exercise  Call your doctor if you feel your heart racing or beating unusually, chest tightness or pain, lightheaded, faint, shortness of breath especially with exercise  It is important to take your heart medication as prescribed  You may be on anticoagulation which is very important to take as directed - you may need blood work to monitor drug levels        SECONDARY DISCHARGE DIAGNOSES  Diagnosis: Thrombus of left atrial appendage  Assessment and Plan of Treatment: Ensure that you take your Eliquis as prescribed by your physician.  Do not skip doses and do not stop taking this medication unless instructed to by your physician.  If you have chest pain or shortness of breath please go to your nearest emergency room and contact your physician.    Diagnosis: Transaminitis  Assessment and Plan of Treatment: Follow up with your PMD.  Improved with fluid removal.    Diagnosis: Leukocytosis  Assessment and Plan of Treatment: Now resolved.  You will need to follow up with a hematologist at the [facility].  They will call you with an appointment.  If you do not hear from them, you can contact them at [phone]    Diagnosis: ELVA (acute kidney injury)  Assessment and Plan of Treatment: Follow up with your nephrologist, Dr Ivey next week, you will need to have a BMP drawn (bloodwork).   Avoid taking (NSAIDs) - (ex: Ibuprofen, Advil, Celebrex, Naprosyn)  Avoid taking any nephrotoxic agents (can harm kidneys) - Intravenous contrast for diagnostic testing, combination cold medications.  Have all medications adjusted for your renal function by your Health Care Provider.  Blood pressure control is important.  Take all medication as prescribed.      Diagnosis: Systolic CHF, acute on chronic  Assessment and Plan of Treatment: Follow up with Dr Hernandez at the heart failure clinic in one week.   Weigh yourself daily.  If you gain 3lbs in 3 days, or 5lbs in a week call your Health Care Provider.  Do not eat or drink foods containing more than 2000mg of salt (sodium) in your diet every day.  Call your Health Care Provider if you have any swelling or increased swelling in your feet, ankles, and/or stomach.  The Pt was provided with CHF diet instruction (low sodium diet, daily weights, label reading, Heart Healthy Cooking Tips & Heart Healthy shopping Tips).  Take all of your medication as directed.  If you become dizzy call your Health Care Provider.      Diagnosis: Hyponatremia  Assessment and Plan of Treatment: Only take medicines as directed by your caregiver. Many medicines can make hyponatremia worse. Discuss all your medicines with your caregiver.  Carefully follow any recommended diet, including any fluid restrictions.  You may be asked to repeat lab tests. Follow these directions.  Avoid alcohol and recreational drugs.  SEEK MEDICAL CARE IF:  You develop worsening nausea, fatigue, headache, confusion, or weakness.  Your original hyponatremia symptoms return.  You have problems following the recommended diet.   SEEK IMMEDIATE MEDICAL CARE IF:  You have a seizure.  You faint.  You have ongoing diarrhea or vomiting PRINCIPAL DISCHARGE DIAGNOSIS  Diagnosis: Rapid atrial fibrillation  Assessment and Plan of Treatment: Follow up with MARC Rivas in one month, call to make an appointment.   Atrial fibrillation is the most common heart rhythm problem & has the risk of stroke & heart attack  It helps if you control your blood pressure, not drink more than 1-2 alcohol drinks per day, cut down on caffeine, getting treatment for over active thyroid gland, & getting exercise  Call your doctor if you feel your heart racing or beating unusually, chest tightness or pain, lightheaded, faint, shortness of breath especially with exercise  It is important to take your heart medication as prescribed  You may be on anticoagulation which is very important to take as directed - you may need blood work to monitor drug levels        SECONDARY DISCHARGE DIAGNOSES  Diagnosis: Thrombus of left atrial appendage  Assessment and Plan of Treatment: Ensure that you take your Eliquis as prescribed by your physician.  Do not skip doses and do not stop taking this medication unless instructed to by your physician.  If you have chest pain or shortness of breath please go to your nearest emergency room and contact your physician.    Diagnosis: Transaminitis  Assessment and Plan of Treatment: Follow up with your PMD.  Improved with fluid removal.    Diagnosis: Leukocytosis  Assessment and Plan of Treatment: Now resolved.  You will need to follow up with a hematologist at the [facility].  They will call you with an appointment.  If you do not hear from them, you can contact them at [phone]    Diagnosis: ELVA (acute kidney injury)  Assessment and Plan of Treatment: Follow up with your nephrologist, Dr Ivey next week, you will need to have a BMP drawn (bloodwork).   Avoid taking (NSAIDs) - (ex: Ibuprofen, Advil, Celebrex, Naprosyn)  Avoid taking any nephrotoxic agents (can harm kidneys) - Intravenous contrast for diagnostic testing, combination cold medications.  Have all medications adjusted for your renal function by your Health Care Provider.  Blood pressure control is important.  Take all medication as prescribed.      Diagnosis: Systolic CHF, acute on chronic  Assessment and Plan of Treatment: Follow up at the heart failure clinic, you have an appointment on 5/7/19 at 1130 am.   Weigh yourself daily.  If you gain 3lbs in 3 days, or 5lbs in a week call your Health Care Provider.  Do not eat or drink foods containing more than 2000mg of salt (sodium) in your diet every day.  Call your Health Care Provider if you have any swelling or increased swelling in your feet, ankles, and/or stomach.  The Pt was provided with CHF diet instruction (low sodium diet, daily weights, label reading, Heart Healthy Cooking Tips & Heart Healthy shopping Tips).  Take all of your medication as directed.  If you become dizzy call your Health Care Provider.      Diagnosis: Hyponatremia  Assessment and Plan of Treatment: Only take medicines as directed by your caregiver. Many medicines can make hyponatremia worse. Discuss all your medicines with your caregiver.  Carefully follow any recommended diet, including any fluid restrictions.  You may be asked to repeat lab tests. Follow these directions.  Avoid alcohol and recreational drugs.  SEEK MEDICAL CARE IF:  You develop worsening nausea, fatigue, headache, confusion, or weakness.  Your original hyponatremia symptoms return.  You have problems following the recommended diet.   SEEK IMMEDIATE MEDICAL CARE IF:  You have a seizure.  You faint.  You have ongoing diarrhea or vomiting

## 2019-04-25 NOTE — DISCHARGE NOTE PROVIDER - PROVIDER TOKENS
PROVIDER:[TOKEN:[[id number]],FOLLOWUP:[1 week]],PROVIDER:[TOKEN:[[id number]],FOLLOWUP:[1 month]],PROVIDER:[TOKEN:[[id number]],FOLLOWUP:[1 week]],PROVIDER:[TOKEN:[[id number]]]

## 2019-04-25 NOTE — PROGRESS NOTE ADULT - PROBLEM SELECTOR PLAN 9
WBC 12- improving. Likely 2/2 stress reaction.  - CT chest/A/P without source of infection  - blood cultures with NGTD from 4/4  -Hem consult appreciated, BCR/ABL sent- 04/18, follow up.   - Recommend out patient follow up with H/O.

## 2019-04-25 NOTE — PROGRESS NOTE ADULT - SUBJECTIVE AND OBJECTIVE BOX
Hospitalist- Osmani Hernandez MD  Pager: [phone]  If no response or off-hours, page [phone]  -------------------------------------  Patient is a 79y old  Male who presents with a chief complaint of dyspnea, abdominal swelling (25 Apr 2019 13:35)  Subjective: No acute events overnight. Today, breathing comfortably, no leg swelling. Eager to be discharged.    14 point ros otherwise negative.     VITAL SIGNS:  Vital Signs Last 24 Hrs  T(C): 36.5 (25 Apr 2019 14:29), Max: 36.8 (25 Apr 2019 06:33)  T(F): 97.7 (25 Apr 2019 14:29), Max: 98.2 (25 Apr 2019 06:33)  HR: 87 (25 Apr 2019 14:29) (71 - 98)  BP: 111/75 (25 Apr 2019 14:29) (101/64 - 155/84)  BP(mean): --  RR: 18 (25 Apr 2019 14:29) (18 - 18)  SpO2: 97% (25 Apr 2019 14:29) (97% - 99%)      PHYSICAL EXAM:     GENERAL: no acute distress  HEENT: PERRLA, EOMI, moist oropharynx   RESPIRATORY: CTAB, no w/c   CARDIOVASCULAR: RRR, no murmurs, gallops, rubs  ABDOMINAL: soft, non-tender, non-distended, positive bowel sounds   EXTREMITIES: no clubbing, cyanosis, or edema  NEUROLOGICAL: alert and oriented x 3, non-focal  SKIN: no rashes or lesions   MUSCULOSKELETAL: no gross joint deformity                          13.3   8.24  )-----------( 240      ( 24 Apr 2019 08:47 )             40.5     04-25    134<L>  |  93<L>  |  97<H>  ----------------------------<  137<H>  4.3   |  26  |  1.97<H>    Ca    10.1      25 Apr 2019 07:08  Mg     2.8     04-24    TPro  7.9  /  Alb  4.0  /  TBili  0.5  /  DBili  0.2  /  AST  35  /  ALT  63<H>  /  AlkPhos  92  04-25      CAPILLARY BLOOD GLUCOSE          MEDICATIONS  (STANDING):  apixaban 5 milliGRAM(s) Oral every 12 hours  buMETAnide 2 milliGRAM(s) Oral two times a day  docusate sodium 100 milliGRAM(s) Oral three times a day  ergocalciferol 38404 Unit(s) Oral every week  famotidine    Tablet 20 milliGRAM(s) Oral daily  hydrALAZINE 100 milliGRAM(s) Oral every 8 hours  isosorbide   dinitrate Tablet (ISORDIL) 20 milliGRAM(s) Oral three times a day  metoprolol tartrate 50 milliGRAM(s) Oral two times a day  multivitamin 1 Tablet(s) Oral daily  polyethylene glycol 3350 17 Gram(s) Oral daily  senna 2 Tablet(s) Oral at bedtime    MEDICATIONS  (PRN):  acetaminophen   Tablet .. 650 milliGRAM(s) Oral every 6 hours PRN Mild Pain (1 - 3)  calcium carbonate    500 mG (Tums) Chewable 2 Tablet(s) Chew three times a day PRN Heartburn  ondansetron Injectable 4 milliGRAM(s) IV Push every 8 hours PRN Nausea and/or Vomiting  simethicone 80 milliGRAM(s) Chew three times a day PRN Dyspepsia

## 2019-04-25 NOTE — PROGRESS NOTE ADULT - PROBLEM SELECTOR PLAN 3
ELVA from low perfusion, continue to monitor on IV Bumex and monitor renal function. Cr 1.9 today  - decrease bumex to 2mg po bid  - resume aldactone 25mg po daily  Renal following, continue monitoring

## 2019-04-25 NOTE — DISCHARGE NOTE PROVIDER - CARE PROVIDERS DIRECT ADDRESSES
,DirectAddress_Unknown,[email or web address],DirectAddress_Unknown,[email or web address]

## 2019-04-25 NOTE — CHART NOTE - NSCHARTNOTEFT_GEN_A_CORE
MEDICINE NP NOTE  Spectra [phone]      Notified by RN, patient with episode of 14 beats WCT on telemetry.  Vitals stable, pt asymptomatic.  Discussed with Dr Hernandez.  Metroprolol dose increased this morning, will discharge on higher dose.  Pt cleared for discharge to home today.

## 2019-04-25 NOTE — DISCHARGE NOTE PROVIDER - HOSPITAL COURSE
80 y/o male with no known PMH who presented with abdominal distension and severe HEAD likely 2/2 to new acute on chronic HF, found to be in Afib with RVR, along with transaminitis and ELVA- likely cardiorenal syndrome.          Problem/Plan - 1:    ·  Problem: Wide-complex tachycardia.  Plan: Repeat episodes of WCT from Milrinone, tapered off, still had some beats after stopping milrinone    Currently tele shows A.fib @/mt.     - lopressor 25mg po bid, uptitrate as tolerated    - f/u RICH report    EP following, keep K >4 and Mg >2    - Strict I/Os    - Daily standing weights.          Problem/Plan - 2:    ·  Problem: Acute systolic heart failure, first episode.  Plan: -ECHO shows EF 23% --> 40-45% on repeat TTE with right and left ventricular dysfunction, elevated pulm pressures. Pt with minimal jvd but overall euvolemic.     - cont bumex 2mg po bid    - hold aldactone 25mg po daily due to ELVA    - Cath show non obstructive coronaries.     - Monitor on tele, monitor UOP, strict I+O and daily weight.          Problem/Plan - 3:    ·  Problem: ELVA (acute kidney injury).  Plan: ELVA from low perfusion, continue to monitor on IV Bumex and monitor renal function. Cr 2.9 today    - decrease bumex to 2mg po bid    - hold aldactone    Renal following, continue monitoring.          Problem/Plan - 4:    ·  Problem: Atrial fibrillation with RVR.  Plan: - LA severely dilated on TTE    - eliquis for AC given patient with SHERRY clot    - TSH wnl.          Problem/Plan - 5:    ·  Problem: Constipation, unspecified constipation type.  Plan: Resolved, continue with bowel regimen.          Problem/Plan - 6:    Problem: Transaminitis. Plan: -LFT's trending down    -hepatitis panel negative; CT A/P with no acute abdominal pathology     - hepatology recs appreciated- possibly 2/2 to hepatic congestion vs. ischemic     hepatopathy    - improving with diuresis    - Reviewed renal sono no ascites seen.         Problem/Plan - 7:    ·  Problem: Hyponatremia.  Plan: - likely hypervolemic hyponatremia given fluid overloaded state.     - s/p Samsca and Urea as per renal.     - FeNa demonstrating intrinsic renal disease (1.1%)- likely ATN from   hypovolemia     - Monitor Na levels.          Problem/Plan - 8:    ·  Problem: Ascites.  Plan: - abdominal CT with ascites    - continue diuresis    -Abdominal sono show no evidence of Ascites s/p diuretics.          Problem/Plan - 9:    ·  Problem: Leukocytosis.  Plan: WBC 12- improving. Likely 2/2 stress reaction.    - CT chest/A/P without source of infection    - blood cultures with NGTD from 4/4    -Hem consult appreciated, BCR/ABL sent- 04/18, follow up.     - Recommend out patient follow up.          Problem/Plan - 10:    Problem: Dyspepsia. Plan; Patient with complaint of frequent belching and epigastric discomfort. No weight loss, melena, hematemesis. Recent IFOBT test neg in 12/2018    - famotidine daily    - consider H. pylori after acute exacerbation treated    - endoscopy/colonoscopy as outpatient.

## 2019-04-25 NOTE — PROGRESS NOTE ADULT - SUBJECTIVE AND OBJECTIVE BOX
Overnight Events:   Denies SOB or CP. Patient wants to go home        Medications:  acetaminophen   Tablet .. 650 milliGRAM(s) Oral every 6 hours PRN  apixaban 5 milliGRAM(s) Oral every 12 hours  buMETAnide 2 milliGRAM(s) Oral two times a day  calcium carbonate    500 mG (Tums) Chewable 2 Tablet(s) Chew three times a day PRN  docusate sodium 100 milliGRAM(s) Oral three times a day  ergocalciferol 83019 Unit(s) Oral every week  famotidine    Tablet 20 milliGRAM(s) Oral daily  hydrALAZINE 100 milliGRAM(s) Oral every 8 hours  isosorbide   dinitrate Tablet (ISORDIL) 20 milliGRAM(s) Oral three times a day  metoprolol tartrate 50 milliGRAM(s) Oral two times a day  multivitamin 1 Tablet(s) Oral daily  ondansetron Injectable 4 milliGRAM(s) IV Push every 8 hours PRN  polyethylene glycol 3350 17 Gram(s) Oral daily  senna 2 Tablet(s) Oral at bedtime  simethicone 80 milliGRAM(s) Chew three times a day PRN      PAST MEDICAL & SURGICAL HISTORY:  No pertinent past medical history  No significant past surgical history      Vitals:  T(F): 97.7 (04-25), Max: 98.2 (04-25)  HR: 94 (04-25) (71 - 95)  BP: 127/86 (04-25) (101/64 - 155/84)  RR: 18 (04-25)  SpO2: 98% (04-25)  I&O's Summary    24 Apr 2019 07:01  -  25 Apr 2019 07:00  --------------------------------------------------------  IN: 800 mL / OUT: 2250 mL / NET: -1450 mL    25 Apr 2019 07:01  -  25 Apr 2019 17:35  --------------------------------------------------------  IN: 400 mL / OUT: 500 mL / NET: -100 mL        Physical Exam:  Appearance: No acute distress  HENT: Normal oral muscosa  Cardiovascular: irregularly irregular, S1, S2, no LE edema, mild JVP at 45 degrees  Respiratory: Clear to auscultation bilaterally  Gastrointestinal: soft, non-tender, non-distended with normal bowel sounds  Neurologic: Non-focal  Psychiatry: AAOx3, mood & affect appropriate                            13.3   8.24  )-----------( 240      ( 24 Apr 2019 08:47 )             40.5     04-25    134<L>  |  93<L>  |  97<H>  ----------------------------<  137<H>  4.3   |  26  |  1.97<H>    Ca    10.1      25 Apr 2019 07:08  Mg     2.8     04-24    TPro  7.9  /  Alb  4.0  /  TBili  0.5  /  DBili  0.2  /  AST  35  /  ALT  63<H>  /  AlkPhos  92  04-25          Serum Pro-Brain Natriuretic Peptide: 6576 pg/mL (04-21 @ 06:08)  Serum Pro-Brain Natriuretic Peptide: 31620 pg/mL (04-20 @ 07:01)          New ECG(s): Personally reviewed    Echo:    Stress Testing:     Cath:    Imaging:    Interpretation of Telemetry: Overnight Events:   Denies SOB or CP. Patient wants to go home        Medications:  acetaminophen   Tablet .. 650 milliGRAM(s) Oral every 6 hours PRN  apixaban 5 milliGRAM(s) Oral every 12 hours  buMETAnide 2 milliGRAM(s) Oral two times a day  calcium carbonate    500 mG (Tums) Chewable 2 Tablet(s) Chew three times a day PRN  docusate sodium 100 milliGRAM(s) Oral three times a day  ergocalciferol 51633 Unit(s) Oral every week  famotidine    Tablet 20 milliGRAM(s) Oral daily  hydrALAZINE 100 milliGRAM(s) Oral every 8 hours  isosorbide   dinitrate Tablet (ISORDIL) 20 milliGRAM(s) Oral three times a day  metoprolol tartrate 50 milliGRAM(s) Oral two times a day  multivitamin 1 Tablet(s) Oral daily  ondansetron Injectable 4 milliGRAM(s) IV Push every 8 hours PRN  polyethylene glycol 3350 17 Gram(s) Oral daily  senna 2 Tablet(s) Oral at bedtime  simethicone 80 milliGRAM(s) Chew three times a day PRN      PAST MEDICAL & SURGICAL HISTORY:  No pertinent past medical history  No significant past surgical history      Vitals:  T(F): 97.7 (04-25), Max: 98.2 (04-25)  HR: 94 (04-25) (71 - 95)  BP: 127/86 (04-25) (101/64 - 155/84)  RR: 18 (04-25)  SpO2: 98% (04-25)  I&O's Summary    24 Apr 2019 07:01  -  25 Apr 2019 07:00  --------------------------------------------------------  IN: 800 mL / OUT: 2250 mL / NET: -1450 mL    25 Apr 2019 07:01  -  25 Apr 2019 17:35  --------------------------------------------------------  IN: 400 mL / OUT: 500 mL / NET: -100 mL        Physical Exam:  Appearance: No acute distress  HENT: Normal oral muscosa  Cardiovascular: irregularly irregular, S1, S2, no LE edema, mild JVP at 45 degrees  Respiratory: Clear to auscultation bilaterally  Gastrointestinal: soft, non-tender, non-distended with normal bowel sounds  Neurologic: Non-focal  Psychiatry: AAOx3, mood & affect appropriate                            13.3   8.24  )-----------( 240      ( 24 Apr 2019 08:47 )             40.5     04-25    134<L>  |  93<L>  |  97<H>  ----------------------------<  137<H>  4.3   |  26  |  1.97<H>    Ca    10.1      25 Apr 2019 07:08  Mg     2.8     04-24    TPro  7.9  /  Alb  4.0  /  TBili  0.5  /  DBili  0.2  /  AST  35  /  ALT  63<H>  /  AlkPhos  92  04-25    Serum Pro-Brain Natriuretic Peptide: 6576 pg/mL (04-21 @ 06:08)  Serum Pro-Brain Natriuretic Peptide: 68679 pg/mL (04-20 @ 07:01)

## 2019-04-25 NOTE — PROGRESS NOTE ADULT - PROBLEM SELECTOR PROBLEM 6
Ascites
Hyponatremia
Ascites
Ascites
Hyponatremia
Leukocytosis
Transaminitis

## 2019-04-29 ENCOUNTER — INPATIENT (INPATIENT)
Facility: HOSPITAL | Age: 79
LOS: 1 days | Discharge: HOME | DRG: 312 | End: 2019-05-01
Attending: INTERNAL MEDICINE | Admitting: HOSPITALIST
Payer: MEDICAID

## 2019-04-29 VITALS
HEIGHT: 70 IN | RESPIRATION RATE: 18 BRPM | DIASTOLIC BLOOD PRESSURE: 61 MMHG | HEART RATE: 97 BPM | SYSTOLIC BLOOD PRESSURE: 109 MMHG | TEMPERATURE: 98 F | OXYGEN SATURATION: 98 % | WEIGHT: 130.07 LBS

## 2019-04-29 DIAGNOSIS — E87.1 HYPO-OSMOLALITY AND HYPONATREMIA: ICD-10-CM

## 2019-04-29 DIAGNOSIS — I50.22 CHRONIC SYSTOLIC (CONGESTIVE) HEART FAILURE: ICD-10-CM

## 2019-04-29 DIAGNOSIS — I25.10 ATHEROSCLEROTIC HEART DISEASE OF NATIVE CORONARY ARTERY WITHOUT ANGINA PECTORIS: ICD-10-CM

## 2019-04-29 DIAGNOSIS — I50.20 UNSPECIFIED SYSTOLIC (CONGESTIVE) HEART FAILURE: ICD-10-CM

## 2019-04-29 DIAGNOSIS — R55 SYNCOPE AND COLLAPSE: ICD-10-CM

## 2019-04-29 DIAGNOSIS — Z29.9 ENCOUNTER FOR PROPHYLACTIC MEASURES, UNSPECIFIED: ICD-10-CM

## 2019-04-29 DIAGNOSIS — I48.91 UNSPECIFIED ATRIAL FIBRILLATION: ICD-10-CM

## 2019-04-29 DIAGNOSIS — I51.3 INTRACARDIAC THROMBOSIS, NOT ELSEWHERE CLASSIFIED: ICD-10-CM

## 2019-04-29 DIAGNOSIS — N18.4 CHRONIC KIDNEY DISEASE, STAGE 4 (SEVERE): ICD-10-CM

## 2019-04-29 LAB
ALBUMIN SERPL ELPH-MCNC: 4 G/DL — SIGNIFICANT CHANGE UP (ref 3.3–5)
ALP SERPL-CCNC: 91 U/L — SIGNIFICANT CHANGE UP (ref 40–120)
ALT FLD-CCNC: 45 U/L — SIGNIFICANT CHANGE UP (ref 10–45)
ANION GAP SERPL CALC-SCNC: 13 MMOL/L — SIGNIFICANT CHANGE UP (ref 5–17)
ANION GAP SERPL CALC-SCNC: 13 MMOL/L — SIGNIFICANT CHANGE UP (ref 5–17)
APTT BLD: 32.3 SEC — SIGNIFICANT CHANGE UP (ref 27.5–36.3)
AST SERPL-CCNC: 27 U/L — SIGNIFICANT CHANGE UP (ref 10–40)
BILIRUB SERPL-MCNC: 0.5 MG/DL — SIGNIFICANT CHANGE UP (ref 0.2–1.2)
BUN SERPL-MCNC: 65 MG/DL — HIGH (ref 7–23)
BUN SERPL-MCNC: 68 MG/DL — HIGH (ref 7–23)
CALCIUM SERPL-MCNC: 8.9 MG/DL — SIGNIFICANT CHANGE UP (ref 8.4–10.5)
CALCIUM SERPL-MCNC: 9.3 MG/DL — SIGNIFICANT CHANGE UP (ref 8.4–10.5)
CHLORIDE SERPL-SCNC: 85 MMOL/L — LOW (ref 96–108)
CHLORIDE SERPL-SCNC: 89 MMOL/L — LOW (ref 96–108)
CO2 SERPL-SCNC: 26 MMOL/L — SIGNIFICANT CHANGE UP (ref 22–31)
CO2 SERPL-SCNC: 28 MMOL/L — SIGNIFICANT CHANGE UP (ref 22–31)
CREAT SERPL-MCNC: 1.69 MG/DL — HIGH (ref 0.5–1.3)
CREAT SERPL-MCNC: 1.81 MG/DL — HIGH (ref 0.5–1.3)
GAS PNL BLDV: SIGNIFICANT CHANGE UP
GLUCOSE SERPL-MCNC: 151 MG/DL — HIGH (ref 70–99)
GLUCOSE SERPL-MCNC: 232 MG/DL — HIGH (ref 70–99)
HCT VFR BLD CALC: 43.9 % — SIGNIFICANT CHANGE UP (ref 39–50)
HGB BLD-MCNC: 14.2 G/DL — SIGNIFICANT CHANGE UP (ref 13–17)
INR BLD: 1.44 RATIO — HIGH (ref 0.88–1.16)
LACTATE BLDV-MCNC: 1.5 MMOL/L — SIGNIFICANT CHANGE UP (ref 0.7–2)
MAGNESIUM SERPL-MCNC: 2.4 MG/DL — SIGNIFICANT CHANGE UP (ref 1.6–2.6)
MCHC RBC-ENTMCNC: 28.2 PG — SIGNIFICANT CHANGE UP (ref 27–34)
MCHC RBC-ENTMCNC: 32.2 GM/DL — SIGNIFICANT CHANGE UP (ref 32–36)
MCV RBC AUTO: 87.6 FL — SIGNIFICANT CHANGE UP (ref 80–100)
OSMOLALITY SERPL: 291 MOS/KG — SIGNIFICANT CHANGE UP (ref 275–300)
PLATELET # BLD AUTO: 305 K/UL — SIGNIFICANT CHANGE UP (ref 150–400)
POTASSIUM SERPL-MCNC: 3.9 MMOL/L — SIGNIFICANT CHANGE UP (ref 3.5–5.3)
POTASSIUM SERPL-MCNC: 4.4 MMOL/L — SIGNIFICANT CHANGE UP (ref 3.5–5.3)
POTASSIUM SERPL-SCNC: 3.9 MMOL/L — SIGNIFICANT CHANGE UP (ref 3.5–5.3)
POTASSIUM SERPL-SCNC: 4.4 MMOL/L — SIGNIFICANT CHANGE UP (ref 3.5–5.3)
PROT SERPL-MCNC: 7.7 G/DL — SIGNIFICANT CHANGE UP (ref 6–8.3)
PROTHROM AB SERPL-ACNC: 16.7 SEC — HIGH (ref 10–12.9)
RBC # BLD: 5.02 M/UL — SIGNIFICANT CHANGE UP (ref 4.2–5.8)
RBC # FLD: 13.5 % — SIGNIFICANT CHANGE UP (ref 10.3–14.5)
SODIUM SERPL-SCNC: 126 MMOL/L — LOW (ref 135–145)
SODIUM SERPL-SCNC: 128 MMOL/L — LOW (ref 135–145)
SODIUM UR-SCNC: <20 MMOL/L — SIGNIFICANT CHANGE UP
TROPONIN T, HIGH SENSITIVITY RESULT: 38 NG/L — SIGNIFICANT CHANGE UP (ref 0–51)
WBC # BLD: 9.7 K/UL — SIGNIFICANT CHANGE UP (ref 3.8–10.5)
WBC # FLD AUTO: 9.7 K/UL — SIGNIFICANT CHANGE UP (ref 3.8–10.5)

## 2019-04-29 PROCEDURE — 71046 X-RAY EXAM CHEST 2 VIEWS: CPT | Mod: 26

## 2019-04-29 PROCEDURE — 99221 1ST HOSP IP/OBS SF/LOW 40: CPT | Mod: GC

## 2019-04-29 PROCEDURE — 99223 1ST HOSP IP/OBS HIGH 75: CPT | Mod: GC

## 2019-04-29 PROCEDURE — 93010 ELECTROCARDIOGRAM REPORT: CPT

## 2019-04-29 PROCEDURE — 99285 EMERGENCY DEPT VISIT HI MDM: CPT | Mod: 25

## 2019-04-29 PROCEDURE — 70450 CT HEAD/BRAIN W/O DYE: CPT | Mod: 26

## 2019-04-29 RX ORDER — HYDRALAZINE HCL 50 MG
50 TABLET ORAL THREE TIMES A DAY
Qty: 0 | Refills: 0 | Status: DISCONTINUED | OUTPATIENT
Start: 2019-04-29 | End: 2019-05-01

## 2019-04-29 RX ORDER — ISOSORBIDE DINITRATE 5 MG/1
20 TABLET ORAL THREE TIMES A DAY
Qty: 0 | Refills: 0 | Status: DISCONTINUED | OUTPATIENT
Start: 2019-04-29 | End: 2019-04-29

## 2019-04-29 RX ORDER — APIXABAN 2.5 MG/1
5 TABLET, FILM COATED ORAL EVERY 12 HOURS
Qty: 0 | Refills: 0 | Status: DISCONTINUED | OUTPATIENT
Start: 2019-04-29 | End: 2019-05-01

## 2019-04-29 RX ORDER — ERGOCALCIFEROL 1.25 MG/1
1 CAPSULE ORAL
Qty: 0 | Refills: 0 | COMMUNITY

## 2019-04-29 RX ORDER — PANTOPRAZOLE SODIUM 20 MG/1
0 TABLET, DELAYED RELEASE ORAL
Qty: 0 | Refills: 0 | COMMUNITY

## 2019-04-29 RX ORDER — SODIUM CHLORIDE 9 MG/ML
200 INJECTION INTRAMUSCULAR; INTRAVENOUS; SUBCUTANEOUS ONCE
Qty: 0 | Refills: 0 | Status: DISCONTINUED | OUTPATIENT
Start: 2019-04-29 | End: 2019-04-29

## 2019-04-29 RX ORDER — SENNA PLUS 8.6 MG/1
2 TABLET ORAL AT BEDTIME
Qty: 0 | Refills: 0 | Status: DISCONTINUED | OUTPATIENT
Start: 2019-04-29 | End: 2019-05-01

## 2019-04-29 RX ORDER — LATANOPROST 0.05 MG/ML
1 SOLUTION/ DROPS OPHTHALMIC; TOPICAL
Qty: 0 | Refills: 0 | COMMUNITY

## 2019-04-29 RX ORDER — DOCUSATE SODIUM 100 MG
100 CAPSULE ORAL THREE TIMES A DAY
Qty: 0 | Refills: 0 | Status: DISCONTINUED | OUTPATIENT
Start: 2019-04-29 | End: 2019-05-01

## 2019-04-29 RX ORDER — FAMOTIDINE 10 MG/ML
20 INJECTION INTRAVENOUS DAILY
Qty: 0 | Refills: 0 | Status: DISCONTINUED | OUTPATIENT
Start: 2019-04-29 | End: 2019-05-01

## 2019-04-29 RX ORDER — BUMETANIDE 0.25 MG/ML
2 INJECTION INTRAMUSCULAR; INTRAVENOUS
Qty: 0 | Refills: 0 | Status: DISCONTINUED | OUTPATIENT
Start: 2019-04-29 | End: 2019-04-29

## 2019-04-29 RX ORDER — SIMETHICONE 80 MG/1
80 TABLET, CHEWABLE ORAL THREE TIMES A DAY
Qty: 0 | Refills: 0 | Status: DISCONTINUED | OUTPATIENT
Start: 2019-04-29 | End: 2019-05-01

## 2019-04-29 RX ORDER — ACETAMINOPHEN 500 MG
650 TABLET ORAL EVERY 6 HOURS
Qty: 0 | Refills: 0 | Status: DISCONTINUED | OUTPATIENT
Start: 2019-04-29 | End: 2019-05-01

## 2019-04-29 RX ORDER — HYDRALAZINE HCL 50 MG
100 TABLET ORAL EVERY 8 HOURS
Qty: 0 | Refills: 0 | Status: DISCONTINUED | OUTPATIENT
Start: 2019-04-29 | End: 2019-04-29

## 2019-04-29 RX ORDER — ERGOCALCIFEROL 1.25 MG/1
50000 CAPSULE ORAL
Qty: 0 | Refills: 0 | Status: DISCONTINUED | OUTPATIENT
Start: 2019-04-29 | End: 2019-05-01

## 2019-04-29 RX ORDER — ACETAMINOPHEN 500 MG
0 TABLET ORAL
Qty: 0 | Refills: 0 | COMMUNITY

## 2019-04-29 RX ORDER — POLYETHYLENE GLYCOL 3350 17 G/17G
17 POWDER, FOR SOLUTION ORAL DAILY
Qty: 0 | Refills: 0 | Status: DISCONTINUED | OUTPATIENT
Start: 2019-04-29 | End: 2019-05-01

## 2019-04-29 RX ORDER — ISOSORBIDE DINITRATE 5 MG/1
10 TABLET ORAL THREE TIMES A DAY
Qty: 0 | Refills: 0 | Status: DISCONTINUED | OUTPATIENT
Start: 2019-04-29 | End: 2019-05-01

## 2019-04-29 RX ORDER — SODIUM CHLORIDE 9 MG/ML
250 INJECTION INTRAMUSCULAR; INTRAVENOUS; SUBCUTANEOUS ONCE
Qty: 0 | Refills: 0 | Status: COMPLETED | OUTPATIENT
Start: 2019-04-29 | End: 2019-04-29

## 2019-04-29 RX ORDER — METOPROLOL TARTRATE 50 MG
50 TABLET ORAL DAILY
Qty: 0 | Refills: 0 | Status: DISCONTINUED | OUTPATIENT
Start: 2019-04-29 | End: 2019-05-01

## 2019-04-29 RX ORDER — SPIRONOLACTONE 25 MG/1
25 TABLET, FILM COATED ORAL DAILY
Qty: 0 | Refills: 0 | Status: DISCONTINUED | OUTPATIENT
Start: 2019-04-29 | End: 2019-04-29

## 2019-04-29 RX ADMIN — Medication 1 TABLET(S): at 15:13

## 2019-04-29 RX ADMIN — Medication 50 MILLIGRAM(S): at 15:05

## 2019-04-29 RX ADMIN — SODIUM CHLORIDE 250 MILLILITER(S): 9 INJECTION INTRAMUSCULAR; INTRAVENOUS; SUBCUTANEOUS at 17:06

## 2019-04-29 RX ADMIN — APIXABAN 5 MILLIGRAM(S): 2.5 TABLET, FILM COATED ORAL at 18:13

## 2019-04-29 RX ADMIN — POLYETHYLENE GLYCOL 3350 17 GRAM(S): 17 POWDER, FOR SOLUTION ORAL at 21:02

## 2019-04-29 RX ADMIN — Medication 100 MILLIGRAM(S): at 21:02

## 2019-04-29 RX ADMIN — ISOSORBIDE DINITRATE 10 MILLIGRAM(S): 5 TABLET ORAL at 22:02

## 2019-04-29 RX ADMIN — SENNA PLUS 2 TABLET(S): 8.6 TABLET ORAL at 21:02

## 2019-04-29 RX ADMIN — FAMOTIDINE 20 MILLIGRAM(S): 10 INJECTION INTRAVENOUS at 18:13

## 2019-04-29 NOTE — ED ADULT NURSE REASSESSMENT NOTE - NS ED NURSE REASSESS COMMENT FT1
Pt admitted to telemetry for Hyponatremia and Syncope.  Report given to Denice GORMAN for [facility] Bed 9. Pt calm and cooperative, food given, awaiting transport to [facility].

## 2019-04-29 NOTE — H&P ADULT - NSHPSOCIALHISTORY_GEN_ALL_CORE
lives at home with wife   has one son and one daughter (son is Dr. Zhang - hospitalist @ [employer] in [place])  ambulates independently  no smoking, EtOH, illicit drugs  retired

## 2019-04-29 NOTE — H&P ADULT - NSHPLABSRESULTS_GEN_ALL_CORE
14.2   9.7   )-----------( 305      ( 29 Apr 2019 08:59 )             43.9       04-29    126<L>  |  85<L>  |  68<H>  ----------------------------<  232<H>  4.4   |  28  |  1.81<H>    Ca    9.3      29 Apr 2019 08:59  Phos  3.1     04-29  Mg     2.4     04-29    TPro  7.7  /  Alb  4.0  /  TBili  0.5  /  DBili  x   /  AST  27  /  ALT  45  /  AlkPhos  91  04-29    EKG -    < from: Xray Chest 2 Views PA/Lat (04.29.19 @ 09:06) >    IMPRESSION: Clear lungs.    < end of copied text >    < from: CT Head No Cont (04.29.19 @ 09:33) >    IMPRESSION:    No acute intracranial pathology is noted. If the patient has new and   persistent symptoms, consider short interval follow-up head CT or brain   MRI follow-up if there are no MRI contraindications.    < end of copied text > 14.2   9.7   )-----------( 305      ( 29 Apr 2019 08:59 )             43.9       04-29    126<L>  |  85<L>  |  68<H>  ----------------------------<  232<H>  4.4   |  28  |  1.81<H>    Ca    9.3      29 Apr 2019 08:59  Phos  3.1     04-29  Mg     2.4     04-29    TPro  7.7  /  Alb  4.0  /  TBili  0.5  /  DBili  x   /  AST  27  /  ALT  45  /  AlkPhos  91  04-29    EKG - afib     < from: Xray Chest 2 Views PA/Lat (04.29.19 @ 09:06) >    IMPRESSION: Clear lungs.    < end of copied text >    < from: CT Head No Cont (04.29.19 @ 09:33) >    IMPRESSION:    No acute intracranial pathology is noted. If the patient has new and   persistent symptoms, consider short interval follow-up head CT or brain   MRI follow-up if there are no MRI contraindications.    < end of copied text > 14.2   9.7   )-----------( 305      ( 29 Apr 2019 08:59 )             43.9       04-29    126<L>  |  85<L>  |  68<H>  ----------------------------<  232<H>  4.4   |  28  |  1.81<H>    Ca    9.3      29 Apr 2019 08:59  Phos  3.1     04-29  Mg     2.4     04-29    TPro  7.7  /  Alb  4.0  /  TBili  0.5  /  DBili  x   /  AST  27  /  ALT  45  /  AlkPhos  91  04-29    EKG - afib (rate controlled, personally reviewed)    < from: Xray Chest 2 Views PA/Lat (04.29.19 @ 09:06) >    IMPRESSION: Clear lungs. (personally reviewed)    < end of copied text >    < from: CT Head No Cont (04.29.19 @ 09:33) >    IMPRESSION:    No acute intracranial pathology is noted. If the patient has new and   persistent symptoms, consider short interval follow-up head CT or brain   MRI follow-up if there are no MRI contraindications.    < end of copied text >

## 2019-04-29 NOTE — ED ADULT NURSE NOTE - NSIMPLEMENTINTERV_GEN_ALL_ED
Implemented All Fall with Harm Risk Interventions:  [place] to call system. Call bell, personal items and telephone within reach. Instruct patient to call for assistance. Room bathroom lighting operational. Non-slip footwear when patient is off stretcher. Physically safe environment: no spills, clutter or unnecessary equipment. Stretcher in lowest position, wheels locked, appropriate side rails in place. Provide visual cue, wrist band, yellow gown, etc. Monitor gait and stability. Monitor for mental status changes and reorient to person, place, and time. Review medications for side effects contributing to fall risk. Reinforce activity limits and safety measures with patient and family. Provide visual clues: red socks.

## 2019-04-29 NOTE — H&P ADULT - NSICDXPASTMEDICALHX_GEN_ALL_CORE_FT
PAST MEDICAL HISTORY:  Atrial fibrillation, unspecified type     Heart failure, unspecified HF chronicity, unspecified heart failure type     Left ventricular thrombus

## 2019-04-29 NOTE — ED PROVIDER NOTE - PMH
No pertinent past medical history <<----- Click to add NO pertinent Past Medical History Atrial fibrillation, unspecified type    Heart failure, unspecified HF chronicity, unspecified heart failure type    Left ventricular thrombus

## 2019-04-29 NOTE — ED ADULT NURSE NOTE - OBJECTIVE STATEMENT
79y Male came by EMS A&0x3 c/o of episode of syncope and incomprehensible speech.  PMH of Afib on. Pt was eating breakfast with wife on bed and had a syncope episode that lasted 3 min and after woke up with incomprehensible speech that resolved to baseline quickly.  Denies CP, SOB, N/V/D, fever and chills. Pt has full facial symmetry, PERRL, no arm drift, no slurred speech, no weakness on either side, no JVD and trach deviation, lung sounds clear and adequate, s1 and s2 heart sounds present, +2 pulses present in all extremities with full ROM and equal strength, ABD soft, non distended and non tender, ABD sounds clear, no urinary symptoms. VSS 79y Male came by EMS A&0x3 c/o of episode of syncope and incomprehensible speech.  PMH of Afib on apixaban. Pt was eating breakfast with wife on bed and had a syncope episode that lasted 3 min and after woke up with incomprehensible speech that resolved to baseline quickly, denies trauma to the head.  Denies CP, SOB, N/V/D, fever and chills. Pt has full facial symmetry, PERRL, no arm drift, no slurred speech, no weakness on either side, no JVD and trach deviation, lung sounds clear and adequate, s1 and s2 heart sounds present, +2 pulses present in all extremities with full ROM and equal strength, ABD soft, non distended and non tender, ABD sounds clear, no urinary symptoms. VSS with Afib on cardiac monitor.

## 2019-04-29 NOTE — H&P ADULT - PROBLEM SELECTOR PLAN 4
- dx with RICH 04/2018  - c/w Eliquis - CHADSVASC 3, c/b SHERRY appendage thrombus  - c/w Eliquis   - c/w rate control Toprol XL 50mg daily

## 2019-04-29 NOTE — CHART NOTE - NSCHARTNOTEFT_GEN_A_CORE
after speaking with HF fellow and Medicine attending  Hydralazine 50mg PO TID ordered   Isordil 10mg PO TID ordered  cont to hold Bumex and Aldactone   Normal Saline 250cc Iv bolus ordered X1   pt cont to be orthostatic    will cont to monitor

## 2019-04-29 NOTE — H&P ADULT - PROBLEM SELECTOR PLAN 2
- TTE 04/04/19 biventricular dysfunction, EF 25%, moderate MR/TR, repeat TTE with improvement of EF 40-45%  - Hyponatremic to 126, lactate elevated to 3.5, awaiting repeat   - c/w spironolactone 25mg daily, Bumex 2mg BID, hydralazine 100mg TID, isosorbide dinitrate 20mg TID, metoprolol XL 50mg daily   - fluid restrict 1200ccs, strict I/Os, daily weights   - follows with Dr. Ronny Newman as an outpatient - euvolemic on exam   - TTE 04/04/19 biventricular dysfunction, EF 25%, moderate MR/TR, repeat TTE with improvement of EF 40-45%  - Hyponatremic to 126, lactate elevated to 3.5, awaiting repeat   - c/w spironolactone 25mg daily, Bumex 2mg BID, hydralazine 100mg TID, isosorbide dinitrate 20mg TID, metoprolol XL 50mg daily   - fluid restrict 1200ccs, strict I/Os, daily weights   - follows with Dr. Ronny Newman as an outpatient, HF to follow inpatient f/u recs - euvolemic on exam   - TTE 04/04/19 biventricular dysfunction, EF 25%, moderate MR/TR, repeat TTE with improvement of EF 40-45%  - Hyponatremic to 126, lactate elevated to 3.5, awaiting repeat   - c/w spironolactone 25mg daily, hydralazine 100mg TID, isosorbide dinitrate 20mg TID, metoprolol XL 50mg daily, hold Bumex for now as above  - strict I/Os, daily weights   - follows with Dr. Ronny Newman as an outpatient, HF to follow inpatient f/u recs - euvolemic on exam   - TTE 04/04/19 biventricular dysfunction, EF 25%, moderate MR/TR, repeat TTE with improvement of EF 40-45%  - Hyponatremic to 126, lactate elevated to 3.5, awaiting repeat   - c/w spironolactone 25mg daily, hydralazine 100mg TID, isosorbide dinitrate 20mg TID, metoprolol XL 50mg daily, hold Bumex and spironolactone for now as above  - strict I/Os, daily weights   - follows with Dr. Ronny Newman as an outpatient, HF to follow inpatient f/u recs - Na 126, baseline 130s, euvolemic on exam  - on previous admission attributed to hypervolemic hyponatremia in setting of CHF, however pt. currently euvolemic on exam, CXR clear, BNP improved, hyponatremia more c/w hypovolemic hyponatremia  - f/u urine na, osm, serum osm, hold off fluid restricted diet, hold bumex for now  - trend BMP, if Na will consider a trial of IVF (after discussion with CHF)  -can consider nephrology cs, Dr. Ivey

## 2019-04-29 NOTE — H&P ADULT - PROBLEM SELECTOR PLAN 7
- DVT ppx: therapeutically anticoagulated on Eliquis  - DIET: DASH/TLC + Fluid Restricted 1200cc fluid restriction - DVT ppx: therapeutically anticoagulated on Eliquis  - DIET: DASH/TLC

## 2019-04-29 NOTE — H&P ADULT - ASSESSMENT
78 y/o M, some english but mostly gay/[place] speaking, [place], recently diagnosed with HFrEF (EF 25% --> 40-45% 04/2019), afib with SHERRY thrombus on Eliquis, CKD IV who presents after syncopal episode. 79M with recently diagnosed with HFrEF (EF 25% --> 40-45% 04/2019), afib with SHERRY thrombus on Eliquis, CKD IV who presents after syncopal episode and transient aphasia possibly 2/2 TIA vs. vasovagal syncope vs. orthostatic hypotension. 79M with recently diagnosed with HFrEF (EF 25% --> 40-45% 04/2019), afib with SHERRY thrombus on Eliquis, CKD IV who presents after syncopal episode and transient aphasia possibly 2/2 orthostatic hypotension from diuresis vs. TIA.

## 2019-04-29 NOTE — H&P ADULT - NSHPREVIEWOFSYSTEMS_GEN_ALL_CORE
REVIEW OF SYSTEMS:    CONSTITUTIONAL: No weakness, fevers or chills  EYES: No visual changes  ENT: No vertigo or throat pain   NECK: No pain or stiffness  RESPIRATORY: No cough, wheezing, hemoptysis; No shortness of breath  CARDIOVASCULAR: No chest pain or palpitations  GASTROINTESTINAL: No abdominal or epigastric pain. No nausea, vomiting, or hematemesis; No diarrhea or constipation. No melena or hematochezia.  GENITOURINARY: No dysuria, frequency or hematuria  NEUROLOGICAL: +syncope, No numbness or weakness  SKIN: No itching, rashes

## 2019-04-29 NOTE — ED PROVIDER NOTE - PROGRESS NOTE DETAILS
Francois MILNER: Spoke with HF Admit for Tele arrhythmia watch, hyponatremia treatment and HF team eval for cause of syncope

## 2019-04-29 NOTE — H&P ADULT - PROBLEM SELECTOR PLAN 1
- episode of syncope with LOC for a few minutes  - CTH negative for acute changes  - low suspicion for seizure-activity, hold off EEG   - no electrolyte abnormalities on chemistry panel   - will check orthostats, will repeat TTE, consult cardiology   - monitor on telemetry - episode of syncope/LOC for two minutes and difficulty speaking  - possibly TIA vs. vasovagal syncope vs. orthostatic hypotension  - CTH negative for acute changes  - low suspicion for seizure-activity, hold off EEG   - no electrolyte abnormalities on chemistry panel   - orthostatic blood pressure / HR positive, likely multifactorial from poor age associated reflex and multiple BP lowering medications, will recommend to stagger AM medications, hold off IVF given systolic HF  - f/u repeat TTE, f/u cardiology recommendations, tele monitoring - episode of syncope/LOC for two minutes and transient aphasia  - possibly TIA vs. vasovagal syncope vs. orthostatic hypotension  - CTH negative for acute changes  - low suspicion for seizure-activity, hold off EEG   - no electrolyte abnormalities on chemistry panel   - orthostatic blood pressure / HR positive, likely multifactorial from poor age associated reflex and multiple BP lowering medications, will recommend to stagger AM medications, hold off IVF given systolic HF  - f/u repeat TTE, f/u cardiology recommendations, tele monitoring - episode of syncope/LOC for two minutes and transient aphasia  - possibly 2/2 orthostatic hypotension from diuresis vs. TIA  - CTH negative for acute changes  - low suspicion for seizure-activity, hold off EEG   - no electrolyte abnormalities on chemistry panel   - orthostatic blood pressure / HR positive, likely multifactorial from poor age associated reflex and multiple BP lowering medications and diuresis, will hold bumex for now and hold off fluid restriction, encourage PO  - f/u repeat TTE, f/u cardiology recommendations, tele monitoring, neuro checks - episode of syncope/LOC for two minutes and transient aphasia  - possibly 2/2 orthostatic hypotension from diuresis vs. TIA  - CTH negative for acute changes  - low suspicion for seizure-activity, hold off EEG   - no electrolyte abnormalities on chemistry panel   - orthostatic blood pressure / HR positive, likely multifactorial from poor age associated reflex and multiple BP lowering medications and diuresis, will hold bumex for now and hold off fluid restriction, encourage PO  - f/u cardiology recommendations, tele monitoring, neuro checks - episode of syncope/LOC for two minutes and transient aphasia  - possibly 2/2 orthostatic hypotension from diuresis vs. TIA  - CTH negative for acute changes  - low suspicion for seizure-activity, hold off EEG   - no electrolyte abnormalities on chemistry panel   - orthostatic blood pressure / HR positive, likely multifactorial from poor age associated reflex and multiple BP lowering medications and diuresis, will hold bumex and spironolactone for now and hold off fluid restriction, encourage PO  - f/u cardiology recommendations, tele monitoring, neuro checks episode of syncope/LOC for two minutes and transient aphasia. possibly 2/2 orthostatic hypotension from overdiuresis vs. TIA  vs cardiogenic  - CTH negative for acute changes  - low suspicion for seizure-activity as event was witnessed by family, hold off EEG   -management of hyponatremia-- liberalize diet, encourage po  - orthostatic blood pressure / HR positive, likely multifactorial from poor age associated reflex and multiple BP lowering medications and diuresis, will hold bumex and spironolactone for now and hold off fluid restriction, encourage PO  - f/u cardiology recommendations, tele monitoring, neuro checks q4h episode of syncope/LOC for two minutes and transient aphasia. possibly 2/2 orthostatic hypotension from overdiuresis vs. TIA  vs cardiogenic  - CTH negative for acute changes  - low suspicion for seizure-activity as event was witnessed by family, hold off EEG   -management of hyponatremia-- liberalize diet, encourage po  - orthostatic blood pressure / HR positive, likely multifactorial from poor age associated reflex and multiple BP lowering medications and diuresis, will hold bumex , imdur, hydralazine, spironolactone for now and hold off fluid restriction, encourage PO  - f/u cardiology recommendations, tele monitoring, neuro checks q4h

## 2019-04-29 NOTE — ED PROVIDER NOTE - OBJECTIVE STATEMENT
79 year-old male with history of    Patient took his medications this AM; 79 year-old male with history of HF, atrial fibrillation w/ known LV thrombus on AC, recently admitted for acute on chronic HF presents to the ER for syncopal episode.  Patient reports he was going to eat when he felt diffusely weak; he lay down on the bed and as per wife he was unresponsive for a few minutes before he came to.  Patient recalls stuttering his words; unable to  the spoon b/c of weakness.  Symptoms persisted for a few more minutes.  Upon EMS arrival patient was awake; back to baseline and ambulating.  No fevers, chills, nausea, vomiting, URI symptoms, chest pain, shortness of breath, abdominal pain, LE edema.  Patient followed by Ronny Newman  Heart Failure - [id number] 79 year-old male with history of HF, atrial fibrillation w/ known LV thrombus on AC, recently admitted for acute on chronic HF presents to the ER for syncopal episode.  Patient reports he was going to eat when he felt diffusely weak; he lay down on the bed. She started to spoon feed him, but then he was talking gibberish, not responding normally, then fell over to one side. Per wife he was unresponsive for a few minutes before he came to.  Patient recalls stuttering his words; unable to  the spoon b/c of weakness.  Symptoms persisted for a few more minutes (wife reports 8-10 min until back to normal).  Upon EMS arrival patient was awake; back to baseline and ambulating.  No fevers, chills, nausea, vomiting, URI symptoms, chest pain, shortness of breath, abdominal pain, LE edema.  Patient followed by Ronny Newman  Heart Failure - [phone]

## 2019-04-29 NOTE — CONSULT NOTE ADULT - SUBJECTIVE AND OBJECTIVE BOX
Patient seen and evaluated at bedside    Chief Complaint:    HPI:  79M with recently diagnosed with HFrEF (EF 25% --> 40-45% 04/2019), afib with SHERRY thrombus on Eliquis, CKD IV who presents after syncopal episode. Pt. woke up this morning, went to the bathroom and then sat down to have breakfast. He took his AM meds then proceeded to have breakfast when he had sudden onset difficult speaking and generalized weakness. He subsequently laid down on his bed, and per family was unresponsive for 2 minutes. No head trauma or seizure like activity. Family called EMS, however by the time they arrived pt. was awake, back to baseline, no focal neurological deficits. Pt. notes that at times when he gets up from a seated position he feels dizzy/lightheaded. Of note, pt. was recently discharged from the hospital 4/25. He had a prolonged stay for three weeks when he had initially presented with worsening abdominal distension and severe HEAD, attributed to newly diagnosed systolic heart failure (biventricular dysfunction, EF 25%, moderate MR/TR), requiring management milrinone assisted diuresis. He underwent LHC/RHC which showed non-obstructive CAD. NM Amyloidosis study was negative. Course was c/b afib with RVR, s/p RICH which showed a SHERRY thrombus and pt. started on anticoagulation. After discharge pt. had been doing well, adherent with dietary and fluid restrictions and medications. He denies headaches, head trauma, focal neurological deficits, cp, sob, abdominal pain, n/v/d, dysuria, change in urinary frequency, sick contacts recent travel.    Initial ED vitals: T 97.9F, HR 97, /61, RR 18, O2Sat 98% on RA.   Labs notable for Na 126, SCr. 1.81, Lactate 3.5  CTH unremarkable, CXR clear lungs (29 Apr 2019 13:14)      PMH:   Atrial fibrillation, unspecified type  Left ventricular thrombus  Heart failure, unspecified HF chronicity, unspecified heart failure type  No pertinent past medical history      PSH:   No significant past surgical history      Medications:   acetaminophen   Tablet .. 650 milliGRAM(s) Oral every 6 hours PRN  apixaban 5 milliGRAM(s) Oral every 12 hours  docusate sodium 100 milliGRAM(s) Oral three times a day  ergocalciferol 88043 Unit(s) Oral <User Schedule>  famotidine    Tablet 20 milliGRAM(s) Oral daily  hydrALAZINE 50 milliGRAM(s) Oral three times a day  isosorbide   dinitrate Tablet (ISORDIL) 10 milliGRAM(s) Oral three times a day  metoprolol succinate ER 50 milliGRAM(s) Oral daily  multivitamin 1 Tablet(s) Oral daily  polyethylene glycol 3350 17 Gram(s) Oral daily  senna 2 Tablet(s) Oral at bedtime  simethicone 80 milliGRAM(s) Chew three times a day PRN  sodium chloride 0.9% Bolus 250 milliLiter(s) IV Bolus once      Allergies:  erythromycin (Unknown)      FAMILY HISTORY:  FH: liver disease: unknown etiology, mother      Social History:  Smoking History:  Alcohol Use:  Drug Use:    Review of Systems:  REVIEW OF SYSTEMS:  CONSTITUTIONAL: No weakness, fevers or chills  EYES/ENT: No visual changes;  No dysphagia  NECK: No pain or stiffness  RESPIRATORY: No cough, wheezing, hemoptysis; No shortness of breath  CARDIOVASCULAR: No chest pain or palpitations; No lower extremity edema  GASTROINTESTINAL: No abdominal or epigastric pain. No nausea, vomiting, or hematemesis; No diarrhea or constipation. No melena or hematochezia.  BACK: No back pain  GENITOURINARY: No dysuria, frequency or hematuria  NEUROLOGICAL: No numbness or weakness  SKIN: No itching, burning, rashes, or lesions   All other review of systems is negative unless indicated above.    Physical Exam:  T(F): 98.3 (04-29), Max: 98.7 (04-29)  HR: 87 (04-29) (84 - 98)  BP: 127/93 (04-29) (109/61 - 127/93)  RR: 17 (04-29)  SpO2: 98% (04-29)  GENERAL: No acute distress, well-developed  HEAD:  Atraumatic, Normocephalic  ENT: EOMI, PERRLA, conjunctiva and sclera clear, Neck supple, No JVD, moist mucosa  CHEST/LUNG: Clear to auscultation bilaterally; No wheeze, equal breath sounds bilaterally   BACK: No spinal tenderness  HEART: Regular rate and rhythm; No murmurs, rubs, or gallops  ABDOMEN: Soft, Nontender, Nondistended; Bowel sounds present  EXTREMITIES:  No clubbing, cyanosis, or edema  PSYCH: Nl behavior, nl affect  NEUROLOGY: AAOx3, non-focal, cranial nerves intact  SKIN: Normal color, No rashes or lesions  LINES:    Cardiovascular Diagnostic Testing:    ECG: Personally reviewed    Echo:    Stress Testing:    Cath:    Interpretation of Telemetry:    Imaging:    Labs: Personally reviewed                        14.2   9.7   )-----------( 305      ( 29 Apr 2019 08:59 )             43.9     04-29    126<L>  |  85<L>  |  68<H>  ----------------------------<  232<H>  4.4   |  28  |  1.81<H>    Ca    9.3      29 Apr 2019 08:59  Phos  3.1     04-29  Mg     2.4     04-29    TPro  7.7  /  Alb  4.0  /  TBili  0.5  /  DBili  x   /  AST  27  /  ALT  45  /  AlkPhos  91  04-29    PT/INR - ( 29 Apr 2019 08:59 )   PT: 16.7 sec;   INR: 1.44 ratio         PTT - ( 29 Apr 2019 08:59 )  PTT:32.3 sec      Serum Pro-Brain Natriuretic Peptide: 3355 pg/mL (04-29 @ 08:59) Chief Complaint:  syncope    HPI:  79M with recently diagnosed with HFrEF (EF 25% --> 40-45% 04/2019), afib with SHERRY thrombus on Eliquis, CKD IV who presents after syncopal episode. Pt. woke up this morning, went to the bathroom and then sat down to have breakfast. He took his AM meds then proceeded to have breakfast when he had sudden onset difficult speaking and generalized weakness. He subsequently laid down on his bed, and per family was unresponsive for 2 minutes. No head trauma or seizure like activity. Family called EMS, however by the time they arrived pt. was awake, back to baseline, no focal neurological deficits. Pt. notes that at times when he gets up from a seated position he feels dizzy/lightheaded. Of note, pt. was recently discharged from the hospital 4/25. He had a prolonged stay for three weeks when he had initially presented with worsening abdominal distension and severe HEAD, attributed to newly diagnosed systolic heart failure (biventricular dysfunction, EF 25%, moderate MR/TR), requiring management milrinone assisted diuresis. He underwent LHC/RHC which showed non-obstructive CAD. NM Amyloidosis study was negative. Course was c/b afib with RVR, s/p RICH which showed a SHERRY thrombus and pt. started on anticoagulation. After discharge pt. had been doing well, adherent with dietary and fluid restrictions and medications. He denies headaches, head trauma, focal neurological deficits, cp, sob, abdominal pain, n/v/d, dysuria, change in urinary frequency, sick contacts recent travel.    Initial ED vitals: T 97.9F, HR 97, /61, RR 18, O2Sat 98% on RA.   Labs notable for Na 126, SCr. 1.81, Lactate 3.5  CTH unremarkable, CXR clear lungs (29 Apr 2019 13:14)      PMH:   Atrial fibrillation, unspecified type  Left ventricular thrombus  Heart failure, unspecified HF chronicity, unspecified heart failure type  No pertinent past medical history      PSH:   No significant past surgical history      Medications:   acetaminophen   Tablet .. 650 milliGRAM(s) Oral every 6 hours PRN  apixaban 5 milliGRAM(s) Oral every 12 hours  docusate sodium 100 milliGRAM(s) Oral three times a day  ergocalciferol 33811 Unit(s) Oral <User Schedule>  famotidine    Tablet 20 milliGRAM(s) Oral daily  hydrALAZINE 50 milliGRAM(s) Oral three times a day  isosorbide   dinitrate Tablet (ISORDIL) 10 milliGRAM(s) Oral three times a day  metoprolol succinate ER 50 milliGRAM(s) Oral daily  multivitamin 1 Tablet(s) Oral daily  polyethylene glycol 3350 17 Gram(s) Oral daily  senna 2 Tablet(s) Oral at bedtime  simethicone 80 milliGRAM(s) Chew three times a day PRN  sodium chloride 0.9% Bolus 250 milliLiter(s) IV Bolus once      Allergies:  erythromycin (Unknown)      FAMILY HISTORY:  FH: liver disease: unknown etiology, mother      Social History:  Smoking History:  Alcohol Use:  Drug Use:    Review of Systems:  REVIEW OF SYSTEMS:  CONSTITUTIONAL: No weakness, fevers or chills  EYES/ENT: No visual changes;  No dysphagia  NECK: No pain or stiffness  RESPIRATORY: No cough, wheezing, hemoptysis; No shortness of breath  CARDIOVASCULAR: No chest pain or palpitations; No lower extremity edema  GASTROINTESTINAL: No abdominal or epigastric pain. No nausea, vomiting, or hematemesis; No diarrhea or constipation. No melena or hematochezia.  BACK: No back pain  GENITOURINARY: No dysuria, frequency or hematuria  NEUROLOGICAL: No numbness or weakness  SKIN: No itching, burning, rashes, or lesions   All other review of systems is negative unless indicated above.    Physical Exam:  T(F): 98.3 (04-29), Max: 98.7 (04-29)  HR: 87 (04-29) (84 - 98)  BP: 127/93 (04-29) (109/61 - 127/93)  RR: 17 (04-29)  SpO2: 98% (04-29)  GENERAL: No acute distress  HEAD:  Atraumatic, Normocephalic  ENT: Normal JVP  CHEST/LUNG: Clear to auscultation bilaterally  HEART: Irregularly irregular normal rate,   ABDOMEN: Soft, Nontender  EXTREMITIES:  No LE edema  NEUROLOGY: AAOx3, non-focal      Cardiovascular Diagnostic Testing:    CXR:   Clear lungs    TTE:  Conclusions:  1. Tethered mitral valve leaflets with normal opening.  Mild-moderate mitral regurgitation.  2. Left atrial appendage thrombus ( 12mm x 8mm) seen in the  tip of the appendage.  3. Moderate global left ventricular systolic dysfunction.  4. Normal right ventricular size and function.      Labs: Personally reviewed                        14.2   9.7   )-----------( 305      ( 29 Apr 2019 08:59 )             43.9     04-29    126<L>  |  85<L>  |  68<H>  ----------------------------<  232<H>  4.4   |  28  |  1.81<H>    Ca    9.3      29 Apr 2019 08:59  Phos  3.1     04-29  Mg     2.4     04-29    TPro  7.7  /  Alb  4.0  /  TBili  0.5  /  DBili  x   /  AST  27  /  ALT  45  /  AlkPhos  91  04-29    PT/INR - ( 29 Apr 2019 08:59 )   PT: 16.7 sec;   INR: 1.44 ratio         PTT - ( 29 Apr 2019 08:59 )  PTT:32.3 sec      Serum Pro-Brain Natriuretic Peptide: 3355 pg/mL (04-29 @ 08:59)

## 2019-04-29 NOTE — H&P ADULT - HISTORY OF PRESENT ILLNESS
80 y/o M, some english but mostly gay/Croatian speaking, [place], recently diagnosed with HFrEF (EF 25% --> 40-45% 04/2019), afib with SHERRY thrombus on Eliquis, CKD IV who presents after syncopal episode. Pt. had gotten up to eat, felt weak and needed to lay back down. Per wife pt. was unresponsive for a few minutes. Upon arrival of EMS, pt. was found to be awake, back to baseline, ambulating. Of note, pt. was recently discharged from the hospital 4/25. He had a prolonged stay for three weeks when he had initially presented with worsening abdominal distension and severe HEAD, attributed to newly diagnosed systolic heart failure (biventricular dysfunction, EF 25%, moderate MR/TR), requiring management milrinone assisted diuresis. He underwent LHC/RHC which showed non-obstructive CAD. NM Amyloidosis study was negative. Course was c/b afib with RVR, s/p RICH which showed a SHERRY thrombus and pt. started on anticoagulation. After discharge pt. had been doing well, adherent with diet and medications, until today when he had the syncopal event. He denies headaches, head trauma, focal neurological deficits, cp, sob, abdominal pain, n/v/d, dysuria, change in urinary frequency, sick contacts recent travel.    Initial ED vitals: T 97.9F, HR 97, /61, RR 18, O2Sat 98% on RA.   Labs notable for Na 126, SCr. 1.81, Lactate 3.5  CTH unremarkable, CXR clear lungs 78 y/o M, some english but mostly gay/Khmer speaking, [facility], recently diagnosed with HFrEF (EF 25% --> 40-45% 04/2019), afib with SHERRY thrombus on Eliquis, CKD IV who presents after syncopal episode.  Pt. woke up this morning, went to the bathroom and then sat down to have breakfast. He took his AM meds then proceeded to have breakfast when he had sudden onset difficult speaking and generalized weakness. He subsequently laid down on his bed, and per family was unresponsive for 2 minutes. No head trauma or seizure like activity. Family called EMS, however by the time they arrived pt. was awake, back to baseline, no focal neurological deficits. Pt. notes that at times when he gets up from a seated position he feels dizzy/lightheaded. Of note, pt. was recently discharged from the hospital 4/25. He had a prolonged stay for three weeks when he had initially presented with worsening abdominal distension and severe HEAD, attributed to newly diagnosed systolic heart failure (biventricular dysfunction, EF 25%, moderate MR/TR), requiring management milrinone assisted diuresis. He underwent LHC/RHC which showed non-obstructive CAD. NM Amyloidosis study was negative. Course was c/b afib with RVR, s/p RICH which showed a SHERRY thrombus and pt. started on anticoagulation. After discharge pt. had been doing well, adherent with diet and medications, until today when he had the syncopal event. He denies headaches, head trauma, focal neurological deficits, cp, sob, abdominal pain, n/v/d, dysuria, change in urinary frequency, sick contacts recent travel.    Initial ED vitals: T 97.9F, HR 97, /61, RR 18, O2Sat 98% on RA.   Labs notable for Na 126, SCr. 1.81, Lactate 3.5  CTH unremarkable, CXR clear lungs 79M with recently diagnosed with HFrEF (EF 25% --> 40-45% 04/2019), afib with SHERRY thrombus on Eliquis, CKD IV who presents after syncopal episode. Pt. woke up this morning, went to the bathroom and then sat down to have breakfast. He took his AM meds then proceeded to have breakfast when he had sudden onset difficult speaking and generalized weakness. He subsequently laid down on his bed, and per family was unresponsive for 2 minutes. No head trauma or seizure like activity. Family called EMS, however by the time they arrived pt. was awake, back to baseline, no focal neurological deficits. Pt. notes that at times when he gets up from a seated position he feels dizzy/lightheaded. Of note, pt. was recently discharged from the hospital 4/25. He had a prolonged stay for three weeks when he had initially presented with worsening abdominal distension and severe HEAD, attributed to newly diagnosed systolic heart failure (biventricular dysfunction, EF 25%, moderate MR/TR), requiring management milrinone assisted diuresis. He underwent LHC/RHC which showed non-obstructive CAD. NM Amyloidosis study was negative. Course was c/b afib with RVR, s/p RICH which showed a SHERRY thrombus and pt. started on anticoagulation. After discharge pt. had been doing well, adherent with diet and medications, until today when he had the syncopal event. He denies headaches, head trauma, focal neurological deficits, cp, sob, abdominal pain, n/v/d, dysuria, change in urinary frequency, sick contacts recent travel.    Initial ED vitals: T 97.9F, HR 97, /61, RR 18, O2Sat 98% on RA.   Labs notable for Na 126, SCr. 1.81, Lactate 3.5  CTH unremarkable, CXR clear lungs 79M with recently diagnosed with HFrEF (EF 25% --> 40-45% 04/2019), afib with SHERRY thrombus on Eliquis, CKD IV who presents after syncopal episode. Pt. woke up this morning, went to the bathroom and then sat down to have breakfast. He took his AM meds then proceeded to have breakfast when he had sudden onset difficult speaking and generalized weakness. He subsequently laid down on his bed, and per family was unresponsive for 2 minutes. No head trauma or seizure like activity. Family called EMS, however by the time they arrived pt. was awake, back to baseline, no focal neurological deficits. Pt. notes that at times when he gets up from a seated position he feels dizzy/lightheaded. Of note, pt. was recently discharged from the hospital 4/25. He had a prolonged stay for three weeks when he had initially presented with worsening abdominal distension and severe HEAD, attributed to newly diagnosed systolic heart failure (biventricular dysfunction, EF 25%, moderate MR/TR), requiring management milrinone assisted diuresis. He underwent LHC/RHC which showed non-obstructive CAD. NM Amyloidosis study was negative. Course was c/b afib with RVR, s/p RICH which showed a SHERRY thrombus and pt. started on anticoagulation. After discharge pt. had been doing well, adherent with dietary and fluid restrictions and medications. He denies headaches, head trauma, focal neurological deficits, cp, sob, abdominal pain, n/v/d, dysuria, change in urinary frequency, sick contacts recent travel.    Initial ED vitals: T 97.9F, HR 97, /61, RR 18, O2Sat 98% on RA.   Labs notable for Na 126, SCr. 1.81, Lactate 3.5  CTH unremarkable, CXR clear lungs

## 2019-04-29 NOTE — H&P ADULT - PROBLEM SELECTOR PLAN 3
- CHADSVASC 3, c/b SHERRY appendage thrombus  - c/w Eliquis   - c/w rate control Toprol XL 50mg daily - euvolemic on exam   - TTE 04/04/19 biventricular dysfunction, EF 25%, moderate MR/TR, repeat TTE with improvement of EF 40-45%  - Hyponatremic to 126, lactate elevated to 3.5, awaiting repeat   - c/w spironolactone 25mg daily, hydralazine 100mg TID, isosorbide dinitrate 20mg TID, metoprolol XL 50mg daily, hold Bumex and spironolactone for now as above  - strict I/Os, daily weights   - follows with Dr. Ronny Newman as an outpatient, HF to follow inpatient f/u recs - euvolemic on exam   - TTE 04/04/19 biventricular dysfunction, EF 25%, moderate MR/TR, repeat TTE with improvement of EF 40-45%  - Hyponatremic to 126, lactate elevated to 3.5, awaiting repeat. suspect hypovolemia from overdiuresis  - c/w metoprolol XL 50mg daily, hold Bumex, hydralazine, imdur and spironolactone for now as above  - strict I/Os, daily weights   - follows with Dr. Ronny Newman as an outpatient, HF to follow inpatient f/u recs

## 2019-04-29 NOTE — H&P ADULT - PROBLEM SELECTOR PLAN 6
- Na 126, baseline 130s  - chronic hyponatremia in setting of CHF, required tolvaptan during last admission  - f/u urine na, osm, serum osm, c/w fluid restriction 1200cc  - trend BMP, will call nephrologist Dr. Ivey - Na 126, baseline 130s, euvolemic on exam  - chronic hyponatremia in setting of CHF, required tolvaptan during last admission  - f/u urine na, osm, serum osm, c/w fluid restriction 1200cc  - trend BMP, if Na worsens call nephrologist Dr. Ivey - Na 126, baseline 130s, euvolemic on exam  - on previous admission attributed to hypervolemic hyponatremia in setting of CHF, however pt. currently euvolemic on exam, CXR clear, BNP improved, hyponatremia more c/w hypovolemic hyponatremia  - f/u urine na, osm, serum osm, hold off fluid restricted diet, hold bumex for now  - trend BMP, if Na worsens call nephrologist Dr. Ivey - SCr. 1.8 (baseline 2)  - trend BMP, renally dose medications

## 2019-04-29 NOTE — CHART NOTE - NSCHARTNOTEFT_GEN_A_CORE
[facility] Accept Note    Transfer from: (  ) Medicine    (  ) Telemetry     (   ) RCU        (    ) Palliative         (   ) Stroke Unit          (  X ) _______ED___________    Accepting physician: Osmin Young     HPI:79M with recently diagnosed with HFrEF (EF 25% --> 40-45% 04/2019), afib with SHERRY thrombus on Eliquis, CKD IV who presents after syncopal episode. Pt. woke up this morning, went to the bathroom and then sat down to have breakfast. He took his AM meds then proceeded to have breakfast when he had sudden onset difficult speaking and generalized weakness. He subsequently laid down on his bed, and per family was unresponsive for 2 minutes. No head trauma or seizure like activity. Family called EMS, however by the time they arrived pt. was awake, back to baseline, no focal neurological deficits. Pt. notes that at times when he gets up from a seated position he feels dizzy/lightheaded. Of note, pt. was recently discharged from the hospital 4/25. He had a prolonged stay for three weeks when he had initially presented with worsening abdominal distension and severe HEAD, attributed to newly diagnosed systolic heart failure (biventricular dysfunction, EF 25%, moderate MR/TR), requiring management milrinone assisted diuresis. He underwent LHC/RHC which showed non-obstructive CAD. NM Amyloidosis study was negative. Course was c/b afib with RVR, s/p RICH which showed a SHERRY thrombus and pt. started on anticoagulation. After discharge pt. had been doing well, adherent with diet and medications, until today when he had the syncopal event. He denies headaches, head trauma, focal neurological deficits, cp, sob, abdominal pain, n/v/d, dysuria, change in urinary frequency, sick contacts recent travel.      SUBJECTIVE DATA: Patient currently asymptomatic, denies: dizziness, chest pain, dyspnea, HA, n/v/d.       OBJECTIVE DATA: Patient arrives from ED to [facility], patient seen and examined.    Physical Exam:  Appearance: Normal  Eyes: PERRL, EOMI  HENT: Normal oral muscosa, NC/AT  Cardiovascular: S1S2, IRRegular, No M/R/G, no JVD, No Lower extremity edema  Respiratory: Clear to auscultation bilaterally  Gastrointestinal: Soft, Non tender, Normal Bowel Sounds  Musculoskeletal: No clubbing, No joint deformity   Neurologic: Non-focal deficits   Lymphatic: No lymphadenopathy  Psychiatry: AAOx3, Mood & affect appropriate  Skin: No rashes, No ecchymoses, No cyanosis    Vital Signs Last 24 Hrs  T(C): 36.8 (29 Apr 2019 13:30), Max: 37.1 (29 Apr 2019 08:33)  T(F): 98.3 (29 Apr 2019 13:30), Max: 98.7 (29 Apr 2019 08:33)  HR: 87 (29 Apr 2019 13:30) (84 - 98)  BP: 127/93 (29 Apr 2019 13:30) (109/61 - 127/93)  BP(mean): --  RR: 17 (29 Apr 2019 13:30) (17 - 19)  SpO2: 98% (29 Apr 2019 13:30) (98% - 100%)  I&O's Summary      Allergies: Erythromycin       MEDICATIONS  (STANDING):  apixaban 5 milliGRAM(s) Oral every 12 hours  buMETAnide 2 milliGRAM(s) Oral two times a day  docusate sodium 100 milliGRAM(s) Oral three times a day  ergocalciferol 69136 Unit(s) Oral <User Schedule>  famotidine    Tablet 20 milliGRAM(s) Oral daily  hydrALAZINE 100 milliGRAM(s) Oral every 8 hours  isosorbide   dinitrate Tablet (ISORDIL) 20 milliGRAM(s) Oral three times a day  metoprolol succinate ER 50 milliGRAM(s) Oral daily  multivitamin 1 Tablet(s) Oral daily  polyethylene glycol 3350 17 Gram(s) Oral daily  senna 2 Tablet(s) Oral at bedtime  spironolactone 25 milliGRAM(s) Oral daily    MEDICATIONS  (PRN):  acetaminophen   Tablet .. 650 milliGRAM(s) Oral every 6 hours PRN Temp greater or equal to 38C (100.4F), Mild Pain (1 - 3)  simethicone 80 milliGRAM(s) Chew three times a day PRN Dyspepsia      LABS                                            14.2                  Neurophils% (auto):   x      (04-29 @ 08:59):    9.7  )-----------(305          Lymphocytes% (auto):  x                                             43.9                   Eosinphils% (auto):   x        Manual%: Neutrophils x    ; Lymphocytes x    ; Eosinophils x    ; Bands%: x    ; Blasts x                                    126    |  85     |  68                  Calcium: 9.3   / iCa: x      (04-29 @ 08:59)    ----------------------------<  232       Magnesium: 2.4                              4.4     |  28     |  1.81             Phosphorous: 3.1      TPro  7.7    /  Alb  4.0    /  TBili  0.5    /  DBili  x      /  AST  27     /  ALT  45     /  AlkPhos  91     29 Apr 2019 08:59    ( 04-29 @ 08:59 )   PT: 16.7 sec;   INR: 1.44 ratio  aPTT: 32.3 sec      EKG:  < from: 12 Lead ECG (04.29.19 @ 08:27) >      Ventricular Rate 90 BPM    Atrial Rate 394 BPM    QRS Duration 82 ms    Q-T Interval 360 ms    QTC Calculation(Bezet) 440 ms    R Axis -6 degrees    T Axis 70 degrees    Diagnosis Line ATRIAL FIBRILLATION  ABNORMAL ECG    Confirmed by ATTENDING, ED([id number]),  CHERYL BARAJAS ([id number]) on 4/29/2019 8:31:04 AM    < end of copied text >      Telemetry: Afib 60-90's with occasional PVCs     Echo:    < from: Transesophageal Echocardiogram w/o TTE (04.23.19 @ 15:57) >    1. Tethered mitral valve leaflets with normal opening.  Mild-moderate mitral regurgitation.  2. Left atrial appendage thrombus ( 12mm x 8mm) seen in the  tip of the appendage.  3. Moderate global left ventricular systolic dysfunction.  4. Normal right ventricular size and function.  Discussed with EPS at the time of RICH    < end of copied text >        Imaging    < from: Xray Chest 2 Views PA/Lat (04.29.19 @ 09:06) >    EXAM:  XR CHEST PA LAT 2V                            PROCEDURE DATE:  04/29/2019            INTERPRETATION:  HISTORY: Chest pain    TECHNIQUE: PA and lateral views of the chest were obtained.    COMPARISON: 4/20/2019    FINDINGS:  The cardiac silhouette is normal in size. There are no focal   consolidations or pleural effusions. The hilar and mediastinal structures   appear unremarkable. The osseous structures are intact.    IMPRESSION: Clear lungs.    < end of copied text >        ASSESSMENT & PLAN:     79M with recently diagnosed with HFrEF (EF 25% --> 40-45% 04/2019), afib with SHERRY thrombus on Eliquis, CKD IV who presents after syncopal episode and transient aphasia possibly 2/2 TIA vs. vasovagal syncope vs. orthostatic hypotension.     Problem/Plan - 1:  ·  Problem: Syncope.  Plan: - episode of syncope/LOC for two minutes and transient aphasia  - possibly TIA vs. vasovagal syncope vs. orthostatic hypotension  - CTH negative for acute changes  - neuro checks   - low suspicion for seizure-activity, hold off EEG   - Na+ 126, lactate elevated to 3.5, ( awaiting repeat )  - orthostatic blood pressure / HR positive,  hold off IVF given systolic HF for now however  might administer 200cc IV bolus pending medical attending approval ( pt euvolemic on exam)   - chest xray clear   - cont to monitor on telemetry   - follows with Dr. Ronny Newman as an outpatient for HF   - HF team to f/u while in hospital     Problem/Plan - 2:  ·  Problem: Atrial fibrillation/ SHERRY thrombus   - c/w Eliquis   - c/w BB for rate control     Problem/Plan - 3:  ·  Problem: CKD (chronic kidney disease), stage IV ? low NA  -  Plan: - SCr. 1.8 (baseline 2)  - trend BMP, renally dose medications.   -  Na 126 (baseline 130s)   - hold off fluid restricted diet, hold bumex for now ( as per medicine )   - chronic hyponatremia in setting of CHF ( required tolvaptan during last admission)  - trend BMP, if Na worsens call nephrologist Dr. Ivey.    above plan/treatment discussed with medicine attending       Jamil Buckley   Cardiology NP  EXT [id number] [facility] Accept Note    Transfer from: (  ) Medicine    (  ) Telemetry     (   ) RCU        (    ) Palliative         (   ) Stroke Unit          (  X ) _______ED___________    Accepting physician: Osmin Young     HPI:79M with recently diagnosed with HFrEF (EF 25% --> 40-45% 04/2019), afib with SHERRY thrombus on Eliquis, CKD IV who presents after syncopal episode. Pt. woke up this morning, went to the bathroom and then sat down to have breakfast. He took his AM meds then proceeded to have breakfast when he had sudden onset difficult speaking and generalized weakness. He subsequently laid down on his bed, and per family was unresponsive for 2 minutes. No head trauma or seizure like activity. Family called EMS, however by the time they arrived pt. was awake, back to baseline, no focal neurological deficits. Pt. notes that at times when he gets up from a seated position he feels dizzy/lightheaded. Of note, pt. was recently discharged from the hospital 4/25. He had a prolonged stay for three weeks when he had initially presented with worsening abdominal distension and severe HEAD, attributed to newly diagnosed systolic heart failure (biventricular dysfunction, EF 25%, moderate MR/TR), requiring management milrinone assisted diuresis. He underwent LHC/RHC which showed non-obstructive CAD. NM Amyloidosis study was negative. Course was c/b afib with RVR, s/p RICH which showed a SHERRY thrombus and pt. started on anticoagulation. After discharge pt. had been doing well, adherent with diet and medications, until today when he had the syncopal event. He denies headaches, head trauma, focal neurological deficits, cp, sob, abdominal pain, n/v/d, dysuria, change in urinary frequency, sick contacts recent travel.      SUBJECTIVE DATA: Patient currently asymptomatic, denies: dizziness, chest pain, dyspnea, HA, n/v/d.       OBJECTIVE DATA: Patient arrives from ED to [facility], patient seen and examined.    Physical Exam:  Appearance: Normal  Eyes: PERRL, EOMI  HENT: Normal oral mus[place]a, NC/AT  Cardiovascular: S1S2, IRRegular, No M/R/G, no JVD, No Lower extremity edema  Respiratory: Clear to auscultation bilaterally  Gastrointestinal: Soft, Non tender, Normal Bowel Sounds  Musculoskeletal: No clubbing, No joint deformity   Neurologic: Non-focal deficits   Lymphatic: No lymphadenopathy  Psychiatry: AAOx3, Mood & affect appropriate  Skin: No rashes, No ecchymoses, No cyanosis    Vital Signs Last 24 Hrs  T(C): 36.8 (29 Apr 2019 13:30), Max: 37.1 (29 Apr 2019 08:33)  T(F): 98.3 (29 Apr 2019 13:30), Max: 98.7 (29 Apr 2019 08:33)  HR: 87 (29 Apr 2019 13:30) (84 - 98)  BP: 127/93 (29 Apr 2019 13:30) (109/61 - 127/93)  BP(mean): --  RR: 17 (29 Apr 2019 13:30) (17 - 19)  SpO2: 98% (29 Apr 2019 13:30) (98% - 100%)  I&O's Summary      Allergies: Erythromycin       MEDICATIONS  (STANDING):  apixaban 5 milliGRAM(s) Oral every 12 hours  buMETAnide 2 milliGRAM(s) Oral two times a day  docusate sodium 100 milliGRAM(s) Oral three times a day  ergocalciferol 43925 Unit(s) Oral <User Schedule>  famotidine    Tablet 20 milliGRAM(s) Oral daily  hydrALAZINE 100 milliGRAM(s) Oral every 8 hours  isosorbide   dinitrate Tablet (ISORDIL) 20 milliGRAM(s) Oral three times a day  metoprolol succinate ER 50 milliGRAM(s) Oral daily  multivitamin 1 Tablet(s) Oral daily  polyethylene glycol 3350 17 Gram(s) Oral daily  senna 2 Tablet(s) Oral at bedtime  spironolactone 25 milliGRAM(s) Oral daily    MEDICATIONS  (PRN):  acetaminophen   Tablet .. 650 milliGRAM(s) Oral every 6 hours PRN Temp greater or equal to 38C (100.4F), Mild Pain (1 - 3)  simethicone 80 milliGRAM(s) Chew three times a day PRN Dyspepsia      LABS                                            14.2                  Neurophils% (auto):   x      (04-29 @ 08:59):    9.7  )-----------(305          Lymphocytes% (auto):  x                                             43.9                   Eosinphils% (auto):   x        Manual%: Neutrophils x    ; Lymphocytes x    ; Eosinophils x    ; Bands%: x    ; Blasts x                                    126    |  85     |  68                  Calcium: 9.3   / iCa: x      (04-29 @ 08:59)    ----------------------------<  232       Magnesium: 2.4                              4.4     |  28     |  1.81             Phosphorous: 3.1      TPro  7.7    /  Alb  4.0    /  TBili  0.5    /  DBili  x      /  AST  27     /  ALT  45     /  AlkPhos  91     29 Apr 2019 08:59    ( 04-29 @ 08:59 )   PT: 16.7 sec;   INR: 1.44 ratio  aPTT: 32.3 sec      EKG:  < from: 12 Lead ECG (04.29.19 @ 08:27) >      Ventricular Rate 90 BPM    Atrial Rate 394 BPM    QRS Duration 82 ms    Q-T Interval 360 ms    QTC Calculation(Bezet) 440 ms    R Axis -6 degrees    T Axis 70 degrees    Diagnosis Line ATRIAL FIBRILLATION  ABNORMAL ECG    Confirmed by ATTENDING, ED([id number]),  CHERYL BARAJAS ([id number]) on 4/29/2019 8:31:04 AM    < end of copied text >      Telemetry: Afib 60-90's with occasional PVCs     Echo:    < from: Transesophageal Echocardiogram w/o TTE (04.23.19 @ 15:57) >    1. Tethered mitral valve leaflets with normal opening.  Mild-moderate mitral regurgitation.  2. Left atrial appendage thrombus ( 12mm x 8mm) seen in the  tip of the appendage.  3. Moderate global left ventricular systolic dysfunction.  4. Normal right ventricular size and function.  Discussed with EPS at the time of RICH    < end of copied text >        Imaging    < from: Xray Chest 2 Views PA/Lat (04.29.19 @ 09:06) >    EXAM:  XR CHEST PA LAT 2V                            PROCEDURE DATE:  04/29/2019            INTERPRETATION:  HISTORY: Chest pain    TECHNIQUE: PA and lateral views of the chest were obtained.    COMPARISON: 4/20/2019    FINDINGS:  The cardiac silhouette is normal in size. There are no focal   consolidations or pleural effusions. The hilar and mediastinal structures   appear unremarkable. The osseous structures are intact.    IMPRESSION: Clear lungs.    < end of copied text >        ASSESSMENT & PLAN:     79M with recently diagnosed with HFrEF (EF 25% --> 40-45% 04/2019), afib with SHERRY thrombus on Eliquis, CKD IV who presents after syncopal episode and transient aphasia possibly 2/2 TIA vs. vasovagal syncope vs. orthostatic hypotension.     Problem/Plan - 1:  ·  Problem: Syncope.  Plan: - episode of syncope/LOC for two minutes and transient aphasia  - possibly TIA vs. vasovagal syncope vs. orthostatic hypotension  - CTH negative for acute changes  - neuro checks   - low suspicion for seizure-activity, hold off EEG   - Na+ 126, lactate elevated to 3.5, ( awaiting repeat )  - orthostatic blood pressure / HR positive,  hold off IVF given systolic HF for now however  might administer 200cc IV bolus pending medical attending approval ( pt euvolemic on exam)   - chest xray clear   - cont to monitor on telemetry   - follows with Dr. Ronny Newman as an outpatient for HF   - HF team to f/u while in hospital     Problem/Plan - 2:  ·  Problem: Atrial fibrillation/ SHERRY thrombus   - c/w Eliquis   - c/w BB for rate control     Problem/Plan - 3:  ·  Problem: CKD (chronic kidney disease), stage IV / low NA  -  Plan: - SCr. 1.8 (baseline 2)  - trend BMP, renally dose medications.   -  Na 126 (baseline 130s)   - hold off fluid restricted diet, hold bumex for now ( as per medicine )   - chronic hyponatremia in setting of CHF ( required tolvaptan during last admission)  - trend BMP, if Na worsens call nephrologist Dr. Ivey.    above plan/treatment discussed with medicine attending       Jamil Buckley   Cardiology NP  EXT [id number] [facility] Accept Note    Transfer from: (  ) Medicine    (  ) Telemetry     (   ) RCU        (    ) Palliative         (   ) Stroke Unit          (  X ) _______ED___________    Accepting physician: Osmin Young     HPI:79M with recently diagnosed with HFrEF (EF 25% --> 40-45% 04/2019), afib with SHERRY thrombus on Eliquis, CKD IV who presents after syncopal episode. Pt. woke up this morning, went to the bathroom and then sat down to have breakfast. He took his AM meds then proceeded to have breakfast when he had sudden onset difficult speaking and generalized weakness. He subsequently laid down on his bed, and per family was unresponsive for 2 minutes. No head trauma or seizure like activity. Family called EMS, however by the time they arrived pt. was awake, back to baseline, no focal neurological deficits. Pt. notes that at times when he gets up from a seated position he feels dizzy/lightheaded. Of note, pt. was recently discharged from the hospital 4/25. He had a prolonged stay for three weeks when he had initially presented with worsening abdominal distension and severe HEAD, attributed to newly diagnosed systolic heart failure (biventricular dysfunction, EF 25%, moderate MR/TR), requiring management milrinone assisted diuresis. He underwent LHC/RHC which showed non-obstructive CAD. NM Amyloidosis study was negative. Course was c/b afib with RVR, s/p RICH which showed a SHERRY thrombus and pt. started on anticoagulation. After discharge pt. had been doing well, adherent with diet and medications, until today when he had the syncopal event. He denies headaches, head trauma, focal neurological deficits, cp, sob, abdominal pain, n/v/d, dysuria, change in urinary frequency, sick contacts recent travel.      SUBJECTIVE DATA: Patient currently asymptomatic, denies: dizziness, chest pain, dyspnea, HA, n/v/d.       OBJECTIVE DATA: Patient arrives from ED to [facility], patient seen and examined.    Physical Exam:  Appearance: Normal  Eyes: PERRL, EOMI  HENT: Normal oral mus[place]a, NC/AT  Cardiovascular: S1S2, IRRegular, No M/R/G, no JVD, No Lower extremity edema  Respiratory: Clear to auscultation bilaterally  Gastrointestinal: Soft, Non tender, Normal Bowel Sounds  Musculoskeletal: No clubbing, No joint deformity   Neurologic: Non-focal deficits   Lymphatic: No lymphadenopathy  Psychiatry: AAOx3, Mood & affect appropriate  Skin: No rashes, No ecchymoses, No cyanosis    Vital Signs Last 24 Hrs  T(C): 36.8 (29 Apr 2019 13:30), Max: 37.1 (29 Apr 2019 08:33)  T(F): 98.3 (29 Apr 2019 13:30), Max: 98.7 (29 Apr 2019 08:33)  HR: 87 (29 Apr 2019 13:30) (84 - 98)  BP: 127/93 (29 Apr 2019 13:30) (109/61 - 127/93)  BP(mean): --  RR: 17 (29 Apr 2019 13:30) (17 - 19)  SpO2: 98% (29 Apr 2019 13:30) (98% - 100%)  I&O's Summary      Allergies: Erythromycin       MEDICATIONS  (STANDING):  apixaban 5 milliGRAM(s) Oral every 12 hours  buMETAnide 2 milliGRAM(s) Oral two times a day  docusate sodium 100 milliGRAM(s) Oral three times a day  ergocalciferol 35790 Unit(s) Oral <User Schedule>  famotidine    Tablet 20 milliGRAM(s) Oral daily  hydrALAZINE 100 milliGRAM(s) Oral every 8 hours  isosorbide   dinitrate Tablet (ISORDIL) 20 milliGRAM(s) Oral three times a day  metoprolol succinate ER 50 milliGRAM(s) Oral daily  multivitamin 1 Tablet(s) Oral daily  polyethylene glycol 3350 17 Gram(s) Oral daily  senna 2 Tablet(s) Oral at bedtime  spironolactone 25 milliGRAM(s) Oral daily    MEDICATIONS  (PRN):  acetaminophen   Tablet .. 650 milliGRAM(s) Oral every 6 hours PRN Temp greater or equal to 38C (100.4F), Mild Pain (1 - 3)  simethicone 80 milliGRAM(s) Chew three times a day PRN Dyspepsia      LABS                                            14.2                  Neurophils% (auto):   x      (04-29 @ 08:59):    9.7  )-----------(305          Lymphocytes% (auto):  x                                             43.9                   Eosinphils% (auto):   x        Manual%: Neutrophils x    ; Lymphocytes x    ; Eosinophils x    ; Bands%: x    ; Blasts x                                    126    |  85     |  68                  Calcium: 9.3   / iCa: x      (04-29 @ 08:59)    ----------------------------<  232       Magnesium: 2.4                              4.4     |  28     |  1.81             Phosphorous: 3.1      TPro  7.7    /  Alb  4.0    /  TBili  0.5    /  DBili  x      /  AST  27     /  ALT  45     /  AlkPhos  91     29 Apr 2019 08:59    ( 04-29 @ 08:59 )   PT: 16.7 sec;   INR: 1.44 ratio  aPTT: 32.3 sec      EKG:  < from: 12 Lead ECG (04.29.19 @ 08:27) >      Ventricular Rate 90 BPM    Atrial Rate 394 BPM    QRS Duration 82 ms    Q-T Interval 360 ms    QTC Calculation(Bezet) 440 ms    R Axis -6 degrees    T Axis 70 degrees    Diagnosis Line ATRIAL FIBRILLATION  ABNORMAL ECG    Confirmed by ATTENDING, ED([id number]),  CHERYL BARAJAS ([id number]) on 4/29/2019 8:31:04 AM    < end of copied text >      Telemetry: Afib 60-90's with occasional PVCs     Echo:    < from: Transesophageal Echocardiogram w/o TTE (04.23.19 @ 15:57) >    1. Tethered mitral valve leaflets with normal opening.  Mild-moderate mitral regurgitation.  2. Left atrial appendage thrombus ( 12mm x 8mm) seen in the  tip of the appendage.  3. Moderate global left ventricular systolic dysfunction.  4. Normal right ventricular size and function.  Discussed with EPS at the time of RICH    < end of copied text >        Imaging    < from: Xray Chest 2 Views PA/Lat (04.29.19 @ 09:06) >    EXAM:  XR CHEST PA LAT 2V                            PROCEDURE DATE:  04/29/2019            INTERPRETATION:  HISTORY: Chest pain    TECHNIQUE: PA and lateral views of the chest were obtained.    COMPARISON: 4/20/2019    FINDINGS:  The cardiac silhouette is normal in size. There are no focal   consolidations or pleural effusions. The hilar and mediastinal structures   appear unremarkable. The osseous structures are intact.    IMPRESSION: Clear lungs.    < end of copied text >        ASSESSMENT & PLAN:     79M with recently diagnosed with HFrEF (EF 25% --> 40-45% 04/2019), afib with SHERRY thrombus on Eliquis, CKD IV who presents after syncopal episode and transient aphasia possibly 2/2 TIA vs. vasovagal syncope vs. orthostatic hypotension.     Problem/Plan - 1:  ·  Problem: Syncope.  Plan: - episode of syncope/LOC for two minutes and transient aphasia  - possibly TIA vs. vasovagal syncope vs. orthostatic hypotension  - CTH negative for acute changes  - neuro checks   - low suspicion for seizure-activity, hold off EEG   - Na+ 126, lactate elevated to 3.5, ( awaiting repeat )  - orthostatic blood pressure / HR positive,  hold off IVF given systolic HF for now however  might administer 200cc IV bolus pending medical attending approval ( pt euvolemic on exam)   - chest xray clear   - cont to monitor on telemetry   - follows with Dr. Ronny Newman as an outpatient for HF   - HF team to f/u while in hospital as per HF fellow Dr Graciela Maier Hydralazine and Isordil at this time as pt has "soft" BPs     Problem/Plan - 2:  ·  Problem: Atrial fibrillation/ SHERRY thrombus   - c/w Eliquis   - c/w BB for rate control     Problem/Plan - 3:  ·  Problem: CKD (chronic kidney disease), stage IV / low NA  -  Plan: - SCr. 1.8 (baseline 2)  - trend BMP, renally dose medications.   -  Na 126 (baseline 130s)   - hold off fluid restricted diet, hold bumex for now ( as per medicine )   - chronic hyponatremia in setting of CHF ( required tolvaptan during last admission)  - trend BMP, if Na worsens call nephrologist Dr. Ivey.    above plan/treatment discussed with medicine attending       Jamil Buckley   Cardiology NP  EXT 2006

## 2019-04-29 NOTE — CONSULT NOTE ADULT - PROBLEM SELECTOR RECOMMENDATION 2
-initial TTE in 4/2019 with EF 20-25% along with RV dysfunction, repeat later in admission with EF 40%, no RV dysfuction  -NICM, patient with nonobstructive CAD  -medications adjustments as above

## 2019-04-29 NOTE — H&P ADULT - PROBLEM SELECTOR PLAN 5
- SCr. 1.8 (baseline 2)  - trend BMP, renally dose medications - dx with RICH 04/2018  - c/w Eliquis

## 2019-04-29 NOTE — H&P ADULT - ATTENDING COMMENTS
suspect syncope is 2/2 orthostasis/overdiuresis/hypotension. addendum to above: will give small fluid bolus of 250cc NS and continue hydralazine/imdur but at reduced doses. will hold all diuretics for now. dc fluid restriction. follow up orthostatics tomorrow.

## 2019-04-29 NOTE — ED PROVIDER NOTE - ATTENDING CONTRIBUTION TO CARE
Cecy Parkinson MD - Attending Physician: I have personally seen and examined this patient with the resident/fellow.  I have fully participated in the care of this patient. I have reviewed all pertinent clinical information, including history, physical exam, plan and the Resident/Fellow’s note and agree except as noted. See MDM

## 2019-04-29 NOTE — ED PROVIDER NOTE - CLINICAL SUMMARY MEDICAL DECISION MAKING FREE TEXT BOX
79 year-old male with history of HF, atrial fibrillation w/ known LV thrombus on AC, recently admitted for acute on chronic HF presents to the ER for syncopal episode.  Patient with no FND.  DDx: arrhythmia, TIA, dehydration, electrolyte imbalances, infectious causes (unlikely).  Pat. with labs, imaging and TBA. 79 year-old male with history of HF, atrial fibrillation w/ known LV thrombus on AC, recently admitted for acute on chronic HF presents to the ER for syncopal episode.  Patient with no FND.  DDx: arrhythmia, TIA, dehydration, electrolyte imbalances, infectious causes (unlikely).  Pat. with labs, imaging and TBA.    Cecy Parkinson MD - Attending Physician: Pt here with syncopal episode, prodrome of weakness and aphasia/dysphagia, recent admit for acute heart failure, ELVA, and LV thrombus. ?TIA vs dysrhythmia vs vagal vs hypoten and overdiuresis. Labs, Xr, HF consult, tba

## 2019-04-29 NOTE — ED PROVIDER NOTE - CARE PLAN
Principal Discharge DX:	Syncope  Secondary Diagnosis:	Hyponatremia Principal Discharge DX:	Syncope  Secondary Diagnosis:	Hyponatremia  Secondary Diagnosis:	Heart failure, unspecified HF chronicity, unspecified heart failure type

## 2019-04-29 NOTE — ED PROVIDER NOTE - PHYSICAL EXAMINATION
*Gen: NAD, AAO*3  *HEENT: NC/AT, MMM, airway patent, trachea midline  *CV: RRR, S1/S2 present, + murmur  *Resp: no respiratory distress, LCTAB, no wheezing/rales  *Abd: non-distended, soft N/Tx4, no guarding or rigidity  *Neuro: CN II-XII intact, no focal neurological deficits, motor and sensory intact bilaterally, 5/5 strength in all extremities, cerebellar coordination WNL  *Extremities: no gross deformity  *Skin: no rashes, no wounds   ~ Olvin Parrish M.D.

## 2019-04-29 NOTE — CONSULT NOTE ADULT - ATTENDING COMMENTS
Patient seen in morning on 4/30/19.   Already feeling better with holding diuretics and a small fluid challenge. He denies true LOC, but rather it seems he felt dizzy and eased himself back onto the bed. Not currently orthostatic. Remains hyponatremic and hypochloremic with ELVA, possibly still fluid deplete.    Would give another 500 mL of NS over 5h now and continue to hold diuretics.  Continue current doses of vasodilators and we will reassess tomorrow. Would anticipate increasing the vasodilators as BP will allow and sparing the amount of diuretic as much as possible.  Likely discharge home tomorrow.

## 2019-04-29 NOTE — H&P ADULT - NSHPPHYSICALEXAM_GEN_ALL_CORE
Vital Signs Last 24 Hrs  T(C): 36.6 (29 Apr 2019 12:24), Max: 37.1 (29 Apr 2019 08:33)  T(F): 97.9 (29 Apr 2019 12:24), Max: 98.7 (29 Apr 2019 08:33)  HR: 85 (29 Apr 2019 12:24) (84 - 98)  BP: 122/70 (29 Apr 2019 12:24) (109/61 - 122/70)  RR: 19 (29 Apr 2019 12:24) (17 - 19)  SpO2: 100% (29 Apr 2019 12:24) (98% - 100%)    General: NAD  Neurology: A&Ox3, nonfocal, KHAN x 4  Eyes: PERRLA/ EOMI, Gross vision intact  ENT/Neck: Neck supple, trachea midline, No JVD, Gross hearing intact  Respiratory: CTA B/L, No wheezing, rales, rhonchi  CV: RRR, S1S2, no murmurs, rubs or gallops  Abdominal: Soft, NT, ND +BS,   Extremities: No edema, + peripheral pulses  Skin: No Rashes, Hematoma, Ecchymosis Vital Signs Last 24 Hrs  T(C): 36.6 (29 Apr 2019 12:24), Max: 37.1 (29 Apr 2019 08:33)  T(F): 97.9 (29 Apr 2019 12:24), Max: 98.7 (29 Apr 2019 08:33)  HR: 85 (29 Apr 2019 12:24) (84 - 98)  BP: 122/70 (29 Apr 2019 12:24) (109/61 - 122/70)  RR: 19 (29 Apr 2019 12:24) (17 - 19)  SpO2: 100% (29 Apr 2019 12:24) (98% - 100%)    General: NAD  Neurology: A&Ox3, nonfocal, KHAN x 4  Eyes: PERRLA/ EOMI, Gross vision intact  ENT/Neck: Neck supple, trachea midline, No JVD, Gross hearing intact  Respiratory: CTA B/L, No wheezing, rales, rhonchi  CV: irregularly, S1S2, no murmurs, rubs or gallops  Abdominal: Soft, NT, ND +BS,   Extremities: No edema, + peripheral pulses  Skin: No Rashes, Hematoma, Ecchymosis

## 2019-04-29 NOTE — CONSULT NOTE ADULT - PROBLEM SELECTOR RECOMMENDATION 9
-CT head negative  -pt orthostatic on admission, could be related to overdiuresis and high doses of vasodilators  -patient does not look overloaded on exam, would hold diuretics  -hold aldactone  -reduce hydralazine to 50mg TID, isordil to 10mg TID. Can continue toprol XL 50mg daily  -repeat orthostatics  -monitor on tele

## 2019-04-29 NOTE — CONSULT NOTE ADULT - ASSESSMENT
79M with recently diagnosed with HFrEF (NIC, EF 25% --> 40-45% 04/2019), nonobstructive CAD, afib with SHERRY thrombus on Eliquis, CKD IV who presents after syncopal episode. CT head negative. Patient hyponatremic Cr 1.8. Patient orthostatic on admission, possibly related to high doses of vasodilators and/or overdiuresis.

## 2019-04-30 ENCOUNTER — TRANSCRIPTION ENCOUNTER (OUTPATIENT)
Age: 79
End: 2019-04-30

## 2019-04-30 PROBLEM — Z78.9 OTHER SPECIFIED HEALTH STATUS: Chronic | Status: INACTIVE | Noted: 2019-04-04 | Resolved: 2019-04-29

## 2019-04-30 LAB
ANION GAP SERPL CALC-SCNC: 13 MMOL/L — SIGNIFICANT CHANGE UP (ref 5–17)
ANION GAP SERPL CALC-SCNC: 13 MMOL/L — SIGNIFICANT CHANGE UP (ref 5–17)
BUN SERPL-MCNC: 56 MG/DL — HIGH (ref 7–23)
BUN SERPL-MCNC: 59 MG/DL — HIGH (ref 7–23)
CALCIUM SERPL-MCNC: 8.7 MG/DL — SIGNIFICANT CHANGE UP (ref 8.4–10.5)
CALCIUM SERPL-MCNC: 8.8 MG/DL — SIGNIFICANT CHANGE UP (ref 8.4–10.5)
CHLORIDE SERPL-SCNC: 89 MMOL/L — LOW (ref 96–108)
CHLORIDE SERPL-SCNC: 90 MMOL/L — LOW (ref 96–108)
CO2 SERPL-SCNC: 25 MMOL/L — SIGNIFICANT CHANGE UP (ref 22–31)
CO2 SERPL-SCNC: 25 MMOL/L — SIGNIFICANT CHANGE UP (ref 22–31)
CREAT SERPL-MCNC: 1.57 MG/DL — HIGH (ref 0.5–1.3)
CREAT SERPL-MCNC: 1.59 MG/DL — HIGH (ref 0.5–1.3)
GLUCOSE SERPL-MCNC: 114 MG/DL — HIGH (ref 70–99)
GLUCOSE SERPL-MCNC: 154 MG/DL — HIGH (ref 70–99)
HCT VFR BLD CALC: 37.9 % — LOW (ref 39–50)
HGB BLD-MCNC: 13.1 G/DL — SIGNIFICANT CHANGE UP (ref 13–17)
MCHC RBC-ENTMCNC: 30 PG — SIGNIFICANT CHANGE UP (ref 27–34)
MCHC RBC-ENTMCNC: 34.5 GM/DL — SIGNIFICANT CHANGE UP (ref 32–36)
MCV RBC AUTO: 87 FL — SIGNIFICANT CHANGE UP (ref 80–100)
OSMOLALITY UR: 448 MOSM/KG — SIGNIFICANT CHANGE UP (ref 50–1200)
PLATELET # BLD AUTO: 268 K/UL — SIGNIFICANT CHANGE UP (ref 150–400)
POTASSIUM SERPL-MCNC: 3.8 MMOL/L — SIGNIFICANT CHANGE UP (ref 3.5–5.3)
POTASSIUM SERPL-MCNC: 4 MMOL/L — SIGNIFICANT CHANGE UP (ref 3.5–5.3)
POTASSIUM SERPL-SCNC: 3.8 MMOL/L — SIGNIFICANT CHANGE UP (ref 3.5–5.3)
POTASSIUM SERPL-SCNC: 4 MMOL/L — SIGNIFICANT CHANGE UP (ref 3.5–5.3)
RBC # BLD: 4.36 M/UL — SIGNIFICANT CHANGE UP (ref 4.2–5.8)
RBC # FLD: 13.2 % — SIGNIFICANT CHANGE UP (ref 10.3–14.5)
SODIUM SERPL-SCNC: 127 MMOL/L — LOW (ref 135–145)
SODIUM SERPL-SCNC: 128 MMOL/L — LOW (ref 135–145)
WBC # BLD: 7.4 K/UL — SIGNIFICANT CHANGE UP (ref 3.8–10.5)
WBC # FLD AUTO: 7.4 K/UL — SIGNIFICANT CHANGE UP (ref 3.8–10.5)

## 2019-04-30 PROCEDURE — 99223 1ST HOSP IP/OBS HIGH 75: CPT | Mod: GC

## 2019-04-30 PROCEDURE — 99233 SBSQ HOSP IP/OBS HIGH 50: CPT

## 2019-04-30 RX ORDER — SODIUM CHLORIDE 9 MG/ML
500 INJECTION INTRAMUSCULAR; INTRAVENOUS; SUBCUTANEOUS
Qty: 0 | Refills: 0 | Status: DISCONTINUED | OUTPATIENT
Start: 2019-04-30 | End: 2019-05-01

## 2019-04-30 RX ADMIN — SODIUM CHLORIDE 100 MILLILITER(S): 9 INJECTION INTRAMUSCULAR; INTRAVENOUS; SUBCUTANEOUS at 16:48

## 2019-04-30 RX ADMIN — Medication 50 MILLIGRAM(S): at 16:48

## 2019-04-30 RX ADMIN — FAMOTIDINE 20 MILLIGRAM(S): 10 INJECTION INTRAVENOUS at 11:08

## 2019-04-30 RX ADMIN — ISOSORBIDE DINITRATE 10 MILLIGRAM(S): 5 TABLET ORAL at 06:22

## 2019-04-30 RX ADMIN — ISOSORBIDE DINITRATE 10 MILLIGRAM(S): 5 TABLET ORAL at 21:35

## 2019-04-30 RX ADMIN — POLYETHYLENE GLYCOL 3350 17 GRAM(S): 17 POWDER, FOR SOLUTION ORAL at 21:35

## 2019-04-30 RX ADMIN — Medication 50 MILLIGRAM(S): at 11:08

## 2019-04-30 RX ADMIN — ISOSORBIDE DINITRATE 10 MILLIGRAM(S): 5 TABLET ORAL at 13:42

## 2019-04-30 RX ADMIN — Medication 1 TABLET(S): at 11:08

## 2019-04-30 RX ADMIN — APIXABAN 5 MILLIGRAM(S): 2.5 TABLET, FILM COATED ORAL at 17:37

## 2019-04-30 RX ADMIN — Medication 50 MILLIGRAM(S): at 08:10

## 2019-04-30 RX ADMIN — APIXABAN 5 MILLIGRAM(S): 2.5 TABLET, FILM COATED ORAL at 06:23

## 2019-04-30 RX ADMIN — Medication 50 MILLIGRAM(S): at 23:47

## 2019-04-30 NOTE — PROGRESS NOTE ADULT - PROBLEM SELECTOR PLAN 1
-CT head negative  -pt orthostatic on admission, could be related to overdiuresis and high doses of vasodilators. No longer orthostatic after decreasing doses, holding diuretics  -continue hydralazine 50mg TID, isordil 10mg TID. Can continue toprol XL 50mg daily  -monitor on tele. -CT head negative  -pt orthostatic on admission, likely related to overdiuresis. No longer orthostatic after holding diuretics, small bolus of IVF  -would give another 500cc NS over 5 hours at 100cc/hr  -continue hydralazine 50mg TID, isordil 10mg TID. Can continue toprol XL 50mg daily  -monitor on tele.

## 2019-04-30 NOTE — PROGRESS NOTE ADULT - SUBJECTIVE AND OBJECTIVE BOX
Patient is a 79y old  Male who presents with a chief complaint of syncope (30 Apr 2019 12:49)    SUBJECTIVE / OVERNIGHT EVENTS: pt no longer feels dizzy, feeling better today. Orthostatics have improved.     MEDICATIONS  (STANDING):  apixaban 5 milliGRAM(s) Oral every 12 hours  docusate sodium 100 milliGRAM(s) Oral three times a day  ergocalciferol 46984 Unit(s) Oral <User Schedule>  famotidine    Tablet 20 milliGRAM(s) Oral daily  hydrALAZINE 50 milliGRAM(s) Oral three times a day  isosorbide   dinitrate Tablet (ISORDIL) 10 milliGRAM(s) Oral three times a day  metoprolol succinate ER 50 milliGRAM(s) Oral daily  multivitamin 1 Tablet(s) Oral daily  polyethylene glycol 3350 17 Gram(s) Oral daily  senna 2 Tablet(s) Oral at bedtime    MEDICATIONS  (PRN):  acetaminophen   Tablet .. 650 milliGRAM(s) Oral every 6 hours PRN Temp greater or equal to 38C (100.4F), Mild Pain (1 - 3)  simethicone 80 milliGRAM(s) Chew three times a day PRN Dyspepsia      Vital Signs Last 24 Hrs  T(C): 36.9 (30 Apr 2019 12:04), Max: 36.9 (30 Apr 2019 12:04)  T(F): 98.4 (30 Apr 2019 12:04), Max: 98.4 (30 Apr 2019 12:04)  HR: 79 (30 Apr 2019 12:04) (68 - 84)  BP: 134/79 (30 Apr 2019 12:04) (109/70 - 134/79)  BP(mean): --  RR: 18 (30 Apr 2019 12:04) (16 - 18)  SpO2: 100% (30 Apr 2019 12:04) (98% - 100%)  CAPILLARY BLOOD GLUCOSE        I&O's Summary    29 Apr 2019 07:01  -  30 Apr 2019 07:00  --------------------------------------------------------  IN: 870 mL / OUT: 1100 mL / NET: -230 mL    30 Apr 2019 07:01  -  30 Apr 2019 14:15  --------------------------------------------------------  IN: 280 mL / OUT: 300 mL / NET: -20 mL        PHYSICAL EXAM:  GENERAL: NAD  EYES:conjunctiva and sclera clear  NECK: No JVD  CHEST/LUNG: Clear to auscultation bilaterally; No wheeze  HEART: +S1/S2, irreg irreg   ABDOMEN: Soft, Nontender, Nondistended; Bowel sounds present  EXTREMITIES:  no peripheral edema noted   PSYCH: AAOx3      LABS:                        13.1   7.4   )-----------( 268      ( 30 Apr 2019 06:10 )             37.9     04-30    127<L>  |  89<L>  |  56<H>  ----------------------------<  154<H>  3.8   |  25  |  1.59<H>    Ca    8.7      30 Apr 2019 06:09  Phos  3.3     04-30  Mg     2.3     04-30    TPro  7.7  /  Alb  4.0  /  TBili  0.5  /  DBili  x   /  AST  27  /  ALT  45  /  AlkPhos  91  04-29    PT/INR - ( 29 Apr 2019 08:59 )   PT: 16.7 sec;   INR: 1.44 ratio         PTT - ( 29 Apr 2019 08:59 )  PTT:32.3 sec          Consultant(s) Notes Reviewed:  Heart Failure

## 2019-04-30 NOTE — PROGRESS NOTE ADULT - PROBLEM SELECTOR PLAN 3
- euvolemic on exam   - TTE 04/04/19 biventricular dysfunction, EF 25%, moderate MR/TR, repeat TTE with improvement of EF 40-45%  - Hyponatremic to 126, lactate elevated to 3.5, awaiting repeat. suspect hypovolemia from overdiuresis  - c/w metoprolol XL 50mg daily, hold Bumex and aldactone. Continue with hydralazine, isordil  - strict I/Os, daily weights, gained 1.2 kgs from yesterday

## 2019-04-30 NOTE — PROGRESS NOTE ADULT - SUBJECTIVE AND OBJECTIVE BOX
Overnight Events:   Patient received small IVF bolus, orthostatics negative this AM    Medications:  acetaminophen   Tablet .. 650 milliGRAM(s) Oral every 6 hours PRN  apixaban 5 milliGRAM(s) Oral every 12 hours  docusate sodium 100 milliGRAM(s) Oral three times a day  ergocalciferol 04087 Unit(s) Oral <User Schedule>  famotidine    Tablet 20 milliGRAM(s) Oral daily  hydrALAZINE 50 milliGRAM(s) Oral three times a day  isosorbide   dinitrate Tablet (ISORDIL) 10 milliGRAM(s) Oral three times a day  metoprolol succinate ER 50 milliGRAM(s) Oral daily  multivitamin 1 Tablet(s) Oral daily  polyethylene glycol 3350 17 Gram(s) Oral daily  senna 2 Tablet(s) Oral at bedtime  simethicone 80 milliGRAM(s) Chew three times a day PRN      PAST MEDICAL & SURGICAL HISTORY:  Atrial fibrillation, unspecified type  Left ventricular thrombus  Heart failure, unspecified HF chronicity, unspecified heart failure type  No significant past surgical history      Vitals:  T(F): 98.4 (04-30), Max: 98.4 (04-30)  HR: 79 (04-30) (68 - 87)  BP: 134/79 (04-30) (109/70 - 134/79)  RR: 18 (04-30)  SpO2: 100% (04-30)  I&O's Summary    29 Apr 2019 07:01  -  30 Apr 2019 07:00  --------------------------------------------------------  IN: 870 mL / OUT: 1100 mL / NET: -230 mL    30 Apr 2019 07:01  -  30 Apr 2019 12:49  --------------------------------------------------------  IN: 280 mL / OUT: 300 mL / NET: -20 mL        Physical Exam:  GENERAL: No acute distress  HEAD:  Atraumatic, Normocephalic  ENT: Normal JVP  CHEST/LUNG: Clear to auscultation bilaterally  HEART: Irregularly irregular normal rate, S1, S2  ABDOMEN: Soft, Nontender  EXTREMITIES:  No LE edema  NEUROLOGY: AAOx3, non-focal                          13.1   7.4   )-----------( 268      ( 30 Apr 2019 06:10 )             37.9     04-30    127<L>  |  89<L>  |  56<H>  ----------------------------<  154<H>  3.8   |  25  |  1.59<H>    Ca    8.7      30 Apr 2019 06:09  Phos  3.3     04-30  Mg     2.3     04-30    TPro  7.7  /  Alb  4.0  /  TBili  0.5  /  DBili  x   /  AST  27  /  ALT  45  /  AlkPhos  91  04-29    PT/INR - ( 29 Apr 2019 08:59 )   PT: 16.7 sec;   INR: 1.44 ratio         PTT - ( 29 Apr 2019 08:59 )  PTT:32.3 sec      Serum Pro-Brain Natriuretic Peptide: 3355 pg/mL (04-29 @ 08:59)          New ECG(s): Personally reviewed    Echo:    Stress Testing:     Cath:    Imaging:    Interpretation of Telemetry:

## 2019-04-30 NOTE — PROGRESS NOTE ADULT - PROBLEM SELECTOR PLAN 2
-initial TTE in 4/2019 with EF 20-25% along with RV dysfunction, repeat later in admission with EF 40%, no RV dysfuction  -NICM, patient with nonobstructive CAD  -medications adjustments as above.

## 2019-04-30 NOTE — DISCHARGE NOTE NURSING/CASE MANAGEMENT/SOCIAL WORK - NSDCDPATPORTLINK_GEN_ALL_CORE
You can access the [facility] Portal, offered by [facility], by registering with the following website: [email or web address]

## 2019-04-30 NOTE — DISCHARGE NOTE NURSING/CASE MANAGEMENT/SOCIAL WORK - NSDCPEPT PROEDHF_GEN_ALL_CORE
Activities as tolerated/Report signs and symptoms to primary care provider/Call primary care provider for follow up after discharge/Low salt diet/Monitor weight daily

## 2019-04-30 NOTE — PROGRESS NOTE ADULT - ASSESSMENT
79M with recently diagnosed with HFrEF (NIC, EF 25% --> 40-45% 04/2019), nonobstructive CAD, afib with SHERRY thrombus on Eliquis, CKD IV who presents after syncopal episode. CT head negative. Patient hyponatremic Cr 1.8. Patient orthostatic on admission, possibly related to high doses of vasodilators and/or overdiuresis. Patient no longer orthostatic after decreasing vasodilators and holding diuretics.

## 2019-04-30 NOTE — CHART NOTE - NSCHARTNOTEFT_GEN_A_CORE
Pt was seen and examined.  Briefly this is a  belinda 79-year-old gentleman, past medical history of   hypertension presents with syncope 10-15 min p taking 6 pills.  I do not believe that he is dehydrated or had over-diuresis(likely vasodilatory effects of the medications)    1.  Cardiology:  Vasodilators reduced and frequency changed.  No longer orthostatic  2.  Renal:  I suspect diuretics can be started again.  Hyponatremia will improve as the BP has stabilized     Avoid excessive liquids without calories as that will worsen his serum sodium.  Start Nephro cans   3.  :  No evidence of post obstruction         Full dictated note to follow in am       Sayed Aviva  [facility]  [phone]

## 2019-04-30 NOTE — PROGRESS NOTE ADULT - PROBLEM SELECTOR PLAN 1
episode of syncope/LOC for two minutes and transient aphasia. possibly 2/2 orthostatic hypotension from overdiuresis vs.   - CTH negative for acute changes  - low suspicion for seizure-activity as event was witnessed by family, hold off EEG   -management of hyponatremia-- liberalize diet, encourage po  -orthostatics have improved with IVF and holding diuretics. Pt no longer feels dizzy.  -f/u heart failure recs for dosing of diuretics and HF meds

## 2019-04-30 NOTE — PROGRESS NOTE ADULT - PROBLEM SELECTOR PLAN 2
- Na 126, baseline 130s, euvolemic on exam  - on previous admission attributed to hypervolemic hyponatremia in setting of CHF, however pt. currently euvolemic on exam, CXR clear, BNP improved, hyponatremia more c/w hypovolemic hyponatremia  -renal consult placed with Dr. Ivey's office  -monitor BMP  -f/u renal recs

## 2019-04-30 NOTE — PROGRESS NOTE ADULT - PROBLEM SELECTOR PLAN 4
- CHADSVASC 3, c/b SHERRY appendage thrombus  - c/w Eliquis   - c/w rate control Toprol XL 50mg daily

## 2019-04-30 NOTE — PROGRESS NOTE ADULT - ASSESSMENT
79M with recently diagnosed with HFrEF (EF 25% --> 40-45% 04/2019), afib with SHERRY thrombus on Eliquis, CKD IV who presents after syncopal episode and transient aphasia likely due to orthostatic hypotension, now resolving s/p IVF:

## 2019-05-01 ENCOUNTER — TRANSCRIPTION ENCOUNTER (OUTPATIENT)
Age: 79
End: 2019-05-01

## 2019-05-01 VITALS — TEMPERATURE: 98 F | HEART RATE: 76 BPM | SYSTOLIC BLOOD PRESSURE: 115 MMHG | DIASTOLIC BLOOD PRESSURE: 90 MMHG

## 2019-05-01 PROBLEM — I51.3 INTRACARDIAC THROMBOSIS, NOT ELSEWHERE CLASSIFIED: Chronic | Status: ACTIVE | Noted: 2019-04-29

## 2019-05-01 PROBLEM — I50.9 HEART FAILURE, UNSPECIFIED: Chronic | Status: ACTIVE | Noted: 2019-04-29

## 2019-05-01 PROBLEM — I48.91 UNSPECIFIED ATRIAL FIBRILLATION: Chronic | Status: ACTIVE | Noted: 2019-04-29

## 2019-05-01 LAB
ANION GAP SERPL CALC-SCNC: 11 MMOL/L — SIGNIFICANT CHANGE UP (ref 5–17)
BUN SERPL-MCNC: 39 MG/DL — HIGH (ref 7–23)
CALCIUM SERPL-MCNC: 8.9 MG/DL — SIGNIFICANT CHANGE UP (ref 8.4–10.5)
CHLORIDE SERPL-SCNC: 93 MMOL/L — LOW (ref 96–108)
CO2 SERPL-SCNC: 24 MMOL/L — SIGNIFICANT CHANGE UP (ref 22–31)
CREAT SERPL-MCNC: 1.44 MG/DL — HIGH (ref 0.5–1.3)
GLUCOSE SERPL-MCNC: 172 MG/DL — HIGH (ref 70–99)
HCT VFR BLD CALC: 38.9 % — LOW (ref 39–50)
HGB BLD-MCNC: 13.1 G/DL — SIGNIFICANT CHANGE UP (ref 13–17)
MCHC RBC-ENTMCNC: 29.4 PG — SIGNIFICANT CHANGE UP (ref 27–34)
MCHC RBC-ENTMCNC: 33.8 GM/DL — SIGNIFICANT CHANGE UP (ref 32–36)
MCV RBC AUTO: 86.8 FL — SIGNIFICANT CHANGE UP (ref 80–100)
PLATELET # BLD AUTO: 284 K/UL — SIGNIFICANT CHANGE UP (ref 150–400)
POTASSIUM SERPL-MCNC: 4.4 MMOL/L — SIGNIFICANT CHANGE UP (ref 3.5–5.3)
POTASSIUM SERPL-SCNC: 4.4 MMOL/L — SIGNIFICANT CHANGE UP (ref 3.5–5.3)
RBC # BLD: 4.48 M/UL — SIGNIFICANT CHANGE UP (ref 4.2–5.8)
RBC # FLD: 13 % — SIGNIFICANT CHANGE UP (ref 10.3–14.5)
SODIUM SERPL-SCNC: 128 MMOL/L — LOW (ref 135–145)
WBC # BLD: 7.4 K/UL — SIGNIFICANT CHANGE UP (ref 3.8–10.5)
WBC # FLD AUTO: 7.4 K/UL — SIGNIFICANT CHANGE UP (ref 3.8–10.5)

## 2019-05-01 PROCEDURE — 85610 PROTHROMBIN TIME: CPT

## 2019-05-01 PROCEDURE — 70450 CT HEAD/BRAIN W/O DYE: CPT

## 2019-05-01 PROCEDURE — 93005 ELECTROCARDIOGRAM TRACING: CPT

## 2019-05-01 PROCEDURE — 84295 ASSAY OF SERUM SODIUM: CPT

## 2019-05-01 PROCEDURE — 82947 ASSAY GLUCOSE BLOOD QUANT: CPT

## 2019-05-01 PROCEDURE — 85730 THROMBOPLASTIN TIME PARTIAL: CPT

## 2019-05-01 PROCEDURE — 80048 BASIC METABOLIC PNL TOTAL CA: CPT

## 2019-05-01 PROCEDURE — 82330 ASSAY OF CALCIUM: CPT

## 2019-05-01 PROCEDURE — 84300 ASSAY OF URINE SODIUM: CPT

## 2019-05-01 PROCEDURE — 80053 COMPREHEN METABOLIC PANEL: CPT

## 2019-05-01 PROCEDURE — 82435 ASSAY OF BLOOD CHLORIDE: CPT

## 2019-05-01 PROCEDURE — 82962 GLUCOSE BLOOD TEST: CPT

## 2019-05-01 PROCEDURE — 83935 ASSAY OF URINE OSMOLALITY: CPT

## 2019-05-01 PROCEDURE — 84100 ASSAY OF PHOSPHORUS: CPT

## 2019-05-01 PROCEDURE — 84484 ASSAY OF TROPONIN QUANT: CPT

## 2019-05-01 PROCEDURE — 85014 HEMATOCRIT: CPT

## 2019-05-01 PROCEDURE — 99285 EMERGENCY DEPT VISIT HI MDM: CPT | Mod: 25

## 2019-05-01 PROCEDURE — 99233 SBSQ HOSP IP/OBS HIGH 50: CPT | Mod: GC

## 2019-05-01 PROCEDURE — 82803 BLOOD GASES ANY COMBINATION: CPT

## 2019-05-01 PROCEDURE — 83930 ASSAY OF BLOOD OSMOLALITY: CPT

## 2019-05-01 PROCEDURE — 71046 X-RAY EXAM CHEST 2 VIEWS: CPT

## 2019-05-01 PROCEDURE — 83735 ASSAY OF MAGNESIUM: CPT

## 2019-05-01 PROCEDURE — 84132 ASSAY OF SERUM POTASSIUM: CPT

## 2019-05-01 PROCEDURE — 83880 ASSAY OF NATRIURETIC PEPTIDE: CPT

## 2019-05-01 PROCEDURE — 83605 ASSAY OF LACTIC ACID: CPT

## 2019-05-01 PROCEDURE — 99239 HOSP IP/OBS DSCHRG MGMT >30: CPT

## 2019-05-01 PROCEDURE — 85027 COMPLETE CBC AUTOMATED: CPT

## 2019-05-01 RX ORDER — FUROSEMIDE 40 MG
20 TABLET ORAL DAILY
Qty: 0 | Refills: 0 | Status: CANCELLED | OUTPATIENT
Start: 2019-05-03 | End: 2019-05-01

## 2019-05-01 RX ORDER — FUROSEMIDE 40 MG
1 TABLET ORAL
Qty: 30 | Refills: 0 | OUTPATIENT
Start: 2019-05-01 | End: 2019-05-30

## 2019-05-01 RX ORDER — SPIRONOLACTONE 25 MG/1
12.5 TABLET, FILM COATED ORAL DAILY
Qty: 0 | Refills: 0 | Status: CANCELLED | OUTPATIENT
Start: 2019-05-03 | End: 2019-05-01

## 2019-05-01 RX ORDER — SPIRONOLACTONE 25 MG/1
0.5 TABLET, FILM COATED ORAL
Qty: 15 | Refills: 0 | OUTPATIENT
Start: 2019-05-01 | End: 2019-05-30

## 2019-05-01 RX ORDER — HYDRALAZINE HCL 50 MG
1 TABLET ORAL
Qty: 90 | Refills: 0 | OUTPATIENT
Start: 2019-05-01 | End: 2019-05-30

## 2019-05-01 RX ADMIN — APIXABAN 5 MILLIGRAM(S): 2.5 TABLET, FILM COATED ORAL at 05:20

## 2019-05-01 RX ADMIN — Medication 50 MILLIGRAM(S): at 07:37

## 2019-05-01 RX ADMIN — FAMOTIDINE 20 MILLIGRAM(S): 10 INJECTION INTRAVENOUS at 13:00

## 2019-05-01 RX ADMIN — ISOSORBIDE DINITRATE 10 MILLIGRAM(S): 5 TABLET ORAL at 05:20

## 2019-05-01 RX ADMIN — ISOSORBIDE DINITRATE 10 MILLIGRAM(S): 5 TABLET ORAL at 13:01

## 2019-05-01 RX ADMIN — Medication 1 TABLET(S): at 13:00

## 2019-05-01 RX ADMIN — Medication 50 MILLIGRAM(S): at 09:26

## 2019-05-01 NOTE — DISCHARGE NOTE PROVIDER - HOSPITAL COURSE
HPI:    79M with recently diagnosed with HFrEF (EF 25% --> 40-45% 04/2019), afib with SHERRY thrombus on Eliquis, CKD IV who presents after syncopal episode. Pt. woke up this morning, went to the bathroom and then sat down to have breakfast. He took his AM meds then proceeded to have breakfast when he had sudden onset difficult speaking and generalized weakness. He subsequently laid down on his bed, and per family was unresponsive for 2 minutes. No head trauma or seizure like activity. Family called EMS, however by the time they arrived pt. was awake, back to baseline, no focal neurological deficits. Pt. notes that at times when he gets up from a seated position he feels dizzy/lightheaded. Of note, pt. was recently discharged from the hospital 4/25. He had a prolonged stay for three weeks when he had initially presented with worsening abdominal distension and severe HEAD, attributed to newly diagnosed systolic heart failure (biventricular dysfunction, EF 25%, moderate MR/TR), requiring management milrinone assisted diuresis. He underwent LHC/RHC which showed non-obstructive CAD. NM Amyloidosis study was negative. Course was c/b afib with RVR, s/p RICH which showed a SHERRY thrombus and pt. started on anticoagulation. After discharge pt. had been doing well, adherent with dietary and fluid restrictions and medications. He denies headaches, head trauma, focal neurological deficits, cp, sob, abdominal pain, n/v/d, dysuria, change in urinary frequency, sick contacts recent travel.        PMH:     Atrial fibrillation, unspecified type    Left ventricular thrombus    Heart failure, unspecified HF chronicity, unspecified heart failure type    No pertinent past medical history    79M with recently diagnosed with HFrEF (NICM, EF 25% --> 40-45% 04/2019), nonobstructive CAD, afib with SHERRY thrombus on Eliquis, CKD IV who presents after syncopal episode. CT head negative. Patient hyponatremic Cr 1.8. Patient orthostatic on admission, possibly related to high doses of vasodilators and/or overdiuresis.         Problem/Recommendation - 1:    Problem: Syncope. Recommendation: -CT head negative    -pt orthostatic on admission, could be related to overdiuresis and high doses of vasodilators    -patient does not look overloaded on exam, would hold diuretics    -hold aldactone    -reduce hydralazine to 50mg TID, isordil to 10mg TID. Can continue toprol XL 50mg daily    -repeat orthostatics    -monitor on tele.        Problem/Recommendation - 2:    ·  Problem: Chronic systolic heart failure.  Recommendation: -initial TTE in 4/2019 with EF 20-25% along with RV dysfunction, repeat later in admission with EF 40%, no RV dysfuction    -NICM, patient with nonobstructive CAD    -medications adjustments as above.         Problem/Recommendation - 3:    ·  Problem: Hyponatremia.  Recommendation: -monitor Na with giving IVF per primary team.         Problem/Recommendation - 4:    ·  Problem: Atrial fibrillation, unspecified type.  Recommendation: -toprol XL    -eliquis.         Problem/Recommendation - 5:    ·  Problem: LA thrombus.  Recommendation: -eliquis.         Problem/Recommendation - 6:    Problem: CAD (coronary artery disease). Recommendation: -ASA, statin.        Attending Attestation:     Patient seen in morning on 4/30/19.     Already feeling better with holding diuretics and a small fluid challenge. He denies true LOC, but rather it seems he felt dizzy and eased himself back onto the bed. Not currently orthostatic. Remains hyponatremic and hypochloremic with ELVA, possibly still fluid deplete. HPI:    79M with recently diagnosed with HFrEF (EF 25% --> 40-45% 04/2019), afib with SHERRY thrombus on Eliquis, CKD IV who presents after syncopal episode. Pt. woke up this morning, went to the bathroom and then sat down to have breakfast. He took his AM meds then proceeded to have breakfast when he had sudden onset difficult speaking and generalized weakness. He subsequently laid down on his bed, and per family was unresponsive for 2 minutes. No head trauma or seizure like activity. Family called EMS, however by the time they arrived pt. was awake, back to baseline, no focal neurological deficits. Pt. notes that at times when he gets up from a seated position he feels dizzy/lightheaded. Of note, pt. was recently discharged from the hospital 4/25. He had a prolonged stay for three weeks when he had initially presented with worsening abdominal distension and severe HEAD, attributed to newly diagnosed systolic heart failure (biventricular dysfunction, EF 25%, moderate MR/TR), requiring management milrinone assisted diuresis. He underwent LHC/RHC which showed non-obstructive CAD. NM Amyloidosis study was negative. Course was c/b afib with RVR, s/p RICH which showed a SHERRY thrombus and pt. started on anticoagulation. After discharge pt. had been doing well, adherent with dietary and fluid restrictions and medications. He denies headaches, head trauma, focal neurological deficits, cp, sob, abdominal pain, n/v/d, dysuria, change in urinary frequency, sick contacts recent travel.        PMH:     Atrial fibrillation, unspecified type    Left ventricular thrombus    Heart failure, unspecified HF chronicity, unspecified heart failure type    No pertinent past medical history    79M with recently diagnosed with HFrEF (NICM, EF 25% --> 40-45% 04/2019), nonobstructive CAD, afib with SHERRY thrombus on Eliquis, CKD IV who presents after syncopal episode. CT head negative. Patient hyponatremic Cr 1.8. Patient orthostatic on admission, possibly related to high doses of vasodilators and/or overdiuresis.         Problem/Recommendation - 1:    Problem: Syncope. Recommendation: -CT head negative    -pt orthostatic on admission, could be related to overdiuresis and high doses of vasodilators    -patient does not look overloaded on exam, would hold diuretics    -hold aldactone    -reduce hydralazine to 50mg TID, isordil to 10mg TID. Can continue toprol XL 50mg daily    -repeat orthostatics    -monitor on tele.        Problem/Recommendation - 2:    ·  Problem: Chronic systolic heart failure.  Recommendation: -initial TTE in 4/2019 with EF 20-25% along with RV dysfunction, repeat later in admission with EF 40%, no RV dysfuction    -NICM, patient with nonobstructive CAD    -medications adjustments as above.         Problem/Recommendation - 3:    ·  Problem: Hyponatremia.  Recommendation: -monitor Na with giving IVF per primary team.         Problem/Recommendation - 4:    ·  Problem: Atrial fibrillation, unspecified type.  Recommendation: -toprol XL    -eliquis.         Problem/Recommendation - 5:    ·  Problem: LA thrombus.  Recommendation: -eliquis.         Problem/Recommendation - 6:    Problem: CAD (coronary artery disease). Recommendation: -ASA, statin. HPI:    79M with recently diagnosed with HFrEF (EF 25% --> 40-45% 04/2019), afib with SHERRY thrombus on Eliquis, CKD IV who presents after syncopal episode. Pt. woke up this morning, went to the bathroom and then sat down to have breakfast. He took his AM meds then proceeded to have breakfast when he had sudden onset difficult speaking and generalized weakness. He subsequently laid down on his bed, and per family was unresponsive for 2 minutes. No head trauma or seizure like activity. Family called EMS, however by the time they arrived pt. was awake, back to baseline, no focal neurological deficits. Pt. notes that at times when he gets up from a seated position he feels dizzy/lightheaded. Of note, pt. was recently discharged from the hospital 4/25. He had a prolonged stay for three weeks when he had initially presented with worsening abdominal distension and severe HEAD, attributed to newly diagnosed systolic heart failure (biventricular dysfunction, EF 25%, moderate MR/TR), requiring management milrinone assisted diuresis. He underwent LHC/RHC which showed non-obstructive CAD. NM Amyloidosis study was negative. Course was c/b afib with RVR, s/p IRCH which showed a SHERRY thrombus and pt. started on anticoagulation. After discharge pt. had been doing well, adherent with dietary and fluid restrictions and medications. He denies headaches, head trauma, focal neurological deficits, cp, sob, abdominal pain, n/v/d, dysuria, change in urinary frequency, sick contacts recent travel.        PMH:     Atrial fibrillation, unspecified type    Left ventricular thrombus    Heart failure, unspecified HF chronicity, unspecified heart failure type    No pertinent past medical history        Problem/Recommendation - 1:    Problem: Syncope. Recommendation: -CT head negative    -pt orthostatic on admission, could be related to overdiuresis and high doses of vasodilators    -patient does not look overloaded on exam, would hold diuretics    -hold aldactone    -reduce hydralazine to 50mg TID, isordil to 10mg TID. Can continue toprol XL 50mg daily    -repeat orthostatics    -monitor on tele.        Problem/Recommendation - 2:    ·  Problem: Chronic systolic heart failure.  Recommendation: -initial TTE in 4/2019 with EF 20-25% along with RV dysfunction, repeat later in admission with EF 40%, no RV dysfuction    -NICM, patient with nonobstructive CAD    -medications adjustments as above.         Problem/Recommendation - 3:    ·  Problem: Hyponatremia.  Recommendation: -monitor Na with giving IVF per primary team.         Problem/Recommendation - 4:    ·  Problem: Atrial fibrillation, unspecified type.  Recommendation: -toprol XL    -eliquis.         Problem/Recommendation - 5:    ·  Problem: LA thrombus.  Recommendation: -eliquis.         Problem/Recommendation - 6:    Problem: CAD (coronary artery disease). Recommendation: -ASA, statin. 79M with recently diagnosed with HFrEF (EF 25% --> 40-45% 04/2019), afib with LA thrombus on Eliquis, CKD pw syncope.         Pt. was recently discharged from the hospital 4/25. Was a/w newly diagnosed systolic heart failure (biventricular dysfunction, EF 25%, moderate MR/TR) requiring management milrinone assisted diuresis. LHC/RHC showed non-obstructive CAD. NM Amyloidosis study was negative. Course was c/b afib with RVR, s/p RICH which showed a SHERRY thrombus and pt. started on anticoagulation.         Admitted now for syncope. Found to be orthostatic and hypotensive due to overdiuresis. Diuretics held, given IV fluids.         Symptoms improved with hydration. Followed by the heart failure team, recommended reducing hydralazine to 50mg TID, isordil to 10mg TID.         Hyponatremia monitored and remained stable.        Able to ambulate. Discharged on a lower dose of diuretics after d/w the heart failure team. F/u on 5/7.        Diagnoses: Syncope; Orthostatic hypotension; Chronic systolic CHF; Hyponatremia; Prerenal azotemia; CKD 3; Pre-existing LA thrombus; Chronic atrial fibrillation; Vitamin D deficiency; CAD

## 2019-05-01 NOTE — PROGRESS NOTE ADULT - SUBJECTIVE AND OBJECTIVE BOX
[facility] [phone]        Patient seen and examined         MEDICATIONS  (STANDING):  apixaban 5 milliGRAM(s) Oral every 12 hours  docusate sodium 100 milliGRAM(s) Oral three times a day  ergocalciferol 46649 Unit(s) Oral <User Schedule>  famotidine    Tablet 20 milliGRAM(s) Oral daily  hydrALAZINE 50 milliGRAM(s) Oral three times a day  isosorbide   dinitrate Tablet (ISORDIL) 10 milliGRAM(s) Oral three times a day  metoprolol succinate ER 50 milliGRAM(s) Oral daily  multivitamin 1 Tablet(s) Oral daily  polyethylene glycol 3350 17 Gram(s) Oral daily  senna 2 Tablet(s) Oral at bedtime  sodium chloride 0.9%. 500 milliLiter(s) (100 mL/Hr) IV Continuous <Continuous>      VITAL:  T(C): , Max: 36.9 (04-30-19 @ 12:04)  T(F): , Max: 98.4 (04-30-19 @ 12:04)  HR: 82 (05-01-19 @ 05:17)  BP: 109/51 (05-01-19 @ 05:17)  BP(mean): --  RR: 17 (05-01-19 @ 05:17)  SpO2: 100% (05-01-19 @ 05:17)  Wt(kg): --    I and O's:    04-30 @ 07:01  -  05-01 @ 07:00  --------------------------------------------------------  IN: 1200 mL / OUT: 2100 mL / NET: -900 mL    05-01 @ 07:01  -  05-01 @ 09:15  --------------------------------------------------------  IN: 140 mL / OUT: 200 mL / NET: -60 mL          PHYSICAL EXAM:    Constitutional: NAD  Neck:  No JVD  Respiratory: CTAB/L  Cardiovascular: S1 and S2  Gastrointestinal: BS+, soft, NT/ND  Extremities: No peripheral edema  Neurological: A/O x 3, no focal deficits  Psychiatric: Normal mood, normal affect  : No Aguirre  Skin: No rashes  Access: Not applicable    LABS:                        13.1   7.4   )-----------( 284      ( 01 May 2019 05:40 )             38.9     05-01    128<L>  |  93<L>  |  39<H>  ----------------------------<  172<H>  4.4   |  24  |  1.44<H>    Ca    8.9      01 May 2019 05:40  Phos  3.3     04-30  Mg     2.3     04-30            Urine Studies:    Osmolality, Random Urine: 448 mosm/Kg (04-30 @ 01:00)  Sodium, Random Urine: <20 mmol/L (04-29 @ 21:14)        RADIOLOGY & ADDITIONAL STUDIES: [facility] [phone]        Patient seen and examined in bed.  He feels great         MEDICATIONS  (STANDING):  apixaban 5 milliGRAM(s) Oral every 12 hours  docusate sodium 100 milliGRAM(s) Oral three times a day  ergocalciferol 62227 Unit(s) Oral <User Schedule>  famotidine    Tablet 20 milliGRAM(s) Oral daily  hydrALAZINE 50 milliGRAM(s) Oral three times a day  isosorbide   dinitrate Tablet (ISORDIL) 10 milliGRAM(s) Oral three times a day  metoprolol succinate ER 50 milliGRAM(s) Oral daily  multivitamin 1 Tablet(s) Oral daily  polyethylene glycol 3350 17 Gram(s) Oral daily  senna 2 Tablet(s) Oral at bedtime  sodium chloride 0.9%. 500 milliLiter(s) (100 mL/Hr) IV Continuous <Continuous>      VITAL:  T(C): , Max: 36.9 (04-30-19 @ 12:04)  T(F): , Max: 98.4 (04-30-19 @ 12:04)  HR: 82 (05-01-19 @ 05:17)  BP: 109/51 (05-01-19 @ 05:17)  BP(mean): --  RR: 17 (05-01-19 @ 05:17)  SpO2: 100% (05-01-19 @ 05:17)  Wt(kg): --    I and O's:    04-30 @ 07:01  -  05-01 @ 07:00  --------------------------------------------------------  IN: 1200 mL / OUT: 2100 mL / NET: -900 mL    05-01 @ 07:01  -  05-01 @ 09:15  --------------------------------------------------------  IN: 140 mL / OUT: 200 mL / NET: -60 mL          PHYSICAL EXAM:    Constitutional: NAD  Neck:  No JVD  Respiratory: CTAB/L  Cardiovascular: S1 and S2  Gastrointestinal: BS+, soft, NT/ND  Extremities: No peripheral edema  Neurological: A/O x 3, no focal deficits  Psychiatric: Normal mood, normal affect  : No Aguirre  Skin: No rashes  Access: Not applicable    LABS:                        13.1   7.4   )-----------( 284      ( 01 May 2019 05:40 )             38.9     05-01    128<L>  |  93<L>  |  39<H>  ----------------------------<  172<H>  4.4   |  24  |  1.44<H>    Ca    8.9      01 May 2019 05:40  Phos  3.3     04-30  Mg     2.3     04-30            Urine Studies:    Osmolality, Random Urine: 448 mosm/Kg (04-30 @ 01:00)  Sodium, Random Urine: <20 mmol/L (04-29 @ 21:14)        RADIOLOGY & ADDITIONAL STUDIES:

## 2019-05-01 NOTE — DISCHARGE NOTE PROVIDER - NSDCCAREPROVSEEN_GEN_ALL_CORE_FT
[facility] Medicine, Advance Practice Team  [facility] Cardiology, Heart Failure Svc

## 2019-05-01 NOTE — CHART NOTE - NSCHARTNOTEFT_GEN_A_CORE
Pt feels well. No orthostasis. Able to ambulate without dizziness.     A/P-     79M with recently diagnosed with HFrEF (EF 25% --> 40-45% 04/2019), afib with SHERRY thrombus on Eliquis, CKD IV aw syncope, orthostatic hypotension. Improved on fluids.     Per the Heart Failure team, can be discharged today on a lower dose of diuretics. F/u with Heart Failure on 5/7.    D/c time 45 mins.

## 2019-05-01 NOTE — PROGRESS NOTE ADULT - ATTENDING COMMENTS
Back to his baseline and feeling well.  BPs are well controlled on the reduced dose meds.  ELVA improving.    OK for discharge from my perspective. Continue current doses of vasodilators.  Would not resume diuretics before Friday. On Friday, start lasix 20 mg po daily plus spironolactone 12.5 mg po daily.  HF NP follow-up on Tuesday.
Already feeling better with holding diuretics and a small fluid challenge. He denies true LOC, but rather it seems he felt dizzy and eased himself back onto the bed. Not currently orthostatic. Remains hyponatremic and hypochloremic with ELVA, possibly still fluid deplete.    Would give another 500 mL of NS over 5h now and continue to hold diuretics.  Continue current doses of vasodilators and we will reassess tomorrow. Would anticipate increasing the vasodilators as BP will allow and sparing the amount of diuretic as much as possible.  Likely discharge home tomorrow.

## 2019-05-01 NOTE — DISCHARGE NOTE PROVIDER - CARE PROVIDERS DIRECT ADDRESSES
,[email or web address],[email or web address]

## 2019-05-01 NOTE — DISCHARGE NOTE PROVIDER - NSDCPNSUBOBJ_GEN_ALL_CORE
Patient is a 79y old  Male who presents with a chief complaint of syncope (01 May 2019 09:15)        Allergies        erythromycin (Unknown)        Intolerances        Medications:    acetaminophen   Tablet .. 650 milliGRAM(s) Oral every 6 hours PRN    apixaban 5 milliGRAM(s) Oral every 12 hours    docusate sodium 100 milliGRAM(s) Oral three times a day    ergocalciferol 82131 Unit(s) Oral <User Schedule>    famotidine    Tablet 20 milliGRAM(s) Oral daily    hydrALAZINE 50 milliGRAM(s) Oral three times a day    isosorbide   dinitrate Tablet (ISORDIL) 10 milliGRAM(s) Oral three times a day    metoprolol succinate ER 50 milliGRAM(s) Oral daily    multivitamin 1 Tablet(s) Oral daily    polyethylene glycol 3350 17 Gram(s) Oral daily    senna 2 Tablet(s) Oral at bedtime    simethicone 80 milliGRAM(s) Chew three times a day PRN    sodium chloride 0.9%. 500 milliLiter(s) IV Continuous <Continuous>            Vitals:    T(C): 36.6 (19 @ 05:17), Max: 36.9 (19 @ 12:04)    HR: 79 (19 @ 09:27) (75 - 82)    BP: 128/74 (19 @ 09:27) (102/61 - 134/79)    BP(mean): --    RR: 17 (19 @ 05:17) (17 - 18)    SpO2: 100% (19 @ 05:17) (99% - 100%)    Wt(kg): --    Daily       Daily Weight in k.7 (01 May 2019 05:17)    I&O's Summary        2019 07:  -  01 May 2019 07:00    --------------------------------------------------------    IN: 1200 mL / OUT: 2100 mL / NET: -900 mL        01 May 2019 07:  -  01 May 2019 10:53    --------------------------------------------------------    IN: 140 mL / OUT: 200 mL / NET: -60 mL                    Physical Exam:    Appearance: Normal    Eyes: PERRL, EOMI    HENT: Normal oral muscosa, NC/AT    Cardiovascular: S1S2, RRR, No M/R/G, no JVD, No Lower extremity edema    Procedural Access Site: No hematoma, Non-tender to palpation, 2+ pulse, No bruit, No Ecchymosis    Respiratory: Clear to auscultation bilaterally    Gastrointestinal: Soft, Non tender, Normal Bowel Sounds    Musculoskeletal: No clubbing, No joint deformity     Neurologic: Non-focal    Lymphatic: No lymphadenopathy    Psychiatry: AAOx3, Mood & affect appropriate    Skin: No rashes, No ecchymoses, No cyanosis                128<L>  |  93<L>  |  39<H>    ----------------------------<  172<H>    4.4   |  24  |  1.44<H>        Ca    8.9      01 May 2019 05:40    Phos  3.3     04-30    Mg     2.3     04-30    Serum Pro-Brain Natriuretic Peptide: 3355 pg/mL ( @ 08:59)        Lipid panel     Hgb A1c                 ECG:Afib 90bpm        Echo:HF EF 40-45%        Stress Testing:     Imaging: CT head negative         Interpretation of Telemetry: Atrial fibrillation 70bpm        Plan : D/C home today     Continue all meds     Resume Lasix 20mg po daily and     Aldactone 12.5mg  po daily  on 5/3/19     Follow up Heart Failure clinic on 19 and have labs checked Patient is a 79y old  Male who presents with a chief complaint of syncope (01 May 2019 09:15)        Allergies        erythromycin (Unknown)        Intolerances        Medications:    acetaminophen   Tablet .. 650 milliGRAM(s) Oral every 6 hours PRN    apixaban 5 milliGRAM(s) Oral every 12 hours    docusate sodium 100 milliGRAM(s) Oral three times a day    ergocalciferol 11709 Unit(s) Oral <User Schedule>    famotidine    Tablet 20 milliGRAM(s) Oral daily    hydrALAZINE 50 milliGRAM(s) Oral three times a day    isosorbide   dinitrate Tablet (ISORDIL) 10 milliGRAM(s) Oral three times a day    metoprolol succinate ER 50 milliGRAM(s) Oral daily    multivitamin 1 Tablet(s) Oral daily    polyethylene glycol 3350 17 Gram(s) Oral daily    senna 2 Tablet(s) Oral at bedtime    simethicone 80 milliGRAM(s) Chew three times a day PRN    sodium chloride 0.9%. 500 milliLiter(s) IV Continuous <Continuous>            Vitals:    T(C): 36.6 (19 @ 05:17), Max: 36.9 (19 @ 12:04)    HR: 79 (19 @ 09:27) (75 - 82)    BP: 128/74 (19 @ 09:27) (102/61 - 134/79)    BP(mean): --    RR: 17 (19 @ 05:17) (17 - 18)    SpO2: 100% (19 @ 05:17) (99% - 100%)    Wt(kg): --    Daily       Daily Weight in k.7 (01 May 2019 05:17)    I&O's Summary        2019 07:  -  01 May 2019 07:00    --------------------------------------------------------    IN: 1200 mL / OUT: 2100 mL / NET: -900 mL        01 May 2019 07:  -  01 May 2019 10:53    --------------------------------------------------------    IN: 140 mL / OUT: 200 mL / NET: -60 mL                    Physical Exam:    Appearance: Normal    Eyes: PERRL, EOMI    HENT: Normal oral muscosa, NC/AT    Cardiovascular: S1S2, RRR, No M/R/G, no JVD, No Lower extremity edema    Procedural Access Site: No hematoma, Non-tender to palpation, 2+ pulse, No bruit, No Ecchymosis    Respiratory: Clear to auscultation bilaterally    Gastrointestinal: Soft, Non tender, Normal Bowel Sounds    Musculoskeletal: No clubbing, No joint deformity     Neurologic: Non-focal    Lymphatic: No lymphadenopathy    Psychiatry: AAOx3, Mood & affect appropriate    Skin: No rashes, No ecchymoses, No cyanosis                128<L>  |  93<L>  |  39<H>    ----------------------------<  172<H>    4.4   |  24  |  1.44<H>        Ca    8.9      01 May 2019 05:40    Phos  3.3     04-30    Mg     2.3     04-30    Serum Pro-Brain Natriuretic Peptide: 3355 pg/mL ( @ 08:59)        Lipid panel     Hgb A1c     ECG:Afib 90bpm        Echo:HF EF 40-45%        Stress Testing:     Imaging: CT head negative         Interpretation of Telemetry: Atrial fibrillation 70bpm        Plan : D/C home today     Continue all meds Eliquis, Hydralazine, Isordil , Toprolxl     Resume Lasix 20mg po daily on 5/3/19     Aldactone 12.5mg  po daily  on 5/3/19     Follow up Heart Failure clinic on 19 and have labs checked Patient is a 79y old  Male who presents with a chief complaint of syncope (01 May 2019 09:15)        Allergies        erythromycin (Unknown)        Intolerances        Medications:    acetaminophen   Tablet .. 650 milliGRAM(s) Oral every 6 hours PRN    apixaban 5 milliGRAM(s) Oral every 12 hours    docusate sodium 100 milliGRAM(s) Oral three times a day    ergocalciferol 82243 Unit(s) Oral <User Schedule>    famotidine    Tablet 20 milliGRAM(s) Oral daily    hydrALAZINE 50 milliGRAM(s) Oral three times a day    isosorbide   dinitrate Tablet (ISORDIL) 10 milliGRAM(s) Oral three times a day    metoprolol succinate ER 50 milliGRAM(s) Oral daily    multivitamin 1 Tablet(s) Oral daily    polyethylene glycol 3350 17 Gram(s) Oral daily    senna 2 Tablet(s) Oral at bedtime    simethicone 80 milliGRAM(s) Chew three times a day PRN    sodium chloride 0.9%. 500 milliLiter(s) IV Continuous <Continuous>            Vitals:    T(C): 36.6 (19 @ 05:17), Max: 36.9 (19 @ 12:04)    HR: 79 (19 @ 09:27) (75 - 82)    BP: 128/74 (19 @ 09:27) (102/61 - 134/79)    BP(mean): --    RR: 17 (19 @ 05:17) (17 - 18)    SpO2: 100% (19 @ 05:17) (99% - 100%)    Wt(kg): --    Daily       Daily Weight in k.7 (01 May 2019 05:17)    I&O's Summary        2019 07:  -  01 May 2019 07:00    --------------------------------------------------------    IN: 1200 mL / OUT: 2100 mL / NET: -900 mL        01 May 2019 07:  -  01 May 2019 10:53    --------------------------------------------------------    IN: 140 mL / OUT: 200 mL / NET: -60 mL                    Physical Exam:    Appearance: Normal    Eyes: PERRL, EOMI    HENT: Normal oral muscosa, NC/AT    Cardiovascular: S1S2, RRR, No M/R/G, no JVD, No Lower extremity edema    Procedural Access Site: No hematoma, Non-tender to palpation, 2+ pulse, No bruit, No Ecchymosis    Respiratory: Clear to auscultation bilaterally    Gastrointestinal: Soft, Non tender, Normal Bowel Sounds    Musculoskeletal: No clubbing, No joint deformity     Neurologic: Non-focal    Lymphatic: No lymphadenopathy    Psychiatry: AAOx3, Mood & affect appropriate    Skin: No rashes, No ecchymoses, No cyanosis                128<L>  |  93<L>  |  39<H>    ----------------------------<  172<H>    4.4   |  24  |  1.44<H>        Ca    8.9      01 May 2019 05:40    Phos  3.3     04-30    Mg     2.3     04-30    Serum Pro-Brain Natriuretic Peptide: 3355 pg/mL ( @ 08:59)        Lipid panel     Hgb A1c     ECG:Afib 90bpm        Echo:HF EF 40-45%        Imaging: CT head negative         Interpretation of Telemetry: Atrial fibrillation 70bpm        Plan : OK for D/C home today Spoke with Dr. Handy and     made aware of plan     Continue all meds Eliquis, Hydralazine, Isordil , Toprolxl     Resume Lasix 20mg po daily on 5/3/19     Aldactone 12.5mg  po daily  on 5/3/19     Follow up Heart Failure clinic on 19 and have labs checked     Questions /Answers discussed with pt in detail pt wife at bedside

## 2019-05-01 NOTE — DISCHARGE NOTE PROVIDER - NSDCCPCAREPLAN_GEN_ALL_CORE_FT
PRINCIPAL DISCHARGE DIAGNOSIS  Diagnosis: Syncope  Assessment and Plan of Treatment: orthostatic b/p      SECONDARY DISCHARGE DIAGNOSES  Diagnosis: Chronic hyponatremia  Assessment and Plan of Treatment: resume lasix on 5/3/19    Diagnosis: Heart failure, unspecified HF chronicity, unspecified heart failure type  Assessment and Plan of Treatment: continue all other meds resume lasix 5/3/19 Aldactone 12.5mg po daily 5/3/19  Follow up heart failure clinic on 5/7/19 and check labs on 5/7/19    Diagnosis: Hyponatremia  Assessment and Plan of Treatment: diuretics to resume on 5/3/19

## 2019-05-01 NOTE — DISCHARGE NOTE PROVIDER - NSDCFUADDAPPT_GEN_ALL_CORE_FT
Follow up appt with heart Failure clinic on 5/7/19 repeat labs on 5/7/19 Follow up appt with heart Failure clinic on Tuesday 5/7/19 repeat labs on 5/7/19

## 2019-05-01 NOTE — PROGRESS NOTE ADULT - PROBLEM SELECTOR PLAN 1
-CT head negative  -pt orthostatic on admission, likely related to overdiuresis. No longer orthostatic after holding diuretics, small amount of IVF  -continue hydralazine 50mg TID, isordil 10mg TID. Can continue toprol XL 50mg daily  - instructed patient to start taking lasix 20mg PO daily and aldactone 12.5mg daily starting Friday 5/3  - patient has f/u appt with HF clinic on 5/7 at 9AM. Should have labs checked at that time

## 2019-05-01 NOTE — PROGRESS NOTE ADULT - SUBJECTIVE AND OBJECTIVE BOX
Overnight Events:   No complaints this AM, feels ready for discharge          Medications:  acetaminophen   Tablet .. 650 milliGRAM(s) Oral every 6 hours PRN  apixaban 5 milliGRAM(s) Oral every 12 hours  docusate sodium 100 milliGRAM(s) Oral three times a day  ergocalciferol 78729 Unit(s) Oral <User Schedule>  famotidine    Tablet 20 milliGRAM(s) Oral daily  hydrALAZINE 50 milliGRAM(s) Oral three times a day  isosorbide   dinitrate Tablet (ISORDIL) 10 milliGRAM(s) Oral three times a day  metoprolol succinate ER 50 milliGRAM(s) Oral daily  multivitamin 1 Tablet(s) Oral daily  polyethylene glycol 3350 17 Gram(s) Oral daily  senna 2 Tablet(s) Oral at bedtime  simethicone 80 milliGRAM(s) Chew three times a day PRN  sodium chloride 0.9%. 500 milliLiter(s) IV Continuous <Continuous>      PAST MEDICAL & SURGICAL HISTORY:  Atrial fibrillation, unspecified type  Left ventricular thrombus  Heart failure, unspecified HF chronicity, unspecified heart failure type  No significant past surgical history      Vitals:  T(F): 97.8 (05-01), Max: 98.4 (05-01)  HR: 76 (05-01) (75 - 82)  BP: 115/90 (05-01) (102/61 - 128/74)  RR: 17 (05-01)  SpO2: 100% (05-01)  I&O's Summary    30 Apr 2019 07:01  -  01 May 2019 07:00  --------------------------------------------------------  IN: 1200 mL / OUT: 2100 mL / NET: -900 mL    01 May 2019 07:01  -  01 May 2019 14:06  --------------------------------------------------------  IN: 280 mL / OUT: 500 mL / NET: -220 mL        Physical Exam:  GENERAL: No acute distress  HEAD:  Atraumatic, Normocephalic  ENT: Normal JVP  CHEST/LUNG: Clear to auscultation bilaterally  HEART: Irregularly irregular normal rate, S1, S2  ABDOMEN: Soft, Nontender  EXTREMITIES:  No LE edema  NEUROLOGY: AAOx3, non-focal                        13.1   7.4   )-----------( 284      ( 01 May 2019 05:40 )             38.9     05-01    128<L>  |  93<L>  |  39<H>  ----------------------------<  172<H>  4.4   |  24  |  1.44<H>    Ca    8.9      01 May 2019 05:40  Phos  3.3     04-30  Mg     2.3     04-30            Serum Pro-Brain Natriuretic Peptide: 3355 pg/mL (04-29 @ 08:59)

## 2019-05-01 NOTE — PROGRESS NOTE ADULT - ASSESSMENT
The patient is a belinda 79-year-old gentleman with past medical history of hypertension, atrial thrombus, congestive heart failure, and mitral regurgitation, who presents for evaluation of syncope.  I suspected syncope was chronologically related to his vasodilator medications as opposed to diuretics.  Within 15 minutes of taking his medications, he had the symptoms.    I do not think that he is clinically dehydrated at present.  Hyponatremia is likely from prerenal azotemia from the hypotension that he had.      1.	Renal:  Nothing more than simple supportive care.  Avoid excessive fluids without calories.  I suspect diuretic should be resumed today?   I suspect Bumex 1 mg po bid? Again, I think this is more related to the vasodilatory effects of the medications as opposed to diuretics.  2.	Cardiology:  Hydralazine and isosorbide have been reduced and the timings have been changed as well.  3.	GI:  Nepro for added caloric intake.    No longer orthostatic..DC planning?

## 2019-05-01 NOTE — DISCHARGE NOTE PROVIDER - PROVIDER TOKENS
PROVIDER:[TOKEN:[[id number]]] PROVIDER:[TOKEN:[[id number]]],FREE:[LAST:[ellis],FIRST:[amalia],PHONE:[(   )    -],FAX:[(   )    -]],PROVIDER:[TOKEN:[[id number]]] PROVIDER:[TOKEN:[[id number]]],PROVIDER:[TOKEN:[[id number]]],FREE:[LAST:[Sayed],FIRST:[jesse Chicas],PHONE:[phone]],FAX:[(   )    -]]

## 2019-05-01 NOTE — DISCHARGE NOTE PROVIDER - CARE PROVIDER_API CALL
Ronny Newman (MD; MPH)  [facility]; Cardiology  [address] [place] [address]  Phone: [phone]  Fax: [phone]  Follow Up Time: Ronny Newman (MD; MPH)  [facility]; Cardiology  [address] [place] [address]  Phone: [phone]  Fax: [phone]  Follow Up Time:     jatinder ellis  Phone: (   )    -  Fax: (   )    -  Follow Up Time:     Jatinder Ellis ([place])  [place] [address]  Phone: [phone]  Fax: [phone]  Follow Up Time: Ronny Newman (MD; MPH)  [facility]; Cardiology  [address] [place] [address]  Phone: [phone]  Fax: [phone]  Follow Up Time:     Jatinder Johnston)  [place] [address]  Phone: [phone]  Fax: [phone]  Follow Up Time:     jesse Bhat  Phone: [phone]  Fax: (   )    -  Follow Up Time:

## 2019-05-03 DIAGNOSIS — Z71.89 OTHER SPECIFIED COUNSELING: ICD-10-CM

## 2019-05-06 ENCOUNTER — APPOINTMENT (OUTPATIENT)
Dept: CARDIOLOGY | Facility: CLINIC | Age: 79
End: 2019-05-06

## 2019-05-07 ENCOUNTER — APPOINTMENT (OUTPATIENT)
Dept: CARDIOLOGY | Facility: CLINIC | Age: 79
End: 2019-05-07
Payer: MEDICAID

## 2019-05-07 VITALS
OXYGEN SATURATION: 100 % | HEART RATE: 88 BPM | WEIGHT: 172 LBS | DIASTOLIC BLOOD PRESSURE: 88 MMHG | HEIGHT: 65 IN | BODY MASS INDEX: 28.66 KG/M2 | SYSTOLIC BLOOD PRESSURE: 145 MMHG

## 2019-05-07 DIAGNOSIS — I50.9 HEART FAILURE, UNSPECIFIED: ICD-10-CM

## 2019-05-07 PROCEDURE — 99214 OFFICE O/P EST MOD 30 MIN: CPT

## 2019-05-07 RX ORDER — HYDRALAZINE HYDROCHLORIDE 50 MG/1
50 TABLET ORAL
Qty: 90 | Refills: 0 | Status: COMPLETED | COMMUNITY
Start: 2019-05-01

## 2019-05-07 RX ORDER — HYDRALAZINE HYDROCHLORIDE 100 MG/1
100 TABLET ORAL
Qty: 90 | Refills: 0 | Status: COMPLETED | COMMUNITY
Start: 2019-04-25

## 2019-05-07 RX ORDER — FUROSEMIDE 20 MG/1
20 TABLET ORAL
Qty: 30 | Refills: 0 | Status: COMPLETED | COMMUNITY
Start: 2019-05-01

## 2019-05-07 RX ORDER — BUMETANIDE 2 MG/1
2 TABLET ORAL
Qty: 60 | Refills: 0 | Status: COMPLETED | COMMUNITY
Start: 2019-04-25

## 2019-05-07 RX ORDER — ATORVASTATIN CALCIUM 20 MG/1
20 TABLET, FILM COATED ORAL
Qty: 90 | Refills: 0 | Status: COMPLETED | COMMUNITY
Start: 2018-12-20

## 2019-05-08 LAB
ALBUMIN SERPL ELPH-MCNC: 3.7 G/DL
ALP BLD-CCNC: 90 U/L
ALT SERPL-CCNC: 27 U/L
ANION GAP SERPL CALC-SCNC: 11 MMOL/L
AST SERPL-CCNC: 29 U/L
BILIRUB SERPL-MCNC: 0.2 MG/DL
BUN SERPL-MCNC: 25 MG/DL
CALCIUM SERPL-MCNC: 9.3 MG/DL
CHLORIDE SERPL-SCNC: 101 MMOL/L
CO2 SERPL-SCNC: 24 MMOL/L
CREAT SERPL-MCNC: 1.25 MG/DL
GLUCOSE SERPL-MCNC: 128 MG/DL
NT-PROBNP SERPL-MCNC: 4974 PG/ML
POTASSIUM SERPL-SCNC: 4.4 MMOL/L
PROT SERPL-MCNC: 6.4 G/DL
SODIUM SERPL-SCNC: 136 MMOL/L

## 2019-05-10 RX ORDER — FAMOTIDINE 20 MG/1
20 TABLET, FILM COATED ORAL DAILY
Refills: 0 | Status: ACTIVE | COMMUNITY
Start: 2019-05-10

## 2019-05-10 NOTE — HISTORY OF PRESENT ILLNESS
[FreeTextEntry1] : Mr. Toure is a 79y.o. male with no PMH initially  presented with abdominal distention and severe HEAD, found to have new HFrEF (TTE 04/04/2019 with LVEF 25%, biventricular dysfunction, moderate MR, moderate TR, mild OH) and AFIB with RVR, and LA thrombis.  Abdominal discomfort/nausea concerning for hepatic congestion in the setting of ADHF (BNP>50K),  LHC/RHC (non-obstructive CAD). RHC:  RA 15, RV 58/14, PA 59/34/43, Wedge 33 PA sat 44%, CI 1.57. He received tolvaptan, initiated on milrinone and diuretics with improvement in symptoms. He was weaned off milrinone and dc'd home 4/25 on hydral 100mgtid, ISDN 20mg tid and toprol 50mg daily. SCR 1.9.\par Readmit 4/29 with a syncopal episode, he had noted some difficulty speaking and generalized weakness, went to lay down and per family was unresponsive for about 2 minutes. CT head negative. He was hyponatremic and orthostatic on admission, possibly related to high doses of vasodilators and/or overdiuresis, improved after decreasing vasodilators and holding diuretics.\par Dc meds hydralazine to 50mg TID, isordil to 10mg TID. toprol XL 50mg daily. lasix 20mg, viviana 12.5. SCR 1.4 Weight 164lbs\par \par Presents for hospital follow up, states feels weak, denies any sob, sleeps on 2 pillows, no PND.  He denies any chest pain, palpitations, dizziness or syncope. He is eating and sleeping well. Denies any abdominal discomfort or distention. He has not noted any edema. He has started increasing his activity, able to walk a few blocks at a time.  \par \par 4/4/19 TTE EF 20-25% VAUGHN 4.8\par 4/19/19 TTE 40-45% VAUGHN 4.5

## 2019-05-10 NOTE — PHYSICAL EXAM
[General Appearance - Well Developed] : well developed [General Appearance - Well Nourished] : well nourished [Eyelids - No Xanthelasma] : the eyelids demonstrated no xanthelasmas [Normal Oral Mucosa] : normal oral mucosa [] : no respiratory distress [Edema] : no peripheral edema present [Bowel Sounds] : normal bowel sounds [Abdomen Soft] : soft [Nail Clubbing] : no clubbing of the fingernails [Abnormal Walk] : normal gait [Cyanosis, Localized] : no localized cyanosis [Skin Color & Pigmentation] : normal skin color and pigmentation [Skin Turgor] : normal skin turgor [Oriented To Time, Place, And Person] : oriented to person, place, and time [FreeTextEntry1] : Irreg

## 2019-05-10 NOTE — ASSESSMENT
[FreeTextEntry1] : Mr. Toure is a 79y.o. male with no apparent PMH recently admitted and found to have new HFrEF (TTE 04/04/2019 with LVEF 25%, biventricular dysfunction, moderate MR, moderate TR, mild NJ) and AFIB with RVR, and LA thrombis. His EF recovered to 40-45%.  He experienced a syncopal episode, vasodilators and diuretics were decreased with improvement, of note he was on no meds prior to admission.  Today appears stable, euvolemic, hypertensive. Plan discussed with patient and his wife, he is reluctant to increase any medications, because of the previous syncopal episode.

## 2019-05-10 NOTE — DISCUSSION/SUMMARY
[FreeTextEntry1] : CHF\par - Labs today wnl\par - Slow increase Hydral and Toprol\par - Continue lasix 20, and aldactone 12.5\par - Continue daily weights\par - Labs today\par \par Afib/LA Thrombis\par - Continue Toprol 50mg\par - Continue Eliquis 5mg bid\par \par RTC 2 weeks with Dr Hernandez\par \par Spoke to son as per patient request, labs wnl SCR 1.25, K 4.4\par Will trial increase hydral 50-50-75, monitor Bp and symptoms

## 2019-05-10 NOTE — REASON FOR VISIT
[Follow-Up - From Hospitalization] : follow-up of a recent hospitalization for [Heart Failure] : congestive heart failure [Syncope] : syncope [Discharge Date: ___] : Discharge Date: [unfilled]

## 2019-05-14 DIAGNOSIS — Z76.89 PERSONS ENCOUNTERING HEALTH SERVICES IN OTHER SPECIFIED CIRCUMSTANCES: ICD-10-CM

## 2019-05-20 ENCOUNTER — APPOINTMENT (OUTPATIENT)
Dept: CARDIOLOGY | Facility: CLINIC | Age: 79
End: 2019-05-20
Payer: MEDICAID

## 2019-05-20 VITALS
BODY MASS INDEX: 29.16 KG/M2 | HEART RATE: 96 BPM | HEIGHT: 65 IN | SYSTOLIC BLOOD PRESSURE: 136 MMHG | OXYGEN SATURATION: 98 % | DIASTOLIC BLOOD PRESSURE: 95 MMHG | WEIGHT: 175 LBS

## 2019-05-20 DIAGNOSIS — I50.22 CHRONIC SYSTOLIC (CONGESTIVE) HEART FAILURE: ICD-10-CM

## 2019-05-20 DIAGNOSIS — I51.3 INTRACARDIAC THROMBOSIS, NOT ELSEWHERE CLASSIFIED: ICD-10-CM

## 2019-05-20 DIAGNOSIS — E55.9 VITAMIN D DEFICIENCY, UNSPECIFIED: ICD-10-CM

## 2019-05-20 DIAGNOSIS — I50.21 ACUTE SYSTOLIC (CONGESTIVE) HEART FAILURE: ICD-10-CM

## 2019-05-20 DIAGNOSIS — N18.4 CHRONIC KIDNEY DISEASE, STAGE 4 (SEVERE): ICD-10-CM

## 2019-05-20 DIAGNOSIS — Z83.79 FAMILY HISTORY OF OTHER DISEASES OF THE DIGESTIVE SYSTEM: ICD-10-CM

## 2019-05-20 DIAGNOSIS — I48.91 UNSPECIFIED ATRIAL FIBRILLATION: ICD-10-CM

## 2019-05-20 PROCEDURE — 99215 OFFICE O/P EST HI 40 MIN: CPT

## 2019-05-20 RX ORDER — HYDRALAZINE HYDROCHLORIDE 50 MG/1
50 TABLET ORAL
Qty: 90 | Refills: 0 | Status: DISCONTINUED | COMMUNITY
Start: 2019-05-01 | End: 2019-05-20

## 2019-05-20 RX ORDER — ERGOCALCIFEROL 1.25 MG/1
1.25 MG CAPSULE, LIQUID FILLED ORAL
Qty: 5 | Refills: 0 | Status: ACTIVE | COMMUNITY
Start: 2018-12-20 | End: 1900-01-01

## 2019-05-20 RX ORDER — METOPROLOL SUCCINATE 50 MG/1
50 TABLET, EXTENDED RELEASE ORAL DAILY
Qty: 135 | Refills: 1 | Status: ACTIVE | COMMUNITY
Start: 2019-04-25 | End: 1900-01-01

## 2019-05-20 RX ORDER — APIXABAN 5 MG/1
5 TABLET, FILM COATED ORAL TWICE DAILY
Qty: 180 | Refills: 1 | Status: ACTIVE | COMMUNITY
Start: 2019-04-25 | End: 1900-01-01

## 2019-05-20 RX ORDER — ISOSORBIDE DINITRATE 20 MG/1
20 TABLET ORAL
Qty: 90 | Refills: 0 | Status: DISCONTINUED | COMMUNITY
Start: 2019-04-25 | End: 2019-05-20

## 2019-05-20 RX ORDER — FUROSEMIDE 20 MG/1
20 TABLET ORAL
Qty: 90 | Refills: 1 | Status: ACTIVE | COMMUNITY
Start: 2019-05-10 | End: 1900-01-01

## 2019-05-20 RX ORDER — ERGOCALCIFEROL 1.25 MG/1
1.25 MG CAPSULE ORAL
Refills: 0 | Status: COMPLETED | COMMUNITY
Start: 2019-05-10 | End: 2019-05-20

## 2019-05-20 RX ORDER — SPIRONOLACTONE 25 MG/1
25 TABLET ORAL DAILY
Qty: 45 | Refills: 1 | Status: ACTIVE | COMMUNITY
Start: 2019-04-25 | End: 1900-01-01

## 2019-05-21 LAB
24R-OH-CALCIDIOL SERPL-MCNC: 23.5 PG/ML
25(OH)D3 SERPL-MCNC: 22.9 NG/ML
ALBUMIN SERPL ELPH-MCNC: 4 G/DL
ALP BLD-CCNC: 87 U/L
ALT SERPL-CCNC: 20 U/L
ANION GAP SERPL CALC-SCNC: 11 MMOL/L
AST SERPL-CCNC: 20 U/L
BILIRUB SERPL-MCNC: 0.4 MG/DL
BUN SERPL-MCNC: 39 MG/DL
CALCIUM SERPL-MCNC: 9.4 MG/DL
CHLORIDE SERPL-SCNC: 99 MMOL/L
CO2 SERPL-SCNC: 24 MMOL/L
CREAT SERPL-MCNC: 1.44 MG/DL
GLUCOSE SERPL-MCNC: 104 MG/DL
MAGNESIUM SERPL-MCNC: 2.3 MG/DL
NT-PROBNP SERPL-MCNC: 1679 PG/ML
POTASSIUM SERPL-SCNC: 4.6 MMOL/L
PROT SERPL-MCNC: 7.3 G/DL
SODIUM SERPL-SCNC: 134 MMOL/L

## 2019-06-03 ENCOUNTER — OUTPATIENT (OUTPATIENT)
Dept: OUTPATIENT SERVICES | Facility: HOSPITAL | Age: 79
LOS: 1 days | Discharge: HOME | End: 2019-06-03

## 2019-06-03 DIAGNOSIS — D47.2 MONOCLONAL GAMMOPATHY: ICD-10-CM

## 2019-06-06 ENCOUNTER — APPOINTMENT (OUTPATIENT)
Dept: HEMATOLOGY ONCOLOGY | Facility: CLINIC | Age: 79
End: 2019-06-06

## 2019-06-06 DIAGNOSIS — D72.820 LYMPHOCYTOSIS (SYMPTOMATIC): ICD-10-CM

## 2019-06-07 PROBLEM — I50.22 CHRONIC SYSTOLIC HEART FAILURE: Noted: 2019-05-20

## 2019-06-07 PROBLEM — N18.4 CKD (CHRONIC KIDNEY DISEASE) STAGE 4, GFR 15-29 ML/MIN: Status: ACTIVE | Noted: 2019-06-07

## 2019-06-07 PROBLEM — Z83.79 FAMILY HISTORY OF LIVER DISEASE: Status: ACTIVE | Noted: 2019-06-07

## 2019-06-07 PROBLEM — I51.3 ATRIAL THROMBUS: Noted: 2019-05-10

## 2019-06-07 PROBLEM — I51.3 THROMBUS OF ATRIAL APPENDAGE: Status: ACTIVE | Noted: 2019-06-07

## 2019-06-07 PROBLEM — I50.21 ACUTE SYSTOLIC HEART FAILURE, FIRST EPISODE: Status: ACTIVE | Noted: 2019-06-07

## 2019-06-07 PROBLEM — I48.91 ATRIAL FIBRILLATION: Status: ACTIVE | Noted: 2019-05-10

## 2019-06-07 RX ORDER — SACUBITRIL AND VALSARTAN 24; 26 MG/1; MG/1
24-26 TABLET, FILM COATED ORAL
Qty: 180 | Refills: 1 | Status: ACTIVE | COMMUNITY
Start: 2019-05-20 | End: 1900-01-01

## 2019-06-07 NOTE — REASON FOR VISIT
[Follow-Up - Clinic] : a clinic follow-up of [Heart Failure] : congestive heart failure [FreeTextEntry1] : PATIENT INSTRUCTIONS:\par \par 1. STOP taking the HYDRALAZINE and ISOSORBIDE DINITRATE and instead START taking ENTRESTO 24-26 TWICE a day.\par \par 2. Please go for lab work TODAY on the 1st floor.\par \par 3. You must also go for lab work in ONE WEEK and please fax or email to Dr. Lazcano.\par \par 4. Please keep a log of your blood pressure, heart rate, and weight taken daily. You may check your blood pressure and heart rate at a random time, preferably when you are at rest. Your weight should be checked first thing in the morning upon wakening and after using the bathroom. \par \par 5. Since you are moving to [place], Dr. Lazcano will refer you to see Dr. Krystle Blake at [facility].

## 2019-06-07 NOTE — HISTORY OF PRESENT ILLNESS
[FreeTextEntry1] : This is a very pleasant, 78 yo M originally from [place], who is being seen today for routine HF follow-up. He was last seen by the HF NP on 5/7/19 following a discharge from [facility] for volume depletion. He had been originally admitted for acute decompensated HF in April 2019 and found to have severe biventricular dysfunction, LVEF 25%, in AFib with RVR eventually diuresed and discharged home on 4/25 with good doses of GDMT and in improvement in LVEF to 40-45%. He was briefly readmitted for syncope due to volume depletion for which his medications were adjusted. He has done well since this discharge and is here today with his son, a Hospitalist physician in [place].\par \par Currently, he reports feeling very well. He is more active and denies any trouble with walking on flat ground. He has not challenged himself much with inclines or stairs. He denies CP, SOB at rest, orthopnea, PND, LH/dizziness, abdominal discomfort, palpitations, and syncope. His appetite is normal and his bowel and bladder habits are unchanged. He has been limiting fluid and sodium in his diet and taking his medications as directed. He does not use NSAIDs. He does not have an AICD. He has not been admitted to the hospital or seen in the ER for HF since discharge.

## 2019-06-07 NOTE — PHYSICAL EXAM
[General Appearance - Well Developed] : well developed [Eyelids - No Xanthelasma] : the eyelids demonstrated no xanthelasmas [General Appearance - Well Nourished] : well nourished [Well Groomed] : well groomed [Respiration, Rhythm And Depth] : normal respiratory rhythm and effort [No Oral Cyanosis] : no oral cyanosis [Auscultation Breath Sounds / Voice Sounds] : lungs were clear to auscultation bilaterally [Edema] : no peripheral edema present [2+] : left 2+ [Abdomen Tenderness] : non-tender [Abdomen Soft] : soft [Bowel Sounds] : normal bowel sounds [Abnormal Walk] : normal gait [Cyanosis, Localized] : no localized cyanosis [Nail Clubbing] : no clubbing of the fingernails [Oriented To Time, Place, And Person] : oriented to person, place, and time [Skin Turgor] : normal skin turgor [No Venous Stasis] : no venous stasis [Mood] : the mood was normal [Impaired Insight] : insight and judgment were intact [Affect] : the affect was normal [FreeTextEntry1] : Irreg Irreg S1S2, no MRG appreciated

## 2019-06-07 NOTE — DISCUSSION/SUMMARY
[Patient] : the patient [FreeTextEntry2] : and son (physician) [FreeTextEntry1] : 80 yo M with NICM, LVEF 40%, with AFib (SHERRY thrombus), hypertension, and CKD stage 3 who is overall doing well. He is ACC/AHA Stage C, NYHA Class II HF, euvolemic on exam. I have recommended the following:\par \par 1. Acute systolic HF - LVEF improved and may have been depressed in the setting of AF with RVR. Currently doing well, but would switch the hyralazine and ISDN to Entresto 24-26 BID for LVEF 40%. Labs today and again in 7 days after starting Entresto.\par \par 2. CKD stage 3 - Stable on recent labs. Will follow closely with lab work while on Entresto. Avoid nephrotoxins.\par \par 3. AFib - Currently rate controlled on beta blocker and on Eliquis for AC.\par \par 4. Follow-up will be arranged in [place] as the patient is moving permanently. I will refer him to see Dr. Krystle Blake at [facility] in the HF group.

## 2019-07-01 ENCOUNTER — OUTPATIENT (OUTPATIENT)
Dept: OUTPATIENT SERVICES | Facility: HOSPITAL | Age: 79
LOS: 1 days | End: 2019-07-01
Payer: MEDICAID

## 2019-07-01 PROCEDURE — G9005: CPT

## 2019-07-18 NOTE — PROGRESS NOTE ADULT - PROBLEM SELECTOR PLAN 9
Left message for patient to call back     Patient with complaint of frequent belching and epigastric discomfort. No weight loss, melena, hematemesis. Recent IFOBT test neg in 12/2018  - famotidine daily  - consider H. pylori after acute exacerbation treated  - endoscopy/colonoscopy outpatient

## 2019-08-22 ENCOUNTER — RX RENEWAL (OUTPATIENT)
Age: 79
End: 2019-08-22

## 2019-08-23 ENCOUNTER — RX RENEWAL (OUTPATIENT)
Age: 79
End: 2019-08-23

## 2020-02-26 NOTE — PROVIDER CONTACT NOTE (OTHER) - ASSESSMENT
· Continue Carafate and Protonix A&ox4. /107, Hr 90, RR 18, 97% RA. Asymptomatic. No c/o distress or CP.

## 2020-03-23 DIAGNOSIS — Z71.89 OTHER SPECIFIED COUNSELING: ICD-10-CM

## 2020-09-23 NOTE — PATIENT PROFILE ADULT - PATIENT REPRESENTATIVE: ( YOU CAN CHOOSE ANY PERSON THAT CAN ASSIST YOU WITH YOUR HEALTH CARE PREFERENCES, DOES NOT HAVE TO BE A SPOUSE, IMMEDIATE FAMILY OR SIGNIFICANT OTHER/PARTNER)
declines Eye Protection Verbiage: Before proceeding with the stage, a plastic scleral shield was inserted. The globe was anesthetized with a few drops of 1% lidocaine with 1:100,000 epinephrine. Then, an appropriate sized scleral shield was chosen and coated with lacrilube ointment. The shield was gently inserted and left in place for the duration of each stage. After the stage was completed, the shield was gently removed.

## 2021-06-07 NOTE — PROVIDER CONTACT NOTE (OTHER) - NAME OF MD/NP/PA/DO NOTIFIED:
"Writer notified [facility] Dr. Jaramillo VERTIGO-A-AH      " Osiris, NP

## 2022-04-18 NOTE — PROGRESS NOTE ADULT - REASON FOR ADMISSION
Caller: Jaleesa Govea    Relationship to patient: Self    Best call back number: [phone]    New or established patient?  [x] New  [] Established    Date of discharge: 04-16-22    Facility discharged from: [facility]    Diagnosis/Symptoms: BLOOD CLOT IN LEG    Length of stay (If applicable): 1 DAY    Specialty Only: Did you see a [facility] health provider?    [x] Yes  [] No  If so, who? DR BROWN  
dyspnea, abdominal swelling

## 2023-03-05 NOTE — PATIENT PROFILE ADULT - NSPROMEDSBROUGHTTOHOSP_GEN_A_NUR
no US w/ complex L ovarian collection. Successful US and CT guided drainage (10.2Fr). 135 mL purulent fluid aspirated.

## 2023-05-08 NOTE — ED PROVIDER NOTE - MUSCULOSKELETAL NEGATIVE STATEMENT, MLM
no back pain, no gout, no musculoskeletal pain, no neck pain, and no weakness. Mirvaso Counseling: Mirvaso is a topical medication which can decrease superficial blood flow where applied. Side effects are uncommon and include stinging, redness and allergic reactions.

## 2023-10-03 NOTE — PATIENT PROFILE ADULT - DO YOU WANT TO COMPLETE THE HCP AND NAME A HEALTH CARE AGENT
Physician Progress Note      Gio Augustin  CSN #:                  [id number]  :                       1952  ADMIT DATE:       2023 6:09 PM  [facility] DATE:  RESPONDING  PROVIDER #:        Kita Nichole MD          QUERY TEXT:    Patient with a dietician consult and progress note on 10/2 states pt meets   criteria for severe malnutrition. Please further specify if treating the   following: The medical record reflects the following:  Risk Factors:  Inadequate oral intake related to inadequate protein-energy   intake, impaired respiratory function, increase demand for energy/nutrients,   catabolic illness  Clinical Indicators: evidenced by poor intake prior to admission, intake   26-50%, lab values, weight loss greater than or equal to 2% in 1 week  Treatment: dietician consult,  Continue ADULT DIET; Regular; 4 carb choices   per meal diet order. Continue Ensure high-protein with meals. Monitor   appetite, meal intake, and acceptance/intake of ONS. Monitor nutrition-related   labs, bowel function, and weight trends. ASPEN Criteria:    https://aspenjournals. onlinelibrary. garcia. com/doi/full/10.1177/455735415925173  5    Thank you,  Brittnee Montes RN,BSN,CCDS,CRCR  Options provided:  -- Protein calorie malnutrition severe  -- Other - I will add my own diagnosis  -- Disagree - Not applicable / Not valid  -- Disagree - Clinically unable to determine / Unknown  -- Refer to Clinical Documentation Reviewer    PROVIDER RESPONSE TEXT:    This patient has severe protein calorie malnutrition.     Query created by: Brittnee Montes on 10/3/2023 1:29 PM      Electronically signed by:  Kita Nichole MD 10/3/2023 2:58 PM no

## 2023-10-09 NOTE — PROGRESS NOTE ADULT - PROBLEM SELECTOR PROBLEM 7
Please review and sign if agreeable-thanks!  
Leukocytosis
Ascites
Ascites
Coagulopathy
Hyponatremia
Leukocytosis
Leukocytosis
Hyponatremia

## 2023-11-03 NOTE — PROGRESS NOTE ADULT - ASSESSMENT
80 y/o male with no known PMH who presented with abdominal distension and severe HEAD likely 2/2 to new acute on chronic HF, found to be in Afib with RVR, along with transaminitis and ELVA- likely cardiorenal syndrome. No

## 2025-07-13 NOTE — DISCHARGE NOTE NURSING/CASE MANAGEMENT/SOCIAL WORK - NSDPLANG ASIS_GEN_ALL_CORE
"Ángel Rodríguez" [place] was seen and treated in our emergency department on 7/13/2025.  He may return to work on 07/14/2025.       If you have any questions or concerns, please don't hesitate to call.      estephania RN    "
No